# Patient Record
Sex: MALE | Race: BLACK OR AFRICAN AMERICAN | Employment: OTHER | URBAN - METROPOLITAN AREA
[De-identification: names, ages, dates, MRNs, and addresses within clinical notes are randomized per-mention and may not be internally consistent; named-entity substitution may affect disease eponyms.]

---

## 2017-01-09 RX ORDER — VALSARTAN 320 MG/1
320 TABLET ORAL DAILY
Qty: 90 TAB | Refills: 1 | Status: SHIPPED | OUTPATIENT
Start: 2017-01-09 | End: 2017-06-28 | Stop reason: SDUPTHER

## 2017-01-11 DIAGNOSIS — N52.9 ERECTILE DYSFUNCTION, UNSPECIFIED ERECTILE DYSFUNCTION TYPE: ICD-10-CM

## 2017-01-11 RX ORDER — LEVOTHYROXINE SODIUM 50 UG/1
TABLET ORAL
Qty: 90 TAB | Refills: 0 | Status: SHIPPED | OUTPATIENT
Start: 2017-01-11 | End: 2017-04-27 | Stop reason: SDUPTHER

## 2017-01-11 RX ORDER — TADALAFIL 20 MG/1
20 TABLET ORAL
Status: CANCELLED | OUTPATIENT
Start: 2017-01-11

## 2017-01-11 RX ORDER — TADALAFIL 20 MG/1
20 TABLET ORAL
Qty: 12 TAB | Refills: 5 | Status: SHIPPED | OUTPATIENT
Start: 2017-01-11

## 2017-01-11 NOTE — TELEPHONE ENCOUNTER
Requested Prescriptions     Pending Prescriptions Disp Refills    tadalafil (CIALIS) 20 mg tablet 12 Tab 5     Sig: Take 1 Tab by mouth every thirty-six (36) hours. Indications: Erectile Dysfunction    tadalafil (CIALIS) 20 mg tablet       Si Tab.

## 2017-02-21 ENCOUNTER — OFFICE VISIT (OUTPATIENT)
Dept: FAMILY MEDICINE CLINIC | Age: 67
End: 2017-02-21

## 2017-02-21 ENCOUNTER — HOSPITAL ENCOUNTER (OUTPATIENT)
Dept: LAB | Age: 67
Discharge: HOME | End: 2017-02-21
Payer: MEDICARE

## 2017-02-21 VITALS
SYSTOLIC BLOOD PRESSURE: 159 MMHG | TEMPERATURE: 95.6 F | HEART RATE: 86 BPM | HEIGHT: 77 IN | RESPIRATION RATE: 18 BRPM | DIASTOLIC BLOOD PRESSURE: 75 MMHG

## 2017-02-21 DIAGNOSIS — E03.9 HYPOTHYROIDISM, ADULT: ICD-10-CM

## 2017-02-21 DIAGNOSIS — E11.21 TYPE 2 DIABETES MELLITUS WITH DIABETIC NEPHROPATHY, WITH LONG-TERM CURRENT USE OF INSULIN (HCC): ICD-10-CM

## 2017-02-21 DIAGNOSIS — Z79.4 TYPE 2 DIABETES MELLITUS WITH DIABETIC NEPHROPATHY, WITH LONG-TERM CURRENT USE OF INSULIN (HCC): ICD-10-CM

## 2017-02-21 DIAGNOSIS — I10 ESSENTIAL HYPERTENSION: ICD-10-CM

## 2017-02-21 DIAGNOSIS — Z79.4 TYPE 2 DIABETES MELLITUS WITH DIABETIC NEPHROPATHY, WITH LONG-TERM CURRENT USE OF INSULIN (HCC): Primary | ICD-10-CM

## 2017-02-21 DIAGNOSIS — E78.5 DYSLIPIDEMIA: ICD-10-CM

## 2017-02-21 DIAGNOSIS — E11.21 TYPE 2 DIABETES MELLITUS WITH DIABETIC NEPHROPATHY, WITH LONG-TERM CURRENT USE OF INSULIN (HCC): Primary | ICD-10-CM

## 2017-02-21 LAB
ALBUMIN SERPL BCP-MCNC: 3.6 G/DL (ref 3.4–5)
ALBUMIN/GLOB SERPL: 1.2 {RATIO} (ref 0.8–1.7)
ALP SERPL-CCNC: 75 U/L (ref 45–117)
ALT SERPL-CCNC: 39 U/L (ref 16–61)
ANION GAP BLD CALC-SCNC: 8 MMOL/L (ref 3–18)
AST SERPL W P-5'-P-CCNC: 23 U/L (ref 15–37)
BILIRUB SERPL-MCNC: 0.3 MG/DL (ref 0.2–1)
BUN SERPL-MCNC: 23 MG/DL (ref 7–18)
BUN/CREAT SERPL: 15 (ref 12–20)
CALCIUM SERPL-MCNC: 8.8 MG/DL (ref 8.5–10.1)
CHLORIDE SERPL-SCNC: 102 MMOL/L (ref 100–108)
CO2 SERPL-SCNC: 31 MMOL/L (ref 21–32)
CREAT SERPL-MCNC: 1.51 MG/DL (ref 0.6–1.3)
EST. AVERAGE GLUCOSE BLD GHB EST-MCNC: 177 MG/DL
GLOBULIN SER CALC-MCNC: 3.1 G/DL (ref 2–4)
GLUCOSE SERPL-MCNC: 270 MG/DL (ref 74–99)
HBA1C MFR BLD: 7.8 % (ref 4.2–5.6)
POTASSIUM SERPL-SCNC: 4 MMOL/L (ref 3.5–5.5)
PROT SERPL-MCNC: 6.7 G/DL (ref 6.4–8.2)
SODIUM SERPL-SCNC: 141 MMOL/L (ref 136–145)

## 2017-02-21 PROCEDURE — 80053 COMPREHEN METABOLIC PANEL: CPT | Performed by: FAMILY MEDICINE

## 2017-02-21 PROCEDURE — 83036 HEMOGLOBIN GLYCOSYLATED A1C: CPT | Performed by: FAMILY MEDICINE

## 2017-02-21 PROCEDURE — 82043 UR ALBUMIN QUANTITATIVE: CPT | Performed by: FAMILY MEDICINE

## 2017-02-21 NOTE — PROGRESS NOTES
HISTORY OF PRESENT ILLNESS  Crispin Espinoza is a 74 y.o. male. HPI Comments: Patient is here for his three month follow up for DM/htn/hyperlipidemia. He is due for some blood work and has been taking his medications. He has been trying to adhere to his diet, he does not check his sugars at home. Diabetes   The history is provided by the patient. This is a chronic problem. The problem occurs constantly. The problem has not changed since onset. Pertinent negatives include no chest pain, no abdominal pain, no headaches and no shortness of breath. The symptoms are relieved by medications. Treatments tried: currently on trulicity and is doing well. The treatment provided mild relief. Hypertension    The history is provided by the patient. This is a chronic problem. The problem has not changed since onset. Pertinent negatives include no chest pain, no palpitations, no anxiety, no confusion, no malaise/fatigue, no blurred vision, no headaches, no tinnitus, no neck pain, no peripheral edema, no dizziness, no shortness of breath and no nausea. Risk factors include stress, hypertension, obesity, a sedentary lifestyle, male gender, dyslipidemia, diabetes mellitus and family history. Cholesterol Problem   The history is provided by the patient. This is a chronic problem. The problem occurs constantly. The problem has been gradually improving. Pertinent negatives include no chest pain, no abdominal pain, no headaches and no shortness of breath. The symptoms are aggravated by stress. The symptoms are relieved by medications. Treatments tried: currently on meds. Review of Systems   Constitutional: Negative for chills, fever and malaise/fatigue. HENT: Negative for congestion, ear discharge, sore throat and tinnitus. Eyes: Negative for blurred vision, double vision, pain and discharge. Respiratory: Negative for cough, sputum production, shortness of breath and wheezing.     Cardiovascular: Negative for chest pain, palpitations and leg swelling. Gastrointestinal: Negative for abdominal pain, diarrhea and nausea. Genitourinary: Negative for dysuria and frequency. Musculoskeletal: Negative for joint pain and neck pain. Neurological: Negative for dizziness, tingling, focal weakness, weakness and headaches. Psychiatric/Behavioral: Negative for confusion and depression. The patient is not nervous/anxious and does not have insomnia. Visit Vitals    /75 (BP 1 Location: Left arm, BP Patient Position: Sitting)    Pulse 86    Temp 95.6 °F (35.3 °C) (Oral)    Resp 18    Ht 6' 5\" (1.956 m)       Physical Exam   Constitutional: He is oriented to person, place, and time. He appears well-developed and well-nourished. No distress. HENT:   Head: Normocephalic and atraumatic. Right Ear: External ear normal.   Left Ear: External ear normal.   Mouth/Throat: Oropharynx is clear and moist.   Eyes: EOM are normal. Pupils are equal, round, and reactive to light. No scleral icterus. Neck: Normal range of motion. No thyromegaly present. Cardiovascular: Normal rate, regular rhythm and normal heart sounds. Pulmonary/Chest: Effort normal and breath sounds normal. No respiratory distress. He has no wheezes. Abdominal: Soft. Bowel sounds are normal. He exhibits no distension. There is no tenderness. Lymphadenopathy:     He has no cervical adenopathy. Neurological: He is alert and oriented to person, place, and time. Psychiatric: He has a normal mood and affect. ASSESSMENT and PLAN  Diabetes :  1)   Goal HBA1C < 7.  2)   Post prandial blood sugar less than or equal to 180.  3)   Exercise 30 min 3-5 times a week for goal BMI of 25.  4)   Please monitor blood sugars as directed and keep a log to bring in with you at each visit. 5)   Diabetic diet discussed and patient instructions to be handed out. 6)   Goal LDL< 100  7)   Goal BP< 120/80 mmhg.   8)   Annual eye exam and foot exam.  9)   Please examine you feet daily for any skin breakages or ulcers. 10) Referral made to see nutritionist for better dietary guidance. 11) Continue current medications as prescribed. 12) Explained the side effects of medication and patient verbalized understanding. 13) Please avoid smoking , alcohol and illicit drug use if applicable to you.  14) Please have blood work ordered today completed. 15) Please make sure you schedule your routine medical exam every 1-2 years. Hypertension :  1) Goal blood pressure less than equal to 140/90 mmHg, goal BP can vary depending on risk factors as discussed. 2) Lifestyle modifications discussed with patient, low sodium <2 gm, low salt , DASH diet  3) Exercise for at least 30 min 3-5 times a week for goal BMI of less than or equal  To 25.  4) Continue current medications as prescribed. 5) Please begin medication as discussed for better blood pressure control, explained side effects and patient verbalized understanding. 6) Goal LDL<100.  7) Please monitor your blood pressure and keep a log to bring in with you at each visit. 8) Discussed risk factors with patient such as CAD, FAST of stroke symptoms, pt verbalizes understanding. 9) Please avoid smoking , alcohol and any illicit drug use if applicable to you. Hyperlipidemia :  1) Goal LDL less than or equal to 100 mg/dl , total cholesterol less than or equal to 200 mg/dl. 2) Goal blood pressure less than or equal to 140/90 mmHg. 3) Discussed low - cholesterol and low fat diet, Good Fats vs Bad Fats. 4) Exercise 30 min 3-5 times a week for goal BMI of less than or equal to 25.  5) Continue current medication as prescribed for cholesterol control. 6) Explained the side effects of medication and patient verbalized understanding. 7) You may also begin fish oil 1000 mg 3-4 times a day for better cholesterol control. 8) You may also try red yeast rice for cholesterol control. 9) Please drink skim milk if you drink dairy products.    10) Please avoid smoking , alcohol , or any illicit drug use if applicable to you. 11) Regular cholesterol panel to be done every 6-12 months.  12) Discussed attributing risk factors , patient verbalized understanding.

## 2017-02-21 NOTE — MR AVS SNAPSHOT
Visit Information Date & Time Provider Department Dept. Phone Encounter #  
 2/21/2017 10:30 AM Fain Paget, MD [address] [phone] [id number] Follow-up Instructions Return in about 3 months (around 5/21/2017) for dm/htn/hyperlipidemia . Upcoming Health Maintenance Date Due COLONOSCOPY 11/19/2015 Pneumococcal 65+ High/Highest Risk (2 of 2 - PPSV23) 4/18/2017 HEMOGLOBIN A1C Q6M 5/21/2017 MICROALBUMIN Q1 11/21/2017 LIPID PANEL Q1 11/21/2017 MEDICARE YEARLY EXAM 11/22/2017 EYE EXAM RETINAL OR DILATED Q1 12/13/2017 FOOT EXAM Q1 1/17/2018 GLAUCOMA SCREENING Q2Y 12/13/2018 DTaP/Tdap/Td series (2 - Td) 2/21/2027 Allergies as of 2/21/2017  Review Complete On: 2/21/2017 By: Fain Paget, MD  
 No Known Allergies Current Immunizations  Reviewed on 3/3/2015 No immunizations on file. Not reviewed this visit You Were Diagnosed With   
  
 Codes Comments Type 2 diabetes mellitus with diabetic nephropathy, with long-term current use of insulin (HCC)    -  Primary ICD-10-CM: E11.21, Z79.4 ICD-9-CM: 250.40, 583.81, V58.67 Hypothyroidism, adult     ICD-10-CM: E03.9 ICD-9-CM: 101. 9 Dyslipidemia     ICD-10-CM: E78.5 ICD-9-CM: 272.4 Essential hypertension     ICD-10-CM: I10 
ICD-9-CM: 401.9 Vitals BP Pulse Temp Resp Height(growth percentile) Smoking Status 159/75 (BP 1 Location: Left arm, BP Patient Position: Sitting) 86 95.6 °F (35.3 °C) (Oral) 18 6' 5\" (1.956 m) Never Smoker Vitals History Preferred Pharmacy Pharmacy Name Phone [id number], [address] [facility] [phone] Your Updated Medication List  
  
   
This list is accurate as of: 2/21/17 10:59 AM.  Always use your most recent med list.  
  
  
  
  
 allopurinol 100 mg tablet Commonly known as:  Kaia Bee Take 2 Tabs by mouth daily.   
  
 * COLCHICINE PO  
0.6 mg.  
  
 * colchicine 0.6 mg tablet Take 1 Tab by mouth daily as needed. Comp Stocking,Knee,Long,X-Lrg Misc  
2 Each. diclofenac 1 % Gel Commonly known as:  VOLTAREN Apply 2 g to affected area four (4) times daily. diclofenac-miSOPROStol  mg-mcg per tablet Commonly known as:  ARTHROTEC 50 TAKE 1 TAB BY MOUTH TWO (2) TIMES A DAY. * dulaglutide 1.5 mg/0.5 mL sub-q pen Commonly known as:  TRULICITY  
0.5 ML BY SUBCUTANEOUS ROUTE EVERY SEVEN (7) DAYS. * dulaglutide 1.5 mg/0.5 mL sub-q pen Commonly known as:  TRULICITY INJECT 0.5 ML BY SUBCUTANEOUS ROUTE EVERY SEVEN (7) DAYS. Fenofibrate 150 mg Cap Take 145 mg by mouth daily. furosemide 80 mg tablet Commonly known as:  LASIX  
1 tab daily  
  
 gabapentin 300 mg capsule Commonly known as:  NEURONTIN Take 300 mg by mouth nightly. * levothyroxine 50 mcg tablet Commonly known as:  SYNTHROID  
TAKE 1 TAB BY MOUTH DAILY (BEFORE BREAKFAST) FOR 90 DAYS. INDICATIONS: HYPOTHYROIDISM * levothyroxine 50 mcg tablet Commonly known as:  SYNTHROID  
TAKE 1 TAB BY MOUTH DAILY (BEFORE BREAKFAST) FOR 90 DAYS. metoprolol tartrate 50 mg tablet Commonly known as:  LOPRESSOR Take 1 Tab by mouth every twelve (12) hours. potassium chloride 20 mEq tablet Commonly known as:  K-DUR, KLOR-CON Take 1 Tab by mouth daily. rosuvastatin 40 mg tablet Commonly known as:  CRESTOR  
TAKE 1 TAB BY MOUTH NIGHTLY FOR 90 DAYS. * tadalafil 20 mg tablet Commonly known as:  CIALIS  
20 mg.  
  
 * tadalafil 20 mg tablet Commonly known as:  CIALIS Take 1 Tab by mouth every thirty-six (36) hours. Indications: Erectile Dysfunction * testosterone 4 mg/24 hr Pt24 Commonly known as:  ANDRODERM  
1 Patch by TransDERmal route daily. Max Daily Amount: 1 Patch. * testosterone 2 mg/24 hour Pt24 Commonly known as:  ANDRODERM  
1 Patch by TransDERmal route daily. Max Daily Amount: 1 Patch. Indications: Male Hypogonadism Urea 40 % topical foam  
Apply  to affected area two (2) times a day. valsartan 320 mg tablet Commonly known as:  DIOVAN Take 1 Tab by mouth daily. * Notice: This list has 10 medication(s) that are the same as other medications prescribed for you. Read the directions carefully, and ask your doctor or other care provider to review them with you. Follow-up Instructions Return in about 3 months (around 5/21/2017) for dm/htn/hyperlipidemia . Introducing [facility]! Jesus Adames introduces [employer] patient portal. Now you can access parts of your medical record, email your doctor's office, and request medication refills online. 1. In your internet browser, go to [email or web address] 2. Click on the First Time User? Click Here link in the Sign In box. You will see the New Member Sign Up page. 3. Enter your [employer] Access Code exactly as it appears below. You will not need to use this code after youve completed the sign-up process. If you do not sign up before the expiration date, you must request a new code. · [employer] Access Code: [email or web address] Expires: 5/22/2017 10:11 AM 
 
4. Enter the last four digits of your Social Security Number (xxxx) and Date of Birth (mm/dd/yyyy) as indicated and click Submit. You will be taken to the next sign-up page. 5. Create a [employer] ID. This will be your [employer] login ID and cannot be changed, so think of one that is secure and easy to remember. 6. Create a [employer] password. You can change your password at any time. 7. Enter your Password Reset Question and Answer. This can be used at a later time if you forget your password. 8. Enter your e-mail address. You will receive e-mail notification when new information is available in [address] 9. Click Sign Up. You can now view and download portions of your medical record. 10. Click the Download Summary menu link to download a portable copy of your medical information. If you have questions, please visit the Frequently Asked Questions section of the [employer] website. Remember, [employer] is NOT to be used for urgent needs. For medical emergencies, dial 911. Now available from your iPhone and Android! Please provide this summary of care documentation to your next provider. Your primary care clinician is listed as Kelsey Romeo. If you have any questions after today's visit, please call [phone].

## 2017-02-21 NOTE — PROGRESS NOTES
Patient is here for dm and htn. 1. Have you been to the ER, urgent care clinic since your last visit? Hospitalized since your last visit?no    2. Have you seen or consulted any other health care providers outside of the [address] [place] [address] since your last visit? Include any pap smears or colon screening.  no

## 2017-02-22 LAB
CREAT UR-MCNC: 102.11 MG/DL (ref 30–125)
MICROALBUMIN UR-MCNC: 0.6 MG/DL (ref 0–3)
MICROALBUMIN/CREAT UR-RTO: 6 MG/G (ref 0–30)

## 2017-03-12 RX ORDER — DULAGLUTIDE 1.5 MG/.5ML
INJECTION, SOLUTION SUBCUTANEOUS
Qty: 4 SYRINGE | Refills: 0 | Status: SHIPPED | OUTPATIENT
Start: 2017-03-12 | End: 2018-02-05 | Stop reason: SDUPTHER

## 2017-03-20 RX ORDER — DULAGLUTIDE 1.5 MG/.5ML
INJECTION, SOLUTION SUBCUTANEOUS
Qty: 4 SYRINGE | Refills: 0 | Status: SHIPPED | OUTPATIENT
Start: 2017-03-20 | End: 2018-02-01 | Stop reason: SDUPTHER

## 2017-03-23 ENCOUNTER — TELEPHONE (OUTPATIENT)
Dept: FAMILY MEDICINE CLINIC | Age: 67
End: 2017-03-23

## 2017-03-23 NOTE — TELEPHONE ENCOUNTER
Pt states put in for refill for medication trulicity. States has to be 90 day supply in order for insurance to cover medication completley. Pt confirmed pharmacy on file.

## 2017-03-27 NOTE — TELEPHONE ENCOUNTER
[facility] pharmacy has medication Trulicity on hold. States [facility] pharmacy insurance will only cover a 90 day supply. [facility] has been without medication for 1 week now.

## 2017-03-31 RX ORDER — DICLOFENAC SODIUM AND MISOPROSTOL 50; 200 MG/1; UG/1
TABLET, DELAYED RELEASE ORAL
Qty: 90 TAB | Refills: 0 | Status: SHIPPED | OUTPATIENT
Start: 2017-03-31 | End: 2017-04-27 | Stop reason: SDUPTHER

## 2017-04-24 RX ORDER — DICLOFENAC SODIUM AND MISOPROSTOL 50; 200 MG/1; UG/1
TABLET, DELAYED RELEASE ORAL
Qty: 90 TAB | Refills: 0 | Status: SHIPPED | OUTPATIENT
Start: 2017-04-24 | End: 2018-10-03

## 2017-04-25 ENCOUNTER — TELEPHONE (OUTPATIENT)
Dept: FAMILY MEDICINE CLINIC | Age: 67
End: 2017-04-25

## 2017-04-27 RX ORDER — LEVOTHYROXINE SODIUM 50 UG/1
TABLET ORAL
Qty: 90 TAB | Refills: 0 | Status: SHIPPED | OUTPATIENT
Start: 2017-04-27

## 2017-04-27 RX ORDER — DICLOFENAC SODIUM AND MISOPROSTOL 50; 200 MG/1; UG/1
1 TABLET, DELAYED RELEASE ORAL 2 TIMES DAILY
Qty: 180 TAB | Refills: 0 | Status: SHIPPED | OUTPATIENT
Start: 2017-04-27 | End: 2017-08-07 | Stop reason: SDUPTHER

## 2017-04-30 RX ORDER — DICLOFENAC SODIUM AND MISOPROSTOL 50; 200 MG/1; UG/1
TABLET, DELAYED RELEASE ORAL
Qty: 90 TAB | Refills: 0 | Status: SHIPPED | OUTPATIENT
Start: 2017-04-30 | End: 2018-02-01 | Stop reason: SDUPTHER

## 2017-04-30 RX ORDER — LEVOTHYROXINE SODIUM 50 UG/1
TABLET ORAL
Qty: 90 TAB | Refills: 0 | Status: SHIPPED | OUTPATIENT
Start: 2017-04-30 | End: 2017-10-24 | Stop reason: SDUPTHER

## 2017-05-02 ENCOUNTER — OFFICE VISIT (OUTPATIENT)
Dept: FAMILY MEDICINE CLINIC | Age: 67
End: 2017-05-02

## 2017-05-02 VITALS
OXYGEN SATURATION: 100 % | BODY MASS INDEX: 37.19 KG/M2 | WEIGHT: 315 LBS | HEIGHT: 77 IN | TEMPERATURE: 97.4 F | SYSTOLIC BLOOD PRESSURE: 150 MMHG | RESPIRATION RATE: 16 BRPM | DIASTOLIC BLOOD PRESSURE: 62 MMHG | HEART RATE: 86 BPM

## 2017-05-02 DIAGNOSIS — Z13.39 SCREENING FOR ALCOHOLISM: ICD-10-CM

## 2017-05-02 DIAGNOSIS — Z00.00 ROUTINE GENERAL MEDICAL EXAMINATION AT A HEALTH CARE FACILITY: ICD-10-CM

## 2017-05-02 DIAGNOSIS — Z00.00 MEDICARE ANNUAL WELLNESS VISIT, SUBSEQUENT: Primary | ICD-10-CM

## 2017-05-02 NOTE — PROGRESS NOTES
Patient is here for subsequent wellness visit. 1. Have you been to the ER, urgent care clinic since your last visit? Hospitalized since your last visit?no    2. Have you seen or consulted any other health care providers outside of the [address] [place] [address] since your last visit? Include any pap smears or colon screening.  no

## 2017-05-02 NOTE — PROGRESS NOTES
HISTORY OF PRESENT ILLNESS  Jermaine Hines is a 74 y.o. male. HPI Comments: Patient is here for his annual wellness visit and he is up to date on almost all health screening except for colonoscopy which is not yet due. Patient does see an eye doctor and dentist.    Annual Exam   The history is provided by the patient. This is a chronic problem. The problem occurs constantly. The problem has been gradually improving. Pertinent negatives include no chest pain, no abdominal pain, no headaches and no shortness of breath. Nothing aggravates the symptoms. Nothing relieves the symptoms. He has tried nothing for the symptoms. Review of Systems   Constitutional: Negative for chills, fever and malaise/fatigue. Morbidly obese   HENT: Negative for nosebleeds and sore throat. Eyes: Negative for blurred vision, double vision, pain and discharge. Respiratory: Negative for cough, sputum production, shortness of breath and wheezing. Cardiovascular: Positive for leg swelling. Negative for chest pain and palpitations. Chronic lower leg swelling on both legs   Gastrointestinal: Negative for abdominal pain and vomiting. Musculoskeletal: Positive for joint pain. Neurological: Negative for tingling, focal weakness, weakness and headaches. Psychiatric/Behavioral: Negative for depression. The patient is not nervous/anxious. Visit Vitals    /62 (BP 1 Location: Left arm, BP Patient Position: Sitting)    Pulse 86    Temp 97.4 °F (36.3 °C) (Oral)    Resp 16    Ht 6' 5\" (1.956 m)    Wt (!) 458 lb (207.7 kg)    SpO2 100%    BMI 54.31 kg/m2       Physical Exam   Constitutional: He is oriented to person, place, and time. He appears well-developed and well-nourished. No distress. HENT:   Head: Normocephalic and atraumatic.    Right Ear: External ear normal.   Left Ear: External ear normal.   Mouth/Throat: Oropharynx is clear and moist.   Eyes: EOM are normal. Pupils are equal, round, and reactive to light. No scleral icterus. Neck: Normal range of motion. No thyromegaly present. Cardiovascular: Normal rate, regular rhythm and normal heart sounds. Pulmonary/Chest: Effort normal and breath sounds normal. No respiratory distress. He has no wheezes. Abdominal: Soft. Bowel sounds are normal. He exhibits no distension. There is no tenderness. Lymphadenopathy:     He has no cervical adenopathy. Neurological: He is alert and oriented to person, place, and time. Psychiatric: He has a normal mood and affect. This is a Subsequent Medicare Annual Wellness Visit providing Personalized Prevention Plan Services (PPPS) (Performed 12 months after initial AWV and PPPS )    I have reviewed the patient's medical history in detail and updated the computerized patient record. History     Past Medical History:   Diagnosis Date    Anemia of chronic disease     Arthritis     Chronic renal insufficiency     Diabetes ([facility]     Dyslipidemia     Gout     Gout     Hypertension     Lymphedema     Morbid obesity ([facility]       Past Surgical History:   Procedure Laterality Date    HX ORTHOPAEDIC  3/2015     Lt Foot ulcer     Current Outpatient Prescriptions   Medication Sig Dispense Refill    levothyroxine (SYNTHROID) 50 mcg tablet TAKE 1 TAB BY MOUTH DAILY (BEFORE BREAKFAST) FOR 90 DAYS. 90 Tab 0    diclofenac-miSOPROStol (ARTHROTEC 50)  mg-mcg per tablet TAKE 1 TABLET BY MOUTH TWICE A DAY 90 Tab 0    levothyroxine (SYNTHROID) 50 mcg tablet TAKE 1 TAB BY MOUTH DAILY (BEFORE BREAKFAST) FOR 90 DAYS. 90 Tab 0    diclofenac-miSOPROStol (ARTHROTEC 50)  mg-mcg per tablet Take 1 Tab by mouth two (2) times a day. 180 Tab 0    diclofenac-miSOPROStol (ARTHROTEC 50)  mg-mcg per tablet TAKE 1 TABLET BY MOUTH TWO (2) TIMES A DAY.  90 Tab 0    TRULICITY 1.5 HR/3.5 mL sub-q pen INJECT 0.5 ML BY SUBCUTANEOUS ROUTE EVERY SEVEN (7) DAYS. 4 Syringe 0    TRULICITY 1.5 YZ/3.8 mL sub-q pen INJECT 0.5 ML BY SUBCUTANEOUS ROUTE EVERY SEVEN (7) DAYS. 4 Syringe 0    tadalafil (CIALIS) 20 mg tablet Take 1 Tab by mouth every thirty-six (36) hours. Indications: Erectile Dysfunction 12 Tab 5    valsartan (DIOVAN) 320 mg tablet Take 1 Tab by mouth daily. 90 Tab 1    levothyroxine (SYNTHROID) 50 mcg tablet TAKE 1 TAB BY MOUTH DAILY (BEFORE BREAKFAST) FOR 90 DAYS. INDICATIONS: HYPOTHYROIDISM 90 Tab 0    potassium chloride (K-DUR, KLOR-CON) 20 mEq tablet Take 1 Tab by mouth daily. 90 Tab 3    diclofenac (VOLTAREN) 1 % gel Apply 2 g to affected area four (4) times daily. 2 g 0    rosuvastatin (CRESTOR) 40 mg tablet TAKE 1 TAB BY MOUTH NIGHTLY FOR 90 DAYS. 90 Tab 1    testosterone (ANDRODERM) 2 mg/24 hour pt24 1 Patch by TransDERmal route daily. Max Daily Amount: 1 Patch. Indications: Male Hypogonadism 90 Patch 1    testosterone (ANDRODERM) 4 mg/24 hr pt24 1 Patch by TransDERmal route daily. Max Daily Amount: 1 Patch. 60 Patch 0    dulaglutide (TRULICITY) 1.5 MZ/4.1 mL sub-q pen 0.5 ML BY SUBCUTANEOUS ROUTE EVERY SEVEN (7) DAYS. 7.5 Syringe 0    Comp Stocking,Knee,Long,X-Lrg misc 2 Each.  tadalafil (CIALIS, ADCIRCA) 20 mg tablet 20 mg.      COLCHICINE PO 0.6 mg.      colchicine 0.6 mg tablet Take 1 Tab by mouth daily as needed. 30 Tab 0    furosemide (LASIX) 80 mg tablet 1 tab daily 90 Tab 3    gabapentin (NEURONTIN) 300 mg capsule Take 300 mg by mouth nightly.  Urea 40 % topical foam Apply  to affected area two (2) times a day. 1 Can 0    allopurinol (ZYLOPRIM) 100 mg tablet Take 2 Tabs by mouth daily. 30 Tab 0    metoprolol (LOPRESSOR) 50 mg tablet Take 1 Tab by mouth every twelve (12) hours. 60 Tab 0    Fenofibrate 150 mg cap Take 145 mg by mouth daily.        No Known Allergies  Family History   Problem Relation Age of Onset   [facility] Cancer Mother     No Known Problems Father      Social History   Substance Use Topics    Smoking status: Never Smoker    Smokeless tobacco: Not on file    Alcohol use No     Patient Active Problem List   Diagnosis Code    Diabetic foot ulcer ([facility] E11.621, K15.467    Diastolic dysfunction E08.7    Dyslipidemia E78.5    Cellulitis and abscess of foot, except toes L03.119, L02.619    Lymphedema of both lower extremities I89.0    CKD (chronic kidney disease) stage 4, GFR 15-29 ml/min ([facility]) N18.4    Morbid obesity with BMI of 70 and over, adult ([facility] E66.01, Z68.45    Type 2 diabetes mellitus with diabetic nephropathy ([facility] E11.21    Erectile dysfunction N52.9    Hypothyroidism, adult E03.9    Hypogonadism in male E29.1    Borderline anemia D64.9    Idiopathic gout of multiple sites M10.09       Depression Risk Factor Screening:     PHQ 2 / 9, over the last two weeks 5/2/2017   Little interest or pleasure in doing things Not at all   Feeling down, depressed or hopeless Not at all   Total Score PHQ 2 0     Alcohol Risk Factor Screening:   Patient does not drink alcohol. Functional Ability and Level of Safety:   Denies difficulty walking or getting around. The patient does not use an assistive device. The patient has no difficulty sitting down or standing from a seated position. The patient denies any difficulty getting out of bed and denies any difficulty getting out of bath or shower. The patient has no trouble toileting and is experiencing no incontinence. The patient uses no incontinence products. Patient has no difficulty preparing balanced nutritious meals , groms and is able to dress and undress without any difficulty. The patient has no difficulty taking prescribed medications properly and has no difficulty with personal hygiene such as brushing teeth , bathing , or showering. Hearing Loss   Good hearing on whisper testing , 2 feel behind the patient. Activities of Daily Living   Denies any difficulty with any indoor household activities and denies experiencing any short term memory loss.       Fall Risk     Fall Risk Assessment, last [facility] 5/2/2017   Able to walk? Yes   Fall in past 12 months? No     Abuse Screen   Patient is not abused    Review of Systems   See above    Physical Examination   See above  Evaluation of Cognitive Function:  The patient has not lost interest in activities. Has no feelings of hopelessness. Sleep hygiene is good and has good daily energy. Appetite is also appropriate. Does not feel bad about herself, has no trouble concentrating. No suicidal ideation. Patient Care Team:  Idalia Vasquez MD as PCP - General (Internal Medicine)  Thelma Castillo MD (Inactive) as Physician (Ophthalmology)    Advice/Referrals/Counseling   Education and counseling provided:  End-of-Life planning (with patient's consent)  Pneumococcal Vaccine  Influenza Vaccine  Hepatitis B Vaccine  Prostate cancer screening tests (PSA, covered annually)  Colorectal cancer screening tests  Cardiovascular screening blood test  Bone mass measurement (DEXA)  Screening for glaucoma  Diabetes screening test      Assessment/Plan     Medicare annual wellness , subsequent:  1) Please make sure you have a routine physical exam every 1-2 years. 2) Annual check up with eye doctor and dentist.  3) Colorectal cancer screening with colonoscopy every ten years at age 48. Depending on certain risk factors screening may need to be done earlier. 4) Rectal exam and PSA screening at age of 50, screening may be done earlier depending on certain risk factors as discussed.    ) Bone density testing starting at the age of 72.   ) Routine blood work to be ordered as part of physical exam and has been discussed with patient.  ) Screening for STD's/HIV.  ) Exercise at least 30 min 3-5 times a week for goal BMI of less than or equal to 25.    Please make sure you wear a seat belt while driving daily , helmet safety discussed.  ) Please avoid smoking , alcohol and illicit drug use.  ) Daily requirement of calcium is 1200 mg per day and 1000 IU of vitamin D.  ) Please make sure all immunizations are up to date:       - Influenza vaccine every year        - Tdap every 10 years       - Pneumococcal vaccine starting at age of 72       - Shingles at age 63

## 2017-05-02 NOTE — MR AVS SNAPSHOT
Visit Information Date & Time Provider Department Dept. Phone Encounter #  
 5/2/2017 10:30 AM Artem De La Cruz MD [address] [facility] [address] [phone] [id number] Follow-up Instructions Return in about 1 month (around 6/2/2017) for dm/htn/hyperlipidemia/thyroid/testosterone and will need blood work done then. Follow-up and Disposition History Upcoming Health Maintenance Date Due COLONOSCOPY 11/19/2015 INFLUENZA AGE 9 TO ADULT 8/1/2017 HEMOGLOBIN A1C Q6M 8/21/2017 LIPID PANEL Q1 11/21/2017 MEDICARE YEARLY EXAM 11/22/2017 EYE EXAM RETINAL OR DILATED Q1 12/13/2017 FOOT EXAM Q1 1/17/2018 MICROALBUMIN Q1 2/21/2018 Pneumococcal 65+ Low/Medium Risk (2 of 2 - PPSV23) 5/2/2018 GLAUCOMA SCREENING Q2Y 12/13/2018 DTaP/Tdap/Td series (2 - Td) 2/21/2027 Allergies as of 5/2/2017  Review Complete On: 5/2/2017 By: Armando Moore LPN No Known Allergies Current Immunizations  Reviewed on 3/3/2015 No immunizations on file. Not reviewed this visit You Were Diagnosed With   
  
 Codes Comments Medicare annual wellness visit, subsequent    -  Primary ICD-10-CM: Z00.00 ICD-9-CM: V70.0 Routine general medical examination at a health care facility     ICD-10-CM: Z00.00 ICD-9-CM: V70.0 Screening for alcoholism     ICD-10-CM: Z13.89 ICD-9-CM: V79.1 Vitals BP Pulse Temp Resp Height(growth percentile) Weight(growth percentile) 150/62 (BP 1 Location: Left arm, BP Patient Position: Sitting) 86 97.4 °F (36.3 °C) (Oral) 16 6' 5\" (1.956 m) (!) 458 lb (207.7 kg) SpO2 BMI Smoking Status 100% 54.31 kg/m2 Never Smoker Vitals History BMI and BSA Data Body Mass Index Body Surface Area 54.31 kg/m 2 3.36 m 2 Preferred Pharmacy Pharmacy Name Phone [id number] Krzysztof Faulkner, [address] [facility] [phone] Your Updated Medication List  
  
   
 This list is accurate as of: 5/2/17 10:44 AM.  Always use your most recent med list.  
  
  
  
  
 allopurinol 100 mg tablet Commonly known as:  Ritta Popper Take 2 Tabs by mouth daily. * COLCHICINE PO  
0.6 mg.  
  
 * colchicine 0.6 mg tablet Take 1 Tab by mouth daily as needed. Comp Stocking,Knee,Long,X-Lrg Misc  
2 Each. diclofenac 1 % Gel Commonly known as:  VOLTAREN Apply 2 g to affected area four (4) times daily. * diclofenac-miSOPROStol  mg-mcg per tablet Commonly known as:  ARTHROTEC 50 TAKE 1 TABLET BY MOUTH TWO (2) TIMES A DAY. * diclofenac-miSOPROStol  mg-mcg per tablet Commonly known as:  ARTHROTEC 50 Take 1 Tab by mouth two (2) times a day. * diclofenac-miSOPROStol  mg-mcg per tablet Commonly known as:  ARTHROTEC 50 TAKE 1 TABLET BY MOUTH TWICE A DAY  
  
 * dulaglutide 1.5 mg/0.5 mL sub-q pen Commonly known as:  TRULICITY  
0.5 ML BY SUBCUTANEOUS ROUTE EVERY SEVEN (7) DAYS. * TRULICITY 1.5 AP/1.1 mL sub-q pen Generic drug:  dulaglutide INJECT 0.5 ML BY SUBCUTANEOUS ROUTE EVERY SEVEN (7) DAYS. * TRULICITY 1.5 GI/8.1 mL sub-q pen Generic drug:  dulaglutide INJECT 0.5 ML BY SUBCUTANEOUS ROUTE EVERY SEVEN (7) DAYS. Fenofibrate 150 mg Cap Take 145 mg by mouth daily. furosemide 80 mg tablet Commonly known as:  LASIX  
1 tab daily  
  
 gabapentin 300 mg capsule Commonly known as:  NEURONTIN Take 300 mg by mouth nightly. * levothyroxine 50 mcg tablet Commonly known as:  SYNTHROID  
TAKE 1 TAB BY MOUTH DAILY (BEFORE BREAKFAST) FOR 90 DAYS. INDICATIONS: HYPOTHYROIDISM * levothyroxine 50 mcg tablet Commonly known as:  SYNTHROID  
TAKE 1 TAB BY MOUTH DAILY (BEFORE BREAKFAST) FOR 90 DAYS. * levothyroxine 50 mcg tablet Commonly known as:  SYNTHROID  
TAKE 1 TAB BY MOUTH DAILY (BEFORE BREAKFAST) FOR 90 DAYS. metoprolol tartrate 50 mg tablet Commonly known as:  LOPRESSOR Take 1 Tab by mouth every twelve (12) hours. potassium chloride 20 mEq tablet Commonly known as:  K-DUR, KLOR-CON Take 1 Tab by mouth daily. rosuvastatin 40 mg tablet Commonly known as:  CRESTOR  
TAKE 1 TAB BY MOUTH NIGHTLY FOR 90 DAYS. * tadalafil 20 mg tablet Commonly known as:  CIALIS  
20 mg.  
  
 * tadalafil 20 mg tablet Commonly known as:  CIALIS Take 1 Tab by mouth every thirty-six (36) hours. Indications: Erectile Dysfunction * testosterone 4 mg/24 hr Pt24 Commonly known as:  ANDRODERM  
1 Patch by TransDERmal route daily. Max Daily Amount: 1 Patch. * testosterone 2 mg/24 hour Pt24 Commonly known as:  ANDRODERM  
1 Patch by TransDERmal route daily. Max Daily Amount: 1 Patch. Indications: Male Hypogonadism Urea 40 % topical foam  
Apply  to affected area two (2) times a day. valsartan 320 mg tablet Commonly known as:  DIOVAN Take 1 Tab by mouth daily. * Notice: This list has 15 medication(s) that are the same as other medications prescribed for you. Read the directions carefully, and ask your doctor or other care provider to review them with you. Follow-up Instructions Return in about 1 month (around 6/2/2017) for dm/htn/hyperlipidemia/thyroid/testosterone and will need blood work done then. Introducing [facility]! [employer] introduces [email or web address] patient portal. Now you can access parts of your medical record, email your doctor's office, and request medication refills online. 1. In your internet browser, go to [email or web address] 2. Click on the First Time User? Click Here link in the Sign In box. You will see the New Member Sign Up page. 3. Enter your [email or web address] Access Code exactly as it appears below. You will not need to use this code after youve completed the sign-up process. If you do not sign up before the expiration date, you must request a new code. · 1DayLater Access Code: [email or web address] Expires: 5/22/2017 11:11 AM 
 
4. Enter the last four digits of your Social Security Number (xxxx) and Date of Birth (mm/dd/yyyy) as indicated and click Submit. You will be taken to the next sign-up page. 5. Create a 1DayLater ID. This will be your 1DayLater login ID and cannot be changed, so think of one that is secure and easy to remember. 6. Create a 1DayLater password. You can change your password at any time. 7. Enter your Password Reset Question and Answer. This can be used at a later time if you forget your password. 8. Enter your e-mail address. You will receive e-mail notification when new information is available in [address] 9. Click Sign Up. You can now view and download portions of your medical record. 10. Click the Download Summary menu link to download a portable copy of your medical information. If you have questions, please visit the Frequently Asked Questions section of the 1DayLater website. Remember, 1DayLater is NOT to be used for urgent needs. For medical emergencies, dial 911. Now available from your iPhone and Android! Please provide this summary of care documentation to your next provider. Your primary care clinician is listed as Kelsey Urbina. If you have any questions after today's visit, please call [phone].

## 2017-06-01 RX ORDER — TESTOSTERONE 2 MG/D
PATCH TRANSDERMAL
Qty: 90 PATCH | Refills: 3 | Status: SHIPPED | OUTPATIENT
Start: 2017-06-01 | End: 2017-06-05 | Stop reason: SDUPTHER

## 2017-06-02 ENCOUNTER — TELEPHONE (OUTPATIENT)
Dept: FAMILY MEDICINE CLINIC | Age: 67
End: 2017-06-02

## 2017-06-05 ENCOUNTER — OFFICE VISIT (OUTPATIENT)
Dept: FAMILY MEDICINE CLINIC | Age: 67
End: 2017-06-05

## 2017-06-05 VITALS
HEART RATE: 87 BPM | HEIGHT: 77 IN | TEMPERATURE: 96.7 F | OXYGEN SATURATION: 96 % | DIASTOLIC BLOOD PRESSURE: 95 MMHG | WEIGHT: 315 LBS | RESPIRATION RATE: 17 BRPM | SYSTOLIC BLOOD PRESSURE: 153 MMHG | BODY MASS INDEX: 37.19 KG/M2

## 2017-06-05 DIAGNOSIS — Z76.0 MEDICATION REFILL: ICD-10-CM

## 2017-06-05 DIAGNOSIS — M10.09 IDIOPATHIC GOUT OF MULTIPLE SITES, UNSPECIFIED CHRONICITY: ICD-10-CM

## 2017-06-05 DIAGNOSIS — E11.21 TYPE 2 DIABETES MELLITUS WITH DIABETIC NEPHROPATHY, WITH LONG-TERM CURRENT USE OF INSULIN (HCC): Primary | ICD-10-CM

## 2017-06-05 DIAGNOSIS — E03.9 HYPOTHYROIDISM, ADULT: ICD-10-CM

## 2017-06-05 DIAGNOSIS — E78.5 DYSLIPIDEMIA: ICD-10-CM

## 2017-06-05 DIAGNOSIS — Z79.4 TYPE 2 DIABETES MELLITUS WITH DIABETIC NEPHROPATHY, WITH LONG-TERM CURRENT USE OF INSULIN (HCC): Primary | ICD-10-CM

## 2017-06-05 DIAGNOSIS — E29.1 HYPOGONADISM IN MALE: ICD-10-CM

## 2017-06-05 RX ORDER — COLCHICINE 0.6 MG/1
0.6 TABLET ORAL
Qty: 30 TAB | Refills: 0 | Status: SHIPPED | OUTPATIENT
Start: 2017-06-05 | End: 2017-06-28 | Stop reason: SDUPTHER

## 2017-06-05 NOTE — MR AVS SNAPSHOT
Visit Information Date & Time Provider Department Dept. Phone Encounter #  
 6/5/2017  8:45 AM Helio Harmon MD [address] [facility] [phone] [id number] Your Appointments 9/5/2017 10:15 AM  
ROUTINE CARE with Helio Harmon MD  
[address] [facility] [address]) [address] [facility] [address] [place] [address]  
[phone]  
  
   
 [address] [facility] [address] Upcoming Health Maintenance Date Due COLONOSCOPY 11/19/2015 INFLUENZA AGE 9 TO ADULT 8/1/2017 HEMOGLOBIN A1C Q6M 8/21/2017 LIPID PANEL Q1 11/21/2017 EYE EXAM RETINAL OR DILATED Q1 12/13/2017 FOOT EXAM Q1 1/17/2018 MICROALBUMIN Q1 2/21/2018 Pneumococcal 65+ Low/Medium Risk (2 of 2 - PPSV23) 5/2/2018 MEDICARE YEARLY EXAM 5/3/2018 GLAUCOMA SCREENING Q2Y 12/13/2018 DTaP/Tdap/Td series (2 - Td) 2/21/2027 Allergies as of 6/5/2017  Review Complete On: 5/2/2017 By: Jayce Noonan LPN No Known Allergies Current Immunizations  Reviewed on 3/3/2015 No immunizations on file. Not reviewed this visit You Were Diagnosed With   
  
 Codes Comments Type 2 diabetes mellitus with diabetic nephropathy, with long-term current use of insulin (HCC)    -  Primary ICD-10-CM: E11.21, Z79.4 ICD-9-CM: 250.40, 583.81, V58.67 Dyslipidemia     ICD-10-CM: E78.5 ICD-9-CM: 272.4 Hypogonadism in male     ICD-10-CM: E29.1 ICD-9-CM: 257.2 Idiopathic gout of multiple sites, unspecified chronicity     ICD-10-CM: M10.09 
ICD-9-CM: 274.00 Medication refill     ICD-10-CM: Z76.0 ICD-9-CM: V68.1 Hypothyroidism, adult     ICD-10-CM: E03.9 ICD-9-CM: 130. 9 Vitals BP Pulse Temp Resp Height(growth percentile) Weight(growth percentile) (!) 153/95 (BP 1 Location: Left arm, BP Patient Position: Sitting) 87 96.7 °F (35.9 °C) (Oral) 17 6' 5\" (1.956 m) (!) 463 lb 3.2 oz (210.1 kg) SpO2 BMI Smoking Status 96% 54.93 kg/m2 Never Smoker BMI and BSA Data Body Mass Index Body Surface Area 54.93 kg/m 2 3.38 m 2 Preferred Pharmacy Pharmacy Name Phone [id number] Andie Hastings, [address] [facility] [phone] Your Updated Medication List  
  
   
This list is accurate as of: 6/5/17  9:23 AM.  Always use your most recent med list.  
  
  
  
  
 allopurinol 100 mg tablet Commonly known as:  Usman Hurt Take 2 Tabs by mouth daily. * COLCHICINE PO  
0.6 mg.  
  
 * colchicine 0.6 mg tablet Take 1 Tab by mouth daily as needed. Comp Stocking,Knee,Long,X-Lrg Misc  
2 Each. diclofenac 1 % Gel Commonly known as:  VOLTAREN Apply 2 g to affected area four (4) times daily. * diclofenac-miSOPROStol  mg-mcg per tablet Commonly known as:  ARTHROTEC 50 TAKE 1 TABLET BY MOUTH TWO (2) TIMES A DAY. * diclofenac-miSOPROStol  mg-mcg per tablet Commonly known as:  ARTHROTEC 50 Take 1 Tab by mouth two (2) times a day. * diclofenac-miSOPROStol  mg-mcg per tablet Commonly known as:  ARTHROTEC 50 TAKE 1 TABLET BY MOUTH TWICE A DAY  
  
 * dulaglutide 1.5 mg/0.5 mL sub-q pen Commonly known as:  TRULICITY  
0.5 ML BY SUBCUTANEOUS ROUTE EVERY SEVEN (7) DAYS. * TRULICITY 1.5 DZ/8.4 mL sub-q pen Generic drug:  dulaglutide INJECT 0.5 ML BY SUBCUTANEOUS ROUTE EVERY SEVEN (7) DAYS. * TRULICITY 1.5 NO/3.6 mL sub-q pen Generic drug:  dulaglutide INJECT 0.5 ML BY SUBCUTANEOUS ROUTE EVERY SEVEN (7) DAYS. Fenofibrate 150 mg Cap Take 145 mg by mouth daily. furosemide 80 mg tablet Commonly known as:  LASIX  
1 tab daily  
  
 gabapentin 300 mg capsule Commonly known as:  NEURONTIN Take 300 mg by mouth nightly. * levothyroxine 50 mcg tablet Commonly known as:  SYNTHROID  
TAKE 1 TAB BY MOUTH DAILY (BEFORE BREAKFAST) FOR 90 DAYS.  INDICATIONS: HYPOTHYROIDISM * levothyroxine 50 mcg tablet Commonly known as:  SYNTHROID  
TAKE 1 TAB BY MOUTH DAILY (BEFORE BREAKFAST) FOR 90 DAYS. * levothyroxine 50 mcg tablet Commonly known as:  SYNTHROID  
TAKE 1 TAB BY MOUTH DAILY (BEFORE BREAKFAST) FOR 90 DAYS. metoprolol tartrate 50 mg tablet Commonly known as:  LOPRESSOR Take 1 Tab by mouth every twelve (12) hours. potassium chloride 20 mEq tablet Commonly known as:  K-DUR, KLOR-CON Take 1 Tab by mouth daily. rosuvastatin 40 mg tablet Commonly known as:  CRESTOR  
TAKE 1 TAB BY MOUTH NIGHTLY FOR 90 DAYS. * tadalafil 20 mg tablet Commonly known as:  CIALIS  
20 mg.  
  
 * tadalafil 20 mg tablet Commonly known as:  CIALIS Take 1 Tab by mouth every thirty-six (36) hours. Indications: Erectile Dysfunction * testosterone 4 mg/24 hr Pt24 Commonly known as:  ANDRODERM  
1 Patch by TransDERmal route daily. Max Daily Amount: 1 Patch. * testosterone 2 mg/24 hour Pt24 Commonly known as:  ANDRODERM  
APPLY 1 PATCH TRANSDERMALLY ONCE DAILY Urea 40 % topical foam  
Apply  to affected area two (2) times a day. valsartan 320 mg tablet Commonly known as:  DIOVAN Take 1 Tab by mouth daily. * Notice: This list has 15 medication(s) that are the same as other medications prescribed for you. Read the directions carefully, and ask your doctor or other care provider to review them with you. Prescriptions Printed Refills  
 testosterone (ANDRODERM) 2 mg/24 hour pt24 3 Sig: APPLY 1 PATCH TRANSDERMALLY ONCE DAILY Class: Print Prescriptions Sent to Pharmacy Refills  
 colchicine 0.6 mg tablet 0 Sig: Take 1 Tab by mouth daily as needed. Class: Normal  
 Pharmacy: [facility]acy #[id number] [place], [address] Ph #: [phone] Route: Oral  
  
Introducing [facility]!    
 [employer] introduces [employer] patient portal. Now you can access parts of your medical record, email your doctor's office, and request medication refills online. 1. In your internet browser, go to [email or web address] 2. Click on the First Time User? Click Here link in the Sign In box. You will see the New Member Sign Up page. 3. Enter your [employer] Access Code exactly as it appears below. You will not need to use this code after youve completed the sign-up process. If you do not sign up before the expiration date, you must request a new code. · [employer] Access Code: [email or web address] Expires: 9/3/2017  9:23 AM 
 
4. Enter the last four digits of your Social Security Number (xxxx) and Date of Birth (mm/dd/yyyy) as indicated and click Submit. You will be taken to the next sign-up page. 5. Create a [employer] ID. This will be your [employer] login ID and cannot be changed, so think of one that is secure and easy to remember. 6. Create a [employer] password. You can change your password at any time. 7. Enter your Password Reset Question and Answer. This can be used at a later time if you forget your password. 8. Enter your e-mail address. You will receive e-mail notification when new information is available in [address] 9. Click Sign Up. You can now view and download portions of your medical record. 10. Click the Download Summary menu link to download a portable copy of your medical information. If you have questions, please visit the Frequently Asked Questions section of the [employer] website. Remember, [employer] is NOT to be used for urgent needs. For medical emergencies, dial 911. Now available from your iPhone and Android! Please provide this summary of care documentation to your next provider. Your primary care clinician is listed as Kelsey Raymond. If you have any questions after today's visit, please call [phone].

## 2017-06-05 NOTE — PROGRESS NOTES
Cesilia Javier is a 74 y.o. male presents to office for DM, HTN, cholesterol and thyroid problem.       1. Have you been to the ER, urgent care clinic or hospitalized since your last visit? no          Health Maintenance items with a due date reviewed with patient:  Health Maintenance Due   Topic Date Due    COLONOSCOPY  11/19/2015

## 2017-06-05 NOTE — PROGRESS NOTES
HISTORY OF PRESENT ILLNESS  Vin Mistry is a 74 y.o. male. HPI Comments: Patient is here for his 3 month follow up for Dm/Htn and hyperlipidemia. He mentions he will make an appointment to see an eye doctor and he also is due to see some of his specialist. He has been taking his medications and does not check his blood sugars on a daily basis. He is due for some blood work today. He has no  Other complaints. Diabetes   The history is provided by the patient. This is a chronic problem. The problem occurs constantly. The problem has been gradually improving. Pertinent negatives include no chest pain, no abdominal pain, no headaches and no shortness of breath. The symptoms are aggravated by eating and stress. The symptoms are relieved by medications. Treatments tried: currently on meds. Hypertension    The history is provided by the patient. This is a chronic problem. The problem has not changed since onset. Pertinent negatives include no chest pain, no palpitations, no confusion, no malaise/fatigue, no blurred vision, no headaches, no neck pain, no peripheral edema, no dizziness, no shortness of breath, no nausea and no vomiting. Risk factors include obesity, hypertension, stress, male gender, diabetes mellitus and dyslipidemia. Cholesterol Problem   The history is provided by the patient. This is a chronic problem. The problem occurs constantly. The problem has been gradually worsening. Pertinent negatives include no chest pain, no abdominal pain, no headaches and no shortness of breath. The symptoms are aggravated by stress and eating. The symptoms are relieved by medications. Treatments tried: currently on meds. The treatment provided mild relief. Gout   The history is provided by the patient. This is a chronic problem. The problem occurs constantly. The problem has been gradually worsening. Pertinent negatives include no chest pain, no abdominal pain, no headaches and no shortness of breath.  The symptoms are aggravated by stress. The symptoms are relieved by medications. Treatments tried: currently on meds. The treatment provided significant relief. Other   The history is provided by the patient. This is a chronic problem. The problem occurs constantly. The problem has not changed since onset. Pertinent negatives include no chest pain, no abdominal pain, no headaches and no shortness of breath. The symptoms are aggravated by stress. Relieved by: testosterone patches. Treatments tried: currently on meds. Thyroid Problem   The history is provided by the patient. This is a chronic problem. The problem occurs constantly. The problem has been gradually improving. Pertinent negatives include no chest pain, no abdominal pain, no headaches and no shortness of breath. The symptoms are aggravated by stress. The symptoms are relieved by medications. Treatments tried: currently on meds. The treatment provided moderate relief. Review of Systems   Constitutional: Negative for chills, fever and malaise/fatigue. HENT: Negative for congestion, ear discharge and sore throat. Eyes: Negative for blurred vision, double vision, pain and discharge. Respiratory: Negative for shortness of breath. Cardiovascular: Negative for chest pain, palpitations and leg swelling. Gastrointestinal: Negative for abdominal pain, blood in stool, constipation, nausea and vomiting. Genitourinary: Negative for dysuria, frequency and hematuria. Musculoskeletal: Positive for gout. Negative for joint pain and neck pain. Neurological: Negative for dizziness, tingling, focal weakness and headaches. Psychiatric/Behavioral: Negative for confusion and depression.      Visit Vitals    BP (!) 153/95 (BP 1 Location: Left arm, BP Patient Position: Sitting)    Pulse 87    Temp 96.7 °F (35.9 °C) (Oral)    Resp 17    Ht 6' 5\" (1.956 m)    Wt (!) 463 lb 3.2 oz (210.1 kg)    SpO2 96%    BMI 54.93 kg/m2       Physical Exam Constitutional: He is oriented to person, place, and time. He appears well-developed and well-nourished. No distress. HENT:   Head: Normocephalic and atraumatic. Right Ear: External ear normal.   Left Ear: External ear normal.   Mouth/Throat: Oropharynx is clear and moist. No oropharyngeal exudate. Eyes: EOM are normal. Pupils are equal, round, and reactive to light. No scleral icterus. Neck: Normal range of motion. No thyromegaly present. Cardiovascular: Normal rate, regular rhythm and normal heart sounds. Pulmonary/Chest: Effort normal and breath sounds normal. No respiratory distress. He has no wheezes. Abdominal: Soft. Bowel sounds are normal. He exhibits no distension. There is no tenderness. Lymphadenopathy:     He has no cervical adenopathy. Neurological: He is alert and oriented to person, place, and time. Psychiatric: He has a normal mood and affect. ASSESSMENT and PLAN  Hypertension :  1) Goal blood pressure less than equal to 140/90 mmHg, goal BP can vary depending on risk factors as discussed. 2) Lifestyle modifications discussed with patient, low sodium <2 gm, low salt , DASH diet  3) Exercise for at least 30 min 3-5 times a week for goal BMI of less than or equal  To 25.  4) Continue current medications as prescribed. 5) Please begin medication as discussed for better blood pressure control, explained side effects and patient verbalized understanding. 6) Goal LDL<100.  7) Please monitor your blood pressure and keep a log to bring in with you at each visit. 8) Discussed risk factors with patient such as CAD, FAST of stroke symptoms, pt verbalizes understanding. 9) Please avoid smoking , alcohol and any illicit drug use if applicable to you. Hyperlipidemia:  1) Goal LDL less than or equal to 100 mg/dl , total cholesterol less than or equal to 200 mg/dl. 2) Goal blood pressure less than or equal to 140/90 mmHg.   3) Discussed low - cholesterol and low fat diet, Good Fats vs Bad Fats. 4) Exercise 30 min 3-5 times a week for goal BMI of less than or equal to 25.  5) Continue current medication as prescribed for cholesterol control. 6) Explained the side effects of medication and patient verbalized understanding. 7) You may also begin fish oil 1000 mg 3-4 times a day for better cholesterol control. 8) You may also try red yeast rice for cholesterol control. 9) Please drink skim milk if you drink dairy products. 10) Please avoid smoking , alcohol , or any illicit drug use if applicable to you. 11) Regular cholesterol panel to be done every 6-12 months.  12) Discussed attributing risk factors , patient verbalized understanding. Diabetes :  1)   Goal HBA1C < 7.  2)   Post prandial blood sugar less than or equal to 180.  3)   Exercise 30 min 3-5 times a week for goal BMI of 25.  4)   Please monitor blood sugars as directed and keep a log to bring in with you at each visit. 5)   Diabetic diet discussed and patient instructions to be handed out. 6)   Goal LDL< 100  7)   Goal BP< 120/80 mmhg. 8)   Annual eye exam and foot exam.  9)   Please examine you feet daily for any skin breakages or ulcers. 10) Referral made to see nutritionist for better dietary guidance. 11) Continue current medications as prescribed. 12) Explained the side effects of medication and patient verbalized understanding. 13) Please avoid smoking , alcohol and illicit drug use if applicable to you.  14) Please have blood work ordered today completed. 15) Please make sure you schedule your routine medical exam every 1-2 years. Hypothyroidism :  1) Reviewed recent lab results with patient , appropriate thyroid medication adjustment has been made. 2) Please have blood work ordered completed today and medication to be adjusted if required. 3) Continue current medication at present dose. 4) Continue to exercise 30 minutes 3-5 times a week to maintain BMI of 25 or less.     Hypogonadism :  - Continue current medication    Gout :  - Continue current medication

## 2017-06-06 LAB
ALBUMIN SERPL-MCNC: 3.8 G/DL (ref 3.6–4.8)
ALBUMIN/CREAT UR: <3.9 MG/G CREAT (ref 0–30)
ALBUMIN/GLOB SERPL: 1.6 {RATIO} (ref 1.2–2.2)
ALP SERPL-CCNC: 51 IU/L (ref 39–117)
ALT SERPL-CCNC: 40 IU/L (ref 0–44)
AST SERPL-CCNC: 31 IU/L (ref 0–40)
BASOPHILS # BLD AUTO: 0 X10E3/UL (ref 0–0.2)
BASOPHILS NFR BLD AUTO: 0 %
BILIRUB SERPL-MCNC: 0.2 MG/DL (ref 0–1.2)
BUN SERPL-MCNC: 19 MG/DL (ref 8–27)
BUN/CREAT SERPL: 15 (ref 10–24)
CALCIUM SERPL-MCNC: 9 MG/DL (ref 8.6–10.2)
CHLORIDE SERPL-SCNC: 103 MMOL/L (ref 96–106)
CHOLEST SERPL-MCNC: 94 MG/DL (ref 100–199)
CO2 SERPL-SCNC: 27 MMOL/L (ref 18–29)
CREAT SERPL-MCNC: 1.3 MG/DL (ref 0.76–1.27)
CREAT UR-MCNC: 76.6 MG/DL
EOSINOPHIL # BLD AUTO: 0.1 X10E3/UL (ref 0–0.4)
EOSINOPHIL NFR BLD AUTO: 2 %
ERYTHROCYTE [DISTWIDTH] IN BLOOD BY AUTOMATED COUNT: 14.8 % (ref 12.3–15.4)
EST. AVERAGE GLUCOSE BLD GHB EST-MCNC: 200 MG/DL
GLOBULIN SER CALC-MCNC: 2.4 G/DL (ref 1.5–4.5)
GLUCOSE SERPL-MCNC: 166 MG/DL (ref 65–99)
HBA1C MFR BLD: 8.6 % (ref 4.8–5.6)
HCT VFR BLD AUTO: 41 % (ref 37.5–51)
HDLC SERPL-MCNC: 44 MG/DL
HGB BLD-MCNC: 13.1 G/DL (ref 12.6–17.7)
IMM GRANULOCYTES # BLD: 0 X10E3/UL (ref 0–0.1)
IMM GRANULOCYTES NFR BLD: 0 %
LDLC SERPL CALC-MCNC: 35 MG/DL (ref 0–99)
LYMPHOCYTES # BLD AUTO: 2.9 X10E3/UL (ref 0.7–3.1)
LYMPHOCYTES NFR BLD AUTO: 46 %
MCH RBC QN AUTO: 30 PG (ref 26.6–33)
MCHC RBC AUTO-ENTMCNC: 32 G/DL (ref 31.5–35.7)
MCV RBC AUTO: 94 FL (ref 79–97)
MICROALBUMIN UR-MCNC: <3 UG/ML
MONOCYTES # BLD AUTO: 0.4 X10E3/UL (ref 0.1–0.9)
MONOCYTES NFR BLD AUTO: 6 %
NEUTROPHILS # BLD AUTO: 2.9 X10E3/UL (ref 1.4–7)
NEUTROPHILS NFR BLD AUTO: 46 %
PLATELET # BLD AUTO: 229 X10E3/UL (ref 150–379)
POTASSIUM SERPL-SCNC: 4.7 MMOL/L (ref 3.5–5.2)
PROT SERPL-MCNC: 6.2 G/DL (ref 6–8.5)
RBC # BLD AUTO: 4.36 X10E6/UL (ref 4.14–5.8)
SODIUM SERPL-SCNC: 146 MMOL/L (ref 134–144)
T4 FREE SERPL-MCNC: 1.15 NG/DL (ref 0.82–1.77)
TRIGL SERPL-MCNC: 73 MG/DL (ref 0–149)
TSH SERPL DL<=0.005 MIU/L-ACNC: 2.88 UIU/ML (ref 0.45–4.5)
VLDLC SERPL CALC-MCNC: 15 MG/DL (ref 5–40)
WBC # BLD AUTO: 6.3 X10E3/UL (ref 3.4–10.8)

## 2017-06-07 ENCOUNTER — TELEPHONE (OUTPATIENT)
Dept: FAMILY MEDICINE CLINIC | Age: 67
End: 2017-06-07

## 2017-06-09 ENCOUNTER — TELEPHONE (OUTPATIENT)
Dept: FAMILY MEDICINE CLINIC | Age: 67
End: 2017-06-09

## 2017-06-09 NOTE — TELEPHONE ENCOUNTER
Patient was informed of lab results and to monitor diet. Low carb low sugar. Patient was educated on taking medication as prescribed. Patient showed verbal understanding and had no further question or concern at this time.

## 2017-06-30 NOTE — TELEPHONE ENCOUNTER
Requested Prescriptions     Pending Prescriptions Disp Refills    colchicine 0.6 mg tablet [Pharmacy Med Name: COLCHICINE 0.6 MG TABLET] 30 Tab 0     Sig: TAKE 1 TAB BY MOUTH DAILY AS NEEDED.  valsartan (DIOVAN) 320 mg tablet [Pharmacy Med Name: VALSARTAN 320 MG TABLET] 90 Tab 1     Sig: TAKE 1 TABLET BY MOUTH EVERY DAY     Pt requesting a 90 supply on all medications.  Pt states completely out of both meds

## 2017-07-02 RX ORDER — COLCHICINE 0.6 MG/1
TABLET ORAL
Qty: 30 TAB | Refills: 0 | Status: SHIPPED | OUTPATIENT
Start: 2017-07-02 | End: 2017-07-31 | Stop reason: SDUPTHER

## 2017-07-02 RX ORDER — VALSARTAN 320 MG/1
TABLET ORAL
Qty: 90 TAB | Refills: 1 | Status: SHIPPED | OUTPATIENT
Start: 2017-07-02 | End: 2017-12-30 | Stop reason: SDUPTHER

## 2017-07-03 ENCOUNTER — TELEPHONE (OUTPATIENT)
Dept: FAMILY MEDICINE CLINIC | Age: 67
End: 2017-07-03

## 2017-07-03 NOTE — TELEPHONE ENCOUNTER
Pt requested a 90 day supply of colchine but was prescribed 30 days. The same thing happened last month. He has already picked up rx from pharmacy. I advised him to request a 90 day supply next month & do not take medication from pharmacy with out verifying it is a 90 day supply. Also pt sometimes takes 2 a day instead of 1. I Advised him that if he would like to increase his dose, he must schedule an appt. Pt declined.

## 2017-07-12 ENCOUNTER — TELEPHONE (OUTPATIENT)
Dept: FAMILY MEDICINE CLINIC | Age: 67
End: 2017-07-12

## 2017-07-12 NOTE — TELEPHONE ENCOUNTER
Mari[place] with [employer] following up on paperwork faxed on 07/03/17. States pt had an appt with [facility] for measuring for custom shoe. States appt was canceled due to paperwork not received. States please call to discuss status of paperwork.

## 2017-07-14 ENCOUNTER — DOCUMENTATION ONLY (OUTPATIENT)
Dept: FAMILY MEDICINE CLINIC | Age: 67
End: 2017-07-14

## 2017-07-17 ENCOUNTER — TELEPHONE (OUTPATIENT)
Dept: FAMILY MEDICINE CLINIC | Age: 67
End: 2017-07-17

## 2017-07-17 NOTE — TELEPHONE ENCOUNTER
Mimo with [employer] states received pt's order for custom shoes. States ICD 10 farhana given was E11.21. States due to pt's lymphodema he will require custom cast shoe. States this will require office notes. States please fax over office notes to [phone].

## 2017-07-24 ENCOUNTER — TELEPHONE (OUTPATIENT)
Dept: FAMILY MEDICINE CLINIC | Age: 67
End: 2017-07-24

## 2017-07-24 NOTE — TELEPHONE ENCOUNTER
Pt requesting to speak with [facility], [address]. Pt states was told by [facility] to contact PCP office regarding paperwork/Lymphedema.   States please call to discuss

## 2017-07-25 RX ORDER — FUROSEMIDE 80 MG/1
TABLET ORAL
Qty: 90 TAB | Refills: 3 | Status: SHIPPED | OUTPATIENT
Start: 2017-07-25 | End: 2018-07-11 | Stop reason: SDUPTHER

## 2017-07-25 NOTE — TELEPHONE ENCOUNTER
Pt requesting a 90 day supply    Requested Prescriptions     Pending Prescriptions Disp Refills    furosemide (LASIX) 80 mg tablet 90 Tab 3     Si tab daily

## 2017-07-26 NOTE — TELEPHONE ENCOUNTER
Called patient and informed him just need another dx code. radhae will fax over order, I informed patient it will be taken care of today, patient said thank you.

## 2017-08-02 RX ORDER — COLCHICINE 0.6 MG/1
TABLET ORAL
Qty: 30 TAB | Refills: 0 | Status: SHIPPED | OUTPATIENT
Start: 2017-08-02 | End: 2017-08-31 | Stop reason: SDUPTHER

## 2017-08-09 RX ORDER — DICLOFENAC SODIUM AND MISOPROSTOL 50; 200 MG/1; UG/1
TABLET, DELAYED RELEASE ORAL
Qty: 180 TAB | Refills: 0 | Status: SHIPPED | OUTPATIENT
Start: 2017-08-09 | End: 2017-11-03 | Stop reason: SDUPTHER

## 2017-08-31 NOTE — TELEPHONE ENCOUNTER
Please send 90 day supply. Requested Prescriptions     Pending Prescriptions Disp Refills    colchicine 0.6 mg tablet [Pharmacy Med Name: COLCHICINE 0.6 MG TABLET] 30 Tab 0     Sig: TAKE 1 TAB BY MOUTH DAILY AS NEEDED.     ------------------------------------------------------------------------    Megan Sanches at 06/30/17 8431   Status: Signed          Requested Prescriptions             Pending Prescriptions Disp Refills    colchicine 0.6 mg tablet [Pharmacy Med Name: COLCHICINE 0.6 MG TABLET] 30 Tab 0       Sig: TAKE 1 TAB BY MOUTH DAILY AS NEEDED.  valsartan (DIOVAN) 320 mg tablet [Pharmacy Med Name: VALSARTAN 320 MG TABLET] 90 Tab 1       Sig: TAKE 1 TABLET BY MOUTH EVERY DAY      Pt requesting a 90 day supply on all medications.  Pt states completely out of both meds

## 2017-09-01 RX ORDER — COLCHICINE 0.6 MG/1
TABLET ORAL
Qty: 30 TAB | Refills: 0 | Status: SHIPPED | OUTPATIENT
Start: 2017-09-01 | End: 2017-09-05 | Stop reason: SDUPTHER

## 2017-09-05 ENCOUNTER — OFFICE VISIT (OUTPATIENT)
Dept: FAMILY MEDICINE CLINIC | Age: 67
End: 2017-09-05

## 2017-09-05 RX ORDER — COLCHICINE 0.6 MG/1
TABLET ORAL
Qty: 90 TAB | Refills: 3 | Status: SHIPPED | OUTPATIENT
Start: 2017-09-05 | End: 2017-09-29 | Stop reason: SDUPTHER

## 2017-09-28 RX ORDER — ROSUVASTATIN CALCIUM 40 MG/1
TABLET, COATED ORAL
Qty: 90 TAB | Refills: 1 | Status: SHIPPED | OUTPATIENT
Start: 2017-09-28 | End: 2018-03-23 | Stop reason: SDUPTHER

## 2017-09-29 RX ORDER — COLCHICINE 0.6 MG/1
TABLET ORAL
Qty: 90 TAB | Refills: 3 | Status: SHIPPED | OUTPATIENT
Start: 2017-09-29 | End: 2018-09-13 | Stop reason: SDUPTHER

## 2017-09-29 NOTE — TELEPHONE ENCOUNTER
Requested Prescriptions     Pending Prescriptions Disp Refills    colchicine 0.6 mg tablet 90 Tab 3     Sig: TAKE 1 TAB BY MOUTH DAILY AS NEEDED.

## 2017-10-24 RX ORDER — LEVOTHYROXINE SODIUM 50 UG/1
TABLET ORAL
Qty: 90 TAB | Refills: 0 | Status: SHIPPED | OUTPATIENT
Start: 2017-10-24 | End: 2018-01-20 | Stop reason: SDUPTHER

## 2017-11-03 RX ORDER — DICLOFENAC SODIUM AND MISOPROSTOL 50; 200 MG/1; UG/1
TABLET, DELAYED RELEASE ORAL
Qty: 180 TAB | Refills: 0 | Status: SHIPPED | OUTPATIENT
Start: 2017-11-03 | End: 2018-01-30 | Stop reason: SDUPTHER

## 2017-11-23 DIAGNOSIS — I10 ESSENTIAL HYPERTENSION WITH GOAL BLOOD PRESSURE LESS THAN 130/80: ICD-10-CM

## 2017-11-27 RX ORDER — POTASSIUM CHLORIDE 1500 MG/1
TABLET, EXTENDED RELEASE ORAL
Qty: 90 TAB | Refills: 3 | Status: SHIPPED | OUTPATIENT
Start: 2017-11-27 | End: 2018-11-09 | Stop reason: SDUPTHER

## 2017-12-03 RX ORDER — TESTOSTERONE 2 MG/D
PATCH TRANSDERMAL
Qty: 90 PATCH | Refills: 0 | Status: SHIPPED | OUTPATIENT
Start: 2017-12-03 | End: 2018-02-05 | Stop reason: SDUPTHER

## 2017-12-11 ENCOUNTER — HOSPITAL ENCOUNTER (OUTPATIENT)
Dept: LAB | Age: 67
Discharge: HOME | End: 2017-12-11
Payer: MEDICARE

## 2017-12-11 ENCOUNTER — OFFICE VISIT (OUTPATIENT)
Dept: FAMILY MEDICINE CLINIC | Age: 67
End: 2017-12-11

## 2017-12-11 VITALS
BODY MASS INDEX: 37.19 KG/M2 | DIASTOLIC BLOOD PRESSURE: 67 MMHG | OXYGEN SATURATION: 97 % | WEIGHT: 315 LBS | RESPIRATION RATE: 18 BRPM | SYSTOLIC BLOOD PRESSURE: 144 MMHG | HEART RATE: 61 BPM | TEMPERATURE: 95.8 F | HEIGHT: 77 IN

## 2017-12-11 DIAGNOSIS — Z79.4 TYPE 2 DIABETES MELLITUS WITH DIABETIC NEPHROPATHY, WITH LONG-TERM CURRENT USE OF INSULIN (HCC): Primary | ICD-10-CM

## 2017-12-11 DIAGNOSIS — E11.21 TYPE 2 DIABETES MELLITUS WITH DIABETIC NEPHROPATHY, WITH LONG-TERM CURRENT USE OF INSULIN (HCC): ICD-10-CM

## 2017-12-11 DIAGNOSIS — E11.21 TYPE 2 DIABETES MELLITUS WITH DIABETIC NEPHROPATHY, WITH LONG-TERM CURRENT USE OF INSULIN (HCC): Primary | ICD-10-CM

## 2017-12-11 DIAGNOSIS — Z79.4 TYPE 2 DIABETES MELLITUS WITH DIABETIC NEPHROPATHY, WITH LONG-TERM CURRENT USE OF INSULIN (HCC): ICD-10-CM

## 2017-12-11 LAB
ALBUMIN SERPL-MCNC: 3.7 G/DL (ref 3.4–5)
ALBUMIN/GLOB SERPL: 1.3 {RATIO} (ref 0.8–1.7)
ALP SERPL-CCNC: 45 U/L (ref 45–117)
ALT SERPL-CCNC: 30 U/L (ref 16–61)
ANION GAP SERPL CALC-SCNC: 7 MMOL/L (ref 3–18)
AST SERPL-CCNC: 24 U/L (ref 15–37)
BILIRUB SERPL-MCNC: 0.3 MG/DL (ref 0.2–1)
BUN SERPL-MCNC: 21 MG/DL (ref 7–18)
BUN/CREAT SERPL: 14 (ref 12–20)
CALCIUM SERPL-MCNC: 9.4 MG/DL (ref 8.5–10.1)
CHLORIDE SERPL-SCNC: 105 MMOL/L (ref 100–108)
CO2 SERPL-SCNC: 32 MMOL/L (ref 21–32)
CREAT SERPL-MCNC: 1.49 MG/DL (ref 0.6–1.3)
EST. AVERAGE GLUCOSE BLD GHB EST-MCNC: 169 MG/DL
GLOBULIN SER CALC-MCNC: 2.9 G/DL (ref 2–4)
GLUCOSE SERPL-MCNC: 114 MG/DL (ref 74–99)
HBA1C MFR BLD: 7.5 % (ref 4.2–5.6)
POTASSIUM SERPL-SCNC: 4.4 MMOL/L (ref 3.5–5.5)
PROT SERPL-MCNC: 6.6 G/DL (ref 6.4–8.2)
SODIUM SERPL-SCNC: 144 MMOL/L (ref 136–145)

## 2017-12-11 PROCEDURE — 83036 HEMOGLOBIN GLYCOSYLATED A1C: CPT | Performed by: FAMILY MEDICINE

## 2017-12-11 PROCEDURE — 80053 COMPREHEN METABOLIC PANEL: CPT | Performed by: FAMILY MEDICINE

## 2017-12-11 NOTE — PROGRESS NOTES
Chief Complaint   Patient presents with    Diabetes     1. Have you been to the ER, urgent care clinic since your last visit? Hospitalized since your last visit? No    2. Have you seen or consulted any other health care providers outside of the [facility] since your last visit? Include any pap smears or colon screening.  No

## 2017-12-11 NOTE — PROGRESS NOTES
HISTORY OF PRESENT ILLNESS  Jose Zelaya is a 74 y.o. male. Diabetes   The history is provided by the patient. This is a chronic problem. The problem occurs constantly. The problem has been gradually improving. Pertinent negatives include no chest pain, no abdominal pain, no headaches and no shortness of breath. The symptoms are aggravated by stress and eating. The symptoms are relieved by medications. Treatments tried: currently on insulin  The treatment provided moderate relief. Review of Systems   Constitutional: Positive for malaise/fatigue. Negative for chills and fever. HENT: Negative for congestion, ear discharge, ear pain, hearing loss, sinus pain and sore throat. Eyes: Negative for blurred vision, pain and discharge. Respiratory: Negative for cough, shortness of breath and wheezing. Cardiovascular: Positive for leg swelling. Negative for chest pain, palpitations and claudication. Gastrointestinal: Negative for abdominal pain, constipation, nausea and vomiting. Genitourinary: Negative for dysuria, frequency and hematuria. Musculoskeletal: Positive for joint pain. Neurological: Negative for tingling, focal weakness, weakness and headaches. Psychiatric/Behavioral: Negative for depression. The patient is not nervous/anxious. Visit Vitals    /67    Pulse 61    Temp 95.8 °F (35.4 °C) (Oral)    Resp 18    Ht 6' 5\" (1.956 m)    Wt (!) 454 lb (205.9 kg)    SpO2 97%    BMI 53.84 kg/m2       Physical Exam   Constitutional: He is oriented to person, place, and time. He appears well-developed and well-nourished. No distress. HENT:   Head: Normocephalic and atraumatic. Right Ear: External ear normal.   Left Ear: External ear normal.   Mouth/Throat: No oropharyngeal exudate. Eyes: EOM are normal. Pupils are equal, round, and reactive to light. No scleral icterus. Neck: Normal range of motion. No thyromegaly present.    Cardiovascular: Normal rate, regular rhythm and normal heart sounds. Pulmonary/Chest: Effort normal. No respiratory distress. He has wheezes. Abdominal: Soft. Bowel sounds are normal. He exhibits no distension. There is no tenderness. Lymphadenopathy:     He has no cervical adenopathy. Neurological: He is alert and oriented to person, place, and time. Psychiatric: He has a normal mood and affect. ASSESSMENT and PLAN  Diabetes :  1)   Goal HBA1C < 7.  2)   Post prandial blood sugar less than or equal to 180.  3)   Exercise 30 min 3-5 times a week for goal BMI of 25.  4)   Please monitor blood sugars as directed and keep a log to bring in with you at each visit. 5)   Diabetic diet discussed and patient instructions to be handed out. 6)   Goal LDL< 100  7)   Goal BP< 120/80 mmhg. 8)   Annual eye exam and foot exam.  9)   Please examine you feet daily for any skin breakages or ulcers. 10) Referral made to see nutritionist for better dietary guidance. 11) Continue current medications as prescribed. 12) Explained the side effects of medication and patient verbalized understanding. 13) Please avoid smoking , alcohol and illicit drug use if applicable to you.  14) Please have blood work ordered today completed. 15) Please make sure you schedule your routine medical exam every 1-2 years.

## 2018-01-01 RX ORDER — VALSARTAN 320 MG/1
TABLET ORAL
Qty: 90 TAB | Refills: 1 | Status: SHIPPED | OUTPATIENT
Start: 2018-01-01 | End: 2018-06-27 | Stop reason: SDUPTHER

## 2018-01-21 RX ORDER — LEVOTHYROXINE SODIUM 50 UG/1
TABLET ORAL
Qty: 90 TAB | Refills: 0 | Status: SHIPPED | OUTPATIENT
Start: 2018-01-21 | End: 2018-02-01 | Stop reason: SDUPTHER

## 2018-01-30 RX ORDER — DICLOFENAC SODIUM AND MISOPROSTOL 50; 200 MG/1; UG/1
TABLET, DELAYED RELEASE ORAL
Qty: 180 TAB | Refills: 0 | Status: SHIPPED | OUTPATIENT
Start: 2018-01-30 | End: 2018-02-01 | Stop reason: SDUPTHER

## 2018-02-01 ENCOUNTER — HOSPITAL ENCOUNTER (OUTPATIENT)
Dept: WOUND CARE | Age: 68
Discharge: HOME | End: 2018-02-01
Payer: MEDICARE

## 2018-02-01 VITALS
OXYGEN SATURATION: 97 % | RESPIRATION RATE: 18 BRPM | DIASTOLIC BLOOD PRESSURE: 80 MMHG | SYSTOLIC BLOOD PRESSURE: 172 MMHG | HEART RATE: 69 BPM | TEMPERATURE: 96.9 F

## 2018-02-01 PROBLEM — L97.312: Status: ACTIVE | Noted: 2018-02-01

## 2018-02-01 PROBLEM — L97.212 NON-PRESSURE CHRONIC ULCER OF RIGHT CALF WITH FAT LAYER EXPOSED (HCC): Status: ACTIVE | Noted: 2018-02-01

## 2018-02-01 PROCEDURE — 77030011256 HC DRSG MEPILEX <16IN NO BORD MOLN -A: Performed by: PODIATRIST

## 2018-02-01 PROCEDURE — 77030011255 HC DSG AQUACEL AG BMS -A: Performed by: PODIATRIST

## 2018-02-01 PROCEDURE — 11042 DBRDMT SUBQ TIS 1ST 20SQCM/<: CPT

## 2018-02-01 NOTE — PROGRESS NOTES
Progress and Debridement Note    Patient: Elizabeth Yee MRN: [id number]  SSN: [id number]    YOB: 1950  Age: 74 y.o. Sex: male      Assessment:     Active Problems:    Lymphedema of both lower extremities (7/14/2015)      Type 2 diabetes mellitus with diabetic nephropathy ([facility] [address] (7/15/2015)      Skin ulcer of ankle with fat layer exposed, right ([address] (2/1/2018)      Non-pressure chronic ulcer of right calf with fat layer exposed ([address] (2/1/2018)        Plan:      Selective excisional debridement with Aquacel AG , mepilex and tubigrip Q 48 hrs. Advised patient to use lymphedema pumps daily BID instead on 3x a week. Gave patient instructions on proper dressing placement. RTC in 2 weeks        Subjective:     73 y/o longtime patient was seen in the office in Dec 2017 with a traumatic ulcer to the RLE/ankle. Patient states he did receive the dressing supplies from [facility] and has been changing the dressings himself. He has not been putting the Aquacel AG over the ulcer. He thought that was what he was to use to clean the wound. He admits to using the lymphedema pumps but only 3 x week.     Objective:     Patient Vitals for the past 24 hrs:   Temp Pulse Resp BP SpO2   02/01/18 0936 96.9 °F (36.1 °C) 69 18 172/80 97 %       Physical Exam:     General Exam  alert, cooperative, no distress, appears stated age    REVIEW OF SYSTEMS:  General: denies chronic fatigue, weight loss, fever, anemia, bruising, depression, nervousness, panic attacks  HEENT: denies ringing in ears, ear infections, dizzy spells, poor vision, glaucoma, sinus trouble, hoarseness, eye infections  GI: denies diarrhea, gas, bloating, heartburn, regurgitation, difficulty swallowing, painful swallowing, nausea, vomiting, constipation, abdominal pain, decreased appetite, blood in stools, black stools, jaundice, dark urine  Lungs: denies pneumonia, asthma, cough, SOB, hemoptysis  Heart: denies chest pain, irregular heart beat, ankle swelling, HTN  Skin: denies rashes, hives, allergic reaction  Urinary: denies UTI, kidney stones, decreased urine force and flow, urination at night, blood in urine, painful urination  Bones and Joints: denies arthritis, rheumatism, back pain, gout, osteoporosis  Neurologic: denies stroke, seizures, headaches, numbness, tingling       VASCULAR EXAM:. Pedal pulses  2/4 DP and PT are non palpable right and left foot. Skin temperature is warm to cool right and left foot. Digital capillary fill time is 4 seconds right and left foot. Patient has extensive lymphedema of the right and left lower extremity and feet.      NEUROLOGICAL EXAM:. Sensation is diminished to the right and left foot. Protective sensation diminished with 5.07g monofilament wire Bilateral pt can not feel wire at distal tip of all toes. Vibratory sensation diminished due to LOPS. Patient with Diabetic peripheral Neuropathy.      MUSCULOSKELETAL EXAM:. There is equinus of the right and left limb.      DERMATOLOGICAL EXAM:. Full thickness ulceration to the lateral aspect of the right ankle and anterior RLE. There is evidence of granulation and slough noted lateral ankle lesion. There is no probing to bone noted.         Wound Foot Left;Dorsal (Active)   DRESSING STATUS Removed 4/9/2015  3:26 PM   DRESSING TYPE Other (Comment) 4/9/2015  3:26 PM   Incision site well approximated? No 4/9/2015  3:26 PM    Read Only Wound Dimensions (length x width x depth) 5.5x0.5x0.8 4/9/2015  3:26 PM   Condition of Base Pink;Granulation 4/9/2015  3:26 PM   Condition of Edges Open 4/9/2015  3:26 PM   Tissue Type Red;Pink 4/9/2015  3:26 PM   Tissue Type Percent Pink 50 4/9/2015  3:26 PM   Tissue Type Percent Red 50 4/9/2015  3:26 PM   Drainage Amount  Small  4/9/2015  3:26 PM   Drainage Color Serosanguinous 4/9/2015  3:26 PM   Wound Odor None 4/9/2015  3:26 PM   Periwound Skin Condition Edematous; Other (comment) 4/9/2015  3:26 PM   Cleansing and Cleansing Agents  Dermal wound cleanser 4/9/2015  3:26 PM   Dressing Type Applied Other (Comment) 4/9/2015  3:26 PM   Procedure Tolerated Well 4/9/2015  3:26 PM   Number of days:1067       Wound Leg Lower Right;Lateral (Active)   DRESSING STATUS Removed 2/1/2018  9:48 AM   DRESSING TYPE Foam 2/1/2018  9:48 AM   Wound Length (cm) 2 cm 2/1/2018  9:48 AM   Wound Width (cm) 2.3 cm 2/1/2018  9:48 AM   Wound Depth (cm) 0.6 2/1/2018  9:48 AM   Wound Surface area (cm^3) 4.6 cm^2 2/1/2018  9:48 AM   Condition of Base Pink;Slough 2/1/2018  9:48 AM   Condition of Edges Open 2/1/2018  9:48 AM   Tissue Type Pink;Yellow 2/1/2018  9:48 AM   Tissue Type Percent Pink 50 2/1/2018  9:48 AM   Tissue Type Percent Yellow 50 2/1/2018  9:48 AM   Drainage Amount  Moderate 2/1/2018  9:48 AM   Drainage Color Serous 2/1/2018  9:48 AM   Wound Odor None 2/1/2018  9:48 AM   Periwound Skin Condition Edematous 2/1/2018  9:48 AM   Cleansing and Cleansing Agents  Dermal wound cleanser 2/1/2018  9:48 AM   Number of days:0       Wound Leg Lower Right; Anterior (Active)   DRESSING TYPE Open to air 2/1/2018  9:48 AM   Wound Length (cm) 1 cm 2/1/2018  9:48 AM   Wound Width (cm) 1 cm 2/1/2018  9:48 AM   Wound Depth (cm) 0.1 2/1/2018  9:48 AM   Wound Surface area (cm^3) 1 cm^2 2/1/2018  9:48 AM   Condition of Base Pink;Slough 2/1/2018  9:48 AM   Condition of Edges Open 2/1/2018  9:48 AM   Tissue Type Pink;Yellow 2/1/2018  9:48 AM   Tissue Type Percent Pink 50 2/1/2018  9:48 AM   Tissue Type Percent Yellow 50 2/1/2018  9:48 AM   Drainage Amount  Moderate 2/1/2018  9:48 AM   Drainage Color Serous 2/1/2018  9:48 AM   Wound Odor None 2/1/2018  9:48 AM   Number of days:0              Lab/Data Review:  No results found for this or any previous visit (from the past 24 hour(s)). none      PROCEDURE    Selective sharp instrument excisional debridement of slough and devitilized tissue    After the benefits/risks/SE were discussed, the patient agreed to proceed.      Time out was done:   * Patient was identified by name and date of birth   * Agreement on procedure being performed was verified   * Procedure site verified and marked as necessary   * Patient was positioned for comfort   * Consent was signed and verified. Site: lateral ankle    Instruments used:    [x]  Dermal curette  [] Blade        [] #15  [] #10  [] Forceps  [] Tissue nippers  [] sterile scissors  [] Other     Anesthesia:    []  EMLA 2.5% cream: applied to wound beds for approximately 15minutes. []   Lidocaine 2% Topical Gel      []  Lidocaine injectable 1% with epinephrine 1:100,000    []  Lidocaine injectable 1% without epinephrine    []  Other:     [x]  None         [] patient is insensate due to neuropathy         [] patient declines        [] allergy to anesthetic        [x] tissue for debridement is either superficial, loosely adherent and/or necrotic and denervated     After satisfactory anesthesia achieved, the wound/s was/were sharply debrided necrotic, devitalized and granulation tissue down to the sub Q layer, revealing a clean and viable wound bed. Post debridement measurement was _2.1x_2.4_x_0.5_cm . Bleeding: <5mL Resolved with light focal pressure. Wounds were cleaned and irrigated with saline.      Wound care applied:   []   Hydrogell   []  Hypergel   [x]   Hydrofiber/Aquacel  AG    []   Cadexomer Iodine (Iodosorb)   []  Silver Alginate    []   Medihoney:    []   Collagenase:Santyl   []  Calcium Alginate   []   Collagen:    []   Foam   []  Non-Adherent Contact Layer   []   Xeroform    []   Adaptec:   []   Hydrocolloid   []   Transparent Film    []  Antibiotic ointment/cream     []   Other (see below)     Other:     []   Dry Gauze and Roll Gauze    []   Foam and Roll Gauze    []   Dry Gauze    []   Bordered gauze:     []   Secure with Tape    [x]   Bordered foam       [x]   Other  Pumps BID    [x]   Compression Wrap:          []   [place]    []Multi-Layer    [x]Tubular Bandages       Stacie-Ulcer Care    []   Cream     []   Lotion     []   Ointment     []   Barrier     []   Other:       The patient tolerated the procedure well with no complications. The patient left the exam room in satisfactory and stable condition.      Signed By: Joaquina Nieto DPM     February 1, 2018 10:31 AM

## 2018-02-01 NOTE — WOUND CARE
Patient here for evaluation of right lower leg ulcers. He stated he bumped his right lower leg and that is how the ulcer started. Plain foam dressing was covering wound upon arrival, with tape placed circumferentially to hold in place. He was seen in Dr. LYLE NOLEN [facility]"" private office and was referred here. Supplies of Aquacel AG and plain foam ordered through [facility] by Dr. Nikunj Velazquez office. He will continue to apply Aquacel Ag and foam to both ulcers on leg. He was instructed in avoiding use of tape circumferentially. Tubigrip provided. Use of his lymphedema pumps was discussed. ...patient states he uses them three times a week but doesn't feel like they are doing anything. Instructed him to increase usage of pumps at least daily, and twice daily if feasible. He will return to the clinic in 2 weeks for follow up appointment. Tubigrip G used to hold dressings in place. 02/01/18 0948   Wound Leg Lower Right;Lateral   Date First Assessed/Time First Assessed: 02/01/18 0947   POA: Yes  Location: Leg Lower  Orientation: Right;Lateral   DRESSING STATUS Removed   DRESSING TYPE Foam  (tape)   Wound Length (cm) 2 cm   Wound Width (cm) 2.3 cm   Wound Depth (cm) 0.6   Wound Surface area (cm^3) 4.6 cm^2   Condition of Base Pink;Slough   Condition of Edges Open   Tissue Type Pink;Yellow   Tissue Type Percent Pink 50   Tissue Type Percent Yellow 50   Drainage Amount  Moderate   Drainage Color Serous   Wound Odor None   Periwound Skin Condition Edematous   Cleansing and Cleansing Agents  Dermal wound cleanser   Dressing Type Applied Other (Comment)  (Aquacel Ag, Mepilex foam (plain), tubigrip (G). )   Wound Leg Lower Right; Anterior   Date First Assessed/Time First Assessed: 02/01/18 0948   POA: Yes  Location: Leg Lower  Orientation: Right; Anterior   DRESSING TYPE Open to air   Wound Length (cm) 1 cm   Wound Width (cm) 1 cm   Wound Depth (cm) 0.1   Wound Surface area (cm^3) 1 cm^2   Condition of Base Pink;Slough Condition of Edges Open   Tissue Type Pink;Yellow   Tissue Type Percent Pink 50   Tissue Type Percent Yellow 50   Drainage Amount  Moderate   Drainage Color Serous   Wound Odor None   Periwound Skin Condition Edematous   Cleansing and Cleansing Agents  Dermal wound cleanser   Dressing Type Applied Other (Comment)  (Aquacel Ag, Mepilex border foam, tubigrip)

## 2018-02-05 DIAGNOSIS — E29.1 HYPOGONADISM IN MALE: Primary | ICD-10-CM

## 2018-02-05 RX ORDER — DULAGLUTIDE 1.5 MG/.5ML
INJECTION, SOLUTION SUBCUTANEOUS
Qty: 6 SYRINGE | Refills: 3 | Status: SHIPPED | OUTPATIENT
Start: 2018-02-05 | End: 2019-01-29 | Stop reason: SDUPTHER

## 2018-02-05 NOTE — TELEPHONE ENCOUNTER
Requested Prescriptions     Pending Prescriptions Disp Refills    testosterone (ANDRODERM) 2 mg/24 hour pt24 90 Patch 0

## 2018-02-06 ENCOUNTER — TELEPHONE (OUTPATIENT)
Dept: FAMILY MEDICINE CLINIC | Age: 68
End: 2018-02-06

## 2018-02-15 ENCOUNTER — HOSPITAL ENCOUNTER (OUTPATIENT)
Dept: WOUND CARE | Age: 68
Discharge: HOME | End: 2018-02-15
Payer: MEDICARE

## 2018-02-15 VITALS
HEART RATE: 66 BPM | DIASTOLIC BLOOD PRESSURE: 70 MMHG | OXYGEN SATURATION: 99 % | TEMPERATURE: 97.2 F | SYSTOLIC BLOOD PRESSURE: 136 MMHG | RESPIRATION RATE: 18 BRPM

## 2018-02-15 PROCEDURE — 77030011256 HC DRSG MEPILEX <16IN NO BORD MOLN -A: Performed by: PODIATRIST

## 2018-02-15 PROCEDURE — 97597 DBRDMT OPN WND 1ST 20 CM/<: CPT

## 2018-02-15 PROCEDURE — 77030020057 HC DRSG MTRX CLLGN STGN -B: Performed by: PODIATRIST

## 2018-02-15 PROCEDURE — 11042 DBRDMT SUBQ TIS 1ST 20SQCM/<: CPT

## 2018-02-15 NOTE — WOUND CARE
Patient ambulated into [facility] clinic, no distress noted. Cleansed and assessed bilateral lower extremities. Dr. Nicole Farfan preformed sharp instrument debridement of right lower leg wound. Patient tolerated well. Dressed lower leg wounds as below. Instructed patient on proper wound cleansing and dressing application. Also encouraged patient to use lymphedema pumps daily. Patient verbalized understanding. Will fax medical supply orders to [facility]. Follow up appointment in 4 weeks. Left clinic, no distress noted. 02/15/18 0935   Wound Leg Lower Right;Lateral   Date First Assessed/Time First Assessed: 02/01/18 0947   POA: Yes  Location: Leg Lower  Orientation: Right;Lateral   DRESSING STATUS Removed   DRESSING TYPE Other (Comment)  (Tubigrip, Coversite, Aqaucel AG)   Non-Pressure Injury Partial thickness (epider/derm)   Wound Length (cm) 1.5 cm   Wound Width (cm) 2.1 cm   Wound Depth (cm) 0.4   Wound Surface area (cm^2) 3.15 cm^2   Condition of Base Pink;Slough   Condition of Edges Open   Tissue Type Pink;Yellow   Tissue Type Percent Pink 40   Tissue Type Percent Yellow 60   Drainage Amount  Small    Drainage Color Serosanguinous   Wound Odor None   Periwound Skin Condition Edematous; Other (comment)  (Hyperkeratotic skin)   Cleansing and Cleansing Agents  Dermal wound cleanser   Dressing Type Applied Other (Comment)  (Terri AG, Plain Foam, Hypafix, Tubigrip)   Procedure Tolerated Well   Wound Leg Lower Right; Anterior   Date First Assessed/Time First Assessed: 02/01/18 0948   POA: Yes  Location: Leg Lower  Orientation: Right; Anterior   DRESSING STATUS Removed   DRESSING TYPE Other (Comment)  (Tubigrip, Coversite, Aquacel AG)   Non-Pressure Injury Partial thickness (epider/derm)   Wound Length (cm) 1.1 cm   Wound Width (cm) 1.1 cm   Wound Depth (cm) 0.1   Wound Surface area (cm^2) 1.21 cm^2   Condition of Base Pink;Slough   Condition of Edges Open   Tissue Type Pink   Tissue Type Percent Pink 100   Drainage Amount  Small    Drainage Color Serosanguinous   Wound Odor None   Periwound Skin Condition Edematous; Other (comment)  (Hyperkeratotic skin)   Cleansing and Cleansing Agents  Dermal wound cleanser   Dressing Type Applied Other (Comment)  (Terri AG, Plain Foam, Hypafix, Tubigrip)   Procedure Tolerated Well

## 2018-02-15 NOTE — PROGRESS NOTES
Progress and Debridement Note    Patient: Sabiha Abel MRN: [id number]  SSN: [id number]    YOB: 1950  Age: 79 y.o. Sex: male      Assessment:     Active Problems:    Lymphedema of both lower extremities (7/14/2015)      Type 2 diabetes mellitus with diabetic nephropathy ([facility] [address] (7/15/2015)      Skin ulcer of ankle with fat layer exposed, right ([address] (2/1/2018)      Non-pressure chronic ulcer of right calf with fat layer exposed ([address] (2/1/2018)        Plan:      Selective excisional debridement with Aquacel AG , mepilex and tubigrip Q 48 hrs. Advised patient to use lymphedema pumps daily BID instead on 3x a week. Subjective:     80 y/o diabetic male presents for chronic ulcer RLE. He states he has been using the lymphedema pumps as instructed and changing the dressings    Objective:     No data found. Physical Exam:     General Exam  alert, cooperative, no distress, appears stated age    REVIEW OF SYSTEMS:  General: denies chronic fatigue, weight loss, fever, anemia, bruising, depression, nervousness, panic attacks  HEENT: denies ringing in ears, ear infections, dizzy spells, poor vision, glaucoma, sinus trouble, hoarseness, eye infections  GI: denies diarrhea, gas, bloating, heartburn, regurgitation, difficulty swallowing, painful swallowing, nausea, vomiting, constipation, abdominal pain, decreased appetite, blood in stools, black stools, jaundice, dark urine  Lungs: denies pneumonia, asthma, cough, SOB, hemoptysis  Heart: denies chest pain, irregular heart beat, ankle swelling, HTN  Skin: denies rashes, hives, allergic reaction  Urinary: denies UTI, kidney stones, decreased urine force and flow, urination at night, blood in urine, painful urination  Bones and Joints: denies arthritis, rheumatism, back pain, gout, osteoporosis  Neurologic: denies stroke, seizures, headaches, numbness, tingling    VASCULAR EXAM:. Pedal pulses  2/4 DP and PT are non palpable right and left foot.  Skin temperature is warm to cool right and left foot. Digital capillary fill time is 4 seconds right and left foot. Patient has extensive lymphedema of the right and left lower extremity and feet.       NEUROLOGICAL EXAM:. Sensation is diminished to the right and left foot. Protective sensation diminished with 5.07g monofilament wire Bilateral pt can not feel wire at distal tip of all toes. Vibratory sensation diminished due to LOPS. Patient with Diabetic peripheral Neuropathy.       MUSCULOSKELETAL EXAM:. There is equinus of the right and left limb.       DERMATOLOGICAL EXAM:. Full thickness ulceration to the lateral aspect of the right ankle and anterior RLE. There is evidence of granulation and slough noted lateral ankle lesion. There is no probing to bone noted.         Wound Leg Lower Right;Lateral (Active)   DRESSING STATUS Removed 2/15/2018  9:35 AM   DRESSING TYPE Other (Comment) 2/15/2018  9:35 AM   Non-Pressure Injury Partial thickness (epider/derm) 2/15/2018  9:35 AM   Wound Length (cm) 1.5 cm 2/15/2018  9:35 AM   Wound Width (cm) 2.1 cm 2/15/2018  9:35 AM   Wound Depth (cm) 0.4 2/15/2018  9:35 AM   Wound Surface area (cm^2) 3.15 cm^2 2/15/2018  9:35 AM   Condition of Base [place];Slough 2/15/2018  9:35 AM   Condition of Edges Open 2/15/2018  9:35 AM   Tissue Type Pink;Yellow 2/15/2018  9:35 AM   Tissue Type Percent Pink 40 2/15/2018  9:35 AM   Tissue Type Percent Yellow 60 2/15/2018  9:35 AM   Drainage Amount  Small  2/15/2018  9:35 AM   Drainage Color Serosanguinous 2/15/2018  9:35 AM   Wound Odor None 2/15/2018  9:35 AM   Periwound Skin Condition Edematous; Other (comment) 2/15/2018  9:35 AM   Cleansing and Cleansing Agents  Dermal wound cleanser 2/15/2018  9:35 AM   Dressing Type Applied Other (Comment) 2/1/2018  9:48 AM   Number of days:14       Wound Leg Lower Right; Anterior (Active)   DRESSING STATUS Removed 2/15/2018  9:35 AM   DRESSING TYPE Other (Comment) 2/15/2018  9:35 AM   Non-Pressure Injury Partial thickness (epider/derm) 2/15/2018  9:35 AM   Wound Length (cm) 1.1 cm 2/15/2018  9:35 AM   Wound Width (cm) 1.1 cm 2/15/2018  9:35 AM   Wound Depth (cm) 0.1 2/15/2018  9:35 AM   Wound Surface area (cm^2) 1.21 cm^2 2/15/2018  9:35 AM   Condition of Base [place];Slough 2/15/2018  9:35 AM   Condition of Edges Open 2/15/2018  9:35 AM   Tissue Type Pink 2/15/2018  9:35 AM   Tissue Type Percent Pink 100 2/15/2018  9:35 AM   Tissue Type Percent Yellow 50 2/1/2018  9:48 AM   Drainage Amount  Small  2/15/2018  9:35 AM   Drainage Color Serosanguinous 2/15/2018  9:35 AM   Wound Odor None 2/15/2018  9:35 AM   Periwound Skin Condition Edematous; Other (comment) 2/15/2018  9:35 AM   Cleansing and Cleansing Agents  Dermal wound cleanser 2/15/2018  9:35 AM   Dressing Type Applied Other (Comment) 2/1/2018  9:48 AM   Number of days:14       [REMOVED] Wound Foot Left;Dorsal (Removed)   Removed 02/15/18    DRESSING STATUS Removed 4/9/2015  3:26 PM   DRESSING TYPE Other (Comment) 4/9/2015  3:26 PM   Incision site well approximated? No 4/9/2015  3:26 PM    Read Only Wound Dimensions (length x width x depth) 5.5x0.5x0.8 4/9/2015  3:26 PM   Condition of Base Pink;Granulation 4/9/2015  3:26 PM   Condition of Edges Open 4/9/2015  3:26 PM   Tissue Type Red;Pink 4/9/2015  3:26 PM   Tissue Type Percent Pink 50 4/9/2015  3:26 PM   Tissue Type Percent Red 50 4/9/2015  3:26 PM   Drainage Amount  Small  4/9/2015  3:26 PM   Drainage Color Serosanguinous 4/9/2015  3:26 PM   Wound Odor None 4/9/2015  3:26 PM   Periwound Skin Condition Edematous; Other (comment) 4/9/2015  3:26 PM   Cleansing and Cleansing Agents  Dermal wound cleanser 4/9/2015  3:26 PM   Dressing Type Applied Other (Comment) 4/9/2015  3:26 PM   Procedure Tolerated Well 4/9/2015  3:26 PM   Number of days:1081          Lab/Data Review:  No results found for this or any previous visit (from the past 24 hour(s)).       none      PROCEDURE    Selective sharp instrument excisional debridement of slough and devitilized tissue    After the benefits/risks/SE were discussed, the patient agreed to proceed. Time out was done:   * Patient was identified by name and date of birth   * Agreement on procedure being performed was verified   * Procedure site verified and marked as necessary   * Patient was positioned for comfort   * Consent was signed and verified. Site: Lateral RLE/ankle and dorsal RLE    Instruments used:    [x]  Dermal curette  [] Blade        [] #15  [] #10  [] Forceps  [] Tissue nippers  [] sterile scissors  [] Other     Anesthesia:    []  EMLA 2.5% cream: applied to wound beds for approximately 15minutes. []   Lidocaine 2% Topical Gel      []  Lidocaine injectable 1% with epinephrine 1:100,000    []  Lidocaine injectable 1% without epinephrine    []  Other:     [x]  None         [x] patient is insensate due to neuropathy         [] patient declines        [] allergy to anesthetic        [] tissue for debridement is either superficial, loosely adherent and/or necrotic and denervated     After satisfactory anesthesia achieved, the wound/s was/were sharply debrided necrotic, devitalized and granulation tissue down to the sub Q layer, revealing a clean and viable wound bed. Post debridement measurement was 1.6_x_2.1_x_0.6_cm lateral .   Post debridement measurement was 1.2_x_1.1_x_0.1_cm dorsal    Bleeding: <5mL Resolved with light focal pressure. Wounds were cleaned and irrigated with saline.      Wound care applied:   []   Hydrogell   []  Hypergel   []   Hydrofiber/Aquacel      []   Cadexomer Iodine (Iodosorb)   []  Silver Alginate    []   Medihoney:    []   Collagenase:Santyl   []  Calcium Alginate   [x]   Collagen:shant    [x]   Foam   []  Non-Adherent Contact Layer   []   Xeroform    []   Adaptec:   []   Hydrocolloid   []   Transparent Film    []  Antibiotic ointment/cream     []   Other (see below)     Other:mepilex     []   Dry Gauze and Roll Gauze    []   Foam and Roll Gauze    []   Dry Gauze    []   Bordered gauze:     []   Secure with Tape    []   Bordered foam       []   Other      [x]   Compression Wrap:          []   Unna Boot    []Multi-Layer    [x]Tubular Bandages       Stacie-Ulcer Care    []   Cream     []   Lotion     []   Ointment     []   Barrier     []   Other:       The patient tolerated the procedure well with no complications. The patient left the exam room in satisfactory and stable condition.      Signed By: Woody Green DPM     February 15, 2018 10:25 AM

## 2018-03-02 ENCOUNTER — TELEPHONE (OUTPATIENT)
Dept: FAMILY MEDICINE CLINIC | Age: 68
End: 2018-03-02

## 2018-03-02 NOTE — TELEPHONE ENCOUNTER
Pt requesting a paper rx for Diabetic shoes. [facility]. States please call when ready for p/u.

## 2018-03-26 RX ORDER — ROSUVASTATIN CALCIUM 40 MG/1
TABLET, COATED ORAL
Qty: 90 TAB | Refills: 1 | Status: SHIPPED | OUTPATIENT
Start: 2018-03-26 | End: 2018-09-16 | Stop reason: SDUPTHER

## 2018-04-04 DIAGNOSIS — E29.1 HYPOGONADISM IN MALE: ICD-10-CM

## 2018-04-04 NOTE — TELEPHONE ENCOUNTER
Requested Prescriptions     Pending Prescriptions Disp Refills    testosterone (ANDRODERM) 2 mg/24 hour pt24 90 Patch 0     Sig: APPLY 1 PATCH TRANSDERMALLY ONCE DAILY

## 2018-04-18 ENCOUNTER — TELEPHONE (OUTPATIENT)
Dept: FAMILY MEDICINE CLINIC | Age: 68
End: 2018-04-18

## 2018-04-18 NOTE — TELEPHONE ENCOUNTER
[facility] but needs a \"condition\" in order to process the order for diabetic shoes.  Please call her at [phone]

## 2018-04-19 ENCOUNTER — HOSPITAL ENCOUNTER (OUTPATIENT)
Dept: WOUND CARE | Age: 68
Discharge: HOME | End: 2018-04-19
Payer: MEDICARE

## 2018-04-19 VITALS
SYSTOLIC BLOOD PRESSURE: 170 MMHG | RESPIRATION RATE: 18 BRPM | OXYGEN SATURATION: 98 % | TEMPERATURE: 96.9 F | HEART RATE: 66 BPM | DIASTOLIC BLOOD PRESSURE: 77 MMHG

## 2018-04-19 PROCEDURE — 77030020057 HC DRSG MTRX CLLGN STGN -B: Performed by: PODIATRIST

## 2018-04-19 PROCEDURE — 77030011256 HC DRSG MEPILEX <16IN NO BORD MOLN -A: Performed by: PODIATRIST

## 2018-04-19 PROCEDURE — 97602 WOUND(S) CARE NON-SELECTIVE: CPT

## 2018-04-19 RX ORDER — LEVOTHYROXINE SODIUM 50 UG/1
TABLET ORAL
Qty: 90 TAB | Refills: 0 | Status: SHIPPED | OUTPATIENT
Start: 2018-04-19 | End: 2018-07-26 | Stop reason: SDUPTHER

## 2018-04-19 NOTE — WOUND CARE
Patient here for follow up appointment for right lower leg wounds. Wounds healing nicely with current topical treatment. He states he is using lymphedema pumps daily. This nurse encouraged him to increase usage to help with lymphedema, which would help wounds. He verbalized understanding. Orders for collagen and border foam sent to [facility]. He will return to clinic in 4 weeks. 04/19/18 1414   Wound Leg Lower Right;Lateral   Date First Assessed/Time First Assessed: 02/01/18 0947   POA: Yes  Location: Leg Lower  Orientation: Right;Lateral   DRESSING STATUS Removed   DRESSING TYPE Other (Comment)  (Bandaid, paper tape)   Non-Pressure Injury Partial thickness (epider/derm)   Wound Length (cm) 1.4 cm   Wound Width (cm) 1.9 cm   Wound Depth (cm) 0.3   Wound Surface area (cm^2) 2.66 cm^2   Condition of Base Pink   Condition of Edges Open   Tissue Type Pink   Tissue Type Percent Pink 100   Drainage Amount  Moderate   Drainage Color Serous   Wound Odor None   Periwound Skin Condition Edematous   Cleansing and Cleansing Agents  Dermal wound cleanser   Dressing Type Applied Collagens/cell matrix  ([facility], Mepilex Border Foam)   Wound Leg Lower Right; Anterior   Date First Assessed/Time First Assessed: 02/01/18 0948   POA: Yes  Location: Leg Lower  Orientation: Right; Anterior   DRESSING TYPE Open to air   Wound Length (cm) 1 cm   Wound Width (cm) 1.1 cm   Wound Depth (cm) 0.1   Wound Surface area (cm^2) 1.1 cm^2   Condition of Base Pink   Condition of Edges Open   Tissue Type Pink   Tissue Type Percent Pink 100   Drainage Amount  Moderate   Drainage Color Serous   Wound Odor None   Periwound Skin Condition Edematous   Cleansing and Cleansing Agents  Dermal wound cleanser   Dressing Type Applied Collagens/cell matrix  ([facility], Mepilex Border Foam)

## 2018-04-19 NOTE — PROGRESS NOTES
Patient had a nurse visit with the dressing applied with shant, plain foam and mepilex. Patient to reschedule appointment with me.

## 2018-04-23 NOTE — TELEPHONE ENCOUNTER
Spoke with [address], she stated she will attach condition to patient's chart which is.     Type 2 dm with neuropathy

## 2018-05-10 RX ORDER — DICLOFENAC SODIUM AND MISOPROSTOL 50; 200 MG/1; UG/1
TABLET, DELAYED RELEASE ORAL
Qty: 180 TAB | Refills: 0 | Status: SHIPPED | OUTPATIENT
Start: 2018-05-10 | End: 2018-08-13 | Stop reason: SDUPTHER

## 2018-05-17 ENCOUNTER — HOSPITAL ENCOUNTER (OUTPATIENT)
Dept: WOUND CARE | Age: 68
Discharge: HOME | End: 2018-05-17
Payer: MEDICARE

## 2018-05-17 VITALS
DIASTOLIC BLOOD PRESSURE: 87 MMHG | SYSTOLIC BLOOD PRESSURE: 156 MMHG | OXYGEN SATURATION: 98 % | RESPIRATION RATE: 18 BRPM | TEMPERATURE: 97.3 F

## 2018-05-17 PROCEDURE — 15271 SKIN SUB GRAFT TRNK/ARM/LEG: CPT

## 2018-05-17 PROCEDURE — 74011000250 HC RX REV CODE- 250

## 2018-05-17 PROCEDURE — 77030011256 HC DRSG MEPILEX <16IN NO BORD MOLN -A: Performed by: PODIATRIST

## 2018-05-17 RX ORDER — LIDOCAINE AND PRILOCAINE 25; 25 MG/G; MG/G
CREAM TOPICAL
Status: COMPLETED
Start: 2018-05-17 | End: 2018-05-17

## 2018-05-17 RX ADMIN — LIDOCAINE AND PRILOCAINE 5 G: 25; 25 CREAM TOPICAL at 10:30

## 2018-05-17 NOTE — WOUND CARE
Patient here for follow up with Dr. Ekaterina Michel for right lower leg ulcers. Theraskin (#1) applied to both ulcers today and dressed as noted below. He will return to clinic in 1 week for nurse visit/evaluation and in 2 weeks for follow up with Dr. Ekaterina Michel. Nutrition and blood glucose was discussed as it relates to wound care. Need reinforcement. Brochure given to patient regarding diabetic classes at [facility].        05/17/18 1014 05/17/18 1105   Wound Leg Lower Right; Anterior   Date First Assessed/Time First Assessed: 02/01/18 0948   POA: Yes  Location: Leg Lower  Orientation: Right; Anterior   DRESSING TYPE Open to air --    Wound Length (cm) 0.4 cm --    Wound Width (cm) 0.5 cm --    Wound Depth (cm) 0.1 --    Wound Surface area (cm^2) 0.2 cm^2 --    Condition of Base Other (comment)  (scab) --    Condition of Edges Closed --    Drainage Amount  None --    Wound Odor None --    Periwound Skin Condition Edematous  (hyperkeratotic) --    Cleansing and Cleansing Agents  Dermal wound cleanser --    Dressing Type Applied --  Other (Comment)  (Theraskin,SS,Mepitel,NS moist gauze,Mepilex Border)   Procedure Tolerated --  Well   Wound Leg Lower Right;Lateral   Date First Assessed/Time First Assessed: 02/01/18 0947   POA: Yes  Location: Leg Lower  Orientation: Right;Lateral   DRESSING STATUS Removed --    DRESSING TYPE Other (Comment)  (Border Foam, collagen) --    Wound Length (cm) 1.4 cm --    Wound Width (cm) 1.8 cm --    Wound Depth (cm) 0.5 --    Wound Surface area (cm^2) 2.52 cm^2 --    Condition of Base Pink --    Condition of Edges Open --    Tissue Type Pink --    Tissue Type Percent Pink 100 --    Drainage Amount  Small  --    Drainage Color Serous --    Wound Odor None --    Periwound Skin Condition Edematous  (hyperkeratotic) --    Cleansing and Cleansing Agents  Dermal wound cleanser --    Dressing Type Applied --  Other (Comment)  (Theraskin,SS,Mepitel,NS moist gauze,Mepilex Border)   Procedure Tolerated -- Well

## 2018-05-17 NOTE — PROGRESS NOTES
Theraskin and Debridement Procedure Note    Pre-Operative Diagnosis: Non-Healing Wound  Post-Operative Diagnosis: Same    Site: lateral and anterior RLE     Procedure:   1. Selective sharp instrument debridement of slough and devitilized tissue  2. Application of Skin Biograft - Theraskin    Indications: 1. Removal of devitalized and necrotic tissue to promote healing and evaluate the extent of healing. 2. Stage 1 of a planned staged series of          10                applications of Theraskin in wound not responding to conventional therapy     After the benefits/risks/SE were discussed, the patient agreed to proceed. Time out was done:   * Patient was identified by name and date of birth   * Agreement on procedure being performed was verified   * Procedure site verified and marked as necessary   * Patient was positioned for comfort   * Consent was signed and verified. Instruments used:    [x]  Dermal curette  Blade        [] #15  [] #10  [] Forceps  [] Tissue nippers  [] sterile scissors  [] Other     Anesthesia used:    [x]  EMLA 2.5% cream: applied to wound beds for approximately 15minutes. []   Lidocaine 2% Topical Gel      []  Lidocaine injectable 1% with epinephrine 1:100,000; Amount used: mL    []  Lidocaine injectable 1% without epinephrine; Amount used: mL    []  Other:     []  None         [] patient is insensate due to neuropathy         [] patient declines        [] allergy to anesthetic        [] tissue for debridement is either superficial, loosely adherent and/or necrotic and denervated     Description of Procedure: After usual preparation, sharp debridement of slough and devitalized tissue was performed utilizing the instruments as above. Tissue was removed from the sub Q layer. The wound was debrided to remove non-viable tissue and to clearly reveal the wound margins. The wound was debrided to an acute state with some bleeding from the capillary bed.      Pre-debridement measurement: see accompanying progress note  Post debridement measurement: 1.5_x_2.0_x0.4cm distal lateralRLE . Post debridement measurement: 0.5_x_0.5_x0.1cm anterior lateral RLE     Bleeding: <5mL Resolved with light focal pressure. Wounds were cleaned and irrigated with saline. After usual preparation, Theraskin applied to the wound and affixed to the skin with steri strips and covered with non-adherent contact layer. The patient tolerated the procedure without complication. Theraskin 13 sq cm sheet  Theraskin used: 13 sq cm  Theraskin discarded: 0 sq cm    Wound care applied:   []   Hydrogell   []  Hypergel   []   Hydrofiber/Aquacel      []   Cadexomer Iodine (Iodosorb)   []  Silver Alginate    []   Medihoney:    []   Collagenase:Santyl   []  Calcium Alginate   []   Collagen:    []   Foam   []  Non-Adherent Contact Layer   []   Xeroform    []   Adaptec:   []   Hydrocolloid   []   Transparent Film    []  Antibiotic ointment/cream    []  hyderofera blue   []   Other (see below)    [] Mesalt       Other:mepiltel one     [x]   Saline moistened Gauze and Roll Gauze    []   Foam and Roll Gauze    []   Dry Gauze    []    Bordered gauze:     []   Secure with Tape    []   Other      [x]   Compression Wrap:          []   Unna Boot    []Multi-Layer    [x]Tubular Bandages       Stacie-Ulcer Care    []   Cream     []   Lotion     []   Ointment     []   Barrier     []   Other:       The patient tolerated the procedure well with no complications. The patient left the exam room in satisfactory and stable condition.      Mahamed Gray DPM

## 2018-05-23 ENCOUNTER — HOSPITAL ENCOUNTER (OUTPATIENT)
Dept: WOUND CARE | Age: 68
Discharge: HOME | End: 2018-05-23
Payer: MEDICARE

## 2018-05-23 PROCEDURE — 77030011256 HC DRSG MEPILEX <16IN NO BORD MOLN -A

## 2018-05-23 PROCEDURE — 77030020057 HC DRSG MTRX CLLGN STGN -B

## 2018-05-23 PROCEDURE — 97602 WOUND(S) CARE NON-SELECTIVE: CPT

## 2018-05-23 NOTE — WOUND CARE
Patient was seen today for dressing change and graft check. He had Theraskin placed on his right lower leg wounds 5/17/18 by Dr. Rafaela Abdi. His wounds are open to air today. The patient reports \"my dressing fell off a few days ago. I toss and turn in the bed at night, and they were on the sheet when I woke up. \" Upon inspection of his wounds, there is no longer any graft in place, as both wound beds are clearly visible. The wounds appear to have improved a little. A dressing of Terri and bordered foam was applied today. He will continue with these dressings on M,W,F at home. Follow up with Dr. Rafaela Abdi in one week.

## 2018-05-31 ENCOUNTER — HOSPITAL ENCOUNTER (OUTPATIENT)
Dept: WOUND CARE | Age: 68
Discharge: HOME | End: 2018-05-31
Payer: MEDICARE

## 2018-05-31 VITALS
SYSTOLIC BLOOD PRESSURE: 134 MMHG | DIASTOLIC BLOOD PRESSURE: 82 MMHG | TEMPERATURE: 97 F | HEART RATE: 72 BPM | RESPIRATION RATE: 18 BRPM | OXYGEN SATURATION: 97 %

## 2018-05-31 PROCEDURE — 15271 SKIN SUB GRAFT TRNK/ARM/LEG: CPT | Performed by: PODIATRIST

## 2018-05-31 NOTE — PROGRESS NOTES
Theraskin and Debridement Procedure Note    Pre-Operative Diagnosis: Non-Healing Wound  Post-Operative Diagnosis: Same    Site: right ankle and lower leg     Procedure:   1. Selective sharp instrument debridement of slough and devitilized tissue  2. Application of Skin Biograft - Theraskin    Indications: 1. Removal of devitalized and necrotic tissue to promote healing and evaluate the extent of healing. 2. Stage 2 of a planned staged series of          10                applications of Theraskin in wound not responding to conventional therapy     After the benefits/risks/SE were discussed, the patient agreed to proceed. Time out was done:   * Patient was identified by name and date of birth   * Agreement on procedure being performed was verified   * Procedure site verified and marked as necessary   * Patient was positioned for comfort   * Consent was signed and verified. Instruments used:    []  Dermal curette  Blade        [] #15  [] #10  [] Forceps  [] Tissue nippers  [] sterile scissors  [] Other     Anesthesia used:    []  EMLA 2.5% cream: applied to wound beds for approximately 15minutes. []   Lidocaine 2% Topical Gel      []  Lidocaine injectable 1% with epinephrine 1:100,000; Amount used: mL    []  Lidocaine injectable 1% without epinephrine; Amount used: mL    []  Other:     []  None         [] patient is insensate due to neuropathy         [] patient declines        [] allergy to anesthetic        [] tissue for debridement is either superficial, loosely adherent and/or necrotic and denervated     Description of Procedure: After usual preparation, sharp debridement of slough and devitalized tissue was performed utilizing the instruments as above. Tissue was removed from the sub Q layer. The wound was debrided to remove non-viable tissue and to clearly reveal the wound margins. The wound was debrided to an acute state with some bleeding from the capillary bed.      Pre-debridement measurement: see accompanying progress note  Post debridement measurement:  ___x_____x___cm, right ankle . Post debridement measurement:  ___x_____x___cm right lower leg    Bleeding: <5mL Resolved with light focal pressure. Wounds were cleaned and irrigated with saline. After usual preparation, Theraskin applied to the wound and affixed to the skin with steri strips and covered with non-adherent contact layer. The patient tolerated the procedure without complication. Theraskin 13 sq cm sheet  Theraskin used: 13 sq cm  Theraskin discarded: 0 sq cm    Wound care applied:   []   Hydrogell   []  Hypergel   []   Hydrofiber/Aquacel      []   Cadexomer Iodine (Iodosorb)   []  Silver Alginate    []   Medihoney:    []   Collagenase:Santyl   []  Calcium Alginate   []   Collagen:    []   Foam   []  Non-Adherent Contact Layer   []   Xeroform    []   Adaptec:   []   Hydrocolloid   []   Transparent Film    []  Antibiotic ointment/cream    []  hyderofera blue   []   Other (see below)    [] Mesalt       Other:Cutimed sorbact     [x]   Saline moistened Gauze and Roll Gauze    []   Foam and Roll Gauze    []   Dry Gauze    []    Bordered gauze:     []   Secure with Tape    [x]   Other  Rolled coban    []   Compression Wrap:          []   Unna Boot    []Multi-Layer    []Tubular Bandages       Stacie-Ulcer Care    []   Cream     []   Lotion     []   Ointment     []   Barrier     []   Other:       The patient tolerated the procedure well with no complications. The patient left the exam room in satisfactory and stable condition.      Luciano Mckeon DPM

## 2018-05-31 NOTE — WOUND CARE
Patient was seen today for follow up by Dr. Filemon Nation. He had a Theraskin graft placed earlier this month, but the patient reported that the graft \"fell off\" while he was sleeping. A second Theraskin was placed today by Dr. Filemon Nation. Today, instead of using steri strips to secure the graft, she used staples. A dressing of roll gauze and Coban was also applied for extra security. Patient was instructed to keep the graft dry, and to cover it with a trash bag and tape when showering. Patient verbalized understanding. He will follow up in one week with Dr. Filemon Nation. 05/31/18 1021   Wound Leg Lower Right;Lateral   Date First Assessed/Time First Assessed: 02/01/18 0947   POA: Yes  Location: Leg Lower  Orientation: Right;Lateral   DRESSING TYPE Open to air   Wound Length (cm) 1.3 cm   Wound Width (cm) 1.9 cm   Wound Depth (cm) 0.6   Wound Surface area (cm^2) 2.47 cm^2   Tissue Type Pink;Yellow   Tissue Type Percent Pink 50   Tissue Type Percent Yellow 50   Drainage Amount  Scant   Drainage Color Serous   Wound Odor None   Periwound Skin Condition Edematous; Other (comment)   Cleansing and Cleansing Agents  Dermal wound cleanser   Dressing Type Applied Other (Comment)  (Theraskin, Cutimed Sorbact, saline gauze, Kerlix, Coban)   Procedure Tolerated Well   Wound Leg Lower Right; Anterior   Date First Assessed/Time First Assessed: 02/01/18 0948   POA: Yes  Location: Leg Lower  Orientation: Right; Anterior   DRESSING TYPE Open to air   Wound Length (cm) 1 cm   Wound Width (cm) 1.1 cm   Wound Depth (cm) 0.1   Wound Surface area (cm^2) 1.1 cm^2   Condition of Base Pink   Tissue Type Pink;Yellow   Tissue Type Percent Pink 50   Tissue Type Percent Yellow 50   Drainage Amount  Scant   Wound Odor None   Periwound Skin Condition Edematous; Other (comment)  (Hyperkeratotic changes related to lymphedema)   Cleansing and Cleansing Agents  Dermal wound cleanser   Dressing Type Applied Other (Comment)  (Theraskin, Cutimed, saline gauze, Coban)   Procedure Tolerated Well

## 2018-06-07 ENCOUNTER — HOSPITAL ENCOUNTER (OUTPATIENT)
Dept: WOUND CARE | Age: 68
Discharge: HOME | End: 2018-06-07
Payer: MEDICARE

## 2018-06-07 VITALS
DIASTOLIC BLOOD PRESSURE: 84 MMHG | SYSTOLIC BLOOD PRESSURE: 151 MMHG | RESPIRATION RATE: 18 BRPM | HEART RATE: 82 BPM | TEMPERATURE: 97.1 F | OXYGEN SATURATION: 98 %

## 2018-06-07 PROCEDURE — 77030008462 HC STPLR SKN PROX J&J -A: Performed by: PODIATRIST

## 2018-06-07 PROCEDURE — 74011000250 HC RX REV CODE- 250

## 2018-06-07 PROCEDURE — 15271 SKIN SUB GRAFT TRNK/ARM/LEG: CPT

## 2018-06-07 RX ORDER — LIDOCAINE AND PRILOCAINE 25; 25 MG/G; MG/G
CREAM TOPICAL
Status: COMPLETED
Start: 2018-06-07 | End: 2018-06-07

## 2018-06-07 RX ADMIN — LIDOCAINE AND PRILOCAINE: 25; 25 CREAM TOPICAL at 10:28

## 2018-06-07 NOTE — WOUND CARE
Patient here for follow up with Dr. Rogelio Olmstead s/p placement of Theraskin. Wounds healing with current treatment. Debridement of both ulcers performed by Dr. Rogelio Olmstead with  Theraskin graft placed on right anterior lower leg and right lateral lower leg ulcers. Theraskin ID [id number] prepared per  instructions with 10 ml NS Lot #[id number] exp. 2020-08-31. Theraskin secured with lg by Dr. Fransisca Andrade non adherent layer placed over graft, covered with gauze, Kerlix, and Coban to secure all in place. He will return in 1 week for nurse visit/evaluation and in 2 weeks for follow up with Dr. Rogelio Olmstead. 06/07/18 1009 06/07/18 1100   Wound Leg Lower Right;Lateral   Date First Assessed/Time First Assessed: 02/01/18 0947   POA: Yes  Location: Leg Lower  Orientation: Right;Lateral   DRESSING STATUS Removed --    DRESSING TYPE Other (Comment)  (Coban, Kerlix,Cutimed,gauze. Theraskin/staples in place) --    Non-Pressure Injury Full thickness (subcut/muscle) --    Wound Length (cm) 1.5 cm --    Wound Width (cm) 1.7 cm --    Wound Depth (cm) 0.3 --    Wound Surface area (cm^2) 2.55 cm^2 --    Condition of Base [place];Slough  (remnants ot Theraskin in place) --    Tissue Type Pink; Other (comment) --    Tissue Type Percent Pink 90 --    Tissue Type Percent Other (comment) 10 --    Drainage Amount  Small  --    Drainage Color Serosanguinous --    Wound Odor None --    Periwound Skin Condition Edematous --    Cleansing and Cleansing Agents  Normal saline --    Dressing Type Applied --  Other (Comment)  (Theraskin,SS,Cutimed,gauze,hypafix,Kerlix,Coban)   Number of Skin Staples Removed 7 --    Procedure Tolerated --  Well   Wound Leg Lower Right; Anterior   Date First Assessed/Time First Assessed: 02/01/18 0948   POA: Yes  Location: Leg Lower  Orientation: Right; Anterior   DRESSING STATUS Removed --    DRESSING TYPE Other (Comment)  (Coban,Kerlix,tape, cutimed,Theraskin/staples in place) -- Non-Pressure Injury Full thickness (subcut/muscle) --    Wound Length (cm) 0.9 cm --    Wound Width (cm) 0.9 cm --    Wound Depth (cm) 0.1 --    Wound Surface area (cm^2) 0.81 cm^2 --    Condition of Base Pink;Slough --    Condition of Edges Open --    Tissue Type Pink;Yellow --    Tissue Type Percent Pink 80 --    Tissue Type Percent Yellow 20 --    Drainage Amount  Small  --    Drainage Color Serosanguinous --    Wound Odor None --    Periwound Skin Condition Edematous --    Cleansing and Cleansing Agents  Normal saline --    Dressing Type Applied --  Other (Comment)  (ERIC Ruvalcaba,Cutimed,gauze,hypafix,Kerlix,Coban)   Number of Skin Staples Removed 5 --    Procedure Tolerated --  Well

## 2018-06-07 NOTE — PROGRESS NOTES
Theraskin and Debridement Procedure Note    Pre-Operative Diagnosis: Non-Healing Wound  Post-Operative Diagnosis: Same    Site: Right lower leg lateral and anterior ulcers     Procedure:   1. Selective sharp instrument debridement of slough and devitilized tissue  2. Application of Skin Biograft - Theraskin    Indications: 1. Removal of devitalized and necrotic tissue to promote healing and evaluate the extent of healing. 2. Stage 3 of a planned staged series of              10            applications of Theraskin in wound not responding to conventional therapy     After the benefits/risks/SE were discussed, the patient agreed to proceed. Time out was done:   * Patient was identified by name and date of birth   * Agreement on procedure being performed was verified   * Procedure site verified and marked as necessary   * Patient was positioned for comfort   * Consent was signed and verified. Instruments used:    []  Dermal curette  Blade        [] #15  [] #10  [] Forceps  [] Tissue nippers  [] sterile scissors  [] Other     Anesthesia used:    []  EMLA 2.5% cream: applied to wound beds for approximately 15minutes. []   Lidocaine 2% Topical Gel      []  Lidocaine injectable 1% with epinephrine 1:100,000; Amount used: mL    []  Lidocaine injectable 1% without epinephrine; Amount used: mL    []  Other:     []  None         [] patient is insensate due to neuropathy         [] patient declines        [] allergy to anesthetic        [] tissue for debridement is either superficial, loosely adherent and/or necrotic and denervated     Description of Procedure: After usual preparation, sharp debridement of slough and devitalized tissue was performed utilizing the instruments as above. Tissue was removed from the sub Q layer. The wound was debrided to remove non-viable tissue and to clearly reveal the wound margins. The wound was debrided to an acute state with some bleeding from the capillary bed. Pre-debridement measurement: see accompanying progress note  Post debridement measurement:  1.6_x_1.7_x0.2_cm RLE lateral .     Post debridement measurement:  1.0_x0.9_x_0.1_cm RLE anterior . Bleeding: <5mL Resolved with light focal pressure. Wounds were cleaned and irrigated with saline. After usual preparation, Theraskin applied to the wound and affixed to the skin with steri strips and covered with non-adherent contact layer. The patient tolerated the procedure without complication. Theraskin 13 sq cm sheet  Theraskin used: 13 sq cm  Theraskin discarded: 0 sq cm    Wound care applied:   []   Hydrogell   []  Hypergel   []   Hydrofiber/Aquacel      []   Cadexomer Iodine (Iodosorb)   []  Silver Alginate    []   Medihoney:    []   Collagenase:Santyl   []  Calcium Alginate   []   Collagen:    []   Foam   []  Non-Adherent Contact Layer   []   Xeroform    []   Adaptec:   []   Hydrocolloid   []   Transparent Film    []  Antibiotic ointment/cream    []  hyderofera blue   []   Other (see below)    [] Mesalt       Other:cutimed     [x]   Saline Gauze and Roll Gauze    []   Foam and Roll Gauze    []   Dry Gauze    []    Bordered gauze:     []   Secure with Tape    []   Other      [x]   Compression Wrap:          []   Unna Boot    []Multi-Layer    [x]Tubular Bandages       Stacie-Ulcer Care    []   Cream     []   Lotion     []   Ointment     []   Barrier     []   Other:       The patient tolerated the procedure well with no complications. The patient left the exam room in satisfactory and stable condition.      Margret Sewell DPM

## 2018-06-11 ENCOUNTER — OFFICE VISIT (OUTPATIENT)
Dept: FAMILY MEDICINE CLINIC | Age: 68
End: 2018-06-11

## 2018-06-11 VITALS
TEMPERATURE: 95.7 F | RESPIRATION RATE: 18 BRPM | DIASTOLIC BLOOD PRESSURE: 88 MMHG | OXYGEN SATURATION: 98 % | HEIGHT: 77 IN | HEART RATE: 74 BPM | SYSTOLIC BLOOD PRESSURE: 160 MMHG

## 2018-06-11 DIAGNOSIS — E03.9 HYPOTHYROIDISM, ADULT: Primary | ICD-10-CM

## 2018-06-11 DIAGNOSIS — E11.21 TYPE 2 DIABETES MELLITUS WITH DIABETIC NEPHROPATHY, WITH LONG-TERM CURRENT USE OF INSULIN (HCC): ICD-10-CM

## 2018-06-11 DIAGNOSIS — Z79.4 TYPE 2 DIABETES MELLITUS WITH DIABETIC NEPHROPATHY, WITH LONG-TERM CURRENT USE OF INSULIN (HCC): ICD-10-CM

## 2018-06-11 DIAGNOSIS — I10 ESSENTIAL HYPERTENSION: ICD-10-CM

## 2018-06-11 DIAGNOSIS — I89.0 LYMPHEDEMA OF BOTH LOWER EXTREMITIES: ICD-10-CM

## 2018-06-11 DIAGNOSIS — E29.1 HYPOGONADISM IN MALE: ICD-10-CM

## 2018-06-11 DIAGNOSIS — E66.01 MORBID OBESITY WITH BMI OF 70 AND OVER, ADULT (HCC): ICD-10-CM

## 2018-06-11 DIAGNOSIS — E78.5 DYSLIPIDEMIA: ICD-10-CM

## 2018-06-11 NOTE — PROGRESS NOTES
Patient is here for f/u for combo clinic. 1. Have you been to the ER, urgent care clinic since your last visit? Hospitalized since your last visit?no    2. Have you seen or consulted any other health care providers outside of the [address] [place] [address] since your last visit? Include any pap smears or colon screening.  no

## 2018-06-11 NOTE — PROGRESS NOTES
HISTORY OF PRESENT ILLNESS  Blake Torres is a 79 y.o. male. HPI Comments: Patient Is here for his chronic medical issues. He just recently had some blood work done with his kidney specialist and I have reviewed this with him. He mentions he has been taking his medications. I will not be ordering blood work at present and he would like some refills. His cholesterol has improved and his diabetes level is 6.6. He has been taking his medications. He will also start taking iron medication and he has started to exercise and work out. Patient also mentions as his lymphedema of both the lower legs is getting worse to the point where he is not able to put his clothes on or ambulate properly he would like to go to a place where he was given information about through a friend and I have made this referral and entered information. Cholesterol Problem   The history is provided by the patient. This is a chronic problem. The problem occurs constantly. The problem has been gradually improving. Pertinent negatives include no chest pain, no abdominal pain, no headaches and no shortness of breath. The symptoms are aggravated by eating and stress. The symptoms are relieved by medications. Treatments tried: currently on meds. Diabetes   The history is provided by the patient. This is a chronic problem. The problem occurs constantly. The problem has been gradually improving. Pertinent negatives include no chest pain, no abdominal pain, no headaches and no shortness of breath. The symptoms are aggravated by stress and eating. The symptoms are relieved by medications. Treatments tried: currently on meds. Other   The history is provided by the patient. This is a chronic problem. The problem occurs constantly. The problem has been gradually worsening. Pertinent negatives include no chest pain, no abdominal pain, no headaches and no shortness of breath.  Associated symptoms comments: Chronic lower leg lymphedema gradually worsening . The symptoms are aggravated by standing, exertion and walking. Nothing relieves the symptoms. Treatments tried: dieretics in past. The treatment provided mild relief. Review of Systems   Constitutional: Positive for malaise/fatigue. Negative for fever. HENT: Negative for congestion, ear pain, sinus pain and sore throat. Eyes: Negative for blurred vision, double vision, pain and discharge. Respiratory: Negative for cough, sputum production, shortness of breath and wheezing. Cardiovascular: Positive for leg swelling. Negative for chest pain, palpitations and claudication. Gastrointestinal: Negative for abdominal pain, blood in stool, constipation, nausea and vomiting. Genitourinary: Negative for dysuria, hematuria and urgency. Musculoskeletal: Positive for back pain and joint pain. Neurological: Negative for dizziness, tingling, focal weakness, weakness and headaches. Endo/Heme/Allergies: Negative for environmental allergies. Psychiatric/Behavioral: Negative for depression. The patient is not nervous/anxious and does not have insomnia. Visit Vitals    /88    Pulse 74    Temp 95.7 °F (35.4 °C) (Oral)    Resp 18    Ht 6' 5\" (1.956 m)    SpO2 98%       Physical Exam   Constitutional: He appears well-developed and well-nourished. No distress. HENT:   Head: Normocephalic and atraumatic. Left Ear: External ear normal.   Mouth/Throat: Oropharynx is clear and moist. No oropharyngeal exudate. Eyes: EOM are normal. Pupils are equal, round, and reactive to light. No scleral icterus. Neck: Normal range of motion. No thyromegaly present. Cardiovascular: Normal rate, regular rhythm and normal heart sounds. Pulmonary/Chest: Effort normal and breath sounds normal. No respiratory distress. He has no wheezes. Abdominal: Soft. Bowel sounds are normal. He exhibits no distension. There is no tenderness. Musculoskeletal: He exhibits edema and tenderness.         Left lower leg: He exhibits swelling and edema. Legs:  Lymphadenopathy:     He has no cervical adenopathy. Psychiatric: He has a normal mood and affect. ASSESSMENT and PLAN  Diabetes :  1)   Goal HBA1C < 7.  2)   Post prandial blood sugar less than or equal to 180.  3)   Exercise 30 min 3-5 times a week for goal BMI of 25.  4)   Please monitor blood sugars as directed and keep a log to bring in with you at each visit. 5)   Diabetic diet discussed and patient instructions to be handed out. 6)   Goal LDL< 100  7)   Goal BP< 120/80 mmhg. 8)   Annual eye exam and foot exam.  9)   Please examine you feet daily for any skin breakages or ulcers. 10) Referral made to see nutritionist for better dietary guidance. 11) Continue current medications as prescribed. 12) Explained the side effects of medication and patient verbalized understanding. 13) Please avoid smoking , alcohol and illicit drug use if applicable to you.  14) Please have blood work ordered today completed. 15) Please make sure you schedule your routine medical exam every 1-2 years. Hypertension :  1) Goal blood pressure less than equal to 140/90 mmHg, goal BP can vary depending on risk factors as discussed. 2) Lifestyle modifications discussed with patient, low sodium <2 gm, low salt , DASH diet  3) Exercise for at least 30 min 3-5 times a week for goal BMI of less than or equal  To 25.  4) Continue current medications as prescribed. 5) Please begin medication as discussed for better blood pressure control, explained side effects and patient verbalized understanding. 6) Goal LDL<100.  7) Please monitor your blood pressure and keep a log to bring in with you at each visit. 8) Discussed risk factors with patient such as CAD, FAST of stroke symptoms, pt verbalizes understanding. 9) Please avoid smoking , alcohol and any illicit drug use if applicable to you.   10) Discussed lifestyle modifications ,dietary control and BP monitoring at home     Hyperlipidemia :  1) Goal LDL less than or equal to 100 mg/dl , total cholesterol less than or equal to 200 mg/dl. 2) Goal blood pressure less than or equal to 140/90 mmHg. 3) Discussed low - cholesterol and low fat diet, Good Fats vs Bad Fats. 4) Exercise 30 min 3-5 times a week for goal BMI of less than or equal to 25.  5) Continue current medication as prescribed for cholesterol control. 6) Explained the side effects of medication and patient verbalized understanding. 7) You may also begin fish oil 1000 mg 3-4 times a day for better cholesterol control. 8) You may also try red yeast rice for cholesterol control. 9) Please drink skim milk if you drink dairy products. 10) Please avoid smoking , alcohol , or any illicit drug use if applicable to you. 11) Regular cholesterol panel to be done every 6-12 months.  12) Discussed attributing risk factors , patient verbalized understanding.     Hypogonadism :  - Currently on testosterone patch   - refill given    Bilateral lower leg lymphedema :    - Referral to physical therapy

## 2018-06-14 ENCOUNTER — HOSPITAL ENCOUNTER (OUTPATIENT)
Dept: WOUND CARE | Age: 68
Discharge: HOME | End: 2018-06-14
Payer: MEDICARE

## 2018-06-14 PROCEDURE — 97602 WOUND(S) CARE NON-SELECTIVE: CPT

## 2018-06-14 NOTE — WOUND CARE
Wound/Ostomy Nurse Progress Note      Patient: Hardik Leigh                                                                                      FZN:[id number]    MRN: [id number]          Situation: Patient here today for nurse visit; Graft check and dressing change. Background: Theraskin grafts placed on his right lower leg ulcers last week by Dr. Gregorio Anderson. Assessment:  His dressing is C/D/I. He denies any problems or complaints. The dressing was removed and the grafts are in place and viable on both anterior and lateral ulcers. The wounds were gently cleansed with NS and a fresh dressing was applied. Plan: RTC in one week for appointment with Dr. Gregorio Anderson.

## 2018-06-21 ENCOUNTER — HOSPITAL ENCOUNTER (OUTPATIENT)
Dept: WOUND CARE | Age: 68
Discharge: HOME | End: 2018-06-21
Payer: MEDICARE

## 2018-06-21 VITALS
OXYGEN SATURATION: 97 % | TEMPERATURE: 97 F | HEART RATE: 66 BPM | DIASTOLIC BLOOD PRESSURE: 79 MMHG | RESPIRATION RATE: 18 BRPM | SYSTOLIC BLOOD PRESSURE: 137 MMHG

## 2018-06-21 PROCEDURE — 87186 SC STD MICRODIL/AGAR DIL: CPT | Performed by: PODIATRIST

## 2018-06-21 PROCEDURE — 74011000250 HC RX REV CODE- 250

## 2018-06-21 PROCEDURE — 87077 CULTURE AEROBIC IDENTIFY: CPT | Performed by: PODIATRIST

## 2018-06-21 PROCEDURE — 77030019570 HC BNDG COMPR KT S&N -B: Performed by: PODIATRIST

## 2018-06-21 PROCEDURE — 87070 CULTURE OTHR SPECIMN AEROBIC: CPT | Performed by: PODIATRIST

## 2018-06-21 RX ORDER — LIDOCAINE AND PRILOCAINE 25; 25 MG/G; MG/G
CREAM TOPICAL
Status: COMPLETED
Start: 2018-06-21 | End: 2018-06-21

## 2018-06-21 RX ORDER — DOXYCYCLINE 100 MG/1
100 CAPSULE ORAL 2 TIMES DAILY
Qty: 14 CAP | Refills: 0 | Status: SHIPPED | OUTPATIENT
Start: 2018-06-21 | End: 2018-08-14

## 2018-06-21 RX ADMIN — LIDOCAINE AND PRILOCAINE: 25; 25 CREAM TOPICAL at 10:43

## 2018-06-21 NOTE — WOUND CARE
Patient arrived to [facility] clinic, no distress noted. Removed dressing on right lower leg. Cleansed and assessed wounds. Wound Culture obtained of right lateral lower leg wound. Dr. Zoe Severe preformed sharp instrument debridement and [facility] application. Patient tolerated well. Dressed wounds per Dr. Zoe Severe. Graft Type: Theraskin 1 x 2    Graft ID#: [id number]    Track[employer] ID#: [id number]    Retrieved from: [address] [place] [address]    Retrieved and Reconstituted by: David Santiago RN    Reconstitution Method: NS    Amount of graft used (sq cm): 13    Amount discarded (sq cm): 0    Plan: Return to [facility] in 1 week for nurse visit, and 2 weeks for a follow up visit with Dr. Zoe Severe. Dr. Zoe Severe sent a prescription for Doxy 100 mg PO 2x a day. Instructed patient on medicine and encouraged him to take the entire course. Left clinic, no distress noted. 06/21/18 1040   Wound Leg Lower Right;Lateral   Date First Assessed/Time First Assessed: 02/01/18 0947   POA: Yes  Location: Leg Lower  Orientation: Right;Lateral   DRESSING STATUS Removed   DRESSING TYPE Other (Comment)  (Coban, roll gauze, gauze 4 x 4, cutimed)   Non-Pressure Injury Full thickness (subcut/muscle)   Wound Length (cm) 2.1 cm   Wound Width (cm) 2.5 cm   Wound Depth (cm) 0.4   Wound Surface area (cm^2) 5.25 cm^2   Condition of Base Slough   Condition of Edges Open   Tissue Type Yellow   Tissue Type Percent Yellow 100   Drainage Amount  Small    Drainage Color Serosanguinous   Wound Odor Mild   Periwound Skin Condition Edematous; Erythema, blanchable; Other (comment)  (raised wound edges)   Cleansing and Cleansing Agents  Normal saline   Dressing Type Applied Other (Comment)  (Theraskin, Staples, Wound gel, Cutimed, gauze wrap, coban)   Number of Skin Staples Removed 15   Procedure Tolerated Well   Wound Leg Lower Right; Anterior   Date First Assessed/Time First Assessed: 02/01/18 0948   POA: Yes  Location: Leg Lower  Orientation: Right; Anterior   DRESSING STATUS Removed   DRESSING TYPE Other (Comment)  (coban, roll gauze, gauze 4x4, cutimed)   Non-Pressure Injury Full thickness (subcut/muscle)   Wound Length (cm) 1.6 cm   Wound Width (cm) 1.3 cm   Wound Depth (cm) 0.1   Wound Surface area (cm^2) 2.08 cm^2   Condition of Base Pink   Condition of Edges Open   Tissue Type Pink   Tissue Type Percent Pink 100   Drainage Amount  Small    Drainage Color Serous   Wound Odor None   Periwound Skin Condition Edematous   Cleansing and Cleansing Agents  Normal saline   Dressing Type Applied Other (Comment)  (Theraskin, Staples, Wound gel, Cutimed, roll gauze, Coban)   Number of Skin Staples Removed 15   Procedure Tolerated Well

## 2018-06-21 NOTE — PROGRESS NOTES
Theraskin and Debridement Procedure Note    Pre-Operative Diagnosis: Non-Healing Wound  Post-Operative Diagnosis: Same    Site: RLE anterior and lateral     Procedure:   1. Selective sharp instrument debridement of slough and devitilized tissue  2. Application of Skin Biograft - Theraskin    Indications: 1. Removal of devitalized and necrotic tissue to promote healing and evaluate the extent of healing. 2. Stage 4 of a planned staged series of                   10       applications of Theraskin in wound not responding to conventional therapy     After the benefits/risks/SE were discussed, the patient agreed to proceed. Time out was done:   * Patient was identified by name and date of birth   * Agreement on procedure being performed was verified   * Procedure site verified and marked as necessary   * Patient was positioned for comfort   * Consent was signed and verified. Instruments used:    [x]  Dermal curette  Blade        [] #15  [] #10  [] Forceps  [] Tissue nippers  [] sterile scissors  [] Other     Anesthesia used:    [x]  EMLA 2.5% cream: applied to wound beds for approximately 15minutes. []   Lidocaine 2% Topical Gel      []  Lidocaine injectable 1% with epinephrine 1:100,000; Amount used: mL    []  Lidocaine injectable 1% without epinephrine; Amount used: mL    []  Other:     []  None         [] patient is insensate due to neuropathy         [] patient declines        [] allergy to anesthetic        [] tissue for debridement is either superficial, loosely adherent and/or necrotic and denervated     Description of Procedure: After usual preparation, sharp debridement of slough and devitalized tissue was performed utilizing the instruments as above. Tissue was removed from the sub Q layer. The wound was debrided to remove non-viable tissue and to clearly reveal the wound margins. The wound was debrided to an acute state with some bleeding from the capillary bed.      Pre-debridement measurement: see accompanying progress note  Post debridement measurement: 1.7_x1.4x0.1cm anterior RLE     Post debridement measurement:  2.5_x_3.0_x_0.2_cm lateral RLE     Bleeding: <5mL Resolved with light focal pressure. Wounds were cleaned and irrigated with saline. After usual preparation, Theraskin applied to the wound and affixed to the skin with steri strips and covered with non-adherent contact layer. The patient tolerated the procedure without complication. Theraskin 13 sq cm sheet  Theraskin used: 13 sq cm  Theraskin discarded: 0 sq cm    Wound care applied:   [x]   Hydrogell   []  Hypergel   []   Hydrofiber/Aquacel      []   Cadexomer Iodine (Iodosorb)   []  Silver Alginate    []   Medihoney:    []   Collagenase:Santyl   []  Calcium Alginate   []   Collagen:    []   Foam   []  Non-Adherent Contact Layer   []   Xeroform    []   Adaptec:   []   Hydrocolloid   []   Transparent Film    []  Antibiotic ointment/cream    []  hyderofera blue   []   Other (see below)    [] Mesalt       Other:cutimed sorbact     [x]   Dry Gauze and Roll Gauze    []   Foam and Roll Gauze    []   Dry Gauze    []    Bordered gauze:     []   Secure with Tape    []   Other      [x]   Compression Wrap:          []   Unna Boot    [x]Multi-Layer    []Tubular Bandages       Stacie-Ulcer Care    []   Cream     []   Lotion     []   Ointment     []   Barrier     []   Other:       The patient tolerated the procedure well with no complications. The patient left the exam room in satisfactory and stable condition.      Brigido Cortes DPM

## 2018-06-24 LAB
BACTERIA SPEC CULT: ABNORMAL
GRAM STN SPEC: ABNORMAL
SERVICE CMNT-IMP: ABNORMAL

## 2018-06-27 RX ORDER — VALSARTAN 320 MG/1
TABLET ORAL
Qty: 90 TAB | Refills: 1 | Status: SHIPPED | OUTPATIENT
Start: 2018-06-27 | End: 2018-09-28 | Stop reason: SDUPTHER

## 2018-06-28 ENCOUNTER — HOSPITAL ENCOUNTER (OUTPATIENT)
Dept: WOUND CARE | Age: 68
Discharge: HOME | End: 2018-06-28
Payer: MEDICARE

## 2018-06-28 PROCEDURE — 97602 WOUND(S) CARE NON-SELECTIVE: CPT

## 2018-06-28 NOTE — WOUND CARE
Patient Name: Clive Wagner    : 1950    MRN: [id number]      Situation: Nurse visit for dressing change and graft check. Background: Right lower leg ulcers, Lymphedema bilateral lower legs. Assessment: Patient denies any complaints since last visit. His dressing is still in place. The dressing was removed and the wounds were assessed. The right lateral leg wound still have a graft in place and secure. The graft appears to be taking well. There is no evidence of the graft on the right anterior ulcer, but the staples are still intact and the wound looks like it is healing well. Both areas were cleansed with NS. The staples were removed from the right anterior ulcer. Plan: A new dressing of Cutimed, roll gauze, and Coban was applied today. This dressing will remain in place until his follow up appointment with Dr. Esvin Lay in one week.

## 2018-07-05 ENCOUNTER — HOSPITAL ENCOUNTER (OUTPATIENT)
Dept: WOUND CARE | Age: 68
Discharge: HOME | End: 2018-07-05
Payer: MEDICARE

## 2018-07-05 VITALS
TEMPERATURE: 97.1 F | HEART RATE: 62 BPM | DIASTOLIC BLOOD PRESSURE: 80 MMHG | SYSTOLIC BLOOD PRESSURE: 146 MMHG | OXYGEN SATURATION: 97 % | RESPIRATION RATE: 16 BRPM

## 2018-07-05 PROCEDURE — 77030008462 HC STPLR SKN PROX J&J -A: Performed by: PODIATRIST

## 2018-07-05 PROCEDURE — 15271 SKIN SUB GRAFT TRNK/ARM/LEG: CPT

## 2018-07-05 NOTE — PROGRESS NOTES
Theraskin and Debridement Procedure Note    Pre-Operative Diagnosis: Non-Healing Wound  Post-Operative Diagnosis: Same    Site: RLE anterior and lateral     Procedure:   1. Selective sharp instrument debridement of slough and devitilized tissue  2. Application of Skin Biograft - Theraskin    Indications: 1. Removal of devitalized and necrotic tissue to promote healing and evaluate the extent of healing. 2. Stage 5 of a planned staged series of     10                     applications of Theraskin in wound not responding to conventional therapy     After the benefits/risks/SE were discussed, the patient agreed to proceed. Time out was done:   * Patient was identified by name and date of birth   * Agreement on procedure being performed was verified   * Procedure site verified and marked as necessary   * Patient was positioned for comfort   * Consent was signed and verified. Instruments used:    []  Dermal curette  Blade        [] #15  [] #10  [] Forceps  [] Tissue nippers  [] sterile scissors  [x] Other     Anesthesia used:    []  EMLA 2.5% cream: applied to wound beds for approximately 15minutes. []   Lidocaine 2% Topical Gel      []  Lidocaine injectable 1% with epinephrine 1:100,000; Amount used: mL    []  Lidocaine injectable 1% without epinephrine; Amount used: mL    []  Other:     [x]  None         [] patient is insensate due to neuropathy         [] patient declines        [] allergy to anesthetic        [x] tissue for debridement is either superficial, loosely adherent and/or necrotic and denervated     Description of Procedure: After usual preparation, sharp debridement of slough and devitalized tissue was performed utilizing the instruments as above. Tissue was removed from the sub Q layer. The wound was debrided to remove non-viable tissue and to clearly reveal the wound margins. The wound was debrided to an acute state with some bleeding from the capillary bed.      Pre-debridement measurement: see accompanying progress note  Post debridement measurement: 2.0_x2.6_x0.1__cm lateral .   Post debridement measurement:  0.9_x_0.9_x_0.1__cm . Bleeding: <5mL Resolved with light focal pressure. Wounds were cleaned and irrigated with saline. After usual preparation, Theraskin applied to the wound and affixed to the skin with steri strips and covered with non-adherent contact layer. The patient tolerated the procedure without complication. Theraskin 13 sq cm sheet  Theraskin used: 13 sq cm  Theraskin discarded: 0 sq cm    Wound care applied:   []   Hydrogell   []  Hypergel   []   Hydrofiber/Aquacel      []   Cadexomer Iodine (Iodosorb)   []  Silver Alginate    []   Medihoney:    []   Collagenase:Santyl   []  Calcium Alginate   []   Collagen:    []   Foam   []  Non-Adherent Contact Layer   []   Xeroform    []   Adaptec:   []   Hydrocolloid   []   Transparent Film    []  Antibiotic ointment/cream    []  hyderofera blue   []   Other (see below)    [] Mesalt       Other:cutimed sorbact     [x]   Saline Gauze and Roll Gauze    []   Foam and Roll Gauze    []   Dry Gauze    []    Bordered gauze:     []   Secure with Tape    []   Other      [x]   Compression Wrap:          []   Unna Boot    []Multi-Layer    [x]Tubular Bandages       Stacie-Ulcer Care    []   Cream     []   Lotion     []   Ointment     []   Barrier     []   Other:       The patient tolerated the procedure well with no complications. The patient left the exam room in satisfactory and stable condition.      Praveena President, XIOMARA

## 2018-07-05 NOTE — WOUND CARE
Theraskin applied to Anterior and lateral leg wound, secured with staples. Cutimed and saline gauze placed and secured with kerlix and coban. Patient to follow up in one week with wound clinic for dressing change.        Jordon   [id number]  Size 1x2  EXP Date 2/17/2022

## 2018-07-11 RX ORDER — FUROSEMIDE 80 MG/1
TABLET ORAL
Qty: 90 TAB | Refills: 3 | Status: SHIPPED | OUTPATIENT
Start: 2018-07-11 | End: 2019-06-29 | Stop reason: SDUPTHER

## 2018-07-12 ENCOUNTER — HOSPITAL ENCOUNTER (OUTPATIENT)
Dept: WOUND CARE | Age: 68
Discharge: HOME | End: 2018-07-12
Payer: MEDICARE

## 2018-07-12 PROCEDURE — 77030019604 HC DRSG WND CA ALG S&N -A

## 2018-07-12 PROCEDURE — 97602 WOUND(S) CARE NON-SELECTIVE: CPT

## 2018-07-12 NOTE — WOUND CARE
[facility] to  [facility], no distress noted. Removed dressing. Theraskin and staples intact, cleansed right leg wounds, applied dressings per Dr. Verlin Koyanagi. Patient tolerated well. Provided patient with information on Lymphedema clinics. Left clinic, no distress noted. Follow up appointment 7/19/18. 
 
 
 
 
 
 07/12/18 1140 Wound Leg Lower Right;Lateral  
Date First Assessed/Time First Assessed: 02/01/18 0947   POA: Yes  Location: Leg Lower  Orientation: Right;Lateral  
DRESSING STATUS Removed DRESSING TYPE Other (Comment) (Coban, Kerlix, Cutimed Swab) Non-Pressure Injury Full thickness (subcut/muscle) Wound Length (cm) (Theraskin intact) Tissue Type Other (comment) [facility] INTACT) Drainage Amount  Moderate Drainage Color Tan Wound Odor Mild Periwound Skin Condition Edematous Cleansing and Cleansing Agents  Dermal wound cleanser Dressing Type Applied Other (Comment) (THERASKIN, wound gel, Cutimed swab, Algisite, Kerlix, Coban) Procedure Tolerated Well Wound Leg Lower Right; Anterior Date First Assessed/Time First Assessed: 02/01/18 0948   POA: Yes  Location: Leg Lower  Orientation: Right; Anterior DRESSING STATUS Removed DRESSING TYPE Other (Comment) (Coban, Kerlix, Cutimed) Non-Pressure Injury Partial thickness (epider/derm) Wound Length (cm) (THERASKIN) Tissue Type Other (comment) [facility]) Drainage Amount  Small Drainage Color Serous Wound Odor None Periwound Skin Condition Edematous Cleansing and Cleansing Agents  Dermal wound cleanser Dressing Type Applied Other (Comment) (THERASKIN, wound gel, Cutimed, Algisite, Kerlix, Coban) Procedure Tolerated Well

## 2018-07-13 DIAGNOSIS — E29.1 HYPOGONADISM IN MALE: ICD-10-CM

## 2018-07-13 NOTE — TELEPHONE ENCOUNTER
Requested Prescriptions     Pending Prescriptions Disp Refills    testosterone (ANDRODERM) 4 mg/24 hr pt24 60 Patch 0     Si Patch by TransDERmal route daily. Max Daily Amount: 1 Patch.

## 2018-07-16 ENCOUNTER — TELEPHONE (OUTPATIENT)
Dept: FAMILY MEDICINE CLINIC | Age: 68
End: 2018-07-16

## 2018-07-19 ENCOUNTER — HOSPITAL ENCOUNTER (OUTPATIENT)
Dept: WOUND CARE | Age: 68
Discharge: HOME | End: 2018-07-19
Payer: MEDICARE

## 2018-07-19 VITALS
OXYGEN SATURATION: 97 % | TEMPERATURE: 97.4 F | DIASTOLIC BLOOD PRESSURE: 76 MMHG | RESPIRATION RATE: 16 BRPM | SYSTOLIC BLOOD PRESSURE: 138 MMHG | HEART RATE: 68 BPM

## 2018-07-19 PROCEDURE — 15271 SKIN SUB GRAFT TRNK/ARM/LEG: CPT | Performed by: PODIATRIST

## 2018-07-19 PROCEDURE — G0277 HBOT, FULL BODY CHAMBER, 30M: HCPCS

## 2018-07-19 NOTE — WOUND CARE
Patient arrived to [facility] clinic, no distress noted. Removed old dressing, cleansed wounds, and removed lg per Dr. Bobbi Parkinson. Dr. Bobbi Parkinson preformed sharp instrument debridement of right lower leg lateral wound and THERASKIN application. Staples used to secure graft. Patient tolerated well. Dressed right lower extremity per Dr. Bobbi Parkinson. Follow up appointment in 1 week and with Dr. Bobbi Parkinson 8/2/18. Left clinic, no distress noted. Graft Type:Theraskin 1in x 2 in Graft ID#: [id number] Carlitos Merl ID#: [id number] Retrieved from: [address] Retrieved and Reconstituted by: Benitez Israel RN Reconstitution Method: Normal Saline Amount of graft used (sq cm): 10 cm Amount discarded (sq cm): 3 cm 
 
 
 
 
 
 
 
 
 
 
 07/19/18 0936 Wound Leg Lower Right;Lateral  
Date First Assessed/Time First Assessed: 02/01/18 0947   POA: Yes  Location: Leg Lower  Orientation: Right;Lateral  
DRESSING STATUS Removed DRESSING TYPE Other (Comment) (Coban, Kerlix, Algisite, Cutimed) Non-Pressure Injury Full thickness (subcut/muscle) Wound Length (cm) 2.4 cm Wound Width (cm) 2.5 cm Wound Depth (cm) 0.2 Wound Surface area (cm^2) 6 cm^2 Condition of Base Pink;Slough Condition of Edges Open Tissue Type Pink;Yellow Tissue Type Percent Pink 60 Tissue Type Percent Yellow 40 Drainage Amount  Moderate Drainage Color Serous Wound Odor None Periwound Skin Condition Edematous Cleansing and Cleansing Agents  Dermal wound cleanser Dressing Type Applied Other (Comment) (Theraskin, Staples, Cutimed, [place], [place], [place]) Number of Skin Staples Removed 5 Procedure Tolerated Well Wound Leg Lower Right; Anterior Date First Assessed/Time First Assessed: 02/01/18 0948   POA: Yes  Location: Leg Lower  Orientation: Right; Anterior DRESSING STATUS Removed DRESSING TYPE Other (Comment) (Coban, Kerlix, Algisite, Cutimed ) Wound Length (cm) 0 cm Wound Width (cm) 0 cm Wound Depth (cm) 0 Wound Surface area (cm^2) 0 cm^2 Condition of Edges Closed Drainage Amount  None Wound Odor None Periwound Skin Condition Intact Cleansing and Cleansing Agents  Dermal wound cleanser Dressing Type Applied Other (Comment) 
(Kerlix, Coban) Procedure Tolerated Well

## 2018-07-19 NOTE — PROGRESS NOTES
Theraskin and Debridement Procedure Note    Pre-Operative Diagnosis: Non-Healing Wound  Post-Operative Diagnosis: Same    Site: RLE/ankle     Procedure:   1. Selective sharp instrument debridement of slough and devitilized tissue  2. Application of Skin Biograft - Theraskin    Indications: 1. Removal of devitalized and necrotic tissue to promote healing and evaluate the extent of healing. 2. Stage 6 of a planned staged series of        10                  applications of Theraskin in wound not responding to conventional therapy     After the benefits/risks/SE were discussed, the patient agreed to proceed. Time out was done:   * Patient was identified by name and date of birth   * Agreement on procedure being performed was verified   * Procedure site verified and marked as necessary   * Patient was positioned for comfort   * Consent was signed and verified. Instruments used:    []  Dermal curette  Blade        [x] #15  [] #10  [] Forceps  [] Tissue nippers  [] sterile scissors  [] Other     Anesthesia used:    []  EMLA 2.5% cream: applied to wound beds for approximately 15minutes. []   Lidocaine 2% Topical Gel      []  Lidocaine injectable 1% with epinephrine 1:100,000; Amount used: mL    []  Lidocaine injectable 1% without epinephrine; Amount used: mL    []  Other:     [x]  None         [] patient is insensate due to neuropathy         [] patient declines        [] allergy to anesthetic        [x] tissue for debridement is either superficial, loosely adherent and/or necrotic and denervated     Description of Procedure: After usual preparation, sharp debridement of slough and devitalized tissue was performed utilizing the instruments as above. Tissue was removed from the sub Q layer. The wound was debrided to remove non-viable tissue and to clearly reveal the wound margins. The wound was debrided to an acute state with some bleeding from the capillary bed.      Pre-debridement measurement: see accompanying progress note  Post debridement measurement:  2.2__x_2.5_x0.1_cm . Bleeding: <5mL Resolved with light focal pressure. Wounds were cleaned and irrigated with saline. After usual preparation, Theraskin applied to the wound and affixed to the skin with steri strips and covered with non-adherent contact layer. The patient tolerated the procedure without complication. Theraskin 13 sq cm sheet  Theraskin used: 10 sq cm  Theraskin discarded: 3 sq cm    Wound care applied:   []   Hydrogell   []  Hypergel   []   Hydrofiber/Aquacel      []   Cadexomer Iodine (Iodosorb)   []  Silver Alginate    []   Medihoney:    []   Collagenase:Santyl   [x]  Calcium Alginate   []   Collagen:    []   Foam   []  Non-Adherent Contact Layer   []   Xeroform    []   Adaptec:   []   Hydrocolloid   []   Transparent Film    []  Antibiotic ointment/cream    []  hyderofera blue   []   Other (see below)    [] Mesalt       Other:cutimed sorbact     [x]   Dry Gauze and Roll Gauze    []   Foam and Roll Gauze    []   Dry Gauze    []    Bordered gauze:     []   Secure with Tape    []   Other      [x]   Compression Wrap:          []   Unna Boot    []Multi-Layer    [x]Tubular Bandages       Stacie-Ulcer Care    []   Cream     []   Lotion     []   Ointment     []   Barrier     []   Other:       The patient tolerated the procedure well with no complications. The patient left the exam room in satisfactory and stable condition.      Hoda Saravia DPM

## 2018-07-26 RX ORDER — LEVOTHYROXINE SODIUM 50 UG/1
TABLET ORAL
Qty: 90 TAB | Refills: 0 | Status: SHIPPED | OUTPATIENT
Start: 2018-07-26 | End: 2018-08-14

## 2018-08-02 ENCOUNTER — HOSPITAL ENCOUNTER (OUTPATIENT)
Dept: WOUND CARE | Age: 68
Discharge: HOME | End: 2018-08-02
Payer: MEDICARE

## 2018-08-02 VITALS
RESPIRATION RATE: 18 BRPM | OXYGEN SATURATION: 95 % | HEART RATE: 70 BPM | DIASTOLIC BLOOD PRESSURE: 83 MMHG | TEMPERATURE: 97 F | SYSTOLIC BLOOD PRESSURE: 139 MMHG

## 2018-08-02 PROCEDURE — A6021 COLLAGEN DRESSING <=16 SQ IN: HCPCS | Performed by: PODIATRIST

## 2018-08-02 PROCEDURE — 97602 WOUND(S) CARE NON-SELECTIVE: CPT

## 2018-08-02 PROCEDURE — 77030013575 HC DRSG HYDROFERA HOLL -B: Performed by: PODIATRIST

## 2018-08-02 NOTE — WOUND CARE
Patient arrived to [address], no distress noted. Dressing removed, Theraskin and staples intact. Removed 6 lg per Dr. Al Conteh. Cleansed wound, and assessed. Noted improvement to wound and patient denies pain. Dr. Al Conteh recommended a new dressing of Endoform and Hydrofera Blue, dressed wound. Instructed patient on new dressings and wound care/ dressing changes. Provided written instructions, and patient verbalized understanding. No DME ordered, patient has supplies from [address]. Will FU 8/14/18 with Dr. Al Conteh. Possible THERASKIN at that visit. 08/02/18 0945 Wound Leg Lower Right;Lateral  
Date First Assessed/Time First Assessed: 02/01/18 0947   POA: Yes  Location: Leg Lower  Orientation: Right;Lateral  
DRESSING STATUS Removed DRESSING TYPE Other (Comment) (Coban, Kerlix, Polymem, Cutimed) Non-Pressure Injury Full thickness (subcut/muscle) Wound Length (cm) 1.9 cm Wound Width (cm) 2.3 cm Wound Depth (cm) 0.3 Wound Surface area (cm^2) 4.37 cm^2 Condition of Base Pink;Slough Condition of Edges Open Tissue Type Pink;Yellow Tissue Type Percent Pink 40 Tissue Type Percent Yellow 60 Drainage Amount  Large Drainage Color Serosanguinous Wound Odor None Periwound Skin Condition Edematous; Erythema, blanchable Cleansing and Cleansing Agents  Normal saline Dressing Type Applied Other (Comment) 
(Endoform, Hydrofera Blue, Gauze, Coban) Procedure Tolerated Well Wound Leg Lower Right; Anterior Date First Assessed/Time First Assessed: 02/01/18 0948   POA: Yes  Location: Leg Lower  Orientation: Right; Anterior Wound Length (cm) 0 cm Wound Width (cm) 0 cm Wound Depth (cm) 0 Wound Surface area (cm^2) 0 cm^2 Condition of Edges Closed Periwound Skin Condition Intact

## 2018-08-09 ENCOUNTER — HOSPITAL ENCOUNTER (OUTPATIENT)
Dept: WOUND CARE | Age: 68
Discharge: HOME | End: 2018-08-09
Payer: MEDICARE

## 2018-08-09 PROCEDURE — A6021 COLLAGEN DRESSING <=16 SQ IN: HCPCS

## 2018-08-09 PROCEDURE — 97602 WOUND(S) CARE NON-SELECTIVE: CPT

## 2018-08-09 PROCEDURE — 77030013575 HC DRSG HYDROFERA HOLL -B

## 2018-08-09 NOTE — WOUND CARE
Patient arrived to [facility] clinic, no distress noted. Removed dressing, cleansed, assessed, and dressed right lower leg wound per Dr. Ana Gomez. Wound noted to be improving. Patient tolerated dressing change well. Patient reported that daughter and granddaughter assist with the dressing change at home, no problems or concerns verbalized.  Will FU 8/14/18.                   08/09/18 0920   Wound Leg Lower Right;Lateral   Date First Assessed/Time First Assessed: 02/01/18 0947   POA: Yes  Location: Leg Lower  Orientation: Right;Lateral   DRESSING STATUS Removed   DRESSING TYPE Other (Comment)  (Coban, Tape, Kerlix, Hydrofera blue)   Non-Pressure Injury Full thickness (subcut/muscle)   Wound Length (cm) 1.8 cm   Wound Width (cm) 1.9 cm   Wound Depth (cm) 0.2   Wound Surface area (cm^2) 3.42 cm^2   Change in Wound Size % 25.65   Condition of Base Pink;Slough   Condition of Edges Open   Tissue Type Pink;Yellow   Tissue Type Percent Pink 50   Tissue Type Percent Yellow 50   Drainage Amount  Large   Drainage Color Serosanguinous   Wound Odor None   Periwound Skin Condition Erythema, blanchable;Edematous   Cleansing and Cleansing Agents  Normal saline   Dressing Type Applied Other (Comment)  (Hydrofera blue, Endoform, Kerlix, Coban)   Procedure Tolerated Well

## 2018-08-13 RX ORDER — DICLOFENAC SODIUM AND MISOPROSTOL 50; 200 MG/1; UG/1
TABLET, DELAYED RELEASE ORAL
Qty: 180 TAB | Refills: 0 | Status: SHIPPED | OUTPATIENT
Start: 2018-08-13 | End: 2018-10-03

## 2018-08-14 ENCOUNTER — HOSPITAL ENCOUNTER (OUTPATIENT)
Dept: WOUND CARE | Age: 68
Discharge: HOME | End: 2018-08-14
Payer: MEDICARE

## 2018-08-14 VITALS
SYSTOLIC BLOOD PRESSURE: 141 MMHG | HEART RATE: 68 BPM | TEMPERATURE: 97.4 F | DIASTOLIC BLOOD PRESSURE: 78 MMHG | RESPIRATION RATE: 16 BRPM | OXYGEN SATURATION: 97 %

## 2018-08-14 PROCEDURE — 97602 WOUND(S) CARE NON-SELECTIVE: CPT

## 2018-08-14 PROCEDURE — 77030013575 HC DRSG HYDROFERA HOLL -B: Performed by: PODIATRIST

## 2018-08-14 PROCEDURE — A6021 COLLAGEN DRESSING <=16 SQ IN: HCPCS | Performed by: PODIATRIST

## 2018-08-14 RX ORDER — DOXYCYCLINE 100 MG/1
100 CAPSULE ORAL 2 TIMES DAILY
Qty: 14 CAP | Refills: 0 | Status: SHIPPED | OUTPATIENT
Start: 2018-08-14 | End: 2018-09-06

## 2018-08-14 NOTE — PROGRESS NOTES
Podiatry Surgery Progress Note      Patient: Faby Medley MRN: [id number]  SSN: [id number]    YOB: 1950  Age: 79 y.o. Sex: male      Assessment:     Patient Active Problem List   Diagnosis Code    Diabetic foot ulcer ([facility] E11.621, Q69.035    Diastolic dysfunction V18.5    Dyslipidemia E78.5    Cellulitis and abscess of foot, except toes L03.119, L02.619    Lymphedema of both lower extremities I89.0    CKD (chronic kidney disease) stage 4, GFR 15-29 ml/min ([facility]) N18.4    Morbid obesity with BMI of 70 and over, adult ([facility] E66.01, Z68.45    Type 2 diabetes mellitus with diabetic nephropathy ([facility] E11.21    Erectile dysfunction N52.9    Hypothyroidism, adult E03.9    Hypogonadism in male E29.1    Borderline anemia D64.9    Idiopathic gout of multiple sites M10.09    Skin ulcer of ankle with fat layer exposed, right ([facility]) L97.312    Non-pressure chronic ulcer of right calf with fat layer exposed ([facility] T13.917          Plan: Will continue [address] with Endoform and Hydrofera Blue Q 48 hrs. He is to use compression and his lymphedema pumps as instructed. Will place in eRx Doxycycline x 7 days, patient has a h/o MRSA. RTC in 3 weeks for possible Theraskin zuri. Total time spent with patient: 30 [address] discussed with: Patient and Nursing Staff    Discussed:  Care Plan and home health    Disposition:  Stable      Mr. Nellie Murphy is a 79 y.o. male who is a diabetic was admitted Chronic VLU RLE.  The dressing has been changed as instructed RLE       Subjective:   Past Medical History  Past Medical History:   Diagnosis Date    Anemia of chronic disease     Arthritis     Chronic renal insufficiency     Diabetes ([facility]     Dyslipidemia     Gout     Gout     Hypertension     Lymphedema     Morbid obesity ([facility]      Social History     Social History    Marital status:      Spouse name: N/A    Number of children: N/A    Years of education: N/A     Occupational History    Not on file. Social History Main Topics    Smoking status: Never Smoker    Smokeless tobacco: Never Used    Alcohol use No    Drug use: No    Sexual activity: Not on file     Other Topics Concern    Not on file     Social History Narrative       Current Medications  Current Outpatient Prescriptions   Medication Sig Dispense Refill    diclofenac-miSOPROStol (ARTHROTEC 50)  mg-mcg per tablet TAKE 1 TABLET BY MOUTH TWICE A  Tab 0    testosterone (ANDRODERM) 4 mg/24 hr pt24 1 Patch by TransDERmal route daily. Max Daily Amount: 1 Patch. 60 Patch 0    furosemide (LASIX) 80 mg tablet TAKE 1 TABLET BY MOUTH ONCE DAILY 90 Tab 3    valsartan (DIOVAN) 320 mg tablet TAKE 1 TABLET BY MOUTH EVERY DAY 90 Tab 1    testosterone (ANDRODERM) 2 mg/24 hour pt24 APPLY 1 PATCH TRANSDERMALLY ONCE DAILY 90 Patch 0    rosuvastatin (CRESTOR) 40 mg tablet TAKE 1 TABLET BY MOUTH NIGHTLY FOR 90 DAYS. 90 Tab 1    TRULICITY 1.5 SY/0.3 mL sub-q pen INJECT 0.5ML SUBCUTANEOUSLY EVERY 7 DAYS 6 Syringe 3    KLOR-CON M20 20 mEq tablet TAKE 1 TABLET BY MOUTH ONCE DAILY 90 Tab 3    colchicine 0.6 mg tablet TAKE 1 TAB BY MOUTH DAILY AS NEEDED. 90 Tab 3    vit b comp & c-fa-copper-zinc (FOLBEE PLUS CZ) 5-1.5-25 mg tab Take 1 Tab by mouth daily. 90 Tab 3    levothyroxine (SYNTHROID) 50 mcg tablet TAKE 1 TAB BY MOUTH DAILY (BEFORE BREAKFAST) FOR 90 DAYS. 90 Tab 0    diclofenac-miSOPROStol (ARTHROTEC 50)  mg-mcg per tablet TAKE 1 TABLET BY MOUTH TWO (2) TIMES A DAY. 90 Tab 0    tadalafil (CIALIS) 20 mg tablet Take 1 Tab by mouth every thirty-six (36) hours. Indications: Erectile Dysfunction 12 Tab 5    diclofenac (VOLTAREN) 1 % gel Apply 2 g to affected area four (4) times daily. 2 g 0    dulaglutide (TRULICITY) 1.5 ZF/9.8 mL sub-q pen 0.5 ML BY SUBCUTANEOUS ROUTE EVERY SEVEN (7) DAYS. 7.5 Syringe 0    Comp Stocking,Knee,Long,X-Lrg misc 2 Each.       COLCHICINE PO 0.6 mg.      gabapentin (NEURONTIN) 300 mg capsule Take 300 mg by mouth nightly.  Urea 40 % topical foam Apply  to affected area two (2) times a day. 1 Can 0    allopurinol (ZYLOPRIM) 100 mg tablet Take 2 Tabs by mouth daily. 30 Tab 0    metoprolol (LOPRESSOR) 50 mg tablet Take 1 Tab by mouth every twelve (12) hours. 60 Tab 0    Fenofibrate 150 mg cap Take 145 mg by mouth daily. Patient Allergies  No Known Allergies       Objective:   General Exam  alert, cooperative, no distress, appears stated age    Vitals  There were no vitals taken for this visit. REVIEW OF SYSTEMS:  No changes    Foot Exam    VASCULAR EXAM:. Pedal pulses  2/4 DP and PT are non palpable right and left foot. Skin temperature is warm to cool right and left foot.  Digital capillary fill time is 4 seconds right and left foot. Patient has extensive lymphedema of the right and left lower extremity and feet.       NEUROLOGICAL EXAM:. Sensation is diminished to the right and left foot. Protective sensation diminished with 5.07g monofilament wire Bilateral pt can not feel wire at distal tip of all toes. Vibratory sensation diminished due to LOPS. Patient with Diabetic peripheral Neuropathy.       MUSCULOSKELETAL EXAM:. There is equinus of the right and left limb.       DERMATOLOGICAL EXAM:. Full thickness ulceration to the lateral aspect of the right ankle and anterior RLE. [place] is evidence of increase granulation noted lateral ankle lesion with improved measurements.  There is no probing to bone noted.      Ulcer  Ulcer Location: R lower leg anterior, R ankle lateral, Ulcer measurements: as stated below and Ulcer base: Granular/Healthy  Azul Scale: Stage 2    Wound Leg Lower Right;Lateral (Active)   DRESSING STATUS Removed 8/14/2018  2:15 PM   DRESSING TYPE Other (Comment) 8/14/2018  2:15 PM   Non-Pressure Injury Full thickness (subcut/muscle) 8/14/2018  2:15 PM   Wound Length (cm) 1.4 cm 8/14/2018  2:15 PM   Wound Width (cm) 1.6 cm 8/14/2018  2:15 PM   Wound Depth (cm) 0.2 8/14/2018  2:15 PM   Wound Surface area (cm^2) 2.24 cm^2 8/14/2018  2:15 PM   Change in Wound Size % 51.3 8/14/2018  2:15 PM   Condition of Base Pink;Slough 8/9/2018  9:20 AM   Condition of Edges Open 8/14/2018  2:15 PM   Tissue Type Pink;Yellow 8/9/2018  9:20 AM   Tissue Type Percent Pink 50 8/9/2018  9:20 AM   Tissue Type Percent Yellow 50 8/9/2018  9:20 AM   Tissue Type Percent Other (comment) 10 6/7/2018 10:09 AM   Drainage Amount  Moderate 8/14/2018  2:15 PM   Drainage Color Serosanguinous 8/14/2018  2:15 PM   Wound Odor None 8/14/2018  2:15 PM   Periwound Skin Condition Edematous 8/14/2018  2:15 PM   Cleansing and Cleansing Agents  Dermal wound cleanser 8/14/2018  2:15 PM   Dressing Type Applied Other (Comment) 8/9/2018  9:20 AM   Number of Skin Staples Removed 5 7/19/2018  9:36 AM   Procedure Tolerated Well 8/9/2018  9:20 AM   Number of days:194       [REMOVED] Wound Foot Left;Dorsal (Removed)   Removed 02/15/18    DRESSING STATUS Removed 4/9/2015  3:26 PM   DRESSING TYPE Other (Comment) 4/9/2015  3:26 PM   Incision site well approximated? No 4/9/2015  3:26 PM    Read Only Wound Dimensions (length x width x depth) 5.5x0.5x0.8 4/9/2015  3:26 PM   Condition of Base Pink;Granulation 4/9/2015  3:26 PM   Condition of Edges Open 4/9/2015  3:26 PM   Tissue Type Red;Pink 4/9/2015  3:26 PM   Tissue Type Percent Pink 50 4/9/2015  3:26 PM   Tissue Type Percent Red 50 4/9/2015  3:26 PM   Drainage Amount  Small  4/9/2015  3:26 PM   Drainage Color Serosanguinous 4/9/2015  3:26 PM   Wound Odor None 4/9/2015  3:26 PM   Periwound Skin Condition Edematous; Other (comment) 4/9/2015  3:26 PM   Cleansing and Cleansing Agents  Dermal wound cleanser 4/9/2015  3:26 PM   Dressing Type Applied Other (Comment) 4/9/2015  3:26 PM   Procedure Tolerated Well 4/9/2015  3:26 PM   Number of days:1081       [REMOVED] Wound Leg Lower Right; Anterior (Removed)   Removed 08/09/18    DRESSING STATUS Removed 7/19/2018  9:36 AM   DRESSING TYPE Other (Comment) 7/19/2018  9:36 AM   Non-Pressure Injury Partial thickness (epider/derm) 7/12/2018 11:40 AM   Wound Length (cm) 0 cm 8/2/2018  9:45 AM   Wound Width (cm) 0 cm 8/2/2018  9:45 AM   Wound Depth (cm) 0 8/2/2018  9:45 AM   Wound Surface area (cm^2) 0 cm^2 8/2/2018  9:45 AM   Condition of Base [place] 7/5/2018 10:50 AM   Condition of Edges Closed 8/2/2018  9:45 AM   Tissue Type Other (comment) 7/12/2018 11:40 AM   Tissue Type Percent Pink 100 7/5/2018 10:50 AM   Tissue Type Percent Red 100 5/23/2018 11:26 AM   Tissue Type Percent Yellow 20 6/7/2018 10:09 AM   Drainage Amount  None 7/19/2018  9:36 AM   Drainage Color Serous 7/12/2018 11:40 AM   Wound Odor None 7/19/2018  9:36 AM   Periwound Skin Condition Intact 8/2/2018  9:45 AM   Cleansing and Cleansing Agents  Dermal wound cleanser 7/19/2018  9:36 AM   Dressing Type Applied Other (Comment) 7/19/2018  9:36 AM   Number of Skin Staples Removed 6 6/28/2018  9:42 AM   Procedure Tolerated Well 7/19/2018  9:36 AM   Number of days:189        Labs  No results found for this or any previous visit (from the past 24 hour(s)). X-Ray:  deferred    Procedures:   S/p Theraskin application on 4/51, 1/44, 6/07, 6/21, 7/05, 7/19 JANA Preston, XIOMARA  August 14, 2018

## 2018-08-14 NOTE — WOUND CARE
08/14/18 1415   Wound Leg Lower Right;Lateral   Date First Assessed/Time First Assessed: 02/01/18 0947   POA: Yes  Location: Leg Lower  Orientation: Right;Lateral   DRESSING STATUS Removed   DRESSING TYPE Other (Comment)  (Coban,Kerlix,Hydrofera Blue)   Non-Pressure Injury Full thickness (subcut/muscle)   Wound Length (cm) 1.4 cm   Wound Width (cm) 1.6 cm   Wound Depth (cm) 0.2   Wound Surface area (cm^2) 2.24 cm^2   Change in Wound Size % 51.3   Condition of Edges Open   Tissue Type Pink   Tissue Type Percent Pink 100   Drainage Amount  Moderate   Drainage Color Serosanguinous   Wound Odor None   Periwound Skin Condition Edematous   Cleansing and Cleansing Agents  Dermal wound cleanser   Dressing Type Applied Other (Comment)  (Endoform, Hydrofera Blue, Kerlix, Coband)   Procedure Tolerated Well

## 2018-08-15 ENCOUNTER — OFFICE VISIT (OUTPATIENT)
Dept: FAMILY MEDICINE CLINIC | Age: 68
End: 2018-08-15

## 2018-08-15 VITALS
DIASTOLIC BLOOD PRESSURE: 82 MMHG | SYSTOLIC BLOOD PRESSURE: 153 MMHG | HEART RATE: 71 BPM | TEMPERATURE: 98.3 F | BODY MASS INDEX: 37.19 KG/M2 | OXYGEN SATURATION: 98 % | WEIGHT: 315 LBS | HEIGHT: 77 IN | RESPIRATION RATE: 18 BRPM

## 2018-08-15 DIAGNOSIS — E29.1 HYPOGONADISM IN MALE: ICD-10-CM

## 2018-08-15 DIAGNOSIS — Z00.00 MEDICARE ANNUAL WELLNESS VISIT, SUBSEQUENT: Primary | ICD-10-CM

## 2018-08-15 DIAGNOSIS — E11.9 ENCOUNTER FOR DIABETIC FOOT EXAM (HCC): ICD-10-CM

## 2018-08-15 DIAGNOSIS — Z71.89 ACP (ADVANCE CARE PLANNING): ICD-10-CM

## 2018-08-15 PROBLEM — E11.40 TYPE 2 DIABETES MELLITUS WITH DIABETIC NEUROPATHY (HCC): Status: ACTIVE | Noted: 2018-08-15

## 2018-08-15 NOTE — PROGRESS NOTES
HISTORY OF PRESENT ILLNESS  Faby Medley is a 79 y.o. male. HPI Comments: Patient is here for his annual wellness. He is up to date with health maintenance and will be seeing his eye doctor some time in October or November but he is not sure. Patient will go back   Patient is up to date with all his other measures and he has had his vaccines. He has had his colonoscopy in 2014. Patient is currently following up to see about his referral to lymphedema clinic. The place where he wanted to go the provider no longer works there. Patient mentions he is not sure at present if he wants to rejoin the lymphedema clinic he was treated at the past.      Annual Exam   The history is provided by the patient. This is a new problem. The problem occurs constantly. The problem has not changed since onset. Pertinent negatives include no chest pain, no abdominal pain, no headaches and no shortness of breath. Review of Systems   Constitutional: Negative for chills, fever and malaise/fatigue. HENT: Negative for hearing loss, nosebleeds and sinus pain. Eyes: Negative for blurred vision, double vision, pain and discharge. Respiratory: Negative for cough, shortness of breath and wheezing. Cardiovascular: Positive for leg swelling. Negative for chest pain. Gastrointestinal: Negative for abdominal pain, diarrhea, nausea and vomiting. Genitourinary: Negative for dysuria, hematuria and urgency. Musculoskeletal: Positive for joint pain. Neurological: Negative for dizziness, weakness and headaches. Endo/Heme/Allergies: Negative for environmental allergies. Psychiatric/Behavioral: Negative for depression, substance abuse and suicidal ideas. The patient is not nervous/anxious and does not have insomnia. Physical Exam   Constitutional: He is oriented to person, place, and time. He appears well-developed and well-nourished. No distress. HENT:   Head: Normocephalic and atraumatic.    Right Ear: External ear normal.   Left Ear: External ear normal.   Mouth/Throat: Oropharynx is clear and moist. No oropharyngeal exudate. Eyes: EOM are normal. Pupils are equal, round, and reactive to light. No scleral icterus. Neck: Normal range of motion. No thyromegaly present. Cardiovascular: Normal rate, regular rhythm and normal heart sounds. Pulmonary/Chest: Effort normal and breath sounds normal. No respiratory distress. He has no wheezes. Abdominal: Soft. Bowel sounds are normal. He exhibits no distension. There is no tenderness. Musculoskeletal: He exhibits edema. Lymphadenopathy:     He has no cervical adenopathy. Neurological: He is alert and oriented to person, place, and time. Psychiatric: He has a normal mood and affect. Diabetic foot exam:     Left Foot:   Visual Exam: lymphedema / large feet and dry skin   Pulse DP: 1+ (weak)   Filament test: reduced sensation    Vibratory sensation: Vibratory sensation: diminished       Right Foot:   Visual Exam: Lymphedema / large feet and dry skin   Pulse DP: 1+ (weak)   Filament test: reduced sensation    Vibratory sensation: Vibratory sensation: diminished              This is the Subsequent Medicare Annual Wellness Exam, performed 12 months or more after the Initial AWV or the last Subsequent AWV    I have reviewed the patient's medical history in detail and updated the computerized patient record.      History     Past Medical History:   Diagnosis Date    Anemia of chronic disease     Arthritis     Chronic renal insufficiency     Diabetes ([address]     Dyslipidemia     Gout     Gout     Hypertension     Lymphedema     Morbid obesity ([facility] [address]       Past Surgical History:   Procedure Laterality Date    HX ORTHOPAEDIC  3/2015     Lt Foot ulcer     Current Outpatient Prescriptions   Medication Sig Dispense Refill    testosterone (ANDRODERM) 2 mg/24 hour pt24 APPLY 1 PATCH TRANSDERMALLY ONCE DAILY 90 Patch 0    doxycycline (VIBRAMYCIN) 100 mg capsule Take 1 Cap by mouth two (2) times a day. Indications: Skin and Skin Structure Infection 14 Cap 0    diclofenac-miSOPROStol (ARTHROTEC 50)  mg-mcg per tablet TAKE 1 TABLET BY MOUTH TWICE A  Tab 0    testosterone (ANDRODERM) 4 mg/24 hr pt24 1 Patch by TransDERmal route daily. Max Daily Amount: 1 Patch. 60 Patch 0    furosemide (LASIX) 80 mg tablet TAKE 1 TABLET BY MOUTH ONCE DAILY 90 Tab 3    valsartan (DIOVAN) 320 mg tablet TAKE 1 TABLET BY MOUTH EVERY DAY 90 Tab 1    rosuvastatin (CRESTOR) 40 mg tablet TAKE 1 TABLET BY MOUTH NIGHTLY FOR 90 DAYS. 90 Tab 1    TRULICITY 1.5 RH/1.5 mL sub-q pen INJECT 0.5ML SUBCUTANEOUSLY EVERY 7 DAYS 6 Syringe 3    KLOR-CON M20 20 mEq tablet TAKE 1 TABLET BY MOUTH ONCE DAILY 90 Tab 3    colchicine 0.6 mg tablet TAKE 1 TAB BY MOUTH DAILY AS NEEDED. 90 Tab 3    vit b comp & c-fa-copper-zinc (FOLBEE PLUS CZ) 5-1.5-25 mg tab Take 1 Tab by mouth daily. 90 Tab 3    levothyroxine (SYNTHROID) 50 mcg tablet TAKE 1 TAB BY MOUTH DAILY (BEFORE BREAKFAST) FOR 90 DAYS. 90 Tab 0    diclofenac-miSOPROStol (ARTHROTEC 50)  mg-mcg per tablet TAKE 1 TABLET BY MOUTH TWO (2) TIMES A DAY. 90 Tab 0    tadalafil (CIALIS) 20 mg tablet Take 1 Tab by mouth every thirty-six (36) hours. Indications: Erectile Dysfunction 12 Tab 5    diclofenac (VOLTAREN) 1 % gel Apply 2 g to affected area four (4) times daily. 2 g 0    dulaglutide (TRULICITY) 1.5 SQ/9.0 mL sub-q pen 0.5 ML BY SUBCUTANEOUS ROUTE EVERY SEVEN (7) DAYS. 7.5 Syringe 0    Comp Stocking,Knee,Long,X-Lrg misc 2 Each.  COLCHICINE PO 0.6 mg.      gabapentin (NEURONTIN) 300 mg capsule Take 300 mg by mouth nightly.  Urea 40 % topical foam Apply  to affected area two (2) times a day. 1 Can 0    allopurinol (ZYLOPRIM) 100 mg tablet Take 2 Tabs by mouth daily. 30 Tab 0    metoprolol (LOPRESSOR) 50 mg tablet Take 1 Tab by mouth every twelve (12) hours. 60 Tab 0    Fenofibrate 150 mg cap Take 145 mg by mouth daily. No Known Allergies  Family History   Problem Relation Age of Onset   [facility] Mother     No Known Problems Father      Social History   Substance Use Topics    Smoking status: Never Smoker    Smokeless tobacco: Never Used    Alcohol use No     Patient Active Problem List   Diagnosis Code    Diabetic foot ulcer ([facility]) E11.621, H61.418    Diastolic dysfunction M95.8    Dyslipidemia E78.5    Cellulitis and abscess of foot, except toes L03.119, L02.619    Lymphedema of both lower extremities I89.0    CKD (chronic kidney disease) stage 4, GFR 15-29 ml/min ([facility]) N18.4    Morbid obesity with BMI of 70 and over, adult ([facility] [address] E66.01, Z68.45    Type 2 diabetes mellitus with diabetic nephropathy ([facility] [address] E11.21    Erectile dysfunction N52.9    Hypothyroidism, adult E03.9    Hypogonadism in male E29.1    Borderline anemia D64.9    Idiopathic gout of multiple sites M10.09    Skin ulcer of ankle with fat layer exposed, right ([facility]) L97.312    Non-pressure chronic ulcer of right calf with fat layer exposed ([facility] [address] L97.212    Type 2 diabetes mellitus with diabetic neuropathy ([facility]) E11.40       Depression Risk Factor Screening:     PHQ over the last two weeks 8/15/2018   Little interest or pleasure in doing things Not at all   Feeling down, depressed, irritable, or hopeless Not at all   Total Score PHQ 2 0     Alcohol Risk Factor Screening:   Patient does not drink alcohol    Functional Ability and Level of Safety:   Hearing Loss  Good hearing on whisper testing , 2 feel behind the patient. Activities of Daily Living  The home contains: no safety equipment. Patient does total self care    Fall Risk  Fall Risk Assessment, last 12 mths 8/15/2018   Able to walk? Yes   Fall in past 12 months?  No       Abuse Screen  Patient is not abused    Cognitive Screening   Evaluation of Cognitive Function:  Has your family/caregiver stated any concerns about your memory: no  Normal    Patient Care Team   Patient Care Team:  Mary Whitmore MD as PCP - General (Internal Medicine)  Jake Linares MD (Inactive) as Physician (Ophthalmology)    Assessment/Plan   Education and counseling provided:  Are appropriate based on today's review and evaluation  End-of-Life planning (with patient's consent)  Pneumococcal Vaccine  Influenza Vaccine  Hepatitis B Vaccine  Screening Mammography  Screening Pap and pelvic (covered once every 2 years)  Colorectal cancer screening tests  Cardiovascular screening blood test  Bone mass measurement (DEXA)  Screening for glaucoma  Diabetes screening test  Medical nutrition therapy for individuals with diabetes or renal disease    Diagnoses and all orders for this visit:    1. Medicare annual wellness visit, subsequent    2. Encounter for diabetic foot exam ([facility] [address]  -      DIABETES FOOT EXAM    3. ACP (advance care planning)    4. Hypogonadism in male  -     testosterone (ANDRODERM) 2 mg/24 hour pt24; APPLY 1 PATCH TRANSDERMALLY ONCE DAILY        Health Maintenance Due   Topic Date Due    EYE EXAM RETINAL OR DILATED Q1  12/13/2017    Pneumococcal 65+ Low/Medium Risk (2 of 2 - PPSV23) 05/02/2018    Influenza Age 5 to Adult  08/01/2018     1) Please make sure you have a routine physical exam every 1-2 years. 2) Annual check up with eye doctor and dentist.  3) Colorectal cancer screening with colonoscopy every ten years at age 48. Depending on certain risk factors screening may need to be done earlier. 4) Rectal exam and PSA screening at age of 48, screening may be done earlier depending on certain risk factors as discussed.    ) Bone density testing starting at the age of 72.   ) Routine blood work to be ordered as part of physical exam and has been discussed with patient.  ) Screening for STD's/HIV.  ) Exercise at least 30 min 3-5 times a week for goal BMI of less than or equal to 25.    Please make sure you wear a seat belt while driving daily , helmet safety discussed.  ) Please avoid smoking , alcohol and illicit drug use.  ) Daily requirement of calcium is 1200 mg per day and 1000 IU of vitamin D.  ) Please make sure all immunizations are up to date:       - Influenza vaccine every year        - Tdap every 10 years       - Pneumococcal vaccine starting at age of 72       - Shingles at age 61      Medications requested have been refilled.

## 2018-08-15 NOTE — PATIENT INSTRUCTIONS
Medicare Wellness Visit, Male    The best way to live healthy is to have a lifestyle where you eat a well-balanced diet, exercise regularly, limit alcohol use, and quit all forms of tobacco/nicotine, if applicable. Regular preventive services are another way to keep healthy. Preventive services (vaccines, screening tests, monitoring & exams) can help personalize your care plan, which helps you manage your own care. Screening tests can find health problems at the earliest stages, when they are easiest to treat. [facility] follows the current, evidence-based guidelines published by the [facility] ([facility]) when recommending preventive services for our patients. Because we follow these guidelines, sometimes recommendations change over time as research supports it. (For example, a prostate screening blood test is no longer routinely recommended for men with no symptoms.)    Of course, you and your provider may decide to screen more often for some diseases, based on your risk and co-morbidities (chronic disease you are already diagnosed with). Preventive services for you include:    - Medicare offers their members a free annual wellness visit, which is time for you and your primary care provider to discuss and plan for your preventive service needs. Take advantage of this benefit every year!    -All people over age 72 should receive the recommended pneumonia vaccines. Current USPSTF guidelines recommend a series of two vaccines for the best pneumonia protection.     -All adults should have a yearly flu vaccine and a tetanus vaccine every 10 years.  All adults age 61 years should receive a shingles vaccine once in their lifetime.      -All adults age 38-68 years who are overweight should have a diabetes screening test once every three years.     -Other screening tests & preventive services for persons with diabetes include: an eye exam to screen for diabetic retinopathy, a kidney function test, a foot exam, and stricter control over your cholesterol.     -Cardiovascular screening for adults with routine risk involves an electrocardiogram (ECG) at intervals determined by the provider.     -Colorectal cancer screenings should be done for adults age 54-65 years with normal risk. There are a number of acceptable methods of screening for this type of cancer. Each test has its own benefits and drawbacks. Discuss with your provider what is most appropriate for you during your annual wellness visit. The different tests include: colonoscopy (considered the best screening method), a fecal occult blood test, a fecal DNA test, and sigmoidoscopy.    -All adults born between [facility] should be screened once for Hepatitis C.    -An Abdominal Aortic Aneurysm (AAA) Screening is recommended for men age 73-68 who has ever smoked in their lifetime.      Here is a list of your current Health Maintenance items (your personalized list of preventive services) with a due date:  Health Maintenance Due   Topic Date Due    Eye Exam  12/13/2017    Pneumococcal Vaccine (2 of 2 - PPSV23) 05/02/2018    Flu Vaccine  08/01/2018

## 2018-08-15 NOTE — ACP (ADVANCE CARE PLANNING)
Advance Care Planning (ACP) Provider Note - Comprehensive     Date of ACP Conversation: 08/15/18  Persons included in Conversation:  patient  Length of ACP Conversation in minutes:  Five minutes              General ACP for ALL Patients with Decision Making Capacity:   Importance of advance care planning, including choosing a healthcare agent to communicate patient's healthcare decisions if patient lost the ability to make decisions, such as after a sudden illness or accident    Review of Existing Advance Directive:  Patient has this in place and will bring in paperwork back    For Serious or Chronic Illness:  Understanding of medical condition      Interventions Provided:  Recommended completion of Advance Directive form after review of ACP materials and conversation with prospective healthcare agent   Recommended communicating the plan and making copies for the healthcare agent, personal physician, and others as appropriate (e.g., health system)  Recommended review of completed ACP document annually or upon change in health status

## 2018-08-26 RX ORDER — FOLIC ACID/VIT BCOMP,C/CU/ZINC 5-1.5-25MG
TABLET ORAL
Qty: 90 TAB | Refills: 3 | Status: SHIPPED | OUTPATIENT
Start: 2018-08-26 | End: 2018-11-26 | Stop reason: SDUPTHER

## 2018-09-06 ENCOUNTER — HOSPITAL ENCOUNTER (OUTPATIENT)
Dept: WOUND CARE | Age: 68
Discharge: HOME | End: 2018-09-06
Payer: MEDICARE

## 2018-09-06 VITALS
SYSTOLIC BLOOD PRESSURE: 146 MMHG | DIASTOLIC BLOOD PRESSURE: 77 MMHG | TEMPERATURE: 96.9 F | OXYGEN SATURATION: 96 % | HEART RATE: 80 BPM

## 2018-09-06 PROCEDURE — 77030011256 HC DRSG MEPILEX <16IN NO BORD MOLN -A: Performed by: PODIATRIST

## 2018-09-06 PROCEDURE — 97602 WOUND(S) CARE NON-SELECTIVE: CPT

## 2018-09-06 PROCEDURE — 87077 CULTURE AEROBIC IDENTIFY: CPT | Performed by: PODIATRIST

## 2018-09-06 PROCEDURE — 87186 SC STD MICRODIL/AGAR DIL: CPT | Performed by: PODIATRIST

## 2018-09-06 PROCEDURE — 87070 CULTURE OTHR SPECIMN AEROBIC: CPT | Performed by: PODIATRIST

## 2018-09-06 NOTE — PROGRESS NOTES
Podiatry Surgery Progress Note Patient: Edgar Herron MRN: [id number]  SSN: [id number] YOB: 1950  Age: 79 y.o. Sex: male Assessment:  
 
Patient Active Problem List  
Diagnosis Code  Diabetic foot ulcer ([facility] E11.621, L97.509  Diastolic dysfunction W33.4  Dyslipidemia E78.5  Cellulitis and abscess of foot, except toes L03.119, L02.619  Lymphedema of both lower extremities I89.0  CKD (chronic kidney disease) stage 4, GFR 15-29 ml/min ([facility]) N18.4  Morbid obesity with BMI of 70 and over, adult ([facility] E66.01, Z68.45  Type 2 diabetes mellitus with diabetic nephropathy ([facility]) E11.21  
 Erectile dysfunction N52.9  Hypothyroidism, adult E03.9  Hypogonadism in male E29.1  Borderline anemia D64.9  
 Idiopathic gout of multiple sites M10.09  
 Skin ulcer of ankle with fat layer exposed, right ([facility] P55.044  Non-pressure chronic ulcer of right calf with fat layer exposed ([facility] D08.589  Type 2 diabetes mellitus with diabetic neuropathy ([facility]) E11.40 Plan:  
Continue LWC with endoform and hydrofera blue Q 48hrs,  Must continue compression stockings and lymphedema pumps(BID) Elevate when sitting and lying. Explained to patient the swelling in his legs decrease healing. Total time spent with patient: 30 Minutes Care Plan discussed with: Patient and Nursing Staff Discussed:  Care Plan and home health Disposition:  Stable Mr. Maryjo Schirmer is a 79 y.o. male who was admitted for chronic VLE with a diabetic component RLE . Patient states he completed the antibx given at the last visit. He states he has not been using the pumps as instructed. Subjective:  
Past Medical History Past Medical History:  
Diagnosis Date  Anemia of chronic disease  Arthritis  Chronic renal insufficiency  Diabetes ([facility]  Dyslipidemia  Gout  Gout  Hypertension  Lymphedema  Morbid obesity ([facility] Social History Social History  Marital status:  Spouse name: N/A  
 Number of children: N/A  
 Years of education: N/A Occupational History  Not on file. Social History Main Topics  Smoking status: Never Smoker  Smokeless tobacco: Never Used  Alcohol use No  
 Drug use: No  
 Sexual activity: Not on file Other Topics Concern  Not on file Social History Narrative Current Medications Current Outpatient Prescriptions Medication Sig Dispense Refill  FOLBEE PLUS 5-1.5-25 mg tab TAKE 1 TABLET BY MOUTH EVERY DAY 90 Tab 3  
 diclofenac-miSOPROStol (ARTHROTEC 50)  mg-mcg per tablet TAKE 1 TABLET BY MOUTH TWICE A  Tab 0  
 testosterone (ANDRODERM) 4 mg/24 hr pt24 1 Patch by TransDERmal route daily. Max Daily Amount: 1 Patch. 60 Patch 0  
 furosemide (LASIX) 80 mg tablet TAKE 1 TABLET BY MOUTH ONCE DAILY 90 Tab 3  
 valsartan (DIOVAN) 320 mg tablet TAKE 1 TABLET BY MOUTH EVERY DAY 90 Tab 1  
 rosuvastatin (CRESTOR) 40 mg tablet TAKE 1 TABLET BY MOUTH NIGHTLY FOR 90 DAYS. 90 Tab 1  TRULICITY 1.5 FL/6.9 mL sub-q pen INJECT 0.5ML SUBCUTANEOUSLY EVERY 7 DAYS 6 Syringe 3  
 KLOR-CON M20 20 mEq tablet TAKE 1 TABLET BY MOUTH ONCE DAILY 90 Tab 3  
 colchicine 0.6 mg tablet TAKE 1 TAB BY MOUTH DAILY AS NEEDED. 90 Tab 3  
 levothyroxine (SYNTHROID) 50 mcg tablet TAKE 1 TAB BY MOUTH DAILY (BEFORE BREAKFAST) FOR 90 DAYS. 90 Tab 0  
 diclofenac-miSOPROStol (ARTHROTEC 50)  mg-mcg per tablet TAKE 1 TABLET BY MOUTH TWO (2) TIMES A DAY. 90 Tab 0  
 tadalafil (CIALIS) 20 mg tablet Take 1 Tab by mouth every thirty-six (36) hours. Indications: Erectile Dysfunction 12 Tab 5  
 diclofenac (VOLTAREN) 1 % gel Apply 2 g to affected area four (4) times daily. 2 g 0  
 dulaglutide (TRULICITY) 1.5 ZS/5.1 mL sub-q pen 0.5 ML BY SUBCUTANEOUS ROUTE EVERY SEVEN (7) DAYS. 7.5 Syringe 0  
 Comp Stocking,Knee,Long,X-Lrg misc 2 Each.     
 COLCHICINE PO 0.6 mg.    
  gabapentin (NEURONTIN) 300 mg capsule Take 300 mg by mouth nightly.  Urea 40 % topical foam Apply  to affected area two (2) times a day. 1 Can 0  
 allopurinol (ZYLOPRIM) 100 mg tablet Take 2 Tabs by mouth daily. 30 Tab 0  
 metoprolol (LOPRESSOR) 50 mg tablet Take 1 Tab by mouth every twelve (12) hours. 60 Tab 0  Fenofibrate 150 mg cap Take 145 mg by mouth daily. Patient Allergies No Known Allergies Objective:  
General Exam 
alert, cooperative, no distress, appears stated age Vitals Visit Vitals  /77  Pulse 80  Temp 96.9 °F (36.1 °C)  SpO2 96% REVIEW OF SYSTEMS: 
General: denies chronic fatigue, weight loss, fever, anemia, bruising, depression, nervousness, panic attacks HEENT: denies ringing in ears, ear infections, dizzy spells, poor vision, glaucoma, sinus trouble, hoarseness, eye infections GI: denies diarrhea, gas, bloating, heartburn, regurgitation, difficulty swallowing, painful swallowing, nausea, vomiting, constipation, abdominal pain, decreased appetite, blood in stools, black stools, jaundice, dark urine Lungs: denies pneumonia, asthma, cough, SOB, hemoptysis Heart: denies chest pain, irregular heart beat, leg swelling, HTN Skin: denies rashes, hives, allergic reaction Urinary: denies UTI, kidney stones, decreased urine force and flow, urination at night, blood in urine, painful urination Bones and Joints: denies arthritis, rheumatism, back pain, gout, osteoporosis Neurologic: denies stroke, seizures, headaches, numbness, tingling Foot Exam 
VASCULAR EXAM:. Pedal pulses  2/4 DP and PT are non palpable right and left foot. Skin temperature is warm to cool right and left foot.  Digital capillary fill time is 4 seconds right and left foot. Patient has extensive lymphedema of the right and left lower extremity and feet.  
   
NEUROLOGICAL EXAM:. Sensation is diminished to the right and left foot. Protective sensation diminished with 5.07g monofilament wire Bilateral pt can not feel wire at distal tip of all toes. Vibratory sensation diminished due to LOPS. Patient with Diabetic peripheral Neuropathy.  
   
MUSCULOSKELETAL EXAM:. There is equinus of the right and left limb.  
   
DERMATOLOGICAL EXAM:. Full thickness ulceration to the lateral aspect of the right ankle and anterior RLE. Lizbeth  is evidence of increase granulation noted lateral ankle lesion with slightly improved measurements. There is no probing to bone noted.  
   
Ulcer Ulcer Location: R lower leg anterior, R ankle lateral, Ulcer measurements: as stated below and Ulcer base: Granular/Healthy Azul Scale: Stage 2 Wound Leg Lower Right;Lateral (Active) DRESSING STATUS Removed 9/6/2018  9:11 AM  
DRESSING TYPE Other (Comment) 9/6/2018  9:11 AM  
Non-Pressure Injury Full thickness (subcut/muscle) 8/14/2018  2:15 PM  
Wound Length (cm) 1.3 cm 9/6/2018  9:11 AM  
Wound Width (cm) 1.4 cm 9/6/2018  9:11 AM  
Wound Depth (cm) 0.2 9/6/2018  9:11 AM  
Wound Surface area (cm^2) 1.82 cm^2 9/6/2018  9:11 AM  
Change in Wound Size % 51.3 8/14/2018  2:15 PM  
Condition of Base Kosse 9/6/2018  9:11 AM  
Condition of Edges Open 9/6/2018  9:11 AM  
Tissue Type Pink 9/6/2018  9:11 AM  
Tissue Type Percent Pink 100 9/6/2018  9:11 AM  
Tissue Type Percent Yellow 50 8/9/2018  9:20 AM  
Tissue Type Percent Other (comment) 10 6/7/2018 10:09 AM  
Drainage Amount  Small  9/6/2018  9:11 AM  
Drainage Color Serous 9/6/2018  9:11 AM  
Wound Odor None 9/6/2018  9:11 AM  
Periwound Skin Condition Edematous 9/6/2018  9:11 AM  
Cleansing and Cleansing Agents  Dermal wound cleanser 9/6/2018  9:11 AM  
Dressing Type Applied Other (Comment) 8/14/2018  2:15 PM  
Number of Skin Staples Removed 5 7/19/2018  9:36 AM  
Procedure Tolerated Well 8/14/2018  2:15 PM  
Number of days:217  
   
[REMOVED] Wound Foot Left;Dorsal (Removed) Removed 02/15/18 DRESSING STATUS Removed 4/9/2015  3:26 PM  
DRESSING TYPE Other (Comment) 4/9/2015  3:26 PM  
Incision site well approximated? No 4/9/2015  3:26 PM  
 Read Only Wound Dimensions (length x width x depth) 5.5x0.5x0.8 4/9/2015  3:26 PM  
Condition of Base Pink;Granulation 4/9/2015  3:26 PM  
Condition of Edges Open 4/9/2015  3:26 PM  
Tissue Type Red;Pink 4/9/2015  3:26 PM  
Tissue Type Percent Pink 50 4/9/2015  3:26 PM  
Tissue Type Percent Red 50 4/9/2015  3:26 PM  
Drainage Amount  Small  4/9/2015  3:26 PM  
Drainage Color Serosanguinous 4/9/2015  3:26 PM  
Wound Odor None 4/9/2015  3:26 PM  
Periwound Skin Condition Edematous; Other (comment) 4/9/2015  3:26 PM  
Cleansing and Cleansing Agents  Dermal wound cleanser 4/9/2015  3:26 PM  
Dressing Type Applied Other (Comment) 4/9/2015  3:26 PM  
Procedure Tolerated Well 4/9/2015  3:26 PM  
Number of days:1081 [REMOVED] Wound Leg Lower Right; Anterior (Removed) Removed 08/09/18 DRESSING STATUS Removed 7/19/2018  9:36 AM  
DRESSING TYPE Other (Comment) 7/19/2018  9:36 AM  
Non-Pressure Injury Partial thickness (epider/derm) 7/12/2018 11:40 AM  
Wound Length (cm) 0 cm 8/2/2018  9:45 AM  
Wound Width (cm) 0 cm 8/2/2018  9:45 AM  
Wound Depth (cm) 0 8/2/2018  9:45 AM  
Wound Surface area (cm^2) 0 cm^2 8/2/2018  9:45 AM  
Condition of Base Blue Ash 7/5/2018 10:50 AM  
Condition of Edges Closed 8/2/2018  9:45 AM  
Tissue Type Other (comment) 7/12/2018 11:40 AM  
Tissue Type Percent Pink 100 7/5/2018 10:50 AM  
Tissue Type Percent Red 100 5/23/2018 11:26 AM  
Tissue Type Percent Yellow 20 6/7/2018 10:09 AM  
Drainage Amount  None 7/19/2018  9:36 AM  
Drainage Color Serous 7/12/2018 11:40 AM  
Wound Odor None 7/19/2018  9:36 AM  
Periwound Skin Condition Intact 8/2/2018  9:45 AM  
Cleansing and Cleansing Agents  Dermal wound cleanser 7/19/2018  9:36 AM  
Dressing Type Applied Other (Comment) 7/19/2018  9:36 AM  
Number of Skin Staples Removed 6 6/28/2018  9:42 AM  
 Procedure Tolerated Well 7/19/2018  9:36 AM  
Number of days:189 Labs No results found for this or any previous visit (from the past 24 hour(s)). X-Ray:  none Procedures:  S/p Theraskin application on 0/70, 8/98, 6/07, 6/21, 7/05, 7/19 JEAN CARLOS. Ariel Louis DPM 
September 6, 2018

## 2018-09-06 NOTE — WOUND CARE
Patient Name: Jordyn Hinkle : 1950 MRN: [id number] Situation: Follow up wound care Background: Chronic right lower leg ulcer with lymphedema. Assessment: Patient's wound has not progressed much since his last visit. However, the base looks better with more pink. He recently finished a course of antibiotics prescribed by Dr. Emeli Webb. Another culture was taken today. A dressing of Endoform, HF Blue, and Coban was applied. Plan: Continue with dressings as above. Follow up in one month with Dr. Emeli Webb. Patient will be contacted with culture results if antibiotic needed.

## 2018-09-09 LAB
BACTERIA SPEC CULT: ABNORMAL
BACTERIA SPEC CULT: ABNORMAL
GRAM STN SPEC: ABNORMAL
SERVICE CMNT-IMP: ABNORMAL

## 2018-09-10 RX ORDER — DOXYCYCLINE 100 MG/1
100 CAPSULE ORAL 2 TIMES DAILY
Qty: 14 CAP | Refills: 0 | Status: SHIPPED | OUTPATIENT
Start: 2018-09-10 | End: 2018-10-18

## 2018-09-16 RX ORDER — COLCHICINE 0.6 MG/1
TABLET ORAL
Qty: 90 TAB | Refills: 3 | Status: SHIPPED | OUTPATIENT
Start: 2018-09-16 | End: 2018-12-10 | Stop reason: SDUPTHER

## 2018-09-16 RX ORDER — ROSUVASTATIN CALCIUM 40 MG/1
TABLET, COATED ORAL
Qty: 90 TAB | Refills: 1 | Status: SHIPPED | OUTPATIENT
Start: 2018-09-16 | End: 2018-12-10 | Stop reason: SDUPTHER

## 2018-09-28 RX ORDER — VALSARTAN 320 MG/1
320 TABLET ORAL DAILY
Qty: 90 TAB | Refills: 3 | Status: SHIPPED | OUTPATIENT
Start: 2018-09-28 | End: 2019-10-03 | Stop reason: SDUPTHER

## 2018-10-04 ENCOUNTER — HOSPITAL ENCOUNTER (OUTPATIENT)
Dept: WOUND CARE | Age: 68
Discharge: HOME | End: 2018-10-04
Payer: MEDICARE

## 2018-10-04 PROCEDURE — 15271 SKIN SUB GRAFT TRNK/ARM/LEG: CPT | Performed by: PODIATRIST

## 2018-10-04 NOTE — WOUND CARE
Patient Name: Ana Hurd : 1950 MRN: [id number] Theraskin graft placed today. Patient will follow up in one week for graft check and dressing change Graft Type: Theraskin Graft ID#: [id number] Orvan Been ID#: [id number] Retrieved from: [address] [place] [address] Retrieved and Reconstituted by: Olu Suggs RN Reconstitution Method: Normal Saline Amount of graft used (sq cm): 19.5 sq cm Amount discarded (sq cm): 19.5 sq cm

## 2018-10-04 NOTE — PROGRESS NOTES
Theraskin and Debridement Procedure Note Pre-Operative Diagnosis: Non-Healing Wound Post-Operative Diagnosis: Same Site: lateral right ankle Procedure: 1. Selective sharp instrument debridement of slough and devitilized tissue 2. Application of Skin Biograft - Theraskin Indications: 1. Removal of devitalized and necrotic tissue to promote healing and evaluate the extent of healing. 2. Stage 7 of a planned staged series of       10                   applications of Theraskin in wound not responding to conventional therapy After the benefits/risks/SE were discussed, the patient agreed to proceed. Time out was done: * Patient was identified by name and date of birth * Agreement on procedure being performed was verified * Procedure site verified and marked as necessary * Patient was positioned for comfort * Consent was signed and verified. Instruments used:  
 [x]  Dermal curette  Blade 
      [] #15  [] #10  [] Forceps  [] Tissue nippers  [] sterile scissors  [] Other Anesthesia used:  
 []  EMLA 2.5% cream: applied to wound beds for approximately 15minutes. []   Lidocaine 2% Topical Gel    
 []  Lidocaine injectable 1% with epinephrine 1:100,000; Amount used: mL  
 []  Lidocaine injectable 1% without epinephrine; Amount used: mL  
 []  Other:   
 []  None  
      [] patient is insensate due to neuropathy  
      [] patient declines 
      [] allergy to anesthetic 
      [] tissue for debridement is either superficial, loosely adherent and/or necrotic and denervated Description of Procedure: After usual preparation, sharp debridement of slough and devitalized tissue was performed utilizing the instruments as above. Tissue was removed from the sub Q layer. The wound was debrided to remove non-viable tissue and to clearly reveal the wound margins. The wound was debrided to an acute state with some bleeding from the capillary bed. Pre-debridement measurement: see accompanying progress note Post debridement measurement:  1.3_x1.4_x0.2_cm . Bleeding: <5mL Resolved with light focal pressure. Wounds were cleaned and irrigated with saline. After usual preparation, Theraskin applied to the wound and affixed to the skin with steri strips and covered with non-adherent contact layer. The patient tolerated the procedure without complication. Theraskin 39 sq cm sheet Theraskin used: 19.5 sq cm Theraskin discarded: 19.5 sq cm Wound care applied: 
 []   Hydrogell   []  Hypergel   []   Hydrofiber/Aquacel    
 []   Cadexomer Iodine (Iodosorb)   []  Silver Alginate    []   Medihoney:  
 []   Collagenase:Santyl   []  Calcium Alginate   []   Collagen:  
 []   Foam   []  Non-Adherent Contact Layer   []   Xeroform  
 []   Adaptec:   []   Hydrocolloid   []   Transparent Film  
 []  Antibiotic ointment/cream    []  hyderofera blue   []   Other (see below) [] [place]alt Other: cutimed sorbact with saline moistened gauze  
 
 []   Dry Gauze and Roll Gauze  
 []   Foam and Roll Gauze  
 []   Dry Gauze  
 []    Bordered gauze:   
 []   Secure with Tape  
 [x]   Other  mepilex [x]   Compression Wrap: 
        []   Ellaree Bibber    []Multi-Layer    [x]Tubular Bandages Stacie-Ulcer Care 
  []   Cream  
  []   Lotion []   Ointment  
  []   Barrier  
  []   Other:  
 
 
The patient tolerated the procedure well with no complications. The patient left the exam room in satisfactory and stable condition.   
 
Giuseppe Andrade DPM

## 2018-10-11 ENCOUNTER — HOSPITAL ENCOUNTER (OUTPATIENT)
Dept: WOUND CARE | Age: 68
Discharge: HOME | End: 2018-10-11
Payer: MEDICARE

## 2018-10-11 PROCEDURE — 97602 WOUND(S) CARE NON-SELECTIVE: CPT

## 2018-10-11 PROCEDURE — 77030011256 HC DRSG MEPILEX <16IN NO BORD MOLN -A

## 2018-10-11 NOTE — WOUND CARE
Patient is here for graft check and dressing change. He has no complaints today. His dressing is C/D/I. The dressing was removed. The graft is in place and still viable. The graft was irrigated with NS and a new dressing was applied. He will follow up in one week with Dr. Spike Wooten.

## 2018-10-15 DIAGNOSIS — E29.1 HYPOGONADISM IN MALE: ICD-10-CM

## 2018-10-18 ENCOUNTER — HOSPITAL ENCOUNTER (OUTPATIENT)
Dept: WOUND CARE | Age: 68
Discharge: HOME | End: 2018-10-18
Payer: MEDICARE

## 2018-10-18 VITALS
DIASTOLIC BLOOD PRESSURE: 85 MMHG | TEMPERATURE: 96.9 F | HEART RATE: 66 BPM | SYSTOLIC BLOOD PRESSURE: 112 MMHG | OXYGEN SATURATION: 98 %

## 2018-10-18 PROCEDURE — 97602 WOUND(S) CARE NON-SELECTIVE: CPT

## 2018-10-18 PROCEDURE — 77030020057 HC DRSG MTRX CLLGN STGN -B: Performed by: PODIATRIST

## 2018-10-18 NOTE — WOUND CARE
Patient here for follow up appointment with Dr. Mumtaz Beltran s/p Theraskin placement 10/4/18. Patient presents today with graft and staples in place. Theraskin is adherent to wound base. Staples and excess, dry graft trimmed off. Yessi Mcdaniel will remain in place with application of collagen moistend with NS. He will return in 1 week for nurse visit and in 2 weeks for follow up with Dr. Mumtaz Beltran. 10/18/18 5221 Wound Leg Lower Right;Lateral  
Date First Assessed/Time First Assessed: 02/01/18 0947   POA: Yes  Location: Leg Lower  Orientation: Right;Lateral  
DRESSING STATUS Removed DRESSING TYPE Other (Comment) (Coban,Foam, Cutimed Sorbact, Theraskin/staples intact) Wound Length (cm) 1 cm Wound Width (cm) 1.7 cm Wound Depth (cm) 0.1 Wound Surface area (cm^2) 1.7 cm^2 Change in Wound Size % 63.04 Tissue Type Other (comment) (Theraskin graft in place) Tissue Type Percent Other (comment) 100 Drainage Amount  Small Drainage Color Sanguinous Wound Odor None Periwound Skin Condition Edematous Cleansing and Cleansing Agents  Normal saline Dressing Type Applied Other (Comment) (Terri over Therskin, NS, plain foam, hypafix. Coban) Number of Skin Staples Removed 5

## 2018-10-18 NOTE — PROGRESS NOTES
Podiatry Surgery Progress Note Patient: Valerio Jaramillo MRN: [id number]  SSN: [id number] YOB: 1950  Age: 79 y.o. Sex: male Assessment:  
 
Patient Active Problem List  
Diagnosis Code  Diabetic foot ulcer ([facility] [address] E11.621, L97.509  Diastolic dysfunction K06.7  Dyslipidemia E78.5  Cellulitis and abscess of foot, except toes L03.119, L02.619  Lymphedema of both lower extremities I89.0  CKD (chronic kidney disease) stage 4, GFR 15-29 ml/min ([facility]) N18.4  Morbid obesity with BMI of 70 and over, adult ([facility] [address] E66.01, Z68.45  Type 2 diabetes mellitus with diabetic nephropathy ([facility]) E11.21  
 Erectile dysfunction N52.9  Hypothyroidism, adult E03.9  Hypogonadism in male E29.1  Borderline anemia D64.9  
 Idiopathic gout of multiple sites M10.09  
 Skin ulcer of ankle with fat layer exposed, right ([facility] [address] Q97.904  Non-pressure chronic ulcer of right calf with fat layer exposed ([address] G78.274  Type 2 diabetes mellitus with diabetic neuropathy ([facility]) E11.40 Plan: With place Terri with mepilex of tubigrip Q 48 hrs. He may benefit from Hendricks application at next visit Total time spent with patient: 30 Minutes Care Plan discussed with: Patient and Nursing Staff Discussed:  Care Plan and home health Disposition:  Stable Mr. Miguel Angel Barrera is a 79 y.o. male who was admitted for chronic ulcer right dorsal ankle. He states he has b. Subjective:  
Past Medical History Past Medical History:  
Diagnosis Date  Anemia of chronic disease  Arthritis  Chronic renal insufficiency  Diabetes ([address]  Dyslipidemia  Edema  Gout  Gout  Hypertension  Lymphedema  Morbid obesity ([facility] [address] Social History Socioeconomic History  Marital status:  Spouse name: Not on file  Number of children: Not on file  Years of education: Not on file  Highest education level: Not on file Social Needs  Financial resource strain: Not on file  Food insecurity - worry: Not on file  Food insecurity - inability: Not on file  Transportation needs - medical: Not on file  Transportation needs - non-medical: Not on file Occupational History  Not on file Tobacco Use  Smoking status: Never Smoker  Smokeless tobacco: Never Used Substance and Sexual Activity  Alcohol use: No  
 Drug use: No  
 Sexual activity: Not on file Other Topics Concern  Not on file Social History Narrative  Not on file Current Medications Current Outpatient Medications Medication Sig Dispense Refill  testosterone (ANDRODERM) 4 mg/24 hr pt24 1 Patch by TransDERmal route daily. Max Daily Amount: 1 Patch. 60 Patch 0  
 ibuprofen (MOTRIN) 600 mg tablet Take 1 Tab by mouth every six (6) hours as needed for Pain. 20 Tab 0  
 valsartan (DIOVAN) 320 mg tablet Take 1 Tab by mouth daily. 90 Tab 3  
 colchicine 0.6 mg tablet TAKE 1 TAB BY MOUTH DAILY AS NEEDED. 90 Tab 3  
 rosuvastatin (CRESTOR) 40 mg tablet TAKE 1 TABLET BY MOUTH NIGHTLY FOR 90 DAYS. 90 Tab 1  
 FOLBEE PLUS 5-1.5-25 mg tab TAKE 1 TABLET BY MOUTH EVERY DAY 90 Tab 3  furosemide (LASIX) 80 mg tablet TAKE 1 TABLET BY MOUTH ONCE DAILY 90 Tab 3  
 TRULICITY 1.5 ZN/2.6 mL sub-q pen INJECT 0.5ML SUBCUTANEOUSLY EVERY 7 DAYS 6 Syringe 3  
 KLOR-CON M20 20 mEq tablet TAKE 1 TABLET BY MOUTH ONCE DAILY 90 Tab 3  
 levothyroxine (SYNTHROID) 50 mcg tablet TAKE 1 TAB BY MOUTH DAILY (BEFORE BREAKFAST) FOR 90 DAYS. 90 Tab 0  
 tadalafil (CIALIS) 20 mg tablet Take 1 Tab by mouth every thirty-six (36) hours. Indications: Erectile Dysfunction 12 Tab 5  
 diclofenac (VOLTAREN) 1 % gel Apply 2 g to affected area four (4) times daily. 2 g 0  
 dulaglutide (TRULICITY) 1.5 MF/6.0 mL sub-q pen 0.5 ML BY SUBCUTANEOUS ROUTE EVERY SEVEN (7) DAYS. 7.5 Syringe 0  
 Comp Stocking,Knee,Long,X-Lrg misc 2 Each.     
 COLCHICINE PO 0.6 mg.    
  gabapentin (NEURONTIN) 300 mg capsule Take 300 mg by mouth nightly.  Urea 40 % topical foam Apply  to affected area two (2) times a day. 1 Can 0  
 allopurinol (ZYLOPRIM) 100 mg tablet Take 2 Tabs by mouth daily. 30 Tab 0  
 metoprolol (LOPRESSOR) 50 mg tablet Take 1 Tab by mouth every twelve (12) hours. 60 Tab 0  Fenofibrate 150 mg cap Take 145 mg by mouth daily. Patient Allergies No Known Allergies Objective:  
General Exam 
alert, cooperative, no distress, appears stated age Vitals Visit Vitals /85 (BP Patient Position: Sitting) Pulse 66 Temp 96.9 °F (36.1 °C) SpO2 98% REVIEW OF SYSTEMS: 
General: denies chronic fatigue, weight loss, fever, anemia, bruising, depression, nervousness, panic attacks HEENT: denies ringing in ears, ear infections, dizzy spells, poor vision, glaucoma, sinus trouble, hoarseness, eye infections GI: denies diarrhea, gas, bloating, heartburn, regurgitation, difficulty swallowing, painful swallowing, nausea, vomiting, constipation, abdominal pain, decreased appetite, blood in stools, black stools, jaundice, dark urine Lungs: denies pneumonia, asthma, cough, SOB, hemoptysis Heart: denies chest pain, irregular heart beat, ankle swelling, HTN Skin: denies rashes, hives, allergic reaction Urinary: denies UTI, kidney stones, decreased urine force and flow, urination at night, blood in urine, painful urination Bones and Joints: denies arthritis, rheumatism, back pain, gout, osteoporosis Neurologic: denies stroke, seizures, headaches, numbness, tingling Foot Exam 
VASCULAR EXAM:. Pedal pulses  2/4 DP and PT are non palpable right and left foot. Skin temperature is warm to cool right and left foot.  Digital capillary fill time is 4 seconds right and left foot. Patient has extensive lymphedema of the right and left lower extremity and feet.  
   
NEUROLOGICAL EXAM:. Sensation is diminished to the right and left foot. Protective sensation diminished with 5.07g monofilament wire Bilateral pt can not feel wire at distal tip of all toes. Vibratory sensation diminished due to LOPS. Patient with Diabetic peripheral Neuropathy.  
   
MUSCULOSKELETAL EXAM:. There is equinus of the right and left limb.  
   
DERMATOLOGICAL EXAM:. Full thickness ulceration to the lateral aspect of the right ankle and anterior RLE. Singh Lingle is evidence of graft intact and in place lateral dorsal ankle, there appears to be improvement in measurements. There is no probing to bone noted.  
   
Ulcer Ulcer Location: R lower leg anterior, R ankle lateral, Ulcer measurements: as stated below and Ulcer base: Granular/Healthy Azul Scale: Stage 2 Wound Leg Lower Right;Lateral (Active) DRESSING STATUS Removed 10/18/2018  9:31 AM  
DRESSING TYPE Other (Comment) 10/18/2018  9:31 AM  
Non-Pressure Injury Full thickness (subcut/muscle) 10/4/2018  9:53 AM  
Wound Length (cm) 1 cm 10/18/2018  9:31 AM  
Wound Width (cm) 1.7 cm 10/18/2018  9:31 AM  
Wound Depth (cm) 0.1 10/18/2018  9:31 AM  
Wound Surface area (cm^2) 1.7 cm^2 10/18/2018  9:31 AM  
Change in Wound Size % 63.04 10/18/2018  9:31 AM  
Condition of Base [place];Slough 10/4/2018  9:53 AM  
Condition of Edges Open 9/6/2018  9:11 AM  
Tissue Type Other (comment) 10/18/2018  9:31 AM  
Tissue Type Percent Pink 90 10/4/2018  9:53 AM  
Tissue Type Percent Yellow 10 10/4/2018  9:53 AM  
Tissue Type Percent Other (comment) 100 10/18/2018  9:31 AM  
Drainage Amount  Small  10/18/2018  9:31 AM  
Drainage Color Sanguinous 10/18/2018  9:31 AM  
Wound Odor None 10/18/2018  9:31 AM  
Periwound Skin Condition Edematous 10/18/2018  9:31 AM  
Cleansing and Cleansing Agents  Normal saline 10/18/2018  9:31 AM  
Dressing Type Applied Other (Comment) 10/18/2018  9:31 AM  
Number of Skin Staples Removed 5 10/18/2018  9:31 AM  
Procedure Tolerated Well 10/11/2018  9:42 AM  
Number of days: 259 [REMOVED] Wound Foot Left;Dorsal (Removed) DRESSING STATUS Removed 4/9/2015  3:26 PM  
DRESSING TYPE Other (Comment) 4/9/2015  3:26 PM  
Incision site well approximated? No 4/9/2015  3:26 PM  
Condition of Base Pink;Granulation 4/9/2015  3:26 PM  
Condition of Edges Open 4/9/2015  3:26 PM  
Tissue Type Red;Pink 4/9/2015  3:26 PM  
Tissue Type Percent Pink 50 4/9/2015  3:26 PM  
Tissue Type Percent Red 50 4/9/2015  3:26 PM  
Drainage Amount  Small  4/9/2015  3:26 PM  
Drainage Color Serosanguinous 4/9/2015  3:26 PM  
Wound Odor None 4/9/2015  3:26 PM  
Periwound Skin Condition Edematous; Other (comment) 4/9/2015  3:26 PM  
Cleansing and Cleansing Agents  Dermal wound cleanser 4/9/2015  3:26 PM  
Dressing Type Applied Other (Comment) 4/9/2015  3:26 PM  
Procedure Tolerated Well 4/9/2015  3:26 PM  
Number of days: 8051 [REMOVED] Wound Leg Lower Right; Anterior (Removed) DRESSING STATUS Removed 7/19/2018  9:36 AM  
DRESSING TYPE Other (Comment) 7/19/2018  9:36 AM  
Non-Pressure Injury Partial thickness (epider/derm) 7/12/2018 11:40 AM  
Wound Length (cm) 0 cm 8/2/2018  9:45 AM  
Wound Width (cm) 0 cm 8/2/2018  9:45 AM  
Wound Depth (cm) 0 8/2/2018  9:45 AM  
Wound Surface area (cm^2) 0 cm^2 8/2/2018  9:45 AM  
Condition of Base [place] 7/5/2018 10:50 AM  
Condition of Edges Closed 8/2/2018  9:45 AM  
Tissue Type Other (comment) 7/12/2018 11:40 AM  
Tissue Type Percent Pink 100 7/5/2018 10:50 AM  
Tissue Type Percent Red 100 5/23/2018 11:26 AM  
Tissue Type Percent Yellow 20 6/7/2018 10:09 AM  
Drainage Amount  None 7/19/2018  9:36 AM  
Drainage Color Serous 7/12/2018 11:40 AM  
Wound Odor None 7/19/2018  9:36 AM  
Periwound Skin Condition Intact 8/2/2018  9:45 AM  
Cleansing and Cleansing Agents  Dermal wound cleanser 7/19/2018  9:36 AM  
Dressing Type Applied Other (Comment) 7/19/2018  9:36 AM  
Number of Skin Staples Removed 6 6/28/2018  9:42 AM  
Procedure Tolerated Well 7/19/2018  9:36 AM  
 Number of days: 189 Labs No results found for this or any previous visit (from the past 24 hour(s)). X-Ray:  none Procedures:   S/p Theraskin application on 9/68, 6/10, 6/07, 6/21, 7/05, 7/19, 10/04/18 JEAN CARLOS. 
  
 
 
 
 
        
Mian Johnson DPM 
October 18, 2018

## 2018-10-19 ENCOUNTER — HOSPITAL ENCOUNTER (OUTPATIENT)
Dept: LAB | Age: 68
Discharge: HOME | End: 2018-10-19
Payer: MEDICARE

## 2018-10-19 DIAGNOSIS — E11.21 TYPE 2 DIABETES MELLITUS WITH DIABETIC NEPHROPATHY, WITH LONG-TERM CURRENT USE OF INSULIN (HCC): ICD-10-CM

## 2018-10-19 DIAGNOSIS — Z79.4 TYPE 2 DIABETES MELLITUS WITH DIABETIC NEPHROPATHY, WITH LONG-TERM CURRENT USE OF INSULIN (HCC): ICD-10-CM

## 2018-10-19 LAB
EST. AVERAGE GLUCOSE BLD GHB EST-MCNC: 140 MG/DL
HBA1C MFR BLD: 6.5 % (ref 4.2–5.6)

## 2018-10-19 PROCEDURE — 83036 HEMOGLOBIN GLYCOSYLATED A1C: CPT | Performed by: FAMILY MEDICINE

## 2018-10-23 RX ORDER — LEVOTHYROXINE SODIUM 50 UG/1
TABLET ORAL
Qty: 90 TAB | Refills: 0 | Status: SHIPPED | OUTPATIENT
Start: 2018-10-23 | End: 2019-01-20 | Stop reason: SDUPTHER

## 2018-10-25 ENCOUNTER — HOSPITAL ENCOUNTER (OUTPATIENT)
Dept: WOUND CARE | Age: 68
Discharge: HOME | End: 2018-10-25
Payer: MEDICARE

## 2018-10-25 PROCEDURE — 77030028895 HC GEL MEDIH TU INLC -A

## 2018-10-25 PROCEDURE — 97602 WOUND(S) CARE NON-SELECTIVE: CPT

## 2018-10-25 PROCEDURE — 77030019604 HC DRSG WND CA ALG S&N -A

## 2018-10-25 PROCEDURE — 77030011256 HC DRSG MEPILEX <16IN NO BORD MOLN -A

## 2018-10-25 NOTE — WOUND CARE
Patient here for nurse visit with wound assessment. He arrived with a dressing of Coban and foam in place. The Coban had rolled up and the foam dressing was falling off. The dressing was removed and his ulcer was assessed. The ulcer is reddish pink and raised. It is essentially unchanged. There is no evidence of any remaining Theraskin graft. The wound was cleansed. A new dressing of Medihoney and alginate with a Coban wrap was applied for now. He will follow up next week with Dr. Corby Gong.

## 2018-10-26 ENCOUNTER — TELEPHONE (OUTPATIENT)
Dept: FAMILY MEDICINE CLINIC | Age: 68
End: 2018-10-26

## 2018-11-01 ENCOUNTER — HOSPITAL ENCOUNTER (OUTPATIENT)
Dept: WOUND CARE | Age: 68
Discharge: HOME | End: 2018-11-01

## 2018-11-01 VITALS
RESPIRATION RATE: 16 BRPM | TEMPERATURE: 97.5 F | HEART RATE: 91 BPM | OXYGEN SATURATION: 91 % | DIASTOLIC BLOOD PRESSURE: 82 MMHG | SYSTOLIC BLOOD PRESSURE: 138 MMHG

## 2018-11-01 NOTE — PROGRESS NOTES
Podiatry Surgery Progress Note      Patient: Halima Schilling MRN: [id number]  SSN: [id number]    YOB: 1950  Age: 79 y.o. Sex: male      Assessment:     Patient Active Problem List   Diagnosis Code    Diabetic foot ulcer ([facility] [address] E11.621, T18.199    Diastolic dysfunction X46.6    Dyslipidemia E78.5    Cellulitis and abscess of foot, except toes L03.119, L02.619    Lymphedema of both lower extremities I89.0    CKD (chronic kidney disease) stage 4, GFR 15-29 ml/min ([facility]) N18.4    Morbid obesity with BMI of 70 and over, adult ([facility] [address] E66.01, Z68.45    Type 2 diabetes mellitus with diabetic nephropathy ([facility] [address] E11.21    Erectile dysfunction N52.9    Hypothyroidism, adult E03.9    Hypogonadism in male E29.1    Borderline anemia D64.9    Idiopathic gout of multiple sites M10.09    Skin ulcer of ankle with fat layer exposed, right ([facility] [address] L97.312    Non-pressure chronic ulcer of right calf with fat layer exposed ([facility] [address] B58.662    Type 2 diabetes mellitus with diabetic neuropathy ([facility]) E11.40          Plan:   Manually removed hypergranulation with saline 4x4 right ankle. Will hold Theraskin and start [address] with medihoney foam and mepilex with tubigrip. He MUST use Lymphedema pumps as instructed. RTC 2-4 weeks    Total time spent with patient: 30 [address] discussed with: Patient and Nursing Staff    Discussed:  Care Plan and home health    Disposition:  Stable      Mr. Sage Rose is a 79 y.o. male who was admitted for chronic ulcer right ankle.         Subjective:   Past Medical History  Past Medical History:   Diagnosis Date    Anemia of chronic disease     Arthritis     Chronic renal insufficiency     Diabetes ([facility] [address]     Dyslipidemia     Edema     Gout     Gout     Hypertension     Lymphedema     Morbid obesity ([facility] [address]      Social History     Socioeconomic History    Marital status:      Spouse name: Not on file    Number of children: Not on file    Years of education: Not on file    Highest education level: Not on file   Social Needs    Financial resource strain: Not on file    Food insecurity - worry: Not on file    Food insecurity - inability: Not on file    Transportation needs - medical: Not on file   [employer] needs - non-medical: Not on file   Occupational History    Not on file   Tobacco Use    Smoking status: Never Smoker    Smokeless tobacco: Never Used   Substance and Sexual Activity    Alcohol use: No    Drug use: No    Sexual activity: Not on file   Other Topics Concern    Not on file   Social History Narrative    Not on file       Current Medications  Current Outpatient Medications   Medication Sig Dispense Refill    levothyroxine (SYNTHROID) 50 mcg tablet TAKE 1 TABLET BY MOUTH EVERY DAY BEFORE BREAKFAST 90 Tab 0    testosterone (ANDRODERM) 4 mg/24 hr pt24 1 Patch by TransDERmal route daily. Max Daily Amount: 1 Patch. 60 Patch 0    ibuprofen (MOTRIN) 600 mg tablet Take 1 Tab by mouth every six (6) hours as needed for Pain. 20 Tab 0    valsartan (DIOVAN) 320 mg tablet Take 1 Tab by mouth daily. 90 Tab 3    colchicine 0.6 mg tablet TAKE 1 TAB BY MOUTH DAILY AS NEEDED. 90 Tab 3    rosuvastatin (CRESTOR) 40 mg tablet TAKE 1 TABLET BY MOUTH NIGHTLY FOR 90 DAYS. 90 Tab 1    FOLBEE PLUS 5-1.5-25 mg tab TAKE 1 TABLET BY MOUTH EVERY DAY 90 Tab 3    furosemide (LASIX) 80 mg tablet TAKE 1 TABLET BY MOUTH ONCE DAILY 90 Tab 3    TRULICITY 1.5 LR/0.3 mL sub-q pen INJECT 0.5ML SUBCUTANEOUSLY EVERY 7 DAYS 6 Syringe 3    KLOR-CON M20 20 mEq tablet TAKE 1 TABLET BY MOUTH ONCE DAILY 90 Tab 3    levothyroxine (SYNTHROID) 50 mcg tablet TAKE 1 TAB BY MOUTH DAILY (BEFORE BREAKFAST) FOR 90 DAYS. 90 Tab 0    tadalafil (CIALIS) 20 mg tablet Take 1 Tab by mouth every thirty-six (36) hours. Indications: Erectile Dysfunction 12 Tab 5    diclofenac (VOLTAREN) 1 % gel Apply 2 g to affected area four (4) times daily.  2 g 0    dulaglutide (TRULICITY) 1.5 mg/0.5 mL sub-q pen 0.5 ML BY SUBCUTANEOUS ROUTE EVERY SEVEN (7) DAYS. 7.5 Syringe 0    Comp Stocking,Knee,Long,X-Lrg misc 2 Each.  COLCHICINE PO 0.6 mg.      gabapentin (NEURONTIN) 300 mg capsule Take 300 mg by mouth nightly.  Urea 40 % topical foam Apply  to affected area two (2) times a day. 1 Can 0    allopurinol (ZYLOPRIM) 100 mg tablet Take 2 Tabs by mouth daily. 30 Tab 0    metoprolol (LOPRESSOR) 50 mg tablet Take 1 Tab by mouth every twelve (12) hours. 60 Tab 0    Fenofibrate 150 mg cap Take 145 mg by mouth daily. Patient Allergies  No Known Allergies       Objective:   General Exam  alert, cooperative, no distress, appears stated age    [de-identified]  Visit Vitals  /82 (BP 1 Location: Left arm, BP Patient Position: Sitting)   Pulse 91   Temp 97.5 °F (36.4 °C)   Resp 16   SpO2 91%       REVIEW OF SYSTEMS:  General: denies chronic fatigue, weight loss, fever, anemia, bruising, depression, nervousness, panic attacks  HEENT: denies ringing in ears, ear infections, dizzy spells, poor vision, glaucoma, sinus trouble, hoarseness, eye infections  GI: denies diarrhea, gas, bloating, heartburn, regurgitation, difficulty swallowing, painful swallowing, nausea, vomiting, constipation, abdominal pain, decreased appetite, blood in stools, black stools, jaundice, dark urine  Lungs: denies pneumonia, asthma, cough, SOB, hemoptysis  Heart: denies chest pain, irregular heart beat, ankle swelling, HTN  Skin: denies rashes, hives, allergic reaction  Urinary: denies UTI, kidney stones, decreased urine force and flow, urination at night, blood in urine, painful urination  Bones and Joints: denies arthritis, rheumatism, back pain, gout, osteoporosis  Neurologic: denies stroke, seizures, headaches, numbness, tingling    Foot Exam  VASCULAR EXAM:. Pedal pulses  2/4 DP and PT are non palpable right and left foot.  Skin temperature is warm to cool right and left foot.  Digital capillary fill time is 4 seconds right and left foot. Patient has extensive lymphedema of the right and left lower extremity and feet.       NEUROLOGICAL EXAM:. Sensation is diminished to the right and left foot. Protective sensation diminished with 5.07g monofilament wire Bilateral pt can not feel wire at distal tip of all toes. Vibratory sensation diminished due to LOPS. Patient with Diabetic peripheral Neuropathy.       MUSCULOSKELETAL EXAM:. There is equinus of the right and left limb.       DERMATOLOGICAL EXAM:. Full thickness ulceration to the lateral aspect of the right ankle and anterior RLE. Sheri Blair is evidence of graft intact and in place lateral dorsal ankle, there appears to be improvement in measurements.  There is no probing to bone noted.       Ulcer  Ulcer Location: R lower leg anterior, R ankle lateral, Ulcer measurements: as stated below and Ulcer base: Granular/Healthy  Azul Scale: Stage 2           Wound Leg Lower Right;Lateral (Active)   DRESSING STATUS Removed 10/25/2018 12:08 PM   DRESSING TYPE Open to air 11/1/2018 10:02 AM   Non-Pressure Injury Full thickness (subcut/muscle) 10/4/2018  9:53 AM   Wound Length (cm) 1.6 cm 11/1/2018 10:02 AM   Wound Width (cm) 1.7 cm 11/1/2018 10:02 AM   Wound Depth (cm) 0.1 11/1/2018 10:02 AM   Wound Surface area (cm^2) 2.72 cm^2 11/1/2018 10:02 AM   Change in Wound Size % 40.87 11/1/2018 10:02 AM   Condition of Base Hypergranulation;Pink;Slough 11/1/2018 10:02 AM   Condition of Edges Open 11/1/2018 10:02 AM   Tissue Type Red 10/25/2018 12:08 PM   Tissue Type Percent Pink 5 11/1/2018 10:02 AM   Tissue Type Percent Yellow 95 11/1/2018 10:02 AM   Tissue Type Percent Other (comment) 100 10/25/2018 12:08 PM   Drainage Amount  Small  10/25/2018 12:08 PM   Drainage Color Serosanguinous 10/25/2018 12:08 PM   Wound Odor None 11/1/2018 10:02 AM   Periwound Skin Condition Edematous 11/1/2018 10:02 AM   Cleansing and Cleansing Agents  Dermal wound cleanser 11/1/2018 10:02 AM   Dressing Type Applied Other (Comment) 10/25/2018 12:08 PM   Number of Skin Staples Removed 5 10/18/2018  9:31 AM   Procedure Tolerated Well 10/11/2018  9:42 AM   Number of days: 273       [REMOVED] Wound Foot Left;Dorsal (Removed)   DRESSING STATUS Removed 4/9/2015  3:26 PM   DRESSING TYPE Other (Comment) 4/9/2015  3:26 PM   Incision site well approximated? No 4/9/2015  3:26 PM   Condition of Base Pink;Granulation 4/9/2015  3:26 PM   Condition of Edges Open 4/9/2015  3:26 PM   Tissue Type Red;Pink 4/9/2015  3:26 PM   Tissue Type Percent Pink 50 4/9/2015  3:26 PM   Tissue Type Percent Red 50 4/9/2015  3:26 PM   Drainage Amount  Small  4/9/2015  3:26 PM   Drainage Color Serosanguinous 4/9/2015  3:26 PM   Wound Odor None 4/9/2015  3:26 PM   Periwound Skin Condition Edematous; Other (comment) 4/9/2015  3:26 PM   Cleansing and Cleansing Agents  Dermal wound cleanser 4/9/2015  3:26 PM   Dressing Type Applied Other (Comment) 4/9/2015  3:26 PM   Procedure Tolerated Well 4/9/2015  3:26 PM   Number of days: 8177       [REMOVED] Wound Leg Lower Right; Anterior (Removed)   DRESSING STATUS Removed 7/19/2018  9:36 AM   DRESSING TYPE Other (Comment) 7/19/2018  9:36 AM   Non-Pressure Injury Partial thickness (epider/derm) 7/12/2018 11:40 AM   Wound Length (cm) 0 cm 8/2/2018  9:45 AM   Wound Width (cm) 0 cm 8/2/2018  9:45 AM   Wound Depth (cm) 0 8/2/2018  9:45 AM   Wound Surface area (cm^2) 0 cm^2 8/2/2018  9:45 AM   Condition of Base [place] 7/5/2018 10:50 AM   Condition of Edges Closed 8/2/2018  9:45 AM   Tissue Type Other (comment) 7/12/2018 11:40 AM   Tissue Type Percent Pink 100 7/5/2018 10:50 AM   Tissue Type Percent Red 100 5/23/2018 11:26 AM   Tissue Type Percent Yellow 20 6/7/2018 10:09 AM   Drainage Amount  None 7/19/2018  9:36 AM   Drainage Color Serous 7/12/2018 11:40 AM   Wound Odor None 7/19/2018  9:36 AM   Periwound Skin Condition Intact 8/2/2018  9:45 AM   Cleansing and Cleansing Agents  Dermal wound cleanser 7/19/2018  9:36 AM   Dressing Type Applied Other (Comment) 7/19/2018  9:36 AM   Number of Skin Staples Removed 6 6/28/2018  9:42 AM   Procedure Tolerated Well 7/19/2018  9:36 AM   Number of days: 189        Labs  No results found for this or any previous visit (from the past 24 hour(s)). X-Ray:  none    Procedures:   S/p Theraskin application on 2/83, 9/92, 6/07, 6/21, 7/05, 7/19, 10/04/18 JANA Ngo DPM  November 1, 2018

## 2018-11-09 DIAGNOSIS — I10 ESSENTIAL HYPERTENSION WITH GOAL BLOOD PRESSURE LESS THAN 130/80: ICD-10-CM

## 2018-11-09 RX ORDER — POTASSIUM CHLORIDE 1500 MG/1
TABLET, EXTENDED RELEASE ORAL
Qty: 90 TAB | Refills: 3 | Status: SHIPPED | OUTPATIENT
Start: 2018-11-09 | End: 2019-09-19 | Stop reason: SDUPTHER

## 2018-11-12 ENCOUNTER — OFFICE VISIT (OUTPATIENT)
Dept: FAMILY MEDICINE CLINIC | Age: 68
End: 2018-11-12

## 2018-11-12 VITALS
RESPIRATION RATE: 18 BRPM | DIASTOLIC BLOOD PRESSURE: 68 MMHG | TEMPERATURE: 96.9 F | OXYGEN SATURATION: 93 % | BODY MASS INDEX: 37.19 KG/M2 | HEIGHT: 77 IN | HEART RATE: 82 BPM | SYSTOLIC BLOOD PRESSURE: 140 MMHG | WEIGHT: 315 LBS

## 2018-11-12 DIAGNOSIS — N18.4 CKD (CHRONIC KIDNEY DISEASE) STAGE 4, GFR 15-29 ML/MIN (HCC): ICD-10-CM

## 2018-11-12 DIAGNOSIS — Z79.4 TYPE 2 DIABETES MELLITUS WITH DIABETIC NEUROPATHY, WITH LONG-TERM CURRENT USE OF INSULIN (HCC): Primary | ICD-10-CM

## 2018-11-12 DIAGNOSIS — E66.01 MORBIDLY OBESE (HCC): ICD-10-CM

## 2018-11-12 DIAGNOSIS — E29.1 HYPOGONADISM IN MALE: ICD-10-CM

## 2018-11-12 DIAGNOSIS — E11.40 TYPE 2 DIABETES MELLITUS WITH DIABETIC NEUROPATHY, WITH LONG-TERM CURRENT USE OF INSULIN (HCC): Primary | ICD-10-CM

## 2018-11-12 NOTE — PROGRESS NOTES
HISTORY OF PRESENT ILLNESS Veverly Tonie is a 79 y.o. male. Patient is here for his diabetes follow up and he has been taking his medications. His most recent hemoglobin a1c was 6.5. Diabetes The history is provided by the patient. This is a chronic problem. The problem occurs constantly. The problem has been gradually improving. Pertinent negatives include no chest pain, no abdominal pain, no headaches and no shortness of breath. The symptoms are aggravated by stress. The symptoms are relieved by medications. Treatments tried: currently on meds. The treatment provided moderate relief. Review of Systems Constitutional: Negative for chills, fever and malaise/fatigue. HENT: Negative for congestion, hearing loss, nosebleeds, sinus pain and sore throat. Eyes: Negative for blurred vision, double vision, pain and discharge. Respiratory: Negative for cough, shortness of breath and wheezing. Cardiovascular: Negative for chest pain and leg swelling. Gastrointestinal: Negative for abdominal pain, blood in stool, nausea and vomiting. Genitourinary: Negative for dysuria, frequency and hematuria. Musculoskeletal: Negative for back pain, joint pain and myalgias. Neurological: Negative for focal weakness, weakness and headaches. Endo/Heme/Allergies: Negative for environmental allergies. Psychiatric/Behavioral: Negative for depression and hallucinations. The patient is not nervous/anxious and does not have insomnia. Visit Vitals /68 Pulse 82 Temp 96.9 °F (36.1 °C) (Oral) Resp 18 Ht 6' 5\" (1.956 m) Wt (!) 455 lb 3.2 oz (206.5 kg) SpO2 93% BMI 53.98 kg/m² Physical Exam  
Constitutional: He is oriented to person, place, and time. He appears well-developed and well-nourished. No distress. HENT:  
Head: Normocephalic and atraumatic. Right Ear: External ear normal.  
Left Ear: External ear normal.  
Mouth/Throat: No oropharyngeal exudate. Eyes: EOM are normal. Pupils are equal, round, and reactive to light. No scleral icterus. Neck: Normal range of motion. No thyromegaly present. Cardiovascular: Normal rate and regular rhythm. Pulmonary/Chest: Effort normal and breath sounds normal. No respiratory distress. He has no wheezes. Abdominal: Soft. Bowel sounds are normal. He exhibits no distension. There is no tenderness. Lymphadenopathy:  
  He has no cervical adenopathy. Neurological: He is alert and oriented to person, place, and time. Psychiatric: He has a normal mood and affect. ASSESSMENT and PLAN Diabetes : 
1)   Goal HBA1C < 7. 
2)   Post prandial blood sugar less than or equal to 180. 
3)   Exercise 30 min 3-5 times a week for goal BMI of 25. 
4)   Please monitor blood sugars as directed and keep a log to bring in with you at each visit. 5)   Diabetic diet discussed and patient instructions to be handed out. 6)   Goal LDL< 100 
7)   Goal BP< 120/80 mmhg. 8)   Annual eye exam and foot exam. 9)   Please examine you feet daily for any skin breakages or ulcers. 10) Referral made to see nutritionist for better dietary guidance. 11) Continue current medications as prescribed. 12) Explained the side effects of medication and patient verbalized understanding. 13) Please avoid smoking , alcohol and illicit drug use if applicable to you. 
14) Please have blood work ordered today completed. 15) Please make sure you schedule your routine medical exam every 1-2 years. Medication refill: 
 Medications requested have been refilled. Morbidly obese: - Lifestyle modification is necessary to ensure weight loss and this includes diet , exercise and behavioral modification. 
- Diet is geared towards balanced low-calorie diets/portion-controlled diets, low fat diet , low carbohydrate diet and mediterranean diet. Start with 1500 kcal diet. -Exercise 30 minutes 3-5 times a week - Behavioral modification helps in controlling eating behavior - Consider surgery only if well-informed and motivated, BMI ? 40 kg/m2,have acceptable risk for surgery, failed previous non-surgical weight loss, adults with a BMI ? 35 kg/m2 comorbidities such as severe diabetes, sleep apnea, or joint disease may also be candidates.  
 
CKD : 
- Please continue follow up with specialist

## 2018-11-12 NOTE — PROGRESS NOTES
Chief Complaint Patient presents with  Diabetes 1. Have you been to the ER, urgent care clinic since your last visit? Hospitalized since your last visit? [facility]-knee pain 2. Have you seen or consulted any other health care providers outside of the [address] [place] [address] since your last visit? Include any pap smears or colon screening.  No

## 2018-11-26 NOTE — TELEPHONE ENCOUNTER
Requested Prescriptions     Pending Prescriptions Disp Refills    vit b comp & c-fa-copper-zinc (FOLBEE PLUS) 5-1.5-25 mg tab 90 Tab 3

## 2018-12-04 RX ORDER — DICLOFENAC SODIUM AND MISOPROSTOL 50; 200 MG/1; UG/1
TABLET, DELAYED RELEASE ORAL
Qty: 180 TAB | Refills: 0 | Status: SHIPPED | OUTPATIENT
Start: 2018-12-04 | End: 2019-02-26 | Stop reason: SDUPTHER

## 2018-12-06 ENCOUNTER — HOSPITAL ENCOUNTER (OUTPATIENT)
Dept: WOUND CARE | Age: 68
Discharge: HOME | End: 2018-12-06
Payer: MEDICARE

## 2018-12-06 VITALS
RESPIRATION RATE: 15 BRPM | SYSTOLIC BLOOD PRESSURE: 148 MMHG | OXYGEN SATURATION: 98 % | HEART RATE: 65 BPM | DIASTOLIC BLOOD PRESSURE: 72 MMHG | TEMPERATURE: 97 F

## 2018-12-06 PROCEDURE — 77030011256 HC DRSG MEPILEX <16IN NO BORD MOLN -A: Performed by: PODIATRIST

## 2018-12-06 PROCEDURE — 77030028895 HC GEL MEDIH TU INLC -A: Performed by: PODIATRIST

## 2018-12-06 PROCEDURE — 97602 WOUND(S) CARE NON-SELECTIVE: CPT

## 2018-12-06 PROCEDURE — 77030019604 HC DRSG WND CA ALG S&N -A: Performed by: PODIATRIST

## 2018-12-06 NOTE — PROGRESS NOTES
Podiatry Surgery Progress Note Patient: Rodney Vasquez MRN: [id number]  SSN: [id number] YOB: 1950  Age: 79 y.o. Sex: male Assessment:  
 
Patient Active Problem List  
Diagnosis Code  Diabetic foot ulcer ([facility] E11.621, L97.509  Diastolic dysfunction L41.8  Dyslipidemia E78.5  Cellulitis and abscess of foot, except toes L03.119, L02.619  Lymphedema of both lower extremities I89.0  CKD (chronic kidney disease) stage 4, GFR 15-29 ml/min ([facility]) N18.4  Morbid obesity with BMI of 70 and over, adult ([facility] E66.01, Z68.45  Type 2 diabetes mellitus with diabetic nephropathy ([facility]) E11.21  
 Erectile dysfunction N52.9  Hypothyroidism, adult E03.9  Hypogonadism in male E29.1  Borderline anemia D64.9  
 Idiopathic gout of multiple sites M10.09  
 Skin ulcer of ankle with fat layer exposed, right ([facility] B43.987  Non-pressure chronic ulcer of right calf with fat layer exposed ([address] S65.004  Type 2 diabetes mellitus with diabetic neuropathy ([facility]) E11.40 Plan: Will continue [address] with Medihoney and alginate with mepilex and compression. He is to continue use lymhedema pumps BID. Total time spent with patient: 30 Minutes Care Plan discussed with: Patient and Nursing Staff Discussed:  Care Plan Disposition:  Stable Mr. Amira Chahal is a 79 y.o. male who was admitted for chronic diabetic ulcer Right ankle. He has been changing the dressing with Christiano Krill as instructed. He has also been using the compression and has been using the lymphedema pumps frequently Subjective:  
Past Medical History Past Medical History:  
Diagnosis Date  Anemia of chronic disease  Arthritis  Chronic renal insufficiency  Diabetes ([address]  Dyslipidemia  Edema  Gout  Gout  Hypertension  Lymphedema  Morbid obesity ([address] Social History Socioeconomic History  Marital status:  Spouse name: Not on file  Number of children: Not on file  Years of education: Not on file  Highest education level: Not on file Social Needs  Financial resource strain: Not on file  Food insecurity - worry: Not on file  Food insecurity - inability: Not on file  Transportation needs - medical: Not on file  Transportation needs - non-medical: Not on file Occupational History  Not on file Tobacco Use  Smoking status: Never Smoker  Smokeless tobacco: Never Used Substance and Sexual Activity  Alcohol use: No  
 Drug use: No  
 Sexual activity: Not on file Other Topics Concern  Not on file Social History Narrative  Not on file Current Medications Current Outpatient Medications Medication Sig Dispense Refill  diclofenac-miSOPROStol (ARTHROTEC 50)  mg-mcg per tablet TAKE 1 TABLET BY MOUTH TWICE A  Tab 0  
 vit b comp & c-fa-copper-zinc (FOLBEE PLUS) 5-1.5-25 mg tab Take 1 Tab by mouth daily. 90 Tab 3  
 testosterone (ANDRODERM) 4 mg/24 hr pt24 1 Patch by TransDERmal route daily. Max Daily Amount: 1 Patch. 60 Patch 0  
 KLOR-CON M20 20 mEq tablet TAKE 1 TABLET BY MOUTH ONCE DAILY 90 Tab 3  
 levothyroxine (SYNTHROID) 50 mcg tablet TAKE 1 TABLET BY MOUTH EVERY DAY BEFORE BREAKFAST 90 Tab 0  ibuprofen (MOTRIN) 600 mg tablet Take 1 Tab by mouth every six (6) hours as needed for Pain. 20 Tab 0  
 valsartan (DIOVAN) 320 mg tablet Take 1 Tab by mouth daily. 90 Tab 3  
 colchicine 0.6 mg tablet TAKE 1 TAB BY MOUTH DAILY AS NEEDED. 90 Tab 3  
 rosuvastatin (CRESTOR) 40 mg tablet TAKE 1 TABLET BY MOUTH NIGHTLY FOR 90 DAYS. 90 Tab 1  
 furosemide (LASIX) 80 mg tablet TAKE 1 TABLET BY MOUTH ONCE DAILY 90 Tab 3  
 TRULICITY 1.5 HF/6.4 mL sub-q pen INJECT 0.5ML SUBCUTANEOUSLY EVERY 7 DAYS 6 Syringe 3  
 levothyroxine (SYNTHROID) 50 mcg tablet TAKE 1 TAB BY MOUTH DAILY (BEFORE BREAKFAST) FOR 90 DAYS.  90 Tab 0  
  tadalafil (CIALIS) 20 mg tablet Take 1 Tab by mouth every thirty-six (36) hours. Indications: Erectile Dysfunction 12 Tab 5  
 diclofenac (VOLTAREN) 1 % gel Apply 2 g to affected area four (4) times daily. 2 g 0  
 dulaglutide (TRULICITY) 1.5 AW/8.6 mL sub-q pen 0.5 ML BY SUBCUTANEOUS ROUTE EVERY SEVEN (7) DAYS. 7.5 Syringe 0  
 Comp Stocking,Knee,Long,X-Lrg misc 2 Each.  COLCHICINE PO 0.6 mg.    
 gabapentin (NEURONTIN) 300 mg capsule Take 300 mg by mouth nightly.  Urea 40 % topical foam Apply  to affected area two (2) times a day. 1 Can 0  
 allopurinol (ZYLOPRIM) 100 mg tablet Take 2 Tabs by mouth daily. 30 Tab 0  
 metoprolol (LOPRESSOR) 50 mg tablet Take 1 Tab by mouth every twelve (12) hours. 60 Tab 0  Fenofibrate 150 mg cap Take 145 mg by mouth daily. Patient Allergies No Known Allergies Objective:  
General Exam 
alert, cooperative, no distress, appears stated age Vitals Visit Vitals /72 (BP 1 Location: Left arm, BP Patient Position: Sitting) Pulse 65 Temp 97 °F (36.1 °C) Resp 15 SpO2 98% REVIEW OF SYSTEMS: 
General: denies chronic fatigue, weight loss, fever, anemia, bruising, depression, nervousness, panic attacks HEENT: denies ringing in ears, ear infections, dizzy spells, poor vision, glaucoma, sinus trouble, hoarseness, eye infections GI: denies diarrhea, gas, bloating, heartburn, regurgitation, difficulty swallowing, painful swallowing, nausea, vomiting, constipation, abdominal pain, decreased appetite, blood in stools, black stools, jaundice, dark urine Lungs: denies pneumonia, asthma, cough, SOB, hemoptysis Heart: denies chest pain, irregular heart beat, ankle swelling, HTN Skin: denies rashes, hives, allergic reaction Urinary: denies UTI, kidney stones, decreased urine force and flow, urination at night, blood in urine, painful urination Bones and Joints: denies arthritis, rheumatism, back pain, gout, osteoporosis Neurologic: denies stroke, seizures, headaches, numbness, tingling Foot Exam 
VASCULAR EXAM:. Pedal pulses  2/4 DP and PT are non palpable right and left foot. Skin temperature is warm to cool right and left foot.  Digital capillary fill time is 4 seconds right and left foot. Patient has extensive lymphedema of the right and left lower extremity and feet.  
   
NEUROLOGICAL EXAM:. Sensation is diminished to the right and left foot. Protective sensation diminished with 5.07g monofilament wire Bilateral pt can not feel wire at distal tip of all toes. Vibratory sensation diminished due to LOPS. Patient with Diabetic peripheral Neuropathy.  
   
MUSCULOSKELETAL EXAM:. There is equinus of the right and left limb.  
   
DERMATOLOGICAL EXAM:. Full thickness ulceration to the lateral aspect of the right ankle and anterior RLE. Sheri Blair is evidence of graft intact and in place lateral dorsal ankle, there appears to be improvement in measurements. There is no probing to bone noted.  
   
Ulcer Ulcer Location: R lower leg anterior, R ankle lateral, Ulcer measurements: see below, Ulcer base: Granular/Healthy and Ulcer exudate: Scant/small amount Serous exudate Azul Scale: Stage 2 Wound Leg Lower Right;Lateral (Active) DRESSING STATUS Removed 12/6/2018  9:49 AM  
DRESSING TYPE Other (Comment) 12/6/2018  9:49 AM  
Non-Pressure Injury Full thickness (subcut/muscle) 12/6/2018  9:49 AM  
Wound Length (cm) 0.4 cm 12/6/2018  9:49 AM  
Wound Width (cm) 0.5 cm 12/6/2018  9:49 AM  
Wound Depth (cm) 0.1 12/6/2018  9:49 AM  
Wound Surface area (cm^2) 0.2 cm^2 12/6/2018  9:49 AM  
Change in Wound Size % 95.65 12/6/2018  9:49 AM  
Condition of Base [place] 12/6/2018  9:49 AM  
Condition of Edges Open 12/6/2018  9:49 AM  
Tissue Type Red 10/25/2018 12:08 PM  
Tissue Type Percent Pink 100 12/6/2018  9:49 AM  
Tissue Type Percent Yellow 95 11/1/2018 10:02 AM  
Tissue Type Percent Other (comment) 100 10/25/2018 12:08 PM  
 Drainage Amount  Small  12/6/2018  9:49 AM  
Drainage Color Serous 12/6/2018  9:49 AM  
Wound Odor None 12/6/2018  9:49 AM  
Periwound Skin Condition Edematous; Macerated 12/6/2018  9:49 AM  
Cleansing and Cleansing Agents  Dermal wound cleanser 12/6/2018  9:49 AM  
Dressing Type Applied Other (Comment) 12/6/2018  9:49 AM  
Number of Skin Staples Removed 5 10/18/2018  9:31 AM  
Procedure Tolerated Well 10/11/2018  9:42 AM  
Number of days: 308 [REMOVED] Wound Foot Left;Dorsal (Removed) DRESSING STATUS Removed 4/9/2015  3:26 PM  
DRESSING TYPE Other (Comment) 4/9/2015  3:26 PM  
Incision site well approximated? No 4/9/2015  3:26 PM  
Condition of Base Pink;Granulation 4/9/2015  3:26 PM  
Condition of Edges Open 4/9/2015  3:26 PM  
Tissue Type Red;Pink 4/9/2015  3:26 PM  
Tissue Type Percent Pink 50 4/9/2015  3:26 PM  
Tissue Type Percent Red 50 4/9/2015  3:26 PM  
Drainage Amount  Small  4/9/2015  3:26 PM  
Drainage Color Serosanguinous 4/9/2015  3:26 PM  
Wound Odor None 4/9/2015  3:26 PM  
Periwound Skin Condition Edematous; Other (comment) 4/9/2015  3:26 PM  
Cleansing and Cleansing Agents  Dermal wound cleanser 4/9/2015  3:26 PM  
Dressing Type Applied Other (Comment) 4/9/2015  3:26 PM  
Procedure Tolerated Well 4/9/2015  3:26 PM  
Number of days: 1248 [REMOVED] Wound Leg Lower Right; Anterior (Removed) DRESSING STATUS Removed 7/19/2018  9:36 AM  
DRESSING TYPE Other (Comment) 7/19/2018  9:36 AM  
Non-Pressure Injury Partial thickness (epider/derm) 7/12/2018 11:40 AM  
Wound Length (cm) 0 cm 8/2/2018  9:45 AM  
Wound Width (cm) 0 cm 8/2/2018  9:45 AM  
Wound Depth (cm) 0 8/2/2018  9:45 AM  
Wound Surface area (cm^2) 0 cm^2 8/2/2018  9:45 AM  
Condition of Base [place] 7/5/2018 10:50 AM  
Condition of Edges Closed 8/2/2018  9:45 AM  
Tissue Type Other (comment) 7/12/2018 11:40 AM  
Tissue Type Percent Pink 100 7/5/2018 10:50 AM  
Tissue Type Percent Red 100 5/23/2018 11:26 AM  
 Tissue Type Percent Yellow 20 6/7/2018 10:09 AM  
Drainage Amount  None 7/19/2018  9:36 AM  
Drainage Color Serous 7/12/2018 11:40 AM  
Wound Odor None 7/19/2018  9:36 AM  
Periwound Skin Condition Intact 8/2/2018  9:45 AM  
Cleansing and Cleansing Agents  Dermal wound cleanser 7/19/2018  9:36 AM  
Dressing Type Applied Other (Comment) 7/19/2018  9:36 AM  
Number of Skin Staples Removed 6 6/28/2018  9:42 AM  
Procedure Tolerated Well 7/19/2018  9:36 AM  
Number of days: 189 Labs No results found for this or any previous visit (from the past 24 hour(s)). X-Ray:  deferred Procedures:  S/p Theraskin application on 3/13, 2/13, 6/07, 6/21, 7/05, 7/19, 10/04/18 MATTHEWE. Kelin Phillips DPM 
December 6, 2018

## 2018-12-06 NOTE — WOUND CARE
Wound/Ostomy Nurse Progress Note Patient: Lizandro Reina OCD:[birth year] MRN: [id number] Situation: Wound care follow up for right lower leg ulcer. Background: Slow healing venous ulcer. Assessment: His wound has improved and it is now measuring smaller. He has been using Medihoney and a foam dressing. No new problems today. A new dressing of Medihoney and foam was applied. His leg was wrapped with Coban to keep the dressing in place. Recommendation: Continue dressings 3x/week, Follow up in four weeks with Dr. Shaka Leggett.

## 2018-12-10 NOTE — TELEPHONE ENCOUNTER
Per fax from [facility] requesting a 90 day supply of medications  Requested Prescriptions     Pending Prescriptions Disp Refills    colchicine 0.6 mg tablet 90 Tab 3    rosuvastatin (CRESTOR) 40 mg tablet 90 Tab 1

## 2018-12-14 RX ORDER — ROSUVASTATIN CALCIUM 40 MG/1
TABLET, COATED ORAL
Qty: 90 TAB | Refills: 1 | Status: SHIPPED | OUTPATIENT
Start: 2018-12-14 | End: 2019-07-21 | Stop reason: SDUPTHER

## 2018-12-14 RX ORDER — COLCHICINE 0.6 MG/1
TABLET ORAL
Qty: 90 TAB | Refills: 3 | Status: SHIPPED | OUTPATIENT
Start: 2018-12-14

## 2018-12-17 DIAGNOSIS — E29.1 HYPOGONADISM IN MALE: ICD-10-CM

## 2019-01-03 ENCOUNTER — HOSPITAL ENCOUNTER (OUTPATIENT)
Dept: WOUND CARE | Age: 69
Discharge: HOME | End: 2019-01-03
Payer: MEDICARE

## 2019-01-03 ENCOUNTER — TELEPHONE (OUTPATIENT)
Dept: FAMILY MEDICINE CLINIC | Age: 69
End: 2019-01-03

## 2019-01-03 PROCEDURE — 77030011256 HC DRSG MEPILEX <16IN NO BORD MOLN -A: Performed by: PODIATRIST

## 2019-01-03 PROCEDURE — 97602 WOUND(S) CARE NON-SELECTIVE: CPT

## 2019-01-03 NOTE — WOUND CARE
Patient's wound continuing to heal well. It is measuring smaller today. He will continue with collagen 3x/week as before. He will follow up in one month with the hope that the wound will be closed at that point.

## 2019-01-03 NOTE — PROGRESS NOTES
Podiatry Surgery Progress Note Patient: Vivien Belle MRN: [id number]  SSN: [id number] YOB: 1950  Age: 79 y.o. Sex: male Assessment:  
 
Patient Active Problem List  
Diagnosis Code  Diabetic foot ulcer ([facility] E11.621, L97.509  Diastolic dysfunction R44.4  Dyslipidemia E78.5  Cellulitis and abscess of foot, except toes L03.119, L02.619  Lymphedema of both lower extremities I89.0  CKD (chronic kidney disease) stage 4, GFR 15-29 ml/min ([facility]) N18.4  Morbid obesity with BMI of 70 and over, adult ([facility] E66.01, Z68.45  Type 2 diabetes mellitus with diabetic nephropathy ([facility]) E11.21  
 Erectile dysfunction N52.9  Hypothyroidism, adult E03.9  Hypogonadism in male E29.1  Borderline anemia D64.9  
 Idiopathic gout of multiple sites M10.09  
 Skin ulcer of ankle with fat layer exposed, right ([facility] O12.820  Non-pressure chronic ulcer of right calf with fat layer exposed ([facility] X87.028  Type 2 diabetes mellitus with diabetic neuropathy ([facility]) E11.40 Plan:  
Monitor, use compression stockings daily and lymphedema pumps as instructed. Keep diabetic foot appointment at the office for diabetic foot care Total time spent with patient: 30 Minutes Care Plan discussed with: Patient and Nursing Staff Discussed:  Care Plan and D/C Planning Disposition:  Stable Mr. Arlet Perez is a 79 y.o. male who was admitted for chronic ulcer care right ankle. Subjective:  
Past Medical History Past Medical History:  
Diagnosis Date  Anemia of chronic disease  Arthritis  Chronic renal insufficiency  Diabetes ([facility]  Dyslipidemia  Edema  Gout  Gout  Hypertension  Lymphedema  Morbid obesity ([facility] Social History Socioeconomic History  Marital status:  Spouse name: Not on file  Number of children: Not on file  Years of education: Not on file  Highest education level: Not on file Social Needs  Financial resource strain: Not on file  Food insecurity - worry: Not on file  Food insecurity - inability: Not on file  Transportation needs - medical: Not on file  Transportation needs - non-medical: Not on file Occupational History  Not on file Tobacco Use  Smoking status: Never Smoker  Smokeless tobacco: Never Used Substance and Sexual Activity  Alcohol use: No  
 Drug use: No  
 Sexual activity: Not on file Other Topics Concern  Not on file Social History Narrative  Not on file Current Medications Current Outpatient Medications Medication Sig Dispense Refill  testosterone (ANDRODERM) 4 mg/24 hr pt24 1 Patch by TransDERmal route daily. Max Daily Amount: 1 Patch. 60 Patch 0  
 colchicine 0.6 mg tablet TAKE 1 TAB BY MOUTH DAILY AS NEEDED. 90 Tab 3  
 rosuvastatin (CRESTOR) 40 mg tablet TAKE 1 TABLET BY MOUTH NIGHTLY FOR 90 DAYS. 90 Tab 1  
 diclofenac-miSOPROStol (ARTHROTEC 50)  mg-mcg per tablet TAKE 1 TABLET BY MOUTH TWICE A  Tab 0  
 vit b comp & c-fa-copper-zinc (FOLBEE PLUS) 5-1.5-25 mg tab Take 1 Tab by mouth daily. 90 Tab 3  
 KLOR-CON M20 20 mEq tablet TAKE 1 TABLET BY MOUTH ONCE DAILY 90 Tab 3  
 levothyroxine (SYNTHROID) 50 mcg tablet TAKE 1 TABLET BY MOUTH EVERY DAY BEFORE BREAKFAST 90 Tab 0  ibuprofen (MOTRIN) 600 mg tablet Take 1 Tab by mouth every six (6) hours as needed for Pain. 20 Tab 0  
 valsartan (DIOVAN) 320 mg tablet Take 1 Tab by mouth daily. 90 Tab 3  furosemide (LASIX) 80 mg tablet TAKE 1 TABLET BY MOUTH ONCE DAILY 90 Tab 3  
 TRULICITY 1.5 AM/8.8 mL sub-q pen INJECT 0.5ML SUBCUTANEOUSLY EVERY 7 DAYS 6 Syringe 3  
 levothyroxine (SYNTHROID) 50 mcg tablet TAKE 1 TAB BY MOUTH DAILY (BEFORE BREAKFAST) FOR 90 DAYS. 90 Tab 0  
 tadalafil (CIALIS) 20 mg tablet Take 1 Tab by mouth every thirty-six (36) hours. Indications: Erectile Dysfunction 12 Tab 5  diclofenac (VOLTAREN) 1 % gel Apply 2 g to affected area four (4) times daily. 2 g 0  
 dulaglutide (TRULICITY) 1.5 WA/3.4 mL sub-q pen 0.5 ML BY SUBCUTANEOUS ROUTE EVERY SEVEN (7) DAYS. 7.5 Syringe 0  
 Comp Stocking,Knee,Long,X-Lrg misc 2 Each.  COLCHICINE PO 0.6 mg.    
 gabapentin (NEURONTIN) 300 mg capsule Take 300 mg by mouth nightly.  Urea 40 % topical foam Apply  to affected area two (2) times a day. 1 Can 0  
 allopurinol (ZYLOPRIM) 100 mg tablet Take 2 Tabs by mouth daily. 30 Tab 0  
 metoprolol (LOPRESSOR) 50 mg tablet Take 1 Tab by mouth every twelve (12) hours. 60 Tab 0  Fenofibrate 150 mg cap Take 145 mg by mouth daily. Patient Allergies No Known Allergies Objective:  
General Exam 
alert, cooperative, no distress, appears stated age Vitals There were no vitals taken for this visit. REVIEW OF SYSTEMS: 
General: denies chronic fatigue, weight loss, fever, anemia, bruising, depression, nervousness, panic attacks HEENT: denies ringing in ears, ear infections, dizzy spells, poor vision, glaucoma, sinus trouble, hoarseness, eye infections GI: denies diarrhea, gas, bloating, heartburn, regurgitation, difficulty swallowing, painful swallowing, nausea, vomiting, constipation, abdominal pain, decreased appetite, blood in stools, black stools, jaundice, dark urine Lungs: denies pneumonia, asthma, cough, SOB, hemoptysis Heart: denies chest pain, irregular heart beat, ankle swelling, HTN Skin: denies rashes, hives, allergic reaction Urinary: denies UTI, kidney stones, decreased urine force and flow, urination at night, blood in urine, painful urination Bones and Joints: denies arthritis, rheumatism, back pain, gout, osteoporosis Neurologic: denies stroke, seizures, headaches, numbness, tingling Foot Exam 
VASCULAR EXAM:. Pedal pulses  2/4 DP and PT are non palpable right and left foot.  Skin temperature is warm to cool right and left foot.  Digital capillary fill time is 4 seconds right and left foot. Patient has extensive lymphedema of the right and left lower extremity and feet.  
   
NEUROLOGICAL EXAM:. Sensation is diminished to the right and left foot. Protective sensation diminished with 5.07g monofilament wire Bilateral pt can not feel wire at distal tip of all toes. Babinski is age appropriated, bilateral. Patient with Diabetic peripheral Neuropathy.  
   
MUSCULOSKELETAL EXAM:. There is equinus of the right and left limb.  
   
DERMATOLOGICAL EXAM:. Full thickness ulceration to the lateral aspect of the right ankle and anterior RLE. Mariama Hatchet is evidence of  complete epithialization there appears to be improvement in measurements. There is no probing to bone noted.  
 
MYCOTIC NAIL Dystrophic nail 1,2,3,4,5 bilateral. Elongated thickened nails 1,2,3,4,5 bilateral Hypertrophic nails 1,2,3,4,5 Ulcer Ulcer Location: R ankle lateral, and Ulcer base: Epithialization Azul Scale: Stage 2 Wound Leg Lower Right;Lateral (Active) Number of days: 336 Labs No results found for this or any previous visit (from the past 24 hour(s)). X-Ray:  deferred Procedures:  S/p Theraskin application on 2/70, 7/05, 6/07, 6/21, 7/05, 7/19, 10/04/18 RLE. Rosa Elena Arango DPM 
January 3, 2019

## 2019-01-21 RX ORDER — LEVOTHYROXINE SODIUM 50 UG/1
TABLET ORAL
Qty: 90 TAB | Refills: 0 | Status: SHIPPED | OUTPATIENT
Start: 2019-01-21 | End: 2019-01-24 | Stop reason: SDUPTHER

## 2019-01-22 DIAGNOSIS — E29.1 HYPOGONADISM IN MALE: ICD-10-CM

## 2019-01-24 ENCOUNTER — TELEPHONE (OUTPATIENT)
Dept: FAMILY MEDICINE CLINIC | Age: 69
End: 2019-01-24

## 2019-01-25 RX ORDER — LEVOTHYROXINE SODIUM 50 UG/1
TABLET ORAL
Qty: 90 TAB | Refills: 0 | Status: SHIPPED | OUTPATIENT
Start: 2019-01-25 | End: 2019-07-05 | Stop reason: SDUPTHER

## 2019-01-29 RX ORDER — DULAGLUTIDE 1.5 MG/.5ML
INJECTION, SOLUTION SUBCUTANEOUS
Qty: 6 SYRINGE | Refills: 3 | Status: SHIPPED | OUTPATIENT
Start: 2019-01-29 | End: 2019-07-24

## 2019-01-30 ENCOUNTER — TELEPHONE (OUTPATIENT)
Dept: FAMILY MEDICINE CLINIC | Age: 69
End: 2019-01-30

## 2019-01-30 NOTE — TELEPHONE ENCOUNTER
Received fax transmission from [address] [place] [address] Transcription. Sent to be signed, sent, and scanned.

## 2019-02-07 ENCOUNTER — HOSPITAL ENCOUNTER (OUTPATIENT)
Dept: WOUND CARE | Age: 69
Discharge: HOME | End: 2019-02-07

## 2019-02-07 VITALS
HEART RATE: 83 BPM | SYSTOLIC BLOOD PRESSURE: 144 MMHG | OXYGEN SATURATION: 97 % | RESPIRATION RATE: 15 BRPM | TEMPERATURE: 97.4 F | DIASTOLIC BLOOD PRESSURE: 82 MMHG

## 2019-02-07 PROBLEM — L97.212 NON-PRESSURE CHRONIC ULCER OF RIGHT CALF WITH FAT LAYER EXPOSED (HCC): Status: RESOLVED | Noted: 2018-02-01 | Resolved: 2019-02-07

## 2019-02-07 NOTE — WOUND CARE
Patient seen in wound clinic by Dr Rafaela Abdi for follow up. Right Leg wound closed. No other open wounds. Patient to moisturizer BLE and feet daily as well as use lymphedema pumps. No follow up scheduled. He is to continue with his diabetic foot care appointments per normal and call wound clinic should new wound arise.

## 2019-02-25 DIAGNOSIS — E29.1 HYPOGONADISM IN MALE: ICD-10-CM

## 2019-02-25 NOTE — TELEPHONE ENCOUNTER
Requested Prescriptions     Pending Prescriptions Disp Refills    testosterone (ANDRODERM) 4 mg/24 hr pt24 60 Patch 0     Si Patch by TransDERmal route daily.  Max Daily Amount: 1 Patch.     +Please call pt when Rx done

## 2019-02-26 RX ORDER — DICLOFENAC SODIUM AND MISOPROSTOL 50; 200 MG/1; UG/1
TABLET, DELAYED RELEASE ORAL
Qty: 180 TAB | Refills: 0 | Status: SHIPPED | OUTPATIENT
Start: 2019-02-26 | End: 2019-05-20 | Stop reason: SDUPTHER

## 2019-03-06 ENCOUNTER — TELEPHONE (OUTPATIENT)
Dept: FAMILY MEDICINE CLINIC | Age: 69
End: 2019-03-06

## 2019-03-14 ENCOUNTER — TELEPHONE (OUTPATIENT)
Dept: FAMILY MEDICINE CLINIC | Age: 69
End: 2019-03-14

## 2019-03-14 RX ORDER — METOPROLOL TARTRATE 50 MG/1
50 TABLET ORAL 2 TIMES DAILY
Qty: 90 TAB | Refills: 0 | Status: SHIPPED | OUTPATIENT
Start: 2019-03-14 | End: 2019-04-05 | Stop reason: SDUPTHER

## 2019-03-14 NOTE — TELEPHONE ENCOUNTER
I have sent over metoprolol 50 mg twice a day and he should come back for a nurse visit on one month for blood pressure

## 2019-03-14 NOTE — TELEPHONE ENCOUNTER
Attempted to call pt to advise must p/u medication he dropped off to office. Per nurse [facility] office not able to dispose of meds. States will let Dr Juliette De La Torre know to send over a new medication.

## 2019-03-14 NOTE — TELEPHONE ENCOUNTER
Pt stopped by office and provided medication Valsartan and letter from pharmacy stating med has been recalled. Pt states please send in another rx for another med to pharmacy on file.  States please call with any questions or concerns

## 2019-03-15 NOTE — TELEPHONE ENCOUNTER
I spoke with patient to inform Dr. Candi Pedraza sent over medication. He picked it up yesterday but he stated he may not be taking it due to the side effects of the medication. He also stated he review the lab work from his kidney specialist and he said his labs improved while he was not taking his valsartan. Patient stated he wont take anything and will come in one month check BP.

## 2019-03-28 DIAGNOSIS — E29.1 HYPOGONADISM IN MALE: ICD-10-CM

## 2019-04-05 RX ORDER — METOPROLOL TARTRATE 50 MG/1
TABLET ORAL
Qty: 90 TAB | Refills: 0 | Status: SHIPPED | OUTPATIENT
Start: 2019-04-05 | End: 2019-07-24

## 2019-05-20 RX ORDER — DICLOFENAC SODIUM AND MISOPROSTOL 50; 200 MG/1; UG/1
TABLET, DELAYED RELEASE ORAL
Qty: 180 TAB | Refills: 0 | Status: SHIPPED | OUTPATIENT
Start: 2019-05-20 | End: 2019-07-24

## 2019-05-24 ENCOUNTER — OFFICE VISIT (OUTPATIENT)
Dept: FAMILY MEDICINE CLINIC | Age: 69
End: 2019-05-24

## 2019-05-24 ENCOUNTER — HOSPITAL ENCOUNTER (OUTPATIENT)
Dept: LAB | Age: 69
Discharge: HOME | End: 2019-05-24
Payer: MEDICARE

## 2019-05-24 VITALS
HEIGHT: 77 IN | DIASTOLIC BLOOD PRESSURE: 72 MMHG | SYSTOLIC BLOOD PRESSURE: 155 MMHG | WEIGHT: 315 LBS | TEMPERATURE: 96.5 F | RESPIRATION RATE: 18 BRPM | BODY MASS INDEX: 37.19 KG/M2 | HEART RATE: 76 BPM | OXYGEN SATURATION: 94 %

## 2019-05-24 DIAGNOSIS — E78.5 DYSLIPIDEMIA: ICD-10-CM

## 2019-05-24 DIAGNOSIS — E66.01 MORBIDLY OBESE (HCC): ICD-10-CM

## 2019-05-24 DIAGNOSIS — E11.40 TYPE 2 DIABETES MELLITUS WITH DIABETIC NEUROPATHY, WITH LONG-TERM CURRENT USE OF INSULIN (HCC): ICD-10-CM

## 2019-05-24 DIAGNOSIS — N18.4 CKD (CHRONIC KIDNEY DISEASE) STAGE 4, GFR 15-29 ML/MIN (HCC): ICD-10-CM

## 2019-05-24 DIAGNOSIS — I10 ESSENTIAL HYPERTENSION: ICD-10-CM

## 2019-05-24 DIAGNOSIS — E11.40 TYPE 2 DIABETES MELLITUS WITH DIABETIC NEUROPATHY, WITH LONG-TERM CURRENT USE OF INSULIN (HCC): Primary | ICD-10-CM

## 2019-05-24 DIAGNOSIS — Z79.4 TYPE 2 DIABETES MELLITUS WITH DIABETIC NEUROPATHY, WITH LONG-TERM CURRENT USE OF INSULIN (HCC): Primary | ICD-10-CM

## 2019-05-24 DIAGNOSIS — Z79.4 TYPE 2 DIABETES MELLITUS WITH DIABETIC NEUROPATHY, WITH LONG-TERM CURRENT USE OF INSULIN (HCC): ICD-10-CM

## 2019-05-24 LAB
ALBUMIN SERPL-MCNC: 3.5 G/DL (ref 3.4–5)
ALBUMIN/GLOB SERPL: 1.2 {RATIO} (ref 0.8–1.7)
ALP SERPL-CCNC: 54 U/L (ref 45–117)
ALT SERPL-CCNC: 66 U/L (ref 16–61)
ANION GAP SERPL CALC-SCNC: 6 MMOL/L (ref 3–18)
AST SERPL-CCNC: 34 U/L (ref 15–37)
BASOPHILS # BLD: 0 K/UL (ref 0–0.1)
BASOPHILS NFR BLD: 0 % (ref 0–2)
BILIRUB SERPL-MCNC: 0.3 MG/DL (ref 0.2–1)
BUN SERPL-MCNC: 20 MG/DL (ref 7–18)
BUN/CREAT SERPL: 12 (ref 12–20)
CALCIUM SERPL-MCNC: 8.7 MG/DL (ref 8.5–10.1)
CHLORIDE SERPL-SCNC: 106 MMOL/L (ref 100–108)
CHOLEST SERPL-MCNC: 122 MG/DL
CO2 SERPL-SCNC: 31 MMOL/L (ref 21–32)
CREAT SERPL-MCNC: 1.66 MG/DL (ref 0.6–1.3)
DIFFERENTIAL METHOD BLD: ABNORMAL
EOSINOPHIL # BLD: 0.1 K/UL (ref 0–0.4)
EOSINOPHIL NFR BLD: 2 % (ref 0–5)
ERYTHROCYTE [DISTWIDTH] IN BLOOD BY AUTOMATED COUNT: 13.9 % (ref 11.6–14.5)
EST. AVERAGE GLUCOSE BLD GHB EST-MCNC: 166 MG/DL
GLOBULIN SER CALC-MCNC: 2.9 G/DL (ref 2–4)
GLUCOSE SERPL-MCNC: 175 MG/DL (ref 74–99)
HBA1C MFR BLD: 7.4 % (ref 4.2–5.6)
HCT VFR BLD AUTO: 42.5 % (ref 36–48)
HDLC SERPL-MCNC: 51 MG/DL (ref 40–60)
HDLC SERPL: 2.4 {RATIO} (ref 0–5)
HGB BLD-MCNC: 13.3 G/DL (ref 13–16)
LDLC SERPL CALC-MCNC: 46.6 MG/DL (ref 0–100)
LIPID PROFILE,FLP: NORMAL
LYMPHOCYTES # BLD: 2.1 K/UL (ref 0.9–3.6)
LYMPHOCYTES NFR BLD: 36 % (ref 21–52)
MCH RBC QN AUTO: 32 PG (ref 24–34)
MCHC RBC AUTO-ENTMCNC: 31.3 G/DL (ref 31–37)
MCV RBC AUTO: 102.4 FL (ref 74–97)
MONOCYTES # BLD: 0.4 K/UL (ref 0.05–1.2)
MONOCYTES NFR BLD: 8 % (ref 3–10)
NEUTS SEG # BLD: 3.2 K/UL (ref 1.8–8)
NEUTS SEG NFR BLD: 54 % (ref 40–73)
PLATELET # BLD AUTO: 180 K/UL (ref 135–420)
PMV BLD AUTO: 10.4 FL (ref 9.2–11.8)
POTASSIUM SERPL-SCNC: 4.2 MMOL/L (ref 3.5–5.5)
PROT SERPL-MCNC: 6.4 G/DL (ref 6.4–8.2)
RBC # BLD AUTO: 4.15 M/UL (ref 4.7–5.5)
SODIUM SERPL-SCNC: 143 MMOL/L (ref 136–145)
TRIGL SERPL-MCNC: 122 MG/DL (ref ?–150)
VLDLC SERPL CALC-MCNC: 24.4 MG/DL
WBC # BLD AUTO: 5.9 K/UL (ref 4.6–13.2)

## 2019-05-24 PROCEDURE — 83036 HEMOGLOBIN GLYCOSYLATED A1C: CPT

## 2019-05-24 PROCEDURE — 80053 COMPREHEN METABOLIC PANEL: CPT

## 2019-05-24 PROCEDURE — 82043 UR ALBUMIN QUANTITATIVE: CPT

## 2019-05-24 PROCEDURE — 80061 LIPID PANEL: CPT

## 2019-05-24 PROCEDURE — 85025 COMPLETE CBC W/AUTO DIFF WBC: CPT

## 2019-05-24 NOTE — PROGRESS NOTES
Patient presents for lab draw ordered by:    Ordering Provider:  Severo Tubbs Department/Practice:  [facility]  Phone:  [phone]  Date Ordered:  5/2019    The following labs were drawn and sent to [facility]  by Talia Garg LPN:    CBC, Lipid Profile, CMP, HgA1C and urine microalbumin    The following tubes were sent:     1 SST, 1Lav, 0Blue top, 1urine    Left AC cleansed using aseptic technique. Specimen obtained using a 21 gauge butterfly  with 1 attempt. Patient tolerated process well and there was no bleeding noted at site.

## 2019-05-24 NOTE — PROGRESS NOTES
HISTORY OF PRESENT ILLNESS  Faby Medley is a 81 y.o. male. Patient is her for his follow up on diabetes / ckd . He is due to have some blood work and mentions he is feeling well. He recently saw a eye specialist and was told to come back in one year. Diabetes   The history is provided by the patient. This is a chronic problem. The problem occurs constantly. The problem has been gradually improving. Pertinent negatives include no chest pain, no abdominal pain, no headaches and no shortness of breath. The symptoms are aggravated by stress and eating. The symptoms are relieved by medications. Treatments tried: medications. The treatment provided moderate relief. Chronic Kidney Disease   The history is provided by the patient. This is a chronic problem. The problem occurs constantly. The problem has been gradually improving. Pertinent negatives include no chest pain, no abdominal pain, no headaches and no shortness of breath. The symptoms are aggravated by stress. Review of Systems   Constitutional: Negative for chills, fever and malaise/fatigue. HENT: Negative for congestion, hearing loss, nosebleeds, sinus pain and sore throat. Eyes: Negative for blurred vision, double vision, pain and discharge. Respiratory: Negative for cough, sputum production, shortness of breath and wheezing. Cardiovascular: Negative for chest pain, palpitations and leg swelling. Gastrointestinal: Negative for abdominal pain, blood in stool, constipation, diarrhea, nausea and vomiting. Genitourinary: Negative for dysuria, frequency and hematuria. Musculoskeletal: Negative for joint pain and myalgias. Neurological: Negative for dizziness, tingling, focal weakness, weakness and headaches. Endo/Heme/Allergies: Negative for environmental allergies. Psychiatric/Behavioral: Negative for depression. The patient is not nervous/anxious and does not have insomnia.       Visit Vitals  /72   Pulse 76   Temp 96.5 °F (35.8 °C) (Oral)   Resp 18   Ht 6' 5\" (1.956 m)   Wt (!) 463 lb (210 kg)   SpO2 94%   BMI 54.90 kg/m²       Physical Exam   Constitutional: He is oriented to person, place, and time. He appears well-developed and well-nourished. No distress. HENT:   Head: Normocephalic and atraumatic. Right Ear: External ear normal.   Left Ear: External ear normal.   Mouth/Throat: Oropharynx is clear and moist. No oropharyngeal exudate. Eyes: Pupils are equal, round, and reactive to light. EOM are normal. No scleral icterus. Neck: Normal range of motion. No thyromegaly present. Cardiovascular: Normal rate, regular rhythm and normal heart sounds. Pulmonary/Chest: Breath sounds normal. No respiratory distress. Abdominal: Soft. Bowel sounds are normal. He exhibits no distension. There is no tenderness. Musculoskeletal: He exhibits edema and tenderness. Right lower leg: He exhibits swelling. Left lower leg: He exhibits swelling. Legs:  Lymphadenopathy:     He has no cervical adenopathy. Neurological: He is alert and oriented to person, place, and time. Psychiatric: He has a normal mood and affect. ASSESSMENT and PLAN  diabets :  1)   Goal HBA1C < 7.  2)   Post prandial blood sugar less than or equal to 180.  3)   Exercise 30 min 3-5 times a week for goal BMI of 25.  4)   Please monitor blood sugars as directed and keep a log to bring in with you at each visit. 5)   Diabetic diet discussed and patient instructions to be handed out. 6)   Goal LDL< 100  7)   Goal BP< 120/80 mmhg. 8)   Annual eye exam and foot exam.  9)   Please examine you feet daily for any skin breakages or ulcers. 10) Referral made to see nutritionist for better dietary guidance. 11) Continue current medications as prescribed. 12) Explained the side effects of medication and patient verbalized understanding.   13) Please avoid smoking , alcohol and illicit drug use if applicable to you.  14) Please have blood work ordered today completed. 15) Please make sure you schedule your routine medical exam every 1-2 years.     CKD stage 3 :  - blood work today   - refills

## 2019-05-24 NOTE — PROGRESS NOTES
Chief Complaint   Patient presents with    Diabetes     follow up    Chronic Kidney Disease     follow up     . Natali Gonsalez 1. Have you been to the ER, urgent care clinic since your last visit? Hospitalized since your last visit? No    2. Have you seen or consulted any other health care providers outside of the [address] [place] [address] since your last visit? Include any pap smears or colon screening.  No

## 2019-05-25 LAB
CREAT UR-MCNC: 180 MG/DL (ref 30–125)
MICROALBUMIN UR-MCNC: 1.48 MG/DL (ref 0–3)
MICROALBUMIN/CREAT UR-RTO: 8 MG/G (ref 0–30)

## 2019-05-28 ENCOUNTER — TELEPHONE (OUTPATIENT)
Dept: FAMILY MEDICINE CLINIC | Age: 69
End: 2019-05-28

## 2019-06-03 DIAGNOSIS — E29.1 HYPOGONADISM IN MALE: ICD-10-CM

## 2019-06-30 RX ORDER — FUROSEMIDE 80 MG/1
TABLET ORAL
Qty: 90 TAB | Refills: 3 | Status: SHIPPED | OUTPATIENT
Start: 2019-06-30

## 2019-07-02 ENCOUNTER — DOCUMENTATION ONLY (OUTPATIENT)
Dept: FAMILY MEDICINE CLINIC | Age: 69
End: 2019-07-02

## 2019-07-02 NOTE — PROGRESS NOTES
Patient never picked up prescription for ANDRODERM 4mg printed on 2/26/19.      Prescription shredded on 7/2/19.

## 2019-07-05 RX ORDER — LEVOTHYROXINE SODIUM 50 UG/1
TABLET ORAL
Qty: 90 TAB | Refills: 0 | Status: SHIPPED | OUTPATIENT
Start: 2019-07-05 | End: 2019-07-24

## 2019-07-22 ENCOUNTER — OFFICE VISIT (OUTPATIENT)
Dept: FAMILY MEDICINE CLINIC | Age: 69
End: 2019-07-22

## 2019-07-22 VITALS
HEIGHT: 77 IN | RESPIRATION RATE: 16 BRPM | WEIGHT: 315 LBS | TEMPERATURE: 97.8 F | BODY MASS INDEX: 37.19 KG/M2 | DIASTOLIC BLOOD PRESSURE: 76 MMHG | OXYGEN SATURATION: 97 % | HEART RATE: 84 BPM | SYSTOLIC BLOOD PRESSURE: 131 MMHG

## 2019-07-22 DIAGNOSIS — M79.661 BILATERAL CALF PAIN: ICD-10-CM

## 2019-07-22 DIAGNOSIS — M79.89 SWELLING OF LOWER LEG: ICD-10-CM

## 2019-07-22 DIAGNOSIS — M79.604 BILATERAL LEG PAIN: ICD-10-CM

## 2019-07-22 DIAGNOSIS — Z91.199 NON-COMPLIANCE: ICD-10-CM

## 2019-07-22 DIAGNOSIS — M79.605 BILATERAL LEG PAIN: ICD-10-CM

## 2019-07-22 DIAGNOSIS — M79.662 BILATERAL CALF PAIN: ICD-10-CM

## 2019-07-22 DIAGNOSIS — I89.0 LYMPHEDEMA OF BOTH LOWER EXTREMITIES: Primary | ICD-10-CM

## 2019-07-22 RX ORDER — ROSUVASTATIN CALCIUM 40 MG/1
TABLET, COATED ORAL
Qty: 90 TAB | Refills: 1 | Status: SHIPPED | OUTPATIENT
Start: 2019-07-22

## 2019-07-22 NOTE — PROGRESS NOTES
Patient is here for leg pain that has been going on for about 1 week. .1. Have you been to the ER, urgent care clinic since your last visit? Hospitalized since your last visit?no    2. Have you seen or consulted any other health care providers outside of the [address] [place] [address] since your last visit? Include any pap smears or colon screening.  no

## 2019-07-25 NOTE — PROGRESS NOTES
HISTORY OF PRESENT ILLNESS  Sarah Espinal is a 81 y.o. male. Patient is here as he has been having bilateral leg pain which has gotten worse in the last week. I have explained to him that his lymphedema has gotten much more worse in the last few months. He mentions he does not think seeing any more specialists will help or even the lymphedema clinic , he is hesitant on going to emergency room and I have advised him that I will not be able to do anything stat for him here at the clinic. He mentioned to me he will go to emergency room on Wednesday. He wants x-rays of legs and I have explained this may not  Be necessary at this time but as he is not ambulatory  Lower leg blood clots need to be ruled out. Patient mentions rather than having outpatient doppler scheduled he would like to go to the emergency room. Patient has been non- compliant in regards to this situation previously as well. Leg Pain    The history is provided by the patient. This is a chronic problem. The problem occurs constantly. The problem has been gradually worsening. The pain is present in the right lower leg and left lower leg. The quality of the pain is described as aching, pounding and sharp. The pain is at a severity of 7/10. The pain is moderate. Associated symptoms include limited range of motion and stiffness. The symptoms are aggravated by activity and movement. The treatment provided no relief. There has been no history of extremity trauma. Review of Systems   Constitutional: Positive for malaise/fatigue. Negative for chills and fever. HENT: Negative for congestion, ear discharge, ear pain, hearing loss, sinus pain and sore throat. Eyes: Negative for blurred vision, double vision and pain. Respiratory: Negative for cough, sputum production and shortness of breath. Cardiovascular: Positive for leg swelling. Negative for chest pain and palpitations.    Gastrointestinal: Negative for abdominal pain, constipation, diarrhea, nausea and vomiting. Genitourinary: Negative for dysuria and hematuria. Musculoskeletal: Positive for stiffness. Negative for joint pain and myalgias. Neurological: Negative for dizziness, focal weakness, weakness and headaches. Endo/Heme/Allergies: Negative for environmental allergies. Psychiatric/Behavioral: Negative for depression. The patient is not nervous/anxious. Physical Exam   Constitutional: He is oriented to person, place, and time. He appears well-developed and well-nourished. No distress. HENT:   Head: Normocephalic and atraumatic. Right Ear: External ear normal.   Left Ear: External ear normal.   Mouth/Throat: Oropharynx is clear and moist. No oropharyngeal exudate. Eyes: Pupils are equal, round, and reactive to light. EOM are normal. No scleral icterus. Neck: Normal range of motion. No thyromegaly present. Cardiovascular: Normal rate, regular rhythm and normal heart sounds. Pulmonary/Chest: Effort normal and breath sounds normal. No respiratory distress. He has no wheezes. Abdominal: Soft. Bowel sounds are normal. He exhibits no distension. There is no tenderness. Musculoskeletal: He exhibits edema and tenderness. Left lower leg: He exhibits tenderness, swelling and edema. Legs:  Lymphadenopathy:     He has no cervical adenopathy. Neurological: He is alert and oriented to person, place, and time. Psychiatric: He has a normal mood and affect.        ASSESSMENT and PLAN  Ongoing lymphedema which is getting worse:  - information has been given to vascular clinic   - Please make an apt  - discussed the need to go to emergency room to rule out DVT  - patient does not seem to understand the importance

## 2019-08-08 RX ORDER — DICLOFENAC SODIUM AND MISOPROSTOL 50; 200 MG/1; UG/1
TABLET, DELAYED RELEASE ORAL
Qty: 180 TAB | Refills: 0 | Status: SHIPPED | OUTPATIENT
Start: 2019-08-08 | End: 2019-10-30 | Stop reason: SDUPTHER

## 2019-08-15 ENCOUNTER — HOSPITAL ENCOUNTER (EMERGENCY)
Age: 69
Discharge: HOME | DRG: 623 | End: 2019-08-15
Attending: EMERGENCY MEDICINE | Admitting: EMERGENCY MEDICINE
Payer: MEDICARE

## 2019-08-15 ENCOUNTER — HOSPITAL ENCOUNTER (OUTPATIENT)
Dept: WOUND CARE | Age: 69
Discharge: HOME | End: 2019-08-15
Payer: MEDICARE

## 2019-08-15 VITALS
DIASTOLIC BLOOD PRESSURE: 68 MMHG | RESPIRATION RATE: 18 BRPM | TEMPERATURE: 97.4 F | OXYGEN SATURATION: 93 % | HEART RATE: 84 BPM | SYSTOLIC BLOOD PRESSURE: 113 MMHG

## 2019-08-15 VITALS
BODY MASS INDEX: 37.19 KG/M2 | TEMPERATURE: 97.3 F | WEIGHT: 315 LBS | SYSTOLIC BLOOD PRESSURE: 115 MMHG | DIASTOLIC BLOOD PRESSURE: 98 MMHG | RESPIRATION RATE: 18 BRPM | HEIGHT: 77 IN | OXYGEN SATURATION: 100 % | HEART RATE: 88 BPM

## 2019-08-15 DIAGNOSIS — I89.0 LYMPHEDEMA OF BOTH LOWER EXTREMITIES: Primary | ICD-10-CM

## 2019-08-15 DIAGNOSIS — Z51.89 ENCOUNTER FOR WOUND CARE: ICD-10-CM

## 2019-08-15 DIAGNOSIS — S81.831A PUNCTURE WOUND OF MULTIPLE SITES OF RIGHT LOWER EXTREMITY, INITIAL ENCOUNTER: ICD-10-CM

## 2019-08-15 PROBLEM — L03.115 CELLULITIS OF LEG WITHOUT FOOT, RIGHT: Status: ACTIVE | Noted: 2019-08-15

## 2019-08-15 PROBLEM — L97.213 NON-PRESSURE CHRONIC ULCER OF RIGHT CALF WITH NECROSIS OF MUSCLE (HCC): Status: ACTIVE | Noted: 2019-08-15

## 2019-08-15 PROBLEM — L97.313 NON-PRESSURE CHRONIC ULCER OF RIGHT ANKLE WITH NECROSIS OF MUSCLE (HCC): Status: ACTIVE | Noted: 2019-08-15

## 2019-08-15 LAB
ALBUMIN SERPL-MCNC: 2.8 G/DL (ref 3.4–5)
ALBUMIN/GLOB SERPL: 0.7 {RATIO} (ref 0.8–1.7)
ALP SERPL-CCNC: 60 U/L (ref 45–117)
ALT SERPL-CCNC: 56 U/L (ref 16–61)
ANION GAP SERPL CALC-SCNC: 4 MMOL/L (ref 3–18)
AST SERPL-CCNC: 26 U/L (ref 10–38)
BASOPHILS # BLD: 0 K/UL (ref 0–0.1)
BASOPHILS NFR BLD: 0 % (ref 0–2)
BILIRUB SERPL-MCNC: 0.1 MG/DL (ref 0.2–1)
BUN SERPL-MCNC: 27 MG/DL (ref 7–18)
BUN/CREAT SERPL: 14 (ref 12–20)
CALCIUM SERPL-MCNC: 9.1 MG/DL (ref 8.5–10.1)
CHLORIDE SERPL-SCNC: 106 MMOL/L (ref 100–111)
CO2 SERPL-SCNC: 34 MMOL/L (ref 21–32)
CREAT SERPL-MCNC: 1.91 MG/DL (ref 0.6–1.3)
DIFFERENTIAL METHOD BLD: ABNORMAL
EOSINOPHIL # BLD: 0.2 K/UL (ref 0–0.4)
EOSINOPHIL NFR BLD: 2 % (ref 0–5)
ERYTHROCYTE [DISTWIDTH] IN BLOOD BY AUTOMATED COUNT: 13.6 % (ref 11.6–14.5)
GLOBULIN SER CALC-MCNC: 4.2 G/DL (ref 2–4)
GLUCOSE SERPL-MCNC: 125 MG/DL (ref 74–99)
HCT VFR BLD AUTO: 27.2 % (ref 36–48)
HGB BLD-MCNC: 8.4 G/DL (ref 13–16)
LYMPHOCYTES # BLD: 1.7 K/UL (ref 0.9–3.6)
LYMPHOCYTES NFR BLD: 26 % (ref 21–52)
MCH RBC QN AUTO: 31.3 PG (ref 24–34)
MCHC RBC AUTO-ENTMCNC: 30.9 G/DL (ref 31–37)
MCV RBC AUTO: 101.5 FL (ref 74–97)
MONOCYTES # BLD: 0.4 K/UL (ref 0.05–1.2)
MONOCYTES NFR BLD: 6 % (ref 3–10)
NEUTS SEG # BLD: 4.3 K/UL (ref 1.8–8)
NEUTS SEG NFR BLD: 66 % (ref 40–73)
PLATELET # BLD AUTO: 158 K/UL (ref 135–420)
POTASSIUM SERPL-SCNC: 5.2 MMOL/L (ref 3.5–5.5)
PROT SERPL-MCNC: 7 G/DL (ref 6.4–8.2)
RBC # BLD AUTO: 2.68 M/UL (ref 4.7–5.5)
SODIUM SERPL-SCNC: 144 MMOL/L (ref 136–145)
WBC # BLD AUTO: 6.6 K/UL (ref 4.6–13.2)

## 2019-08-15 PROCEDURE — 85025 COMPLETE CBC W/AUTO DIFF WBC: CPT

## 2019-08-15 PROCEDURE — 11045 DBRDMT SUBQ TISS EACH ADDL: CPT

## 2019-08-15 PROCEDURE — 87077 CULTURE AEROBIC IDENTIFY: CPT

## 2019-08-15 PROCEDURE — 87186 SC STD MICRODIL/AGAR DIL: CPT

## 2019-08-15 PROCEDURE — 99283 EMERGENCY DEPT VISIT LOW MDM: CPT

## 2019-08-15 PROCEDURE — 80053 COMPREHEN METABOLIC PANEL: CPT

## 2019-08-15 PROCEDURE — 87070 CULTURE OTHR SPECIMN AEROBIC: CPT

## 2019-08-15 PROCEDURE — 77030011256 HC DRSG MEPILEX <16IN NO BORD MOLN -A: Performed by: PODIATRIST

## 2019-08-15 PROCEDURE — 74011250637 HC RX REV CODE- 250/637: Performed by: PHYSICIAN ASSISTANT

## 2019-08-15 RX ORDER — DOXYCYCLINE 100 MG/1
100 CAPSULE ORAL 2 TIMES DAILY
Qty: 20 CAP | Refills: 0 | Status: SHIPPED | OUTPATIENT
Start: 2019-08-15 | End: 2019-08-30

## 2019-08-15 RX ORDER — DOXYCYCLINE 100 MG/1
100 CAPSULE ORAL
Status: COMPLETED | OUTPATIENT
Start: 2019-08-15 | End: 2019-08-15

## 2019-08-15 RX ADMIN — DOXYCYCLINE 100 MG: 100 CAPSULE ORAL at 18:44

## 2019-08-15 NOTE — ED PROVIDER NOTES
EMERGENCY DEPARTMENT HISTORY AND PHYSICAL EXAM    Date: 8/15/2019  Patient Name: Jacquie Bennett    History of Presenting Illness     Chief Complaint   Patient presents with    Skin Problem     right lower leg         History Provided By: Patient    Chief Complaint: wound to leg  Duration: 1 Months  Timing:  Gradual and Worsening  Location: right lower extremity  Quality: n/a  Severity: N/A  Modifying Factors: none  Associated Symptoms: denies any other associated signs or symptoms      HPI: Jacquie Bennett is a 81 y.o. male with a PMH of Morbid obesity, diabetes, hypertension, dyslipidemia, chronic renal insufficiency, anemia, arthritis, lymphedema, gout who presents to the ER for further evaluation of lymphedema and wounds to his right lower extremity. Patient was seen in the wound care clinic today by Dr. Nikunj Beltran, podiatry who advised patient should be further evaluated in the emergency department. She reports the patient has a tunneling wound to his right lower extremity approximately 8 cm deep. She reported purulent drainage and foul odor. Wound was packed upstairs prior to ER arrival.  Patient denied any recent fevers. He has not been on any antibiotics for this. He denied all other symptoms and complaints. PCP: Tomas Petty MD    Current Facility-Administered Medications   Medication Dose Route Frequency Provider Last Rate Last Dose    doxycycline (MONODOX) capsule 100 mg  100 mg Oral NOW Sivakumar Leo PA-C         Current Outpatient Medications   Medication Sig Dispense Refill    doxycycline (MONODOX) 100 mg capsule Take 1 Cap by mouth two (2) times a day for 10 days. 20 Cap 0    methocarbamol (ROBAXIN-750) 750 mg tablet Take 1 Tab by mouth four (4) times daily.  As needed for pain or muscle spasm 20 Tab 0    diclofenac-miSOPROStol (ARTHROTEC 50)  mg-mcg per tablet TAKE 1 TABLET BY MOUTH TWICE A  Tab 0    rosuvastatin (CRESTOR) 40 mg tablet TAKE 1 TABLET BY MOUTH NIGHTLY FOR 90 DAYS. 90 Tab 1    furosemide (LASIX) 80 mg tablet TAKE 1 TABLET BY MOUTH EVERY DAY 90 Tab 3    testosterone (ANDRODERM) 4 mg/24 hr pt24 1 Patch by TransDERmal route daily. Max Daily Amount: 1 Patch. 60 Patch 0    colchicine 0.6 mg tablet TAKE 1 TAB BY MOUTH DAILY AS NEEDED. 90 Tab 3    vit b comp & c-fa-copper-zinc (FOLBEE PLUS) 5-1.5-25 mg tab Take 1 Tab by mouth daily. 90 Tab 3    KLOR-CON M20 20 mEq tablet TAKE 1 TABLET BY MOUTH ONCE DAILY 90 Tab 3    ibuprofen (MOTRIN) 600 mg tablet Take 1 Tab by mouth every six (6) hours as needed for Pain. 20 Tab 0    valsartan (DIOVAN) 320 mg tablet Take 1 Tab by mouth daily. 90 Tab 3    levothyroxine (SYNTHROID) 50 mcg tablet TAKE 1 TAB BY MOUTH DAILY (BEFORE BREAKFAST) FOR 90 DAYS. 90 Tab 0    tadalafil (CIALIS) 20 mg tablet Take 1 Tab by mouth every thirty-six (36) hours. Indications: Erectile Dysfunction 12 Tab 5    dulaglutide (TRULICITY) 1.5 KA/1.6 mL sub-q pen 0.5 ML BY SUBCUTANEOUS ROUTE EVERY SEVEN (7) DAYS. 7.5 Syringe 0    gabapentin (NEURONTIN) 300 mg capsule Take 300 mg by mouth nightly.  metoprolol (LOPRESSOR) 50 mg tablet Take 1 Tab by mouth every twelve (12) hours. 60 Tab 0    Fenofibrate 150 mg cap Take 145 mg by mouth daily. Past History     Past Medical History:  Past Medical History:   Diagnosis Date    Anemia of chronic disease     Arthritis     Chronic renal insufficiency     Diabetes ([facility] [address]     Dyslipidemia     Edema     Gout     Gout     Hypertension     Lymphedema     Morbid obesity ([facility] [address]        Past Surgical History:  Past Surgical History:   Procedure Laterality Date    HX ORTHOPAEDIC  3/2015     Lt Foot ulcer       Family History:  Family History   Problem Relation Age of Onset    Cancer Mother     No Known Problems Father        Social History:  Social History     Tobacco Use    Smoking status: Never Smoker    Smokeless tobacco: Never Used   Substance Use Topics    Alcohol use: No    Drug use:  No Allergies:  No Known Allergies      Review of Systems   Review of Systems   Constitutional: Negative for chills, fatigue and fever. HENT: Negative. Negative for sore throat. Eyes: Negative. Respiratory: Negative for cough and shortness of breath. Cardiovascular: Negative for chest pain and palpitations. Gastrointestinal: Negative for abdominal pain, nausea and vomiting. Genitourinary: Negative for dysuria. Musculoskeletal: Negative. Skin: Positive for wound. Right leg swelling, wound drainage     Neurological: Negative for dizziness, weakness, light-headedness and headaches. Psychiatric/Behavioral: Negative. All other systems reviewed and are negative. Physical Exam     Vitals:    08/15/19 1550   BP: 126/84   Pulse: 88   Resp: 18   Temp: 97.3 °F (36.3 °C)   SpO2: 97%   Weight: (!) 205 kg (452 lb)   Height: 6' 5\" (1.956 m)     Physical Exam   Constitutional: He is oriented to person, place, and time. He appears well-developed and well-nourished. No distress. HENT:   Head: Normocephalic and atraumatic. Mouth/Throat: Oropharynx is clear and moist.   Eyes: Conjunctivae are normal. No scleral icterus. Neck: Neck supple. No JVD present. No tracheal deviation present. Cardiovascular: Normal rate, regular rhythm and normal heart sounds. No murmur heard. Pulmonary/Chest: Effort normal and breath sounds normal. No respiratory distress. He has no wheezes. He has no rales. Abdominal: Soft. There is no tenderness. Musculoskeletal: Normal range of motion. Right lower leg: He exhibits swelling. Legs:  Neurological: He is alert and oriented to person, place, and time. He has normal strength. Gait normal. GCS eye subscore is 4. GCS verbal subscore is 5. GCS motor subscore is 6. Skin: Skin is warm and dry. He is not diaphoretic. Psychiatric: He has a normal mood and affect. Nursing note and vitals reviewed.         Diagnostic Study Results     Labs - Recent Results (from the past 12 hour(s))   CBC WITH AUTOMATED DIFF    Collection Time: 08/15/19  5:05 PM   Result Value Ref Range    WBC 6.6 4.6 - 13.2 K/uL    RBC 2.68 (L) 4.70 - 5.50 M/uL    HGB 8.4 (L) 13.0 - 16.0 g/dL    HCT 27.2 (L) 36.0 - 48.0 %    .5 (H) 74.0 - 97.0 FL    MCH 31.3 24.0 - 34.0 PG    MCHC 30.9 (L) 31.0 - 37.0 g/dL    RDW 13.6 11.6 - 14.5 %    PLATELET 094 325 - 850 K/uL    NEUTROPHILS 66 40 - 73 %    LYMPHOCYTES 26 21 - 52 %    MONOCYTES 6 3 - 10 %    EOSINOPHILS 2 0 - 5 %    BASOPHILS 0 0 - 2 %    ABS. NEUTROPHILS 4.3 1.8 - 8.0 K/UL    ABS. LYMPHOCYTES 1.7 0.9 - 3.6 K/UL    ABS. MONOCYTES 0.4 0.05 - 1.2 K/UL    ABS. EOSINOPHILS 0.2 0.0 - 0.4 K/UL    ABS. BASOPHILS 0.0 0.0 - 0.1 K/UL    DF AUTOMATED     METABOLIC PANEL, COMPREHENSIVE    Collection Time: 08/15/19  5:05 PM   Result Value Ref Range    Sodium 144 136 - 145 mmol/L    Potassium 5.2 3.5 - 5.5 mmol/L    Chloride 106 100 - 111 mmol/L    CO2 34 (H) 21 - 32 mmol/L    Anion gap 4 3.0 - 18 mmol/L    Glucose 125 (H) 74 - 99 mg/dL    BUN 27 (H) 7.0 - 18 MG/DL    Creatinine 1.91 (H) 0.6 - 1.3 MG/DL    BUN/Creatinine ratio 14 12 - 20      GFR est AA 43 (L) >60 ml/min/1.73m2    GFR est non-AA 35 (L) >60 ml/min/1.73m2    Calcium 9.1 8.5 - 10.1 MG/DL    Bilirubin, total 0.1 (L) 0.2 - 1.0 MG/DL    ALT (SGPT) 56 16 - 61 U/L    AST (SGOT) 26 10 - 38 U/L    Alk. phosphatase 60 45 - 117 U/L    Protein, total 7.0 6.4 - 8.2 g/dL    Albumin 2.8 (L) 3.4 - 5.0 g/dL    Globulin 4.2 (H) 2.0 - 4.0 g/dL    A-G Ratio 0.7 (L) 0.8 - 1.7         Radiologic Studies -   No orders to display     CT Results  (Last 48 hours)    None        CXR Results  (Last 48 hours)    None            Medical Decision Making   I am the first provider for this patient. I reviewed the vital signs, available nursing notes, past medical history, past surgical history, family history and social history. Vital Signs-Reviewed the patient's vital signs.     Records Reviewed: Nursing Notes and Old Medical Records     1123 PM  46-year-old male with history of chronic lymphedema presents to the ER from Dr. Mandy Green office, podiatry for evaluation of tunneling of lymphedema and wounds to right lower leg. Patient afebrile with stable vitals. No signs of sepsis on exam.  Discussed patient with Dr. Mandy Green, present in ER as well as Kaitlin Beckwith PA-C w/ vascular who were both here to further evaluate patient. Due to patient's size he is unable to have any further CTA imaging of his extremity to further assess this. We will plan on labs at this time to ensure no systemic infection and anticipate discharge on antibiotics with plastic surgery follow-up. Fahran Riec PA-C     6:21 PM  Labs reviewed and noted to be stable. No elevated white count. Discussed results with patient. Given first dose of doxycycline here and will send home with prescription and plastic surgery follow-up for wound consult and possible need for surgical washout. Discussed strict return precautions. Patient stable for discharge. All questions answered and patient in agreement with plan of care. Will plan for discharge. Farhan Rice PA-C    Disposition:  discharged    DISCHARGE NOTE:       Care plan outlined and precautions discussed. Patient has no new complaints, changes, or physical findings. Results of labs were reviewed with the patient. All medications were reviewed with the patient; will d/c home with matias. All of pt's questions and concerns were addressed. Patient was instructed and agrees to follow up with pcp and plastics, as well as to return to the ED upon further deterioration. Patient is ready to go home.     Follow-up Information     Follow up With Specialties Details Why [facility] EMERGENCY DEPT Emergency Medicine  If symptoms worsen [address] [place] [address] 51    Jose Guillaume MD Plastic Surgery Call in 1 day plastic surgery follow up for lymphedema and suspected need for surgical wash out of right leg wound [id number] [address]            Current Discharge Medication List      START taking these medications    Details   doxycycline (MONODOX) 100 mg capsule Take 1 Cap by mouth two (2) times a day for 10 days. Qty: 20 Cap, Refills: 0         CONTINUE these medications which have NOT CHANGED    Details   methocarbamol (ROBAXIN-750) 750 mg tablet Take 1 Tab by mouth four (4) times daily. As needed for pain or muscle spasm  Qty: 20 Tab, Refills: 0      diclofenac-miSOPROStol (ARTHROTEC 50)  mg-mcg per tablet TAKE 1 TABLET BY MOUTH TWICE A DAY  Qty: 180 Tab, Refills: 0      rosuvastatin (CRESTOR) 40 mg tablet TAKE 1 TABLET BY MOUTH NIGHTLY FOR 90 DAYS. Qty: 90 Tab, Refills: 1      furosemide (LASIX) 80 mg tablet TAKE 1 TABLET BY MOUTH EVERY DAY  Qty: 90 Tab, Refills: 3      testosterone (ANDRODERM) 4 mg/24 hr pt24 1 Patch by TransDERmal route daily. Max Daily Amount: 1 Patch. Qty: 60 Patch, Refills: 0    Associated Diagnoses: Hypogonadism in male      colchicine 0.6 mg tablet TAKE 1 TAB BY MOUTH DAILY AS NEEDED. Qty: 90 Tab, Refills: 3      vit b comp & c-fa-copper-zinc (FOLBEE PLUS) 5-1.5-25 mg tab Take 1 Tab by mouth daily. Qty: 90 Tab, Refills: 3      KLOR-CON M20 20 mEq tablet TAKE 1 TABLET BY MOUTH ONCE DAILY  Qty: 90 Tab, Refills: 3    Associated Diagnoses: Essential hypertension with goal blood pressure less than 130/80      ibuprofen (MOTRIN) 600 mg tablet Take 1 Tab by mouth every six (6) hours as needed for Pain. Qty: 20 Tab, Refills: 0      valsartan (DIOVAN) 320 mg tablet Take 1 Tab by mouth daily. Qty: 90 Tab, Refills: 3      levothyroxine (SYNTHROID) 50 mcg tablet TAKE 1 TAB BY MOUTH DAILY (BEFORE BREAKFAST) FOR 90 DAYS. Qty: 90 Tab, Refills: 0      tadalafil (CIALIS) 20 mg tablet Take 1 Tab by mouth every thirty-six (36) hours.  Indications: Erectile Dysfunction  Qty: 12 Tab, Refills: 5 Associated Diagnoses: Erectile dysfunction, unspecified erectile dysfunction type      dulaglutide (TRULICITY) 1.5 NZ/1.2 mL sub-q pen 0.5 ML BY SUBCUTANEOUS ROUTE EVERY SEVEN (7) DAYS. Qty: 7.5 Syringe, Refills: 0    Associated Diagnoses: Type 2 diabetes mellitus with diabetic nephropathy (HCC)      gabapentin (NEURONTIN) 300 mg capsule Take 300 mg by mouth nightly. metoprolol (LOPRESSOR) 50 mg tablet Take 1 Tab by mouth every twelve (12) hours. Qty: 60 Tab, Refills: 0      Fenofibrate 150 mg cap Take 145 mg by mouth daily. Provider Notes (Medical Decision Making):     Procedures:  Procedures        Diagnosis     Clinical Impression:   1. Lymphedema of both lower extremities    2.  Encounter for wound care

## 2019-08-15 NOTE — ED NOTES
Attempted to discharge patient. Patient daughter demanded to speak to physician statng she did not think he received proper care for being here for 4 hours.  Joana IVAN made aware

## 2019-08-15 NOTE — PROGRESS NOTES
Podiatry Surgery Progress Note      Patient: Cesar Stoddard MRN: [id number]  SSN: [id number]    YOB: 1950  Age: 81 y.o. Sex: male      Assessment:     Patient Active Problem List   Diagnosis Code    Diabetic foot ulcer ([facility] E11.621, B00.341    Diastolic dysfunction Y17.48    Dyslipidemia E78.5    Cellulitis and abscess of foot, except toes L03.119, L02.619    Lymphedema of both lower extremities I89.0    CKD (chronic kidney disease) stage 4, GFR 15-29 ml/min ([facility]) N18.4    Morbid obesity with BMI of 70 and over, adult ([facility] E66.01, Z68.45    Type 2 diabetes mellitus with diabetic nephropathy ([facility] E11.21    Erectile dysfunction N52.9    Hypothyroidism, adult E03.9    Hypogonadism in male E29.1    Borderline anemia D64.9    Idiopathic gout of multiple sites M10.09    Skin ulcer of ankle with fat layer exposed, right ([facility] L97.312    Non-pressure chronic ulcer of right calf with fat layer exposed ([facility] S27.926    Type 2 diabetes mellitus with diabetic neuropathy ([facility]) E11.40    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 ([facility]) L97.213    Puncture wound of multiple sites of right leg S81.831A    Cellulitis of leg without foot, right L03.115          Plan: Will send patient to ER for IV antibx and may benefit from consult to General surgery or vascular RLE for possible washout and maybe a wound vac application. C&S gram stain taken today     Total time spent with patient: [address] discussed with: Patient and Nursing Staff    Discussed:  Care Plan    Disposition:  Stable      Mr. Casandra Sosa is a 81 y.o. male who was seen at the wound clinic c/o of sore on the RLE/calf after injury to the leg that occurred 07/10/19. He states he was concerned that the his leg was painful He states he was seen at the [facility] ER to r/o DVT. He was given a muscle relaxant. He states he has been using the lymphedema pumps.        Subjective:   Past Medical History  Past Medical History:   Diagnosis Date    Anemia of chronic disease     Arthritis     Chronic renal insufficiency     Diabetes (HCC)     Dyslipidemia     Edema     Gout     Gout     Hypertension     Lymphedema     Morbid obesity ([facility]      Social History     Socioeconomic History    Marital status:      Spouse name: Not on file    Number of children: Not on file    Years of education: Not on file    Highest education level: Not on file   Occupational History    Not on file   Social Needs    Financial resource strain: Not on file    Food insecurity:     Worry: Not on file     Inability: Not on file    Transportation needs:     Medical: Not on file     Non-medical: Not on file   Tobacco Use    Smoking status: Never Smoker    Smokeless tobacco: Never Used   Substance and Sexual Activity    Alcohol use: No    Drug use: No    Sexual activity: Not on file   Lifestyle    Physical activity:     Days per week: Not on file     Minutes per session: Not on file    Stress: Not on file   Relationships    Social connections:     Talks on phone: Not on file     Gets together: Not on file     Attends [employer] service: Not on file     Active member of club or organization: Not on file     Attends meetings of clubs or organizations: Not on file     Relationship status: Not on file    Intimate partner violence:     Fear of current or ex partner: Not on file     Emotionally abused: Not on file     Physically abused: Not on file     Forced sexual activity: Not on file   Other Topics Concern    Not on file   Social History Narrative    Not on file       Current Medications  Current Outpatient Medications   Medication Sig Dispense Refill    methocarbamol (ROBAXIN-750) 750 mg tablet Take 1 Tab by mouth four (4) times daily.  As needed for pain or muscle spasm 20 Tab 0    diclofenac-miSOPROStol (ARTHROTEC 50)  mg-mcg per tablet TAKE 1 TABLET BY MOUTH TWICE A  Tab 0    rosuvastatin (CRESTOR) 40 mg tablet TAKE 1 TABLET BY MOUTH NIGHTLY FOR 90 DAYS. 90 Tab 1    furosemide (LASIX) 80 mg tablet TAKE 1 TABLET BY MOUTH EVERY DAY 90 Tab 3    testosterone (ANDRODERM) 4 mg/24 hr pt24 1 Patch by TransDERmal route daily. Max Daily Amount: 1 Patch. 60 Patch 0    colchicine 0.6 mg tablet TAKE 1 TAB BY MOUTH DAILY AS NEEDED. 90 Tab 3    vit b comp & c-fa-copper-zinc (FOLBEE PLUS) 5-1.5-25 mg tab Take 1 Tab by mouth daily. 90 Tab 3    KLOR-CON M20 20 mEq tablet TAKE 1 TABLET BY MOUTH ONCE DAILY 90 Tab 3    ibuprofen (MOTRIN) 600 mg tablet Take 1 Tab by mouth every six (6) hours as needed for Pain. 20 Tab 0    valsartan (DIOVAN) 320 mg tablet Take 1 Tab by mouth daily. 90 Tab 3    levothyroxine (SYNTHROID) 50 mcg tablet TAKE 1 TAB BY MOUTH DAILY (BEFORE BREAKFAST) FOR 90 DAYS. 90 Tab 0    tadalafil (CIALIS) 20 mg tablet Take 1 Tab by mouth every thirty-six (36) hours. Indications: Erectile Dysfunction 12 Tab 5    dulaglutide (TRULICITY) 1.5 SO/9.8 mL sub-q pen 0.5 ML BY SUBCUTANEOUS ROUTE EVERY SEVEN (7) DAYS. 7.5 Syringe 0    gabapentin (NEURONTIN) 300 mg capsule Take 300 mg by mouth nightly.  metoprolol (LOPRESSOR) 50 mg tablet Take 1 Tab by mouth every twelve (12) hours. 60 Tab 0    Fenofibrate 150 mg cap Take 145 mg by mouth daily. Patient Allergies  No Known Allergies       Objective:   General Exam  alert, cooperative, no distress, appears stated age    Vitals  There were no vitals taken for this visit.     REVIEW OF SYSTEMS:  General: denies chronic fatigue, weight loss, fever, anemia, bruising, depression, nervousness, panic attacks  HEENT: denies ringing in ears, ear infections, dizzy spells, poor vision, glaucoma, sinus trouble, hoarseness, eye infections  GI: denies diarrhea, gas, bloating, heartburn, regurgitation, difficulty swallowing, painful swallowing, nausea, vomiting, constipation, abdominal pain, decreased appetite, blood in stools, black stools, jaundice, dark urine  Lungs: denies pneumonia, asthma, cough, SOB, hemoptysis  Heart: denies chest pain, irregular heart beat, ankle swelling, HTN  Skin: denies rashes, hives, allergic reaction  Urinary: denies UTI, kidney stones, decreased urine force and flow, urination at night, blood in urine, painful urination  Bones and Joints: denies arthritis, rheumatism, back pain, gout, osteoporosis  Neurologic: denies stroke, seizures, headaches, numbness, tingling      Foot Exam  VASCULAR EXAM:. Pedal pulses  2/4 DP and PT are non palpable right and left foot. Skin temperature is warm to cool right and left foot.  Digital capillary fill time is 4 seconds right and left foot. Patient has extensive lymphedema of the right and left lower extremity and feet.       NEUROLOGICAL EXAM:. Sensation is diminished to the right and left foot. Protective sensation diminished with 5.07g monofilament wire Bilateral pt can not feel wire at distal tip of all toes. Babinski is age appropriated, bilateral. Patient with Diabetic peripheral Neuropathy.       MUSCULOSKELETAL EXAM:. There is equinus of the right and left limb.       DERMATOLOGICAL EXAM:. Full thickness ulceration to the lateral aspect of the right calf and posterior RLE. There is warmth noted noted upon palpation of the lateral aspect of the right calf. Upon examination there tunneling noted with brown exudate noted with odor lateral calf RLE        MYCOTIC NAIL Dystrophic nail 1,2,3,4,5 bilateral. Elongated thickened nails 1,2,3,4,5 bilateral Hypertrophic nails 1,2,3,4,5    Ulcer  Ulcer Location: R lower leg lateral, R lower leg posterior, Ulcer measurements:  3.5 x 2.9 x 8.0 lateral and 0.6 x 2.0 x 8.0 cm posterior Ulcer base: Necrotic eschar and Ulcer exudate: Moderate amount Brown exudate  Azul Scale: Stage 2    Wound Leg Lower Right;Lateral (Active)   Number of days: 560        Labs  No results found for this or any previous visit (from the past 24 hour(s)).     X-Ray: Taken 08/09/19 at [facility]  Indication: Injury, edema, pain           3 views right ankle show extensive soft tissue large plantar calcaneal spur. No  fracture or dislocation. Vascular calcifications seen in soft tissues.     IMPRESSION  IMPRESSION: Calcaneal spur. Peripheral vascular disease with extensive soft  tissue swelling.  No acute fracture.       Imaging     XR ANKLE RT MIN 3 V (Order: [id number]) - 8/9/2019        Procedures:   none               Pino Hernández DPM  August 15, 2019

## 2019-08-15 NOTE — ED NOTES
Pt comes from the wound care clinic with wounds to right lower leg. Pt states there are 2 areas that are possibly infected that needs treatment.   Pt denies any pain

## 2019-08-15 NOTE — DISCHARGE INSTRUCTIONS
Patient Education        Lymphedema: Care Instructions  Your Care Instructions    Lymphedema is fluid that builds up in the arms or legs. It is often caused by surgery to remove lymph nodes during cancer treatment, especially breast cancer surgery, which can cause fluid to build up in the arm. It can happen after radiation treatment to an area that involves lymph nodes. It also can be caused by a fractured bone or surgery to fix a fracture. And some medicines also can cause lymphedema. Some people get it for unknown reasons. Normally, lymph nodes trap bacteria and other substances as fluid flows through them. Then, the white cells in the body's defense, or immune, system can destroy the substances. But if there are few or no lymph nodes--or if the lymph system in an arm or leg has been damaged--fluid can build up in the affected arm or leg. You can take simple steps at home to help treat or prevent fluid buildup. Treatment may include raising the arm or leg to let gravity drain the fluid. You also can wear compression stockings or sleeves. Follow-up care is a key part of your treatment and safety. Be sure to make and go to all appointments, and call your doctor if you are having problems. It's also a good idea to know your test results and keep a list of the medicines you take. How can you care for yourself at home? · Wear a compression stocking or sleeve as your doctor suggests. It can help keep fluid from pooling in an arm or leg. Wear it during air travel. · Prop up the swollen arm or leg on a pillow anytime you sit or lie down. Try to keep it above the level of your heart. This will help reduce swelling. · Avoid crossing your legs if your legs are swollen. · Get some exercise on most days of the week. Increase the intensity of exercise slowly. Water aerobics can help reduce swelling by helping fluid move around.  Wear your compression stocking or sleeve during exercise, but not during water exercise. · See a physical therapist. He or she can teach you how to do self-massage to help fluid move around. You also can learn what activities would be best for you. · Keep your feet clean and wear clean socks or stockings every day. Check your feet often for signs of infection, such as redness or heat. Do not walk barefoot. · If you have had lymph nodes removed from under your arm:  ? Do not have blood drawn from the arm on the side of the lymph node surgery. ? Do not allow a blood pressure cuff to be placed on that arm. If you are in the hospital, make sure your nurse and other hospital staff know of your condition. ? Wear gloves when gardening or doing other activities that may lead to cuts on your fingers or hands. · If you have had lymph nodes removed from your groin:  ? Bathe your feet daily in lukewarm, not hot, water. Use a mild soap that has a moisturizer, or use a moisturizer separately. ? Check your feet for blisters or cuts. ? Wear comfortable and supportive shoes that fit properly. ? Wear the correct size of panty hose and stockings. Avoid garters or knee-high or thigh-high stockings. · Ask your doctor how to treat any cuts, scratches, insect bites, or other injuries that may occur. · Use sunscreen and insect repellent when outdoors to protect your skin from sunburn and insect bites. · Wear medical alert jewelry that says you have lymphedema. You can buy these at most drugstores and on the Internet. When should you call for help? Call your doctor now or seek immediate medical care if:    · You have signs of infection, such as:  ? Increased pain, swelling, warmth, or redness. ? Red streaks leading from the area. ? Pus draining from the area. ? A fever.    Watch closely for changes in your health, and be sure to contact your doctor if:    · You have new or worse symptoms from lymphedema.     · You do not get better as expected. Where can you learn more?   Go to http://buck-arun.info/. Enter V398 in the search box to learn more about \"Lymphedema: Care Instructions. \"  Current as of: December 19, 2018  Content Version: 12.1  © 5298-0441 [employer]. Care instructions adapted under license by [employer] (which disclaims liability or warranty for this information). If you have questions about a medical condition or this instruction, always ask your healthcare professional. [place] [address] any warranty or liability for your use of this information.

## 2019-08-15 NOTE — WOUND CARE
Wound/Ostomy Nurse Progress Note          Patient: Sarah Espinal                                                                                      KXV:[birth year]    MRN: [id number]          Situation: Patient here today to see Dr. Bel Redman for right lower leg ulcer. Background: Chronic BLE, lymphedema, and, chronic ulcers. Today he is c/o a wound on his right leg x 2 weeks. He has been seen twice in the ED at [facility], but states he was not given antibiotic or other treatment for the wound. Assessment: The wound in the right lateral leg is deep, cavernous, and malodorous. There is necrotic tissue within the wound. It measures 3.5 x 2.9 x 8.0 cm. He states he is feeling \"weak\". His vital signs are stable. His leg is warm to touch. Patient stood up at the bedside to expose the wound on his posterior leg. This wound is covered in yellow slough, and measures 0.6 x 2 x 8.0 cm. The drainage is brown, cloudy, and foul-smelling. The right lateral leg wound was packed with gauze soaked in Betadine. The right posterior leg was packed with Mesalt. Recommendation: The patient was directed to the ED for further work-up and possible admission for IV abx and surgical consult for debridement.

## 2019-08-15 NOTE — PROGRESS NOTES
Patient with h/o severe BLE lymphedema sent from wound care for RLE wounds x2 due to injury last month with concern for infection  May need washout and vac placement, no concern for arterial or venous issue at this time per Dr. Brambila Gist:  Would suggest General Surgery or Plastic surgery evaluation for wound washout and vac placement  Elevate BLE above heart while supine  Will need compression stockings/lymphedema pumps - outpatient services   If any concern for arterial disease, Arterial Duplex may be difficult with severe lymphedema - consider CTA Abd with RLE runoff for evaluation

## 2019-08-15 NOTE — ED NOTES
Assume care of patient at this time. Patient states he was sent from wound clinic for possible infection in right leg. Leg was just wrapped and packed by wound care, per Ian IVAN do not unwrap at this time. Patient states he has 2 \"puncture spots\" one on anterior of right calf and one posteriorly. Patient  Has lymphedema in bilat lower extremities. Swelling non pitting, skin raised and bumpy and rough. Patient denies pain and has been ambulatory in care area. Breathing even and unlabored denying SOB or chest pain.

## 2019-08-16 ENCOUNTER — HOSPITAL ENCOUNTER (INPATIENT)
Age: 69
LOS: 14 days | Discharge: HOME HEALTH | DRG: 623 | End: 2019-08-30
Attending: EMERGENCY MEDICINE | Admitting: HOSPITALIST
Payer: MEDICARE

## 2019-08-16 ENCOUNTER — APPOINTMENT (OUTPATIENT)
Dept: VASCULAR SURGERY | Age: 69
DRG: 623 | End: 2019-08-16
Attending: EMERGENCY MEDICINE
Payer: MEDICARE

## 2019-08-16 DIAGNOSIS — I89.0 LYMPHEDEMA: ICD-10-CM

## 2019-08-16 DIAGNOSIS — L97.919 ULCER OF RIGHT LOWER EXTREMITY, UNSPECIFIED ULCER STAGE (HCC): Primary | ICD-10-CM

## 2019-08-16 DIAGNOSIS — L03.115 CELLULITIS OF RIGHT LOWER EXTREMITY: ICD-10-CM

## 2019-08-16 PROBLEM — I50.32 CHRONIC DIASTOLIC HF (HEART FAILURE) (HCC): Status: ACTIVE | Noted: 2019-08-16

## 2019-08-16 PROBLEM — L03.90 CELLULITIS: Status: ACTIVE | Noted: 2019-08-16

## 2019-08-16 LAB
ALBUMIN SERPL-MCNC: 2.8 G/DL (ref 3.4–5)
ALBUMIN/GLOB SERPL: 0.8 {RATIO} (ref 0.8–1.7)
ALP SERPL-CCNC: 57 U/L (ref 45–117)
ALT SERPL-CCNC: 50 U/L (ref 16–61)
ANION GAP SERPL CALC-SCNC: ABNORMAL MMOL/L (ref 3–18)
AST SERPL-CCNC: 20 U/L (ref 10–38)
BASOPHILS # BLD: 0 K/UL (ref 0–0.1)
BASOPHILS NFR BLD: 0 % (ref 0–2)
BILIRUB SERPL-MCNC: 0.3 MG/DL (ref 0.2–1)
BUN SERPL-MCNC: 27 MG/DL (ref 7–18)
BUN/CREAT SERPL: 16 (ref 12–20)
CALCIUM SERPL-MCNC: 8.7 MG/DL (ref 8.5–10.1)
CHLORIDE SERPL-SCNC: 107 MMOL/L (ref 100–111)
CO2 SERPL-SCNC: 35 MMOL/L (ref 21–32)
CREAT SERPL-MCNC: 1.68 MG/DL (ref 0.6–1.3)
DIFFERENTIAL METHOD BLD: ABNORMAL
EOSINOPHIL # BLD: 0.2 K/UL (ref 0–0.4)
EOSINOPHIL NFR BLD: 2 % (ref 0–5)
ERYTHROCYTE [DISTWIDTH] IN BLOOD BY AUTOMATED COUNT: 13.6 % (ref 11.6–14.5)
GLOBULIN SER CALC-MCNC: 3.6 G/DL (ref 2–4)
GLUCOSE BLD STRIP.AUTO-MCNC: 134 MG/DL (ref 70–110)
GLUCOSE SERPL-MCNC: 105 MG/DL (ref 74–99)
HCT VFR BLD AUTO: 34.9 % (ref 36–48)
HGB BLD-MCNC: 10.8 G/DL (ref 13–16)
INR PPP: 1 (ref 0.8–1.2)
LACTATE BLD-SCNC: 0.65 MMOL/L (ref 0.4–2)
LYMPHOCYTES # BLD: 2.3 K/UL (ref 0.9–3.6)
LYMPHOCYTES NFR BLD: 26 % (ref 21–52)
MCH RBC QN AUTO: 30.9 PG (ref 24–34)
MCHC RBC AUTO-ENTMCNC: 30.9 G/DL (ref 31–37)
MCV RBC AUTO: 99.7 FL (ref 74–97)
MONOCYTES # BLD: 0.5 K/UL (ref 0.05–1.2)
MONOCYTES NFR BLD: 6 % (ref 3–10)
NEUTS SEG # BLD: 5.8 K/UL (ref 1.8–8)
NEUTS SEG NFR BLD: 66 % (ref 40–73)
PLATELET # BLD AUTO: 394 K/UL (ref 135–420)
PMV BLD AUTO: 9.1 FL (ref 9.2–11.8)
POTASSIUM SERPL-SCNC: 4.6 MMOL/L (ref 3.5–5.5)
PROT SERPL-MCNC: 6.4 G/DL (ref 6.4–8.2)
PROTHROMBIN TIME: 13 SEC (ref 11.5–15.2)
RBC # BLD AUTO: 3.5 M/UL (ref 4.7–5.5)
SODIUM SERPL-SCNC: 141 MMOL/L (ref 136–145)
WBC # BLD AUTO: 8.8 K/UL (ref 4.6–13.2)

## 2019-08-16 PROCEDURE — 82962 GLUCOSE BLOOD TEST: CPT

## 2019-08-16 PROCEDURE — 74011250637 HC RX REV CODE- 250/637: Performed by: PHYSICIAN ASSISTANT

## 2019-08-16 PROCEDURE — 74011250636 HC RX REV CODE- 250/636: Performed by: EMERGENCY MEDICINE

## 2019-08-16 PROCEDURE — 94761 N-INVAS EAR/PLS OXIMETRY MLT: CPT

## 2019-08-16 PROCEDURE — 74011000258 HC RX REV CODE- 258: Performed by: PHYSICIAN ASSISTANT

## 2019-08-16 PROCEDURE — 65270000029 HC RM PRIVATE

## 2019-08-16 PROCEDURE — 0JBQ0ZZ EXCISION OF RIGHT FOOT SUBCUTANEOUS TISSUE AND FASCIA, OPEN APPROACH: ICD-10-PCS | Performed by: PLASTIC SURGERY

## 2019-08-16 PROCEDURE — 96365 THER/PROPH/DIAG IV INF INIT: CPT

## 2019-08-16 PROCEDURE — 87040 BLOOD CULTURE FOR BACTERIA: CPT

## 2019-08-16 PROCEDURE — 0JBN0ZZ EXCISION OF RIGHT LOWER LEG SUBCUTANEOUS TISSUE AND FASCIA, OPEN APPROACH: ICD-10-PCS | Performed by: PLASTIC SURGERY

## 2019-08-16 PROCEDURE — 93926 LOWER EXTREMITY STUDY: CPT

## 2019-08-16 PROCEDURE — 83036 HEMOGLOBIN GLYCOSYLATED A1C: CPT

## 2019-08-16 PROCEDURE — 74011250636 HC RX REV CODE- 250/636: Performed by: PHYSICIAN ASSISTANT

## 2019-08-16 PROCEDURE — 80053 COMPREHEN METABOLIC PANEL: CPT

## 2019-08-16 PROCEDURE — 74011000258 HC RX REV CODE- 258: Performed by: EMERGENCY MEDICINE

## 2019-08-16 PROCEDURE — 85610 PROTHROMBIN TIME: CPT

## 2019-08-16 PROCEDURE — 83605 ASSAY OF LACTIC ACID: CPT

## 2019-08-16 PROCEDURE — 99284 EMERGENCY DEPT VISIT MOD MDM: CPT

## 2019-08-16 PROCEDURE — 85025 COMPLETE CBC W/AUTO DIFF WBC: CPT

## 2019-08-16 RX ORDER — INSULIN LISPRO 100 [IU]/ML
INJECTION, SOLUTION INTRAVENOUS; SUBCUTANEOUS
Status: DISCONTINUED | OUTPATIENT
Start: 2019-08-16 | End: 2019-08-19

## 2019-08-16 RX ORDER — ACETAMINOPHEN 325 MG/1
650 TABLET ORAL
Status: DISCONTINUED | OUTPATIENT
Start: 2019-08-16 | End: 2019-08-30 | Stop reason: HOSPADM

## 2019-08-16 RX ORDER — FUROSEMIDE 40 MG/1
80 TABLET ORAL DAILY
Status: DISCONTINUED | OUTPATIENT
Start: 2019-08-17 | End: 2019-08-30 | Stop reason: HOSPADM

## 2019-08-16 RX ORDER — METOPROLOL TARTRATE 50 MG/1
50 TABLET ORAL EVERY 12 HOURS
Status: DISCONTINUED | OUTPATIENT
Start: 2019-08-16 | End: 2019-08-30 | Stop reason: HOSPADM

## 2019-08-16 RX ORDER — INSULIN GLARGINE 100 [IU]/ML
10 INJECTION, SOLUTION SUBCUTANEOUS
Status: CANCELLED | OUTPATIENT
Start: 2019-08-16

## 2019-08-16 RX ORDER — SODIUM CHLORIDE 0.9 % (FLUSH) 0.9 %
5-40 SYRINGE (ML) INJECTION AS NEEDED
Status: DISCONTINUED | OUTPATIENT
Start: 2019-08-16 | End: 2019-08-30 | Stop reason: HOSPADM

## 2019-08-16 RX ORDER — HYDROCODONE BITARTRATE AND ACETAMINOPHEN 5; 325 MG/1; MG/1
1 TABLET ORAL
Status: DISCONTINUED | OUTPATIENT
Start: 2019-08-16 | End: 2019-08-30 | Stop reason: HOSPADM

## 2019-08-16 RX ORDER — VANCOMYCIN 2 GRAM/500 ML IN 0.9 % SODIUM CHLORIDE INTRAVENOUS
2000 ONCE
Status: COMPLETED | OUTPATIENT
Start: 2019-08-16 | End: 2019-08-16

## 2019-08-16 RX ORDER — LEVOTHYROXINE SODIUM 50 UG/1
50 TABLET ORAL
Status: DISCONTINUED | OUTPATIENT
Start: 2019-08-17 | End: 2019-08-30 | Stop reason: HOSPADM

## 2019-08-16 RX ORDER — MAGNESIUM SULFATE 100 %
4 CRYSTALS MISCELLANEOUS AS NEEDED
Status: DISCONTINUED | OUTPATIENT
Start: 2019-08-16 | End: 2019-08-30 | Stop reason: HOSPADM

## 2019-08-16 RX ORDER — VALSARTAN 80 MG/1
320 TABLET ORAL DAILY
Status: DISCONTINUED | OUTPATIENT
Start: 2019-08-17 | End: 2019-08-30 | Stop reason: HOSPADM

## 2019-08-16 RX ORDER — GABAPENTIN 300 MG/1
300-600 CAPSULE ORAL
Status: DISCONTINUED | OUTPATIENT
Start: 2019-08-16 | End: 2019-08-30 | Stop reason: HOSPADM

## 2019-08-16 RX ORDER — HEPARIN SODIUM 5000 [USP'U]/ML
5000 INJECTION, SOLUTION INTRAVENOUS; SUBCUTANEOUS EVERY 8 HOURS
Status: DISCONTINUED | OUTPATIENT
Start: 2019-08-16 | End: 2019-08-30 | Stop reason: HOSPADM

## 2019-08-16 RX ORDER — DEXTROSE MONOHYDRATE 100 MG/ML
250 INJECTION, SOLUTION INTRAVENOUS AS NEEDED
Status: DISCONTINUED | OUTPATIENT
Start: 2019-08-16 | End: 2019-08-17

## 2019-08-16 RX ORDER — SODIUM CHLORIDE 0.9 % (FLUSH) 0.9 %
5-40 SYRINGE (ML) INJECTION EVERY 8 HOURS
Status: DISCONTINUED | OUTPATIENT
Start: 2019-08-16 | End: 2019-08-30 | Stop reason: HOSPADM

## 2019-08-16 RX ADMIN — PIPERACILLIN, TAZOBACTAM 4.5 G: 4; .5 INJECTION, POWDER, LYOPHILIZED, FOR SOLUTION INTRAVENOUS at 15:32

## 2019-08-16 RX ADMIN — PIPERACILLIN SODIUM AND TAZOBACTAM SODIUM 4.5 G: 4; .5 INJECTION, POWDER, LYOPHILIZED, FOR SOLUTION INTRAVENOUS at 21:24

## 2019-08-16 RX ADMIN — GABAPENTIN 300 MG: 300 CAPSULE ORAL at 21:23

## 2019-08-16 RX ADMIN — VANCOMYCIN HYDROCHLORIDE 2000 MG: 10 INJECTION, POWDER, LYOPHILIZED, FOR SOLUTION INTRAVENOUS at 17:41

## 2019-08-16 RX ADMIN — METOPROLOL TARTRATE 50 MG: 50 TABLET ORAL at 21:23

## 2019-08-16 RX ADMIN — HEPARIN SODIUM 5000 UNITS: 5000 INJECTION INTRAVENOUS; SUBCUTANEOUS at 21:24

## 2019-08-16 RX ADMIN — Medication 10 ML: at 21:09

## 2019-08-16 NOTE — ED TRIAGE NOTES
Sent from Dr. Mondragon Doug office for admission for right leg wound care.  To be admitted under Dr. Abhi Harris for right leg ulcer infection

## 2019-08-16 NOTE — ED NOTES
I performed a brief history of the patient here in triage and I have determined that pt will need further treatment and evaluation from the main side ER physician. I have placed initial orders based on the history to help in expediting patients care.        Visit Vitals  /82 (BP 1 Location: Right arm, BP Patient Position: At rest)   Pulse 72   Temp 98 °F (36.7 °C)   Resp 16   Ht 6' 5\" (1.956 m)   Wt (!) 205 kg (452 lb)   SpO2 98%   BMI 53.60 kg/m²      MARZENA Viera 1:16 PM

## 2019-08-16 NOTE — ED NOTES
Dr. Nemo Guy at bedside. Informed him of patient IV appearing to be infiltrated. Albina Kawasaki PA stated she will put order in for patient to have PICC line placed while in ER.  Vancomycin will be held until PICC line placed

## 2019-08-16 NOTE — ED NOTES
While turning off IV pump after receiving zosyn  Patient stated his arm felt tired. Assessed IV site, flushed and had blood return. Charge nurse Sin Chen assessed IV as well. flushed and received blood return.

## 2019-08-16 NOTE — ED NOTES
Updated patient on plan of care. All questions answered. Patient resting in position of comfort on stretcher.   at bedside

## 2019-08-16 NOTE — ED NOTES
Patient family member called me over to bedside stating patient wished to speak to me. Patient stated that his IV site felt fine and was ready for second round of antibiotics. IV flushed, patent and receiving blood return.

## 2019-08-16 NOTE — ED PROVIDER NOTES
EMERGENCY DEPARTMENT HISTORY AND PHYSICAL EXAM    3:12 PM      Date: 8/16/2019  Patient Name: Sarahi Garcia    History of Presenting Illness     Chief Complaint   Patient presents with    Wound Check         HISTORY: Sarahi Garcia is a 81 y.o. male with medical history as listed below who presents with need for admission for antibiotics and surgical debridement. he was sent to the emergency department yesterday by the wound care clinic for consideration for admission. He was evaluated in the emergency department by podiatry and vascular surgery and was sent home with oral Keflex. He was seen by plastic surgeon, Dr. Chelsea Chase today sent the patient to the emergency department for admission. Per the family the plan is to take the patient to the OR for surgical cleanout of the wound and need for antibiotics. Dr. Chelsea Chase did not call the ER prior to arrival.  His nurse walked the patient over with a sticky note which only stated that the patient has a right leg infection. There is no plan noted on the note. He denies fever, chest pain, difficulty breathing or abdominal complaints. PCP: Sarah Cates MD    Current Outpatient Medications   Medication Sig Dispense Refill    doxycycline (MONODOX) 100 mg capsule Take 1 Cap by mouth two (2) times a day for 10 days. 20 Cap 0    methocarbamol (ROBAXIN-750) 750 mg tablet Take 1 Tab by mouth four (4) times daily. As needed for pain or muscle spasm 20 Tab 0    diclofenac-miSOPROStol (ARTHROTEC 50)  mg-mcg per tablet TAKE 1 TABLET BY MOUTH TWICE A  Tab 0    rosuvastatin (CRESTOR) 40 mg tablet TAKE 1 TABLET BY MOUTH NIGHTLY FOR 90 DAYS. 90 Tab 1    furosemide (LASIX) 80 mg tablet TAKE 1 TABLET BY MOUTH EVERY DAY 90 Tab 3    testosterone (ANDRODERM) 4 mg/24 hr pt24 1 Patch by TransDERmal route daily. Max Daily Amount: 1 Patch. 60 Patch 0    colchicine 0.6 mg tablet TAKE 1 TAB BY MOUTH DAILY AS NEEDED.  90 Tab 3    vit b comp & c-fa-copper-zinc (FOLBEE PLUS) 5-1.5-25 mg tab Take 1 Tab by mouth daily. 90 Tab 3    KLOR-CON M20 20 mEq tablet TAKE 1 TABLET BY MOUTH ONCE DAILY 90 Tab 3    ibuprofen (MOTRIN) 600 mg tablet Take 1 Tab by mouth every six (6) hours as needed for Pain. 20 Tab 0    valsartan (DIOVAN) 320 mg tablet Take 1 Tab by mouth daily. 90 Tab 3    levothyroxine (SYNTHROID) 50 mcg tablet TAKE 1 TAB BY MOUTH DAILY (BEFORE BREAKFAST) FOR 90 DAYS. 90 Tab 0    tadalafil (CIALIS) 20 mg tablet Take 1 Tab by mouth every thirty-six (36) hours. Indications: Erectile Dysfunction 12 Tab 5    dulaglutide (TRULICITY) 1.5 TN/0.1 mL sub-q pen 0.5 ML BY SUBCUTANEOUS ROUTE EVERY SEVEN (7) DAYS. 7.5 Syringe 0    gabapentin (NEURONTIN) 300 mg capsule Take 300 mg by mouth nightly.  metoprolol (LOPRESSOR) 50 mg tablet Take 1 Tab by mouth every twelve (12) hours. 60 Tab 0    Fenofibrate 150 mg cap Take 145 mg by mouth daily. Past History     Past Medical History:  Past Medical History:   Diagnosis Date    Anemia of chronic disease     Arthritis     Chronic renal insufficiency     Diabetes ([facility]     Dyslipidemia     Edema     Gout     Gout     Hypertension     Lymphedema     Morbid obesity ([facility]        Past Surgical History:  Past Surgical History:   Procedure Laterality Date    HX ORTHOPAEDIC  3/2015     Lt Foot ulcer       Family History:  Family History   Problem Relation Age of Onset    Cancer Mother     No Known Problems Father        Social History:  Social History     Tobacco Use    Smoking status: Never Smoker    Smokeless tobacco: Never Used   Substance Use Topics    Alcohol use: No    Drug use: No       Allergies:  No Known Allergies      Review of Systems       Review of Systems   Constitutional: Negative for chills. HENT: Negative for congestion and sore throat. Respiratory: Negative for cough and shortness of breath. Cardiovascular: Negative for chest pain.    Gastrointestinal: Negative for abdominal pain, diarrhea, nausea and vomiting. Genitourinary: Negative for dysuria. Musculoskeletal: Negative for back pain. Skin: Positive for wound. Negative for rash. Neurological: Negative for dizziness and headaches. All other systems reviewed and are negative. Physical Exam     Visit Vitals  /82 (BP 1 Location: Right arm, BP Patient Position: At rest)   Pulse 72   Temp 98 °F (36.7 °C)   Resp 16   Ht 6' 5\" (1.956 m)   Wt (!) 205 kg (452 lb)   SpO2 98%   BMI 53.60 kg/m²       Physical Exam  General Exam: Patient is a well developed and well nourished in no distress. Patient does not appear acutely ill or toxic. Eye Exam: Lids and conjunctiva are normal  ENT Exam: The general head and facial exam is normal.  The neck is supple without meningeal signs. No significant adenopathy. Pulmonary Exam: No respiratory distress. The respiratory rate is normal.  No stridor. The breath sounds are equal bilaterally. There are no wheezes, rales, or rhonchi noted. Cardiac Exam: The cardiac rate and rhythm are normal.  No significant murmurs, rubs, or gallops. The peripheral pulses of the upper extremities are normal.  Abdominal Exam: Abdomen is soft and non-distended. No pulsatile masses. There is no local tenderness. There is no rebound or guarding noted. Skin and Soft Tissue: Lymphedema  Musculoskeletal Exam: Lymphedema, bandage right lower extremity which appears saturated with foul-smelling odor. psychiatric: Normal adult with appropriate demeanor and interpersonal interaction. Is oriented to person, place, and time.       Diagnostic Study Results     Labs -  Recent Results (from the past 12 hour(s))   CBC WITH AUTOMATED DIFF    Collection Time: 08/16/19  2:45 PM   Result Value Ref Range    WBC 8.8 4.6 - 13.2 K/uL    RBC 3.50 (L) 4.70 - 5.50 M/uL    HGB 10.8 (L) 13.0 - 16.0 g/dL    HCT 34.9 (L) 36.0 - 48.0 %    MCV 99.7 (H) 74.0 - 97.0 FL    MCH 30.9 24.0 - 34.0 PG    MCHC 30.9 (L) 31.0 - 37.0 g/dL    RDW 13.6 11.6 - 14.5 %    PLATELET 242 926 - 047 K/uL    MPV 9.1 (L) 9.2 - 11.8 FL    NEUTROPHILS 66 40 - 73 %    LYMPHOCYTES 26 21 - 52 %    MONOCYTES 6 3 - 10 %    EOSINOPHILS 2 0 - 5 %    BASOPHILS 0 0 - 2 %    ABS. NEUTROPHILS 5.8 1.8 - 8.0 K/UL    ABS. LYMPHOCYTES 2.3 0.9 - 3.6 K/UL    ABS. MONOCYTES 0.5 0.05 - 1.2 K/UL    ABS. EOSINOPHILS 0.2 0.0 - 0.4 K/UL    ABS. BASOPHILS 0.0 0.0 - 0.1 K/UL    DF AUTOMATED     POC LACTIC ACID    Collection Time: 08/16/19  2:48 PM   Result Value Ref Range    Lactic Acid (POC) 0.65 0.40 - 2.00 mmol/L       Radiologic Studies -   No orders to display         Medical Decision Making   I am the first provider for this patient. I reviewed the vital signs, available nursing notes, past medical history, past surgical history, family history and social history. Vital Signs-Reviewed the patient's vital signs. Records Reviewed: Nursing Notes (Time of Review: 3:12 PM)    ED Course: Progress Notes, Reevaluation, and Consults:      Provider Notes (Medical Decision Making): Pt with RLE wound, recently seen by podiatry, vascular surgery, and now today plastic surgery. Started on antibiotics yesterday. Recent wound culture with GNR. Pt sent by surgeon for admission given complexity of wound. Pt is not septic. Does not have symptoms to suggest DVT in extremity. Admitted for ongoing management. Consult:  Discussed care with Dr. Maximiliano Jo (HOSPITALIST) Standard discussion; including history of patients chief complaint, available diagnostic results, and treatment course. Will admit the pt.  3:43 PM, 8/16/2019       Consult:  Discussed care with Dr. Sandra Durán ([address] [place] [address]). Standard discussion; including history of patients chief complaint, available diagnostic results, and treatment course. He will see the patient.     3:44 PM, 8/16/2019     Consult:  Discussed care with Dr. Reanna Garcia (PODIATRY) Standard discussion; including history of patients chief complaint, available diagnostic results, and treatment course. No need for podiatry involvement, would be happy to see the patient again in the wound care clinic. 3:45 PM, 8/16/2019           Diagnosis     Clinical Impression:     ICD-10-CM ICD-9-CM   1. Ulcer of right lower extremity, unspecified ulcer stage ([facility] L97.919 707.10   2. Cellulitis of right lower extremity L03.115 682.6   3. Lymphedema I89.0 457.1         Disposition: ADMIT    Follow-up Information    None          Patient's Medications   Start Taking    No medications on file   Continue Taking    COLCHICINE 0.6 MG TABLET    TAKE 1 TAB BY MOUTH DAILY AS NEEDED. DICLOFENAC-MISOPROSTOL (ARTHROTEC 50)  MG-MCG PER TABLET    TAKE 1 TABLET BY MOUTH TWICE A DAY    DOXYCYCLINE (MONODOX) 100 MG CAPSULE    Take 1 Cap by mouth two (2) times a day for 10 days. DULAGLUTIDE (TRULICITY) 1.5 DN/0.9 ML SUB-Q PEN    0.5 ML BY SUBCUTANEOUS ROUTE EVERY SEVEN (7) DAYS. FENOFIBRATE 150 MG CAP    Take 145 mg by mouth daily. FUROSEMIDE (LASIX) 80 MG TABLET    TAKE 1 TABLET BY MOUTH EVERY DAY    GABAPENTIN (NEURONTIN) 300 MG CAPSULE    Take 300 mg by mouth nightly. IBUPROFEN (MOTRIN) 600 MG TABLET    Take 1 Tab by mouth every six (6) hours as needed for Pain. KLOR-CON M20 20 MEQ TABLET    TAKE 1 TABLET BY MOUTH ONCE DAILY    LEVOTHYROXINE (SYNTHROID) 50 MCG TABLET    TAKE 1 TAB BY MOUTH DAILY (BEFORE BREAKFAST) FOR 90 DAYS. METHOCARBAMOL (ROBAXIN-750) 750 MG TABLET    Take 1 Tab by mouth four (4) times daily. As needed for pain or muscle spasm    METOPROLOL (LOPRESSOR) 50 MG TABLET    Take 1 Tab by mouth every twelve (12) hours. ROSUVASTATIN (CRESTOR) 40 MG TABLET    TAKE 1 TABLET BY MOUTH NIGHTLY FOR 90 DAYS. TADALAFIL (CIALIS) 20 MG TABLET    Take 1 Tab by mouth every thirty-six (36) hours. Indications: Erectile Dysfunction    TESTOSTERONE (ANDRODERM) 4 MG/24 HR PT24    1 Patch by TransDERmal route daily. Max Daily Amount: 1 Patch.     VALSARTAN (DIOVAN) 320 MG TABLET    Take 1 Tab by mouth daily. VIT B COMP & C-FA-COPPER-ZINC (FOLBEE PLUS) 5-1.5-25 MG TAB    Take 1 Tab by mouth daily. These Medications have changed    No medications on file   Stop Taking    No medications on file     _______________________________    Please note that this dictation was completed with [employer], the computer voice recognition software. Quite often unanticipated grammatical, syntax, homophones, and other interpretive errors are inadvertently transcribed by the computer software. Please disregard these errors. Please excuse any errors that have escaped final proofreading.

## 2019-08-16 NOTE — CONSULTS
[facility]  (A Division of [address] [place] [address])    Infectious Disease Consultation Note      Date of Admission: 8/16/2019    Date of Consultation: 8/16/2019    Referred by: Dr. Zeke Coffman       Reason for Referral: right leg cellulitis, lymphedema       Current Antimicrobials: Prior Antimicrobials   Vancomycin, Zosyn 8/16 - 0      Assessment / Plan:     Infected Ulcer, right lateral leg  - with cellulitis right leg  - wd 8/15: gs gpc prs, gnr.  cx GNR (2) -> continue vancomycin, zosyn pending cx results  -> await PRS input   Chronic massive lymphedema  - bilateral lower extremities    Morbid Obesity    DM    CKD    HTN    Gout      Microbiology:     8/15 Wd gs gpc prs, gnr.  cx gnr(2)  8/16 blcx NGTD x 1    Lines / Catheters:     piv    HPI:     81year-old AA morbidly obese male with DM, HTN, CKD, Gout, Chronic severe lymphedema, admitted to [facility] 8/16/2019 due to infected right leg wound, and cellulitis. In 2011, Vascular surgery (Dr. Roslyn Medina) already noted \"longstanding lymphedema\" but the patient apparently did not comply with recommendations for bariatric and plastic surgery evaluation. He has been followed by [facility] for chronic kidney disease and also by Dr. Kyler Teresa for a dorsal left foot ulcer. Extensive excisional debridement with wound vac was done for the latter in March of 2015. He has also been followed at the [facility] wound center for right lateral ankle ulcers since 2018 and on 10/4/2018 the lateral right ankle wound was debrided and theraskin biograft was applied by Dr. Claudette Jules with subsequent wound closure and healing by February 2019. In July 2019, around three weeks PTA, he scraped his right lateral leg and developed wounds there and subsequently developed foul smelling drainage and pain. He presented to the [facility] ED 7/24 and was worked up for DVT and discharged home after this was ruled out.   He returned to the [facility] ED on 8/9 with the same complaints and was again ruled out for DVT and sent home on Robaxin. He consulted Dr. Nikunj Beltran at the wound clinic on 8/15 and she sent him to the ED for IV antibiotics and possibly general or vascular surgery consult but after discussing with Dr. Nikunj Beltran the ED sent him home on oral antibiotics. He consulted Dr. Blossom Silveira today and was sent right back to the ED for admission.         Active Hospital Problems    Diagnosis Date Noted    Cellulitis of right lower extremity 08/16/2019    Chronic diastolic HF (heart failure) ([facility]) 08/16/2019    Type 2 diabetes mellitus with diabetic neuropathy ([facility] 08/15/2018    Hypothyroidism, adult 09/29/2015    Lymphedema of both lower extremities 07/14/2015    CKD (chronic kidney disease) stage 3, GFR 30-59 ml/min ([facility]) 07/14/2015     Past Medical History:   Diagnosis Date    Anemia of chronic disease     Arthritis     Chronic renal insufficiency     Diabetes ([facility]     Dyslipidemia     Edema     Gout     Gout     Hypertension     Lymphedema     Morbid obesity ([facility]      Past Surgical History:   Procedure Laterality Date    HX ORTHOPAEDIC  3/2015     Lt Foot ulcer     Family History   Problem Relation Age of Onset    Cancer Mother     No Known Problems Father      Social History     Socioeconomic History    Marital status:      Spouse name: Not on file    Number of children: Not on file    Years of education: Not on file    Highest education level: Not on file   Occupational History    Not on file   Social Needs    Financial resource strain: Not on file    Food insecurity:     Worry: Not on file     Inability: Not on file    Transportation needs:     Medical: Not on file     Non-medical: Not on file   Tobacco Use    Smoking status: Never Smoker    Smokeless tobacco: Never Used   Substance and Sexual Activity    Alcohol use: No    Drug use: No    Sexual activity: Not on file   Lifestyle    Physical activity:     Days per week: Not on file Minutes per session: Not on file    Stress: Not on file   Relationships    Social connections:     Talks on phone: Not on file     Gets together: Not on file     Attends [employer] service: Not on file     Active member of club or organization: Not on file     Attends meetings of clubs or organizations: Not on file     Relationship status: Not on file    Intimate partner violence:     Fear of current or ex partner: Not on file     Emotionally abused: Not on file     Physically abused: Not on file     Forced sexual activity: Not on file   Other Topics Concern    Not on file   Social History Narrative    Not on file        Allergies:    Patient has no known allergies. .    Medications:     Current Facility-Administered Medications   Medication Dose Route Frequency    piperacillin-tazobactam (ZOSYN) 4.5 g in 0.9% sodium chloride (MBP/ADV) 100 mL MBP  4.5 g IntraVENous Q6H    vancomycin (VANCOCIN) 2000 mg in  ml infusion  2,000 mg IntraVENous ONCE     Current Outpatient Medications   Medication Sig Dispense    doxycycline (MONODOX) 100 mg capsule Take 1 Cap by mouth two (2) times a day for 10 days. 20 Cap    methocarbamol (ROBAXIN-750) 750 mg tablet Take 1 Tab by mouth four (4) times daily. As needed for pain or muscle spasm 20 Tab    diclofenac-miSOPROStol (ARTHROTEC 50)  mg-mcg per tablet TAKE 1 TABLET BY MOUTH TWICE A  Tab    rosuvastatin (CRESTOR) 40 mg tablet TAKE 1 TABLET BY MOUTH NIGHTLY FOR 90 DAYS. 90 Tab    furosemide (LASIX) 80 mg tablet TAKE 1 TABLET BY MOUTH EVERY DAY 90 Tab    testosterone (ANDRODERM) 4 mg/24 hr pt24 1 Patch by TransDERmal route daily. Max Daily Amount: 1 Patch. 60 Patch    colchicine 0.6 mg tablet TAKE 1 TAB BY MOUTH DAILY AS NEEDED. 90 Tab    vit b comp & c-fa-copper-zinc (FOLBEE PLUS) 5-1.5-25 mg tab Take 1 Tab by mouth daily.  90 Tab    KLOR-CON M20 20 mEq tablet TAKE 1 TABLET BY MOUTH ONCE DAILY 90 Tab    ibuprofen (MOTRIN) 600 mg tablet Take 1 Tab by mouth every six (6) hours as needed for Pain. 20 Tab    valsartan (DIOVAN) 320 mg tablet Take 1 Tab by mouth daily. 90 Tab    levothyroxine (SYNTHROID) 50 mcg tablet TAKE 1 TAB BY MOUTH DAILY (BEFORE BREAKFAST) FOR 90 DAYS. 90 Tab    tadalafil (CIALIS) 20 mg tablet Take 1 Tab by mouth every thirty-six (36) hours. Indications: Erectile Dysfunction 12 Tab    dulaglutide (TRULICITY) 1.5 CT/8.9 mL sub-q pen 0.5 ML BY SUBCUTANEOUS ROUTE EVERY SEVEN (7) DAYS. 7.5 Syringe    gabapentin (NEURONTIN) 300 mg capsule Take 300 mg by mouth nightly.  metoprolol (LOPRESSOR) 50 mg tablet Take 1 Tab by mouth every twelve (12) hours. 60 Tab    Fenofibrate 150 mg cap Take 145 mg by mouth daily. ROS:   A comprehensive review of systems was negative except for that written in the History of Present Illness. Physical Exam:     Temp (24hrs), Av °F (36.7 °C), Min:98 °F (36.7 °C), Max:98 °F (36.7 °C)    Visit Vitals  /71   Pulse 67   Temp 98 °F (36.7 °C)   Resp 17   Ht 6' 5\" (1.956 m)   Wt (!) 205 kg (452 lb)   SpO2 100%   BMI 53.60 kg/m²       General: Well developed, morbidly obese 81 y.o.  male in no acute distress. ENT: ENT exam normal, no neck nodes or sinus tenderness  Head: normocephalic, without obvious abnormality  Mouth:  mucous membranes moist, pharynx normal without lesions  Neck: supple, symmetrical, trachea midline   Cardio:  regular rate and rhythm, S1, S2 normal, no murmur, click, rub or gallop  Chest: inspection normal - no chest wall deformities or tenderness, respiratory effort normal  Lungs: clear to auscultation and no wheezes or rales  Abdomen: obese, soft, non-tender. Bowel sounds normal. No masses, no organomegaly. Extremities:  Massive lymphedema bilateral lower extremities with redundant skin folds and lichenified skin. Right lateral leg ulcer currently packed (see photos). There is also a posterior leg ulcer which I did not examine.   Neuro: Grossly normal              Lab results:     Chemistry  Recent Labs     08/16/19  1445 08/15/19  1705   * 125*    144   K 4.6 5.2    106   CO2 35* 34*   BUN 27* 27*   CREA 1.68* 1.91*   CA 8.7 9.1   AGAP NEG 1 4   BUCR 16 14   AP 57 60   TP 6.4 7.0   ALB 2.8* 2.8*   GLOB 3.6 4.2*   AGRAT 0.8 0.7*       CBC w/ Diff  Recent Labs     08/16/19  1445 08/15/19  1705   WBC 8.8 6.6   RBC 3.50* 2.68*   HGB 10.8* 8.4*   HCT 34.9* 27.2*    158   GRANS 66 66   LYMPH 26 26   EOS 2 2       Microbiology  All Micro Results     Procedure Component Value Units Date/Time    CULTURE, BLOOD [[id number]] Collected:  08/16/19 1445    Order Status:  Completed Specimen:  Blood Updated:  08/16/19 1454    CULTURE, BLOOD [[id number]]     Order Status:  Canceled Specimen:  Blood              Radha Barajas MD, [facility]  Infectious Disease Specialist  Pager [phone]

## 2019-08-16 NOTE — ED NOTES
Assume care of patient at this time. Patient states he was here yesterday and was sent again from wound clinic for infection in the right lower extremity. Wounds wrapped by clinic today. bilat legs have lymphedema. Skin rough bumpy, swollen. Patient ambulatory in care area. States pain is 5/10 that is constant and achy. Denies SOb, breathing even and unlabored. Denies chest pain, blood pressure elevated while in triage. Patient placed on cardiac monitor and continuous pulse ox. 2 techs at bedside to attempt IV access and obtain blood samples. Dr. Denise Aiken at bedside.  Dressing changed today at wound care office, per Dr. Denise Aiken dressing will remain intact in ER

## 2019-08-17 LAB
ANION GAP SERPL CALC-SCNC: 6 MMOL/L (ref 3–18)
BASOPHILS # BLD: 0 K/UL (ref 0–0.1)
BASOPHILS NFR BLD: 0 % (ref 0–2)
BUN SERPL-MCNC: 22 MG/DL (ref 7–18)
BUN/CREAT SERPL: 14 (ref 12–20)
CALCIUM SERPL-MCNC: 8.2 MG/DL (ref 8.5–10.1)
CHLORIDE SERPL-SCNC: 109 MMOL/L (ref 100–111)
CO2 SERPL-SCNC: 28 MMOL/L (ref 21–32)
CREAT SERPL-MCNC: 1.58 MG/DL (ref 0.6–1.3)
DIFFERENTIAL METHOD BLD: ABNORMAL
EOSINOPHIL # BLD: 0.3 K/UL (ref 0–0.4)
EOSINOPHIL NFR BLD: 3 % (ref 0–5)
ERYTHROCYTE [DISTWIDTH] IN BLOOD BY AUTOMATED COUNT: 13.9 % (ref 11.6–14.5)
EST. AVERAGE GLUCOSE BLD GHB EST-MCNC: 163 MG/DL
GLUCOSE BLD STRIP.AUTO-MCNC: 111 MG/DL (ref 70–110)
GLUCOSE BLD STRIP.AUTO-MCNC: 115 MG/DL (ref 70–110)
GLUCOSE BLD STRIP.AUTO-MCNC: 136 MG/DL (ref 70–110)
GLUCOSE BLD STRIP.AUTO-MCNC: 168 MG/DL (ref 70–110)
GLUCOSE SERPL-MCNC: 114 MG/DL (ref 74–99)
HBA1C MFR BLD: 7.3 % (ref 4.2–5.6)
HCT VFR BLD AUTO: 32.3 % (ref 36–48)
HGB BLD-MCNC: 10 G/DL (ref 13–16)
LYMPHOCYTES # BLD: 2.7 K/UL (ref 0.9–3.6)
LYMPHOCYTES NFR BLD: 28 % (ref 21–52)
MCH RBC QN AUTO: 31.7 PG (ref 24–34)
MCHC RBC AUTO-ENTMCNC: 31 G/DL (ref 31–37)
MCV RBC AUTO: 102.5 FL (ref 74–97)
MONOCYTES # BLD: 0.8 K/UL (ref 0.05–1.2)
MONOCYTES NFR BLD: 8 % (ref 3–10)
NEUTS SEG # BLD: 5.9 K/UL (ref 1.8–8)
NEUTS SEG NFR BLD: 61 % (ref 40–73)
PLATELET # BLD AUTO: 239 K/UL (ref 135–420)
PMV BLD AUTO: 10.2 FL (ref 9.2–11.8)
POTASSIUM SERPL-SCNC: 5.6 MMOL/L (ref 3.5–5.5)
RBC # BLD AUTO: 3.15 M/UL (ref 4.7–5.5)
RIGHT DIST ATA VELOCITY: 98.6 CM/S
RIGHT DIST PTA PSV: 65 CM/S
SODIUM SERPL-SCNC: 143 MMOL/L (ref 136–145)
WBC # BLD AUTO: 9.7 K/UL (ref 4.6–13.2)

## 2019-08-17 PROCEDURE — 85025 COMPLETE CBC W/AUTO DIFF WBC: CPT

## 2019-08-17 PROCEDURE — 74011250636 HC RX REV CODE- 250/636: Performed by: HOSPITALIST

## 2019-08-17 PROCEDURE — 74011250636 HC RX REV CODE- 250/636: Performed by: PHYSICIAN ASSISTANT

## 2019-08-17 PROCEDURE — 74011250637 HC RX REV CODE- 250/637: Performed by: PHYSICIAN ASSISTANT

## 2019-08-17 PROCEDURE — 82962 GLUCOSE BLOOD TEST: CPT

## 2019-08-17 PROCEDURE — 74011636637 HC RX REV CODE- 636/637: Performed by: PHYSICIAN ASSISTANT

## 2019-08-17 PROCEDURE — 74011000258 HC RX REV CODE- 258: Performed by: PHYSICIAN ASSISTANT

## 2019-08-17 PROCEDURE — 36415 COLL VENOUS BLD VENIPUNCTURE: CPT

## 2019-08-17 PROCEDURE — 65270000029 HC RM PRIVATE

## 2019-08-17 PROCEDURE — 80048 BASIC METABOLIC PNL TOTAL CA: CPT

## 2019-08-17 PROCEDURE — 93005 ELECTROCARDIOGRAM TRACING: CPT

## 2019-08-17 RX ORDER — DEXTROSE MONOHYDRATE 100 MG/ML
125-250 INJECTION, SOLUTION INTRAVENOUS AS NEEDED
Status: DISCONTINUED | OUTPATIENT
Start: 2019-08-17 | End: 2019-08-30 | Stop reason: HOSPADM

## 2019-08-17 RX ADMIN — PIPERACILLIN SODIUM AND TAZOBACTAM SODIUM 4.5 G: 4; .5 INJECTION, POWDER, LYOPHILIZED, FOR SOLUTION INTRAVENOUS at 13:45

## 2019-08-17 RX ADMIN — HEPARIN SODIUM 5000 UNITS: 5000 INJECTION INTRAVENOUS; SUBCUTANEOUS at 21:38

## 2019-08-17 RX ADMIN — GABAPENTIN 300 MG: 300 CAPSULE ORAL at 21:38

## 2019-08-17 RX ADMIN — HEPARIN SODIUM 5000 UNITS: 5000 INJECTION INTRAVENOUS; SUBCUTANEOUS at 13:45

## 2019-08-17 RX ADMIN — SODIUM CHLORIDE 1750 MG: 900 INJECTION, SOLUTION INTRAVENOUS at 05:28

## 2019-08-17 RX ADMIN — Medication 10 ML: at 21:43

## 2019-08-17 RX ADMIN — PIPERACILLIN SODIUM AND TAZOBACTAM SODIUM 4.5 G: 4; .5 INJECTION, POWDER, LYOPHILIZED, FOR SOLUTION INTRAVENOUS at 05:28

## 2019-08-17 RX ADMIN — Medication 10 ML: at 13:46

## 2019-08-17 RX ADMIN — VALSARTAN 320 MG: 80 TABLET, FILM COATED ORAL at 09:18

## 2019-08-17 RX ADMIN — INSULIN LISPRO 3 UNITS: 100 INJECTION, SOLUTION INTRAVENOUS; SUBCUTANEOUS at 13:46

## 2019-08-17 RX ADMIN — SODIUM CHLORIDE 1750 MG: 900 INJECTION, SOLUTION INTRAVENOUS at 17:03

## 2019-08-17 RX ADMIN — HEPARIN SODIUM 5000 UNITS: 5000 INJECTION INTRAVENOUS; SUBCUTANEOUS at 05:28

## 2019-08-17 RX ADMIN — LEVOTHYROXINE SODIUM 50 MCG: 50 TABLET ORAL at 05:30

## 2019-08-17 RX ADMIN — FUROSEMIDE 80 MG: 40 TABLET ORAL at 09:18

## 2019-08-17 RX ADMIN — PIPERACILLIN SODIUM AND TAZOBACTAM SODIUM 4.5 G: 4; .5 INJECTION, POWDER, LYOPHILIZED, FOR SOLUTION INTRAVENOUS at 21:38

## 2019-08-17 RX ADMIN — METOPROLOL TARTRATE 50 MG: 50 TABLET ORAL at 21:43

## 2019-08-17 RX ADMIN — Medication 10 ML: at 05:31

## 2019-08-17 NOTE — ED NOTES
Report called to [facility]. All questions answered at this time.

## 2019-08-17 NOTE — PROGRESS NOTES
[facility]  (A Division of [address] [place] [address])    Infectious Disease Follow-up Note      Date of Admission: 8/16/2019     Date of Note:  8/17/2019      Summary:     82 y/o AAM with chronic massive bilateral LE lymphedema, morbid obesity, infected right lateral leg ulcer with celllulitis. Comorbid: DM, HTN, CKD, Gout    Interval History:     No new complaints. Afebrile. Tolerating antibiotics. Ambulates to bathroom and back. Awaiting Dr Jessica Mariano evaluation.      Current Antimicrobials: Prior Antimicrobials   Vancomycin, Zosyn 8/16 - 1        Assessment / Plan:      Infected Ulcer, right lateral leg  - with cellulitis right leg  - wd 8/15: gs gpc prs, gnr.  cx GNR (2), NHS, Diphtheroids -> continue vancomycin, zosyn pending cx results  -> await PRS input   Chronic massive lymphedema  - bilateral lower extremities     Morbid Obesity     DM     CKD     HTN     Gout        Microbiology:      8/15      Wd gs gpc prs, gnr.  cx gnr(2)  8/16      blcx NGTD x 1     Lines / Catheters:      piv      Patient Active Problem List   Diagnosis Code    Diabetic foot ulcer ([facility] E11.621, H76.169    Diastolic dysfunction R70.10    Dyslipidemia E78.5    Cellulitis and abscess of foot, except toes L03.119, L02.619    Lymphedema of both lower extremities I89.0    CKD (chronic kidney disease) stage 3, GFR 30-59 ml/min ([facility]) N18.3    Morbid obesity with BMI of 70 and over, adult ([facility]) E66.01, Z68.45    Erectile dysfunction N52.9    Hypothyroidism, adult E03.9    Hypogonadism in male E29.1    Borderline anemia D64.9    Idiopathic gout of multiple sites M10.09    Skin ulcer of ankle with fat layer exposed, right ([facility]) L97.312    Non-pressure chronic ulcer of right calf with fat layer exposed ([facility] L97.212    Type 2 diabetes mellitus with diabetic neuropathy ([facility]) E11.40    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 ([address] L97.213    Puncture wound of multiple sites of right leg S81.831A    Cellulitis of leg without foot, right L03.115    Cellulitis of right lower extremity L03.115    Chronic diastolic HF (heart failure) (H[facility]) I50.32           Current Facility-Administered Medications   Medication Dose Route Frequency    sodium chloride (NS) flush 5-40 mL  5-40 mL IntraVENous Q8H    sodium chloride (NS) flush 5-40 mL  5-40 mL IntraVENous PRN    acetaminophen (TYLENOL) tablet 650 mg  650 mg Oral Q4H PRN    HYDROcodone-acetaminophen (NORCO) 5-325 mg per tablet 1 Tab  1 Tab Oral Q4H PRN    heparin (porcine) injection 5,000 Units  5,000 Units SubCUTAneous Q8H    insulin lispro (HUMALOG) injection   SubCUTAneous AC&HS    glucose chewable tablet 16 g  4 Tab Oral PRN    glucagon (GLUCAGEN) injection 1 mg  1 mg IntraMUSCular PRN    dextrose 10% infusion 250 mL  250 mL IntraVENous PRN    furosemide (LASIX) tablet 80 mg  80 mg Oral DAILY    gabapentin (NEURONTIN) capsule 300-600 mg  300-600 mg Oral QHS    levothyroxine (SYNTHROID) tablet 50 mcg  50 mcg Oral ACB    metoprolol tartrate (LOPRESSOR) tablet 50 mg  50 mg Oral Q12H    valsartan (DIOVAN) tablet 320 mg  320 mg Oral DAILY    piperacillin-tazobactam (ZOSYN) 4.5 g in 0.9% sodium chloride (MBP/ADV) ###EXTENDED 4 HOUR INFUSION###  4.5 g IntraVENous Q8H    vancomycin (VANCOCIN) 1,750 mg in 0.9% sodium chloride 500 mL IVPB  1,750 mg IntraVENous Q12H    [START ON 2019] VANCOMYCIN INFORMATION NOTE   Other ONCE    VANCOMYCIN INFORMATION NOTE 1 Each  1 Each Other Rx Dosing/Monitoring         Review of Systems - Negative except as in interval history       Objective:     Visit Vitals  /72   Pulse 69   Temp 97.9 °F (36.6 °C)   Resp 17   Ht 6' 5\" (1.956 m)   Wt (!) 205 kg (452 lb)   SpO2 96%   BMI 53.60 kg/m²       Temp (24hrs), Av.1 °F (36.7 °C), Min:97.8 °F (36.6 °C), Max:98.5 °F (36.9 °C)      General: Well developed, morbidly obese 81 y.o.  male in no acute distress.   ENT: ENT exam normal, no neck nodes or sinus tenderness  Head: normocephalic, without obvious abnormality  Mouth:  mucous membranes moist, pharynx normal without lesions  Neck: supple, symmetrical, trachea midline   Cardio:  regular rate and rhythm, S1, S2 normal, no murmur, click, rub or gallop  Chest: inspection normal - no chest wall deformities or tenderness, respiratory effort normal  Lungs: clear to auscultation and no wheezes or rales  Abdomen: obese, soft, non-tender. Bowel sounds normal. No masses, no organomegaly. Extremities:  Massive lymphedema bilateral lower extremities with redundant skin folds and lichenified skin. Right dressing not removed (see photos from yesterday 8/16).         8/16              Lab results     Chemistry  Recent Labs     08/17/19  0500 08/16/19  1445 08/15/19  1705   * 105* 125*    141 144   K 5.6* 4.6 5.2    107 106   CO2 28 35* 34*   BUN 22* 27* 27*   CREA 1.58* 1.68* 1.91*   CA 8.2* 8.7 9.1   AGAP 6 NEG 1 4   BUCR 14 16 14   AP  --  57 60   TP  --  6.4 7.0   ALB  --  2.8* 2.8*   GLOB  --  3.6 4.2*   AGRAT  --  0.8 0.7*       CBC w/ Diff  Recent Labs     08/17/19  0500 08/16/19  1445 08/15/19  1705   WBC 9.7 8.8 6.6   RBC 3.15* 3.50* 2.68*   HGB 10.0* 10.8* 8.4*   HCT 32.3* 34.9* 27.2*    394 158   GRANS 61 66 66   LYMPH 28 26 26   EOS 3 2 2       Microbiology  All Micro Results     Procedure Component Value Units Date/Time    CULTURE, BLOOD [[id number]] Collected:  08/16/19 1445    Order Status:  Completed Specimen:  Blood Updated:  08/17/19 1021     Special Requests: PERIPHERAL        Culture result: NO GROWTH AFTER 19 HOURS       CULTURE, BLOOD [[id number]]     Order Status:  Canceled Specimen:  Blood              Tammy Max MD, [facility]  Infectious Disease Specialist  Pager [phone]

## 2019-08-17 NOTE — PROGRESS NOTES
This patient has already diuresed a lot of his dependant oedema using limb elevation and Lasix only - the right lateral ankle ulcer is deep anteriorly and posteriorly with a Gram negative malodor - the wound is well packed and drained though the extent of the deep necrosis is as yet not defined - viability of the foot will depend on the on the opinion of the vascular surgeons.

## 2019-08-17 NOTE — ROUTINE PROCESS
Spoke with Kristen RN caring for patient regarding PICC line order. States she has adequate IV access for present meds. Additional PIV inserted with use of ultrasound for backup IV. No present indication for need of central access.  Will reevaluate PICC line need when cultures are complete

## 2019-08-17 NOTE — PROGRESS NOTES
Podiatry Surgery Progress Note      Patient: aYhaira Em MRN: [id number]  SSN: [id number]    YOB: 1950  Age: 81 y.o. Sex: male      Assessment:     Patient Active Problem List   Diagnosis Code    Diabetic foot ulcer ([facility] E11.621, B92.547    Diastolic dysfunction K81.50    Dyslipidemia E78.5    Cellulitis and abscess of foot, except toes L03.119, L02.619    Lymphedema of both lower extremities I89.0    CKD (chronic kidney disease) stage 3, GFR 30-59 ml/min ([facility]) N18.3    Morbid obesity with BMI of 70 and over, adult ([facility] E66.01, Z68.45    Erectile dysfunction N52.9    Hypothyroidism, adult E03.9    Hypogonadism in male E29.1    Borderline anemia D64.9    Idiopathic gout of multiple sites M10.09    Skin ulcer of ankle with fat layer exposed, right ([facility] L97.312    Non-pressure chronic ulcer of right calf with fat layer exposed ([facility] L97.212    Type 2 diabetes mellitus with diabetic neuropathy ([facility]) E11.40    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 ([facility]) L97.213    Puncture wound of multiple sites of right leg S81.831A    Cellulitis of leg without foot, right L03.115    Cellulitis of right lower extremity L03.115    Chronic diastolic HF (heart failure) ([facility]) I50.32          Plan:   Continue LWC RLE and IV antibx. Previewed culture preliminary cx results. Discussed with Dr Jaziel Yee the possible treatment course for IV antibx with possible washout/wound vac. Call if needed. Total time spent with patient: 30 [facility] discussed with: Patient    Discussed:  Care Plan    Disposition:  Stable      Mr. Shikha Lock is a 81 y.o. male who was seen during AM rounds for RLE/calf ulcerations. No new complaints today.         Subjective:   Past Medical History  Past Medical History:   Diagnosis Date    Anemia of chronic disease     Arthritis     Chronic renal insufficiency     Diabetes ([facility])     Dyslipidemia     Edema     Gout     Gout     Hypertension     Lymphedema     Morbid obesity ([facility]      Social History     Socioeconomic History    Marital status:      Spouse name: Not on file    Number of children: Not on file    Years of education: Not on file    Highest education level: Not on file   Occupational History    Not on file   Social Needs    Financial resource strain: Not on file    Food insecurity:     Worry: Not on file     Inability: Not on file    Transportation needs:     Medical: Not on file     Non-medical: Not on file   Tobacco Use    Smoking status: Never Smoker    Smokeless tobacco: Never Used   Substance and Sexual Activity    Alcohol use: No    Drug use: No    Sexual activity: Not on file   Lifestyle    Physical activity:     Days per week: Not on file     Minutes per session: Not on file    Stress: Not on file   Relationships    Social connections:     Talks on phone: Not on file     Gets together: Not on file     Attends [employer] service: Not on file     Active member of club or organization: Not on file     Attends meetings of clubs or organizations: Not on file     Relationship status: Not on file    Intimate partner violence:     Fear of current or ex partner: Not on file     Emotionally abused: Not on file     Physically abused: Not on file     Forced sexual activity: Not on file   Other Topics Concern    Not on file   Social History Narrative    Not on file       Current Medications  Current Facility-Administered Medications   Medication Dose Route Frequency Provider Last Rate Last Dose    sodium chloride (NS) flush 5-40 mL  5-40 mL IntraVENous Q8H Genesis Pinon PA   10 mL at 08/17/19 0531    sodium chloride (NS) flush 5-40 mL  5-40 mL IntraVENous PRN ReprogleArnoldo, PA        acetaminophen (TYLENOL) tablet 650 mg  650 mg Oral Q4H PRN ReprogleArnoldo, PA        HYDROcodone-acetaminophen (NORCO) 5-325 mg per tablet 1 Tab  1 Tab Oral Q4H PRN Reprogle, Arnoldo Eth, PA        heparin (porcine) injection 5,000 Units  5,000 Units SubCUTAneous Q8H Leighann Pinon PA   5,000 Units at 08/17/19 0528    insulin lispro (HUMALOG) injection   SubCUTAneous AC&HS Leighann Pinon PA   Stopped at 08/16/19 2200    glucose chewable tablet 16 g  4 Tab Oral PRN Leighann Pinon PA        glucagon (GLUCAGEN) injection 1 mg  1 mg IntraMUSCular PRN Leighann Pinon PA        dextrose 10% infusion 250 mL  250 mL IntraVENous PRN Leighann Pinon PA        furosemide (LASIX) tablet 80 mg  80 mg Oral DAILY Leighann Pinon PA        gabapentin (NEURONTIN) capsule 300-600 mg  300-600 mg Oral QHS Genesis Pinon PA   300 mg at 08/16/19 2123    levothyroxine (SYNTHROID) tablet 50 mcg  50 mcg Oral ACB Genesis Pinon PA   50 mcg at 08/17/19 0530    metoprolol tartrate (LOPRESSOR) tablet 50 mg  50 mg Oral Q12H Leighann Pinon PA   50 mg at 08/16/19 2123    valsartan (DIOVAN) tablet 320 mg  320 mg Oral DAILY Leighann Pinon PA        piperacillin-tazobactam (ZOSYN) 4.5 g in 0.9% sodium chloride (MBP/ADV) ###EXTENDED 4 HOUR INFUSION###  4.5 g IntraVENous Q8H Genesis Pinon PA 25 mL/hr at 08/17/19 0528 4.5 g at 08/17/19 0528    vancomycin (VANCOCIN) 1,750 mg in 0.9% sodium chloride 500 mL IVPB  1,750 mg IntraVENous Q12H Trey Watters  mL/hr at 08/17/19 0528 1,750 mg at 08/17/19 0528    [START ON 8/18/2019] VANCOMYCIN INFORMATION NOTE   Other ONCE Trey Watters MD        VANCOMYCIN INFORMATION NOTE 1 Each  1 Each Other Rx Dosing/Monitoring Trey Watters MD           Patient Allergies  No Known Allergies       Objective:   General Exam  alert, cooperative, no distress, appears stated age    Vitals  Visit Vitals  /70   Pulse 70   Temp 98.3 °F (36.8 °C)   Resp 20   Ht 6' 5\" (1.956 m)   Wt (!) 205 kg (452 lb)   SpO2 98%   BMI 53.60 kg/m²       REVIEW OF SYSTEMS:  General: denies chronic fatigue, weight loss, fever, anemia, bruising, depression, nervousness, panic attacks  HEENT: denies ringing in ears, ear infections, dizzy spells, poor vision, glaucoma, sinus trouble, hoarseness, eye infections  GI: denies diarrhea, gas, bloating, heartburn, regurgitation, difficulty swallowing, painful swallowing, nausea, vomiting, constipation, abdominal pain, decreased appetite, blood in stools, black stools, jaundice, dark urine  Lungs: denies pneumonia, asthma, cough, SOB, hemoptysis  Heart: denies chest pain, irregular heart beat, ankle swelling, HTN  Skin: denies rashes, hives, allergic reaction  Urinary: denies UTI, kidney stones, decreased urine force and flow, urination at night, blood in urine, painful urination  Bones and Joints: denies arthritis, rheumatism, back pain, gout, osteoporosis  Neurologic: denies stroke, seizures, headaches, numbness, tingling    Foot Exam  VASCULAR EXAM:. Pedal pulses  2/4 DP and PT are non palpable right and left foot. Skin temperature is warm to cool right and left foot.  Digital capillary fill time is 4 seconds right and left foot. Patient has extensive lymphedema of the right and left lower extremity and feet.       NEUROLOGICAL EXAM:. Sensation is diminished to the right and left foot. Protective sensation diminished with 5.07g monofilament wire Bilateral pt can not feel wire at distal tip of all toes. Babinski is age appropriated, bilateral. Patient with Diabetic peripheral Neuropathy.       MUSCULOSKELETAL EXAM:. There is equinus of the right and left limb.       DERMATOLOGICAL EXAM:. Full thickness ulceration to the lateral aspect of the right calf and posterior RLE. There is warmth noted noted upon palpation of the lateral aspect of the right calf.  Upon examination there tunneling noted with brown exudate noted with odor lateral calf RLE         MYCOTIC NAIL Dystrophic nail 1,2,3,4,5 bilateral. Elongated thickened nails 1,2,3,4,5 bilateral Hypertrophic nails 1,2,3,4,5     Ulcer  Ulcer Location: R lower leg lateral, R lower leg posterior, Ulcer measurements:  3.5 x 2.9 x 8.0 lateral and 0.6 x 2.0 x 8.0 cm posterior Ulcer base: Necrotic eschar and Ulcer exudate: Moderate amount Brown exudate  Azul Scale: Stage 2    Wound Leg Lower Right;Lateral (Active)   Number of days: 562       Wound Leg lower Right;Posterior (Active)   Number of days: 2        Labs  Recent Results (from the past 24 hour(s))   CBC WITH AUTOMATED DIFF    Collection Time: 08/16/19  2:45 PM   Result Value Ref Range    WBC 8.8 4.6 - 13.2 K/uL    RBC 3.50 (L) 4.70 - 5.50 M/uL    HGB 10.8 (L) 13.0 - 16.0 g/dL    HCT 34.9 (L) 36.0 - 48.0 %    MCV 99.7 (H) 74.0 - 97.0 FL    MCH 30.9 24.0 - 34.0 PG    MCHC 30.9 (L) 31.0 - 37.0 g/dL    RDW 13.6 11.6 - 14.5 %    PLATELET 887 999 - 709 K/uL    MPV 9.1 (L) 9.2 - 11.8 FL    NEUTROPHILS 66 40 - 73 %    LYMPHOCYTES 26 21 - 52 %    MONOCYTES 6 3 - 10 %    EOSINOPHILS 2 0 - 5 %    BASOPHILS 0 0 - 2 %    ABS. NEUTROPHILS 5.8 1.8 - 8.0 K/UL    ABS. LYMPHOCYTES 2.3 0.9 - 3.6 K/UL    ABS. MONOCYTES 0.5 0.05 - 1.2 K/UL    ABS. EOSINOPHILS 0.2 0.0 - 0.4 K/UL    ABS. BASOPHILS 0.0 0.0 - 0.1 K/UL    DF AUTOMATED     METABOLIC PANEL, COMPREHENSIVE    Collection Time: 08/16/19  2:45 PM   Result Value Ref Range    Sodium 141 136 - 145 mmol/L    Potassium 4.6 3.5 - 5.5 mmol/L    Chloride 107 100 - 111 mmol/L    CO2 35 (H) 21 - 32 mmol/L    Anion gap NEG 1 3.0 - 18 mmol/L    Glucose 105 (H) 74 - 99 mg/dL    BUN 27 (H) 7.0 - 18 MG/DL    Creatinine 1.68 (H) 0.6 - 1.3 MG/DL    BUN/Creatinine ratio 16 12 - 20      GFR est AA 50 (L) >60 ml/min/1.73m2    GFR est non-AA 41 (L) >60 ml/min/1.73m2    Calcium 8.7 8.5 - 10.1 MG/DL    Bilirubin, total 0.3 0.2 - 1.0 MG/DL    ALT (SGPT) 50 16 - 61 U/L    AST (SGOT) 20 10 - 38 U/L    Alk.  phosphatase 57 45 - 117 U/L    Protein, total 6.4 6.4 - 8.2 g/dL    Albumin 2.8 (L) 3.4 - 5.0 g/dL    Globulin 3.6 2.0 - 4.0 g/dL    A-G Ratio 0.8 0.8 - 1.7     PROTHROMBIN TIME + INR    Collection Time: 08/16/19  2:45 PM   Result Value Ref Range    Prothrombin time 13.0 11.5 - 15.2 sec    INR 1.0 0.8 - 1.2     POC LACTIC ACID    Collection Time: 08/16/19  2:48 PM   Result Value Ref Range    Lactic Acid (POC) 0.65 0.40 - 2.00 mmol/L   HEMOGLOBIN A1C WITH EAG    Collection Time: 08/16/19  2:51 PM   Result Value Ref Range    Hemoglobin A1c 7.3 (H) 4.2 - 5.6 %    Est. average glucose 163 mg/dL   DUPLEX LOW EXT ARTERY RIGHT W MIGDALIA    Collection Time: 08/16/19  4:51 PM   Result Value Ref Range    Right Dist YAHAIRA Velocity 98.6 cm/s    Right Dist PTA PSV 65.0 cm/s   GLUCOSE, POC    Collection Time: 08/16/19  9:29 PM   Result Value Ref Range    Glucose (POC) 134 (H) 70 - 413 mg/dL   METABOLIC PANEL, BASIC    Collection Time: 08/17/19  5:00 AM   Result Value Ref Range    Sodium 143 136 - 145 mmol/L    Potassium 5.6 (H) 3.5 - 5.5 mmol/L    Chloride 109 100 - 111 mmol/L    CO2 28 21 - 32 mmol/L    Anion gap 6 3.0 - 18 mmol/L    Glucose 114 (H) 74 - 99 mg/dL    BUN 22 (H) 7.0 - 18 MG/DL    Creatinine 1.58 (H) 0.6 - 1.3 MG/DL    BUN/Creatinine ratio 14 12 - 20      GFR est AA 53 (L) >60 ml/min/1.73m2    GFR est non-AA 44 (L) >60 ml/min/1.73m2    Calcium 8.2 (L) 8.5 - 10.1 MG/DL   CBC WITH AUTOMATED DIFF    Collection Time: 08/17/19  5:00 AM   Result Value Ref Range    WBC 9.7 4.6 - 13.2 K/uL    RBC 3.15 (L) 4.70 - 5.50 M/uL    HGB 10.0 (L) 13.0 - 16.0 g/dL    HCT 32.3 (L) 36.0 - 48.0 %    .5 (H) 74.0 - 97.0 FL    MCH 31.7 24.0 - 34.0 PG    MCHC 31.0 31.0 - 37.0 g/dL    RDW 13.9 11.6 - 14.5 %    PLATELET 521 342 - 919 K/uL    MPV 10.2 9.2 - 11.8 FL    NEUTROPHILS 61 40 - 73 %    LYMPHOCYTES 28 21 - 52 %    MONOCYTES 8 3 - 10 %    EOSINOPHILS 3 0 - 5 %    BASOPHILS 0 0 - 2 %    ABS. NEUTROPHILS 5.9 1.8 - 8.0 K/UL    ABS. LYMPHOCYTES 2.7 0.9 - 3.6 K/UL    ABS. MONOCYTES 0.8 0.05 - 1.2 K/UL    ABS. EOSINOPHILS 0.3 0.0 - 0.4 K/UL    ABS.  BASOPHILS 0.0 0.0 - 0.1 K/UL    DF AUTOMATED     GLUCOSE, POC    Collection Time: 08/17/19  7:41 AM   Result Value Ref Range    Glucose (POC) 111 (H) 70 - 110 mg/dL     Special Requests:      Preliminary   NO SPECIAL REQUESTS    GRAM STAIN FEW WBC'S      Preliminary   GRAM STAIN      Preliminary   FEW GRAM POSITIVE COCCI IN PAIRS    GRAM STAIN      Preliminary   MODERATE GRAM NEGATIVE RODS    Culture result: Abnormal       Preliminary   MODERATE GRAM NEGATIVE RODS    Culture result: Abnormal       Preliminary   FEW POSSIBLE 2ND GRAM NEGATIVE ETIENNE    Lab and Collection     CULTURE, WOUND Dorthula Beams (Order: [id number]) - 8/15/2019               Duplex images were obtained using 2-D gray scale, color flow, and spectral Doppler analysis. Physiologic testing was performed. See report for details on waveform analysis for continuous wave Doppler and/or pulsed volume recording (PVR). Vitals     Weight Height BSA (calculated - sq m) BP Pulse (Heart Rate)          Interpretation Summary     There is no evidence of any hemodynamically significant peripheral arterial disease at rest in either lower extremity.           Lower Extremity Arterial Findings     Right Lower Arterial     Triphasic Doppler waveforms in the right posterior tibial and dorsalis pedis artery. Study limited to distal posterior tibial and dorsalis pedis artery due to patient body habitus and edema. Right Lower Arterial Measurements      PSV   PTA Dist 65 cm/s         YAHAIRA Dist 98.6 cm/s                                 Procedure Staff     Technologist/Clinician: Linsey Leggett  Supporting Staff: None  Performing Physician/Midlevel: None     Exam Completion Date/Time: 8/16/19  5:01 PM       X-Ray:  T[facility] 08/09/19 at [facility]  Indication: Injury, edema, pain           3 views right ankle show extensive soft tissue large plantar calcaneal spur. No  fracture or dislocation. Vascular calcifications seen in soft tissues.     IMPRESSION  IMPRESSION: Calcaneal spur.  Peripheral vascular disease with extensive soft  tissue swelling.  No acute fracture.       Imaging      XR ANKLE RT MIN 3 V (Order: [id number]) - 8/9/2019          Procedures:   none               Leticia De La Garza DPM  August 17, 2019

## 2019-08-17 NOTE — PROGRESS NOTES
Problem: Diabetes Self-Management  Goal: *Disease process and treatment process  Description  Define diabetes and identify own type of diabetes; list 3 options for treating diabetes. Outcome: Progressing Towards Goal  Goal: *Incorporating nutritional management into lifestyle  Description  Describe effect of type, amount and timing of food on blood glucose; list 3 methods for planning meals. Outcome: Progressing Towards Goal  Goal: *Incorporating physical activity into lifestyle  Description  State effect of exercise on blood glucose levels. Outcome: Progressing Towards Goal  Goal: *Developing strategies to promote health/change behavior  Description  Define the ABC's of diabetes; identify appropriate screenings, schedule and personal plan for screenings. Outcome: Progressing Towards Goal  Goal: *Using medications safely  Description  State effect of diabetes medications on diabetes; name diabetes medication taking, action and side effects. Outcome: Progressing Towards Goal  Goal: *Monitoring blood glucose, interpreting and using results  Description  Identify recommended blood glucose targets  and personal targets. Outcome: Progressing Towards Goal  Goal: *Prevention, detection, treatment of acute complications  Description  List symptoms of hyper- and hypoglycemia; describe how to treat low blood sugar and actions for lowering  high blood glucose level. Outcome: Progressing Towards Goal  Goal: *Prevention, detection and treatment of chronic complications  Description  Define the natural course of diabetes and describe the relationship of blood glucose levels to long term complications of diabetes.   Outcome: Progressing Towards Goal  Goal: *Developing strategies to address psychosocial issues  Description  Describe feelings about living with diabetes; identify support needed and support network  Outcome: Progressing Towards Goal  Goal: *Insulin pump training  Outcome: Progressing Towards Goal  Goal: *Sick day guidelines  Outcome: Progressing Towards Goal  Goal: *Patient Specific Goal (EDIT GOAL, INSERT TEXT)  Outcome: Progressing Towards Goal     Problem: Patient Education: Go to Patient Education Activity  Goal: Patient/Family Education  Outcome: Progressing Towards Goal     Problem: Pressure Injury - Risk of  Goal: *Prevention of pressure injury  Description  Document Stone Scale and appropriate interventions in the flowsheet. Outcome: Progressing Towards Goal  Note:   Pressure Injury Interventions:  Sensory Interventions: Maintain/enhance activity level, Keep linens dry and wrinkle-free, Discuss PT/OT consult with provider    Moisture Interventions: Absorbent underpads, Contain wound drainage    Activity Interventions: PT/OT evaluation    Mobility Interventions: Pressure redistribution bed/mattress (bed type)    Nutrition Interventions: Offer support with meals,snacks and hydration    Friction and Shear Interventions: Foam dressings/transparent film/skin sealants, Apply protective barrier, creams and emollients                Problem: Patient Education: Go to Patient Education Activity  Goal: Patient/Family Education  Outcome: Progressing Towards Goal     Problem: Falls - Risk of  Goal: *Absence of Falls  Description  Document Zuleyma Fall Risk and appropriate interventions in the flowsheet.   Outcome: Progressing Towards Goal  Note:   Fall Risk Interventions:  Mobility Interventions: Patient to call before getting OOB, OT consult for ADLs         Medication Interventions: Evaluate medications/consider consulting pharmacy, Patient to call before getting OOB    Elimination Interventions: Call light in reach, Urinal in reach              Problem: Patient Education: Go to Patient Education Activity  Goal: Patient/Family Education  Outcome: Progressing Towards Goal     Problem: Discharge Planning  Goal: *Discharge to safe environment  Outcome: Progressing Towards Goal     Problem: Pain  Goal: *Control of Pain  Outcome: Progressing Towards Goal  Goal: *PALLIATIVE CARE:  Alleviation of Pain  Outcome: Progressing Towards Goal     Problem: Patient Education: Go to Patient Education Activity  Goal: Patient/Family Education  Outcome: Progressing Towards Goal

## 2019-08-17 NOTE — PROGRESS NOTES
Problem: Discharge Planning  Goal: *Discharge to safe environment  Outcome: Progressing Towards Goal       Plan: home     Reason for Admission:   RLE Cellulitis               RRAT Score:  23                Resources/supports as identified by patient/family:   Family, PCP, ins. Top Challenges facing patient (as identified by patient/family and CM): Finances/Medication cost?     MCR/Cigna               Transportation? Self/spouse              Support system or lack thereof? spouse                     Living arrangements? spouse             Self-care/ADLs/Cognition? Independent          Current Advanced Directive/Advance Care Plan:  Not on file, document on file is [address] for utilizing home health: Will assess for needs. Transition of Care Plan:   Home with spouse & out-pt follow up. Chart reviewed. Met with pt., verified all demographics. States has MCR/Cigna ins. States Dr. Jaida Tuttle is his PCP, last seen 1 week ago. NOK:  Keri Rhodes, with whom he lives with. Has lymphedema pump, no other DME. States independent with ADL's prior to admit, still drives. Will cont to follow for any needs. Goldie Platt RN,ext [phone]. Patient has designated his daughter to participate in his/her discharge plan and to receive any needed information. Name: Maricruz Bautista  Address:  Phone number:  [phone]    Care Management Interventions  PCP Verified by CM: Yes(Dr. Jaida Tuttle, last seen about 1 week ago)  Palliative Care Criteria Met (RRAT>21 & CHF Dx)?: No  Mode of Transport at Discharge:  Other (see comment)(family)  Transition of Care Consult (CM Consult): Discharge Planning  Discharge Durable Medical Equipment: No  Physical Therapy Consult: No  Occupational Therapy Consult: No  Speech Therapy Consult: No  Current Support Network: Lives with Spouse  Confirm Follow Up Transport: Self  Plan discussed with Pt/Family/Caregiver: Yes  Discharge Location  Discharge Placement: Home

## 2019-08-17 NOTE — PROGRESS NOTES
Progress Note      Patient: Sarahi Garcia               Sex: male          DOA: 8/16/2019       YOB: 1950      Age:  81 y.o.        LOS:  LOS: 1 day             CHIEF COMPLAINT:  Wound Check      Assessment/Plan:     Principal Problem:    Cellulitis of right lower extremity (8/16/2019)    Active Problems:    Lymphedema of both lower extremities (7/14/2015)      CKD (chronic kidney disease) stage 3, GFR 30-59 ml/min ([facility]) (7/14/2015)      Hypothyroidism, adult (9/29/2015)      Type 2 diabetes mellitus with diabetic neuropathy ([facility] (8/15/2018)      Chronic diastolic HF (heart failure) ([facility] (8/16/2019)        PLAN:  · Cellulitis with infected ulcer right lateral leg with h/o lymphedema  · Cont vanc zosyn per ID  · PRS evaluated, recommends vascular surgery eval but MIGDALIA done on admission shows \"There is no evidence of any hemodynamically significant peripheral arterial disease at rest in either lower extremity. \", do not see indication for Vascular consult at this time. · Appreciate Podiatry assistance  · Chronic lymphedema b/l LE  · Cont lasix  · Hypothyroidism  · Cont synthroid  · DM  · Cont insulin  · CKD  · Monitor renal function  · DVT protocol        Subjective:     Pt denies complaints. Denies pain    Objective:     Visit Vitals  /72   Pulse 66   Temp 98.7 °F (37.1 °C)   Resp 18   Ht 6' 5\" (1.956 m)   Wt (!) 205 kg (452 lb)   SpO2 97%   BMI 53.60 kg/m²        Physical Exam:  Ears: hearing is intact  Eyes: Icterus is not present  Lungs: clear to auscultation bilaterally  Heart: regular rate and rhythm, S1, S2 normal, no murmur, click, rub or gallop  Gastrointestinal: soft, non-tender.  Bowel sounds normal. No masses,  no organomegaly  Neurological:  New Focal Deficits are not present  Psychiatric:  Mood is stable    Lab/Data Reviewed:  CMP:   Lab Results   Component Value Date/Time     08/17/2019 05:00 AM    K 5.6 (H) 08/17/2019 05:00 AM     08/17/2019 05:00 AM    CO2 28 08/17/2019 05:00 AM    AGAP 6 08/17/2019 05:00 AM     (H) 08/17/2019 05:00 AM    BUN 22 (H) 08/17/2019 05:00 AM    CREA 1.58 (H) 08/17/2019 05:00 AM    GFRAA 53 (L) 08/17/2019 05:00 AM    GFRNA 44 (L) 08/17/2019 05:00 AM    CA 8.2 (L) 08/17/2019 05:00 AM     CBC:   Lab Results   Component Value Date/Time    WBC 9.7 08/17/2019 05:00 AM    HGB 10.0 (L) 08/17/2019 05:00 AM    HCT 32.3 (L) 08/17/2019 05:00 AM     08/17/2019 05:00 AM       Imaging Reviewed:  No results found.

## 2019-08-17 NOTE — PROGRESS NOTES
conducted an initial consultation and Spiritual Assessment for Erasto Cooper, who is a 81 y.o.,male. Patients Primary Language is: [place]. According to the patients EMR Mandaen Affiliation is: [facility].     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 ([address] 08/15/2019    Puncture wound of multiple sites of right leg 08/15/2019    Cellulitis of leg without foot, right 08/15/2019    Type 2 diabetes mellitus with diabetic neuropathy ([address] 08/15/2018    Skin ulcer of ankle with fat layer exposed, right ([address] 02/01/2018    Non-pressure chronic ulcer of right calf with fat layer exposed ([address] 02/01/2018    Idiopathic gout of multiple sites 05/19/2016    Borderline anemia 11/18/2015    Hypothyroidism, adult 09/29/2015    Hypogonadism in male 09/29/2015    Erectile dysfunction 07/15/2015    Lymphedema of both lower extremities 07/14/2015    CKD (chronic kidney disease) stage 3, GFR 30-59 ml/min ([address] 07/14/2015    Morbid obesity with BMI of 70 and over, adult ([address] 07/14/2015    Cellulitis and abscess of foot, except toes 25/61/1140    Diastolic dysfunction 76/08/5266    Dyslipidemia 03/12/2015    Diabetic foot ulcer ([address] 03/02/2015        The  provided the following Interventions:  Initiated a relationship of care and support. Provided information about Spiritual Care Services. Offered prayer and assurance of continued prayers on patient's behalf. The following outcomes were achieved:  Patient expressed gratitude for 's visit. Assessment:  There are no further spiritual or Lutheran issues which require intervention at this time.      Plan:  Chaplains will continue to follow and will provide pastoral care on an as needed/requested basis. Zeenat Ramires M.Div.   , [address]: [phone]/PTV: [phone]

## 2019-08-17 NOTE — ROUTINE PROCESS
3884 Report given by off going nurse Luis; no complaints or distress    1915 -Verbal shift change report given to [facility] (oncoming nurse) by Kristen (offgoing nurse). Report included the following information SBAR, Kardex, Intake/Output, MAR, Accordion, Recent Results and Med Rec Status.

## 2019-08-17 NOTE — PROGRESS NOTES
Pharmacy Dosing Services: Vancomycin    Indication: Diabetic Foot Infections    Day of therapy: 1    Other Antimicrobials (Include dose, start day & day of therapy):  Zosyn 4.5g IV Q8H    Day1          Loading dose (date given): 2000mg  Current Maintenance dose: New Start    Goal Vancomycin Level: 15-20  (Trough 15-20 for most infections, 20 for meningitis/osteomyelitis, pre-HD level ~25)    Vancomycin Level (if drawn): New Start     Significant Cultures: pending    Renal function stable? (unstable defined as SCr increase of 0.5 mg/dL or > 50% increase from baseline, whichever is greater) (Y/N): Y     CAPD, Hemodialysis or Renal Replacement Therapy (Y/N): N     Recent Labs     19  1445 08/15/19  1705   CREA 1.68* 1.91*   BUN 27* 27*   WBC 8.8 6.6     Temp (24hrs), Av °F (36.7 °C), Min:97.9 °F (36.6 °C), Max:98 °F (36.7 °C)    Creatinine Clearance (Creatinine Clearance (ml/min)): 81     Regimen assessment: New Start  Maintenance dose: New Start  Next scheduled level:   @ 0530       Pharmacy will follow daily and adjust medications as appropriate for renal function and/or serum levels.     Thank you,  Hermelindo Juarez, PHARMD

## 2019-08-17 NOTE — PROGRESS NOTES
TRANSFER - IN REPORT:    Verbal report received from [facility] (name) on Marianna Hsu  being received from ED (unit) for routine progression of care      Report consisted of patients Situation, Background, Assessment and   Recommendations(SBAR). Information from the following report(s) SBAR, ED Summary and MAR was reviewed with the receiving nurse. Opportunity for questions and clarification was provided. Assessment completed upon patients arrival to unit and care assumed. 2300: dual skin completed with Kain Soler. Pt irritable stating that he does not want to bothered further. Will wait until AM for required admission documentation. Bedside shift change report given to Kristen RN (oncoming nurse) by Edu Baker RN (offgoing nurse). Report included the following information SBAR, Intake/Output, MAR and Quality Measures. Opportunity for questions and clarification provided.

## 2019-08-18 LAB
ANION GAP SERPL CALC-SCNC: 4 MMOL/L (ref 3–18)
ATRIAL RATE: 70 BPM
BACTERIA SPEC CULT: ABNORMAL
BASOPHILS # BLD: 0 K/UL (ref 0–0.1)
BASOPHILS NFR BLD: 1 % (ref 0–2)
BUN SERPL-MCNC: 17 MG/DL (ref 7–18)
BUN/CREAT SERPL: 11 (ref 12–20)
CALCIUM SERPL-MCNC: 9 MG/DL (ref 8.5–10.1)
CALCULATED P AXIS, ECG09: 52 DEGREES
CALCULATED R AXIS, ECG10: 27 DEGREES
CALCULATED T AXIS, ECG11: 47 DEGREES
CHLORIDE SERPL-SCNC: 109 MMOL/L (ref 100–111)
CO2 SERPL-SCNC: 33 MMOL/L (ref 21–32)
CREAT SERPL-MCNC: 1.58 MG/DL (ref 0.6–1.3)
DATE LAST DOSE: ABNORMAL
DIAGNOSIS, 93000: NORMAL
DIFFERENTIAL METHOD BLD: ABNORMAL
EOSINOPHIL # BLD: 0.2 K/UL (ref 0–0.4)
EOSINOPHIL NFR BLD: 3 % (ref 0–5)
ERYTHROCYTE [DISTWIDTH] IN BLOOD BY AUTOMATED COUNT: 13.7 % (ref 11.6–14.5)
GLUCOSE BLD STRIP.AUTO-MCNC: 112 MG/DL (ref 70–110)
GLUCOSE BLD STRIP.AUTO-MCNC: 121 MG/DL (ref 70–110)
GLUCOSE BLD STRIP.AUTO-MCNC: 128 MG/DL (ref 70–110)
GLUCOSE BLD STRIP.AUTO-MCNC: 89 MG/DL (ref 70–110)
GLUCOSE SERPL-MCNC: 116 MG/DL (ref 74–99)
GRAM STN SPEC: ABNORMAL
HCT VFR BLD AUTO: 32.8 % (ref 36–48)
HGB BLD-MCNC: 10.3 G/DL (ref 13–16)
LYMPHOCYTES # BLD: 2.2 K/UL (ref 0.9–3.6)
LYMPHOCYTES NFR BLD: 26 % (ref 21–52)
MCH RBC QN AUTO: 31.2 PG (ref 24–34)
MCHC RBC AUTO-ENTMCNC: 31.4 G/DL (ref 31–37)
MCV RBC AUTO: 99.4 FL (ref 74–97)
MONOCYTES # BLD: 0.6 K/UL (ref 0.05–1.2)
MONOCYTES NFR BLD: 7 % (ref 3–10)
NEUTS SEG # BLD: 5.3 K/UL (ref 1.8–8)
NEUTS SEG NFR BLD: 63 % (ref 40–73)
P-R INTERVAL, ECG05: 206 MS
PLATELET # BLD AUTO: 366 K/UL (ref 135–420)
PMV BLD AUTO: 9.3 FL (ref 9.2–11.8)
POTASSIUM SERPL-SCNC: 3.9 MMOL/L (ref 3.5–5.5)
Q-T INTERVAL, ECG07: 410 MS
QRS DURATION, ECG06: 84 MS
QTC CALCULATION (BEZET), ECG08: 442 MS
RBC # BLD AUTO: 3.3 M/UL (ref 4.7–5.5)
REPORTED DOSE,DOSE: ABNORMAL UNITS
REPORTED DOSE/TIME,TMG: 600
SERVICE CMNT-IMP: ABNORMAL
SODIUM SERPL-SCNC: 146 MMOL/L (ref 136–145)
VANCOMYCIN TROUGH SERPL-MCNC: 25.5 UG/ML (ref 10–20)
VENTRICULAR RATE, ECG03: 70 BPM
WBC # BLD AUTO: 8.4 K/UL (ref 4.6–13.2)

## 2019-08-18 PROCEDURE — 80202 ASSAY OF VANCOMYCIN: CPT

## 2019-08-18 PROCEDURE — 36415 COLL VENOUS BLD VENIPUNCTURE: CPT

## 2019-08-18 PROCEDURE — 74011000250 HC RX REV CODE- 250: Performed by: PLASTIC SURGERY

## 2019-08-18 PROCEDURE — 74011250637 HC RX REV CODE- 250/637: Performed by: PHYSICIAN ASSISTANT

## 2019-08-18 PROCEDURE — 80048 BASIC METABOLIC PNL TOTAL CA: CPT

## 2019-08-18 PROCEDURE — 74011250636 HC RX REV CODE- 250/636: Performed by: PHYSICIAN ASSISTANT

## 2019-08-18 PROCEDURE — 74011000258 HC RX REV CODE- 258: Performed by: PHYSICIAN ASSISTANT

## 2019-08-18 PROCEDURE — 65270000029 HC RM PRIVATE

## 2019-08-18 PROCEDURE — 85025 COMPLETE CBC W/AUTO DIFF WBC: CPT

## 2019-08-18 PROCEDURE — 74011250636 HC RX REV CODE- 250/636: Performed by: HOSPITALIST

## 2019-08-18 PROCEDURE — 82962 GLUCOSE BLOOD TEST: CPT

## 2019-08-18 RX ADMIN — HYDROCODONE BITARTRATE AND ACETAMINOPHEN 1 TABLET: 5; 325 TABLET ORAL at 23:33

## 2019-08-18 RX ADMIN — FUROSEMIDE 80 MG: 40 TABLET ORAL at 09:57

## 2019-08-18 RX ADMIN — Medication 10 ML: at 05:29

## 2019-08-18 RX ADMIN — Medication 10 ML: at 22:00

## 2019-08-18 RX ADMIN — VALSARTAN 320 MG: 80 TABLET, FILM COATED ORAL at 09:57

## 2019-08-18 RX ADMIN — HYDROCODONE BITARTRATE AND ACETAMINOPHEN 1 TABLET: 5; 325 TABLET ORAL at 11:10

## 2019-08-18 RX ADMIN — HYDROCODONE BITARTRATE AND ACETAMINOPHEN 1 TABLET: 5; 325 TABLET ORAL at 18:47

## 2019-08-18 RX ADMIN — GABAPENTIN 300 MG: 300 CAPSULE ORAL at 22:32

## 2019-08-18 RX ADMIN — SODIUM HYPOCHLORITE: 2.5 SOLUTION TOPICAL at 00:16

## 2019-08-18 RX ADMIN — HEPARIN SODIUM 5000 UNITS: 5000 INJECTION INTRAVENOUS; SUBCUTANEOUS at 22:32

## 2019-08-18 RX ADMIN — METOPROLOL TARTRATE 50 MG: 50 TABLET ORAL at 09:57

## 2019-08-18 RX ADMIN — PIPERACILLIN SODIUM AND TAZOBACTAM SODIUM 4.5 G: 4; .5 INJECTION, POWDER, LYOPHILIZED, FOR SOLUTION INTRAVENOUS at 22:31

## 2019-08-18 RX ADMIN — HEPARIN SODIUM 5000 UNITS: 5000 INJECTION INTRAVENOUS; SUBCUTANEOUS at 13:12

## 2019-08-18 RX ADMIN — PIPERACILLIN SODIUM AND TAZOBACTAM SODIUM 4.5 G: 4; .5 INJECTION, POWDER, LYOPHILIZED, FOR SOLUTION INTRAVENOUS at 05:28

## 2019-08-18 RX ADMIN — PIPERACILLIN SODIUM AND TAZOBACTAM SODIUM 4.5 G: 4; .5 INJECTION, POWDER, LYOPHILIZED, FOR SOLUTION INTRAVENOUS at 13:12

## 2019-08-18 RX ADMIN — SODIUM HYPOCHLORITE: 2.5 SOLUTION TOPICAL at 22:47

## 2019-08-18 RX ADMIN — HEPARIN SODIUM 5000 UNITS: 5000 INJECTION INTRAVENOUS; SUBCUTANEOUS at 05:27

## 2019-08-18 RX ADMIN — METOPROLOL TARTRATE 50 MG: 50 TABLET ORAL at 22:46

## 2019-08-18 RX ADMIN — LEVOTHYROXINE SODIUM 50 MCG: 50 TABLET ORAL at 05:27

## 2019-08-18 RX ADMIN — Medication 10 ML: at 13:15

## 2019-08-18 RX ADMIN — SODIUM CHLORIDE 1750 MG: 900 INJECTION, SOLUTION INTRAVENOUS at 17:30

## 2019-08-18 RX ADMIN — SODIUM CHLORIDE 1750 MG: 900 INJECTION, SOLUTION INTRAVENOUS at 05:33

## 2019-08-18 NOTE — PROGRESS NOTES
Problem: Diabetes Self-Management  Goal: *Disease process and treatment process  Description  Define diabetes and identify own type of diabetes; list 3 options for treating diabetes. Outcome: Progressing Towards Goal  Goal: *Incorporating nutritional management into lifestyle  Description  Describe effect of type, amount and timing of food on blood glucose; list 3 methods for planning meals. Outcome: Progressing Towards Goal  Goal: *Incorporating physical activity into lifestyle  Description  State effect of exercise on blood glucose levels. Outcome: Progressing Towards Goal  Goal: *Developing strategies to promote health/change behavior  Description  Define the ABC's of diabetes; identify appropriate screenings, schedule and personal plan for screenings. Outcome: Progressing Towards Goal  Goal: *Using medications safely  Description  State effect of diabetes medications on diabetes; name diabetes medication taking, action and side effects. Outcome: Progressing Towards Goal  Goal: *Monitoring blood glucose, interpreting and using results  Description  Identify recommended blood glucose targets  and personal targets. Outcome: Progressing Towards Goal  Goal: *Prevention, detection, treatment of acute complications  Description  List symptoms of hyper- and hypoglycemia; describe how to treat low blood sugar and actions for lowering  high blood glucose level. Outcome: Progressing Towards Goal  Goal: *Prevention, detection and treatment of chronic complications  Description  Define the natural course of diabetes and describe the relationship of blood glucose levels to long term complications of diabetes.   Outcome: Progressing Towards Goal  Goal: *Developing strategies to address psychosocial issues  Description  Describe feelings about living with diabetes; identify support needed and support network  Outcome: Progressing Towards Goal  Goal: *Insulin pump training  Outcome: Progressing Towards Goal  Goal: *Sick day guidelines  Outcome: Progressing Towards Goal  Goal: *Patient Specific Goal (EDIT GOAL, INSERT TEXT)  Outcome: Progressing Towards Goal     Problem: Patient Education: Go to Patient Education Activity  Goal: Patient/Family Education  Outcome: Progressing Towards Goal     Problem: Pressure Injury - Risk of  Goal: *Prevention of pressure injury  Description  Document Stone Scale and appropriate interventions in the flowsheet. Outcome: Progressing Towards Goal  Note:   Pressure Injury Interventions:  Sensory Interventions: Assess changes in LOC, Keep linens dry and wrinkle-free, Pressure redistribution bed/mattress (bed type), Monitor skin under medical devices    Moisture Interventions: Absorbent underpads, Check for incontinence Q2 hours and as needed    Activity Interventions: PT/OT evaluation, Increase time out of bed    Mobility Interventions: Float heels, Pressure redistribution bed/mattress (bed type)    Nutrition Interventions: Offer support with meals,snacks and hydration    Friction and Shear Interventions: Foam dressings/transparent film/skin sealants, Apply protective barrier, creams and emollients                Problem: Patient Education: Go to Patient Education Activity  Goal: Patient/Family Education  Outcome: Progressing Towards Goal     Problem: Falls - Risk of  Goal: *Absence of Falls  Description  Document Zuleyma Fall Risk and appropriate interventions in the flowsheet.   Outcome: Progressing Towards Goal  Note:   Fall Risk Interventions:  Mobility Interventions: Patient to call before getting OOB         Medication Interventions: Patient to call before getting OOB    Elimination Interventions: Call light in reach, Patient to call for help with toileting needs              Problem: Patient Education: Go to Patient Education Activity  Goal: Patient/Family Education  Outcome: Progressing Towards Goal     Problem: Discharge Planning  Goal: *Discharge to safe environment  Outcome: Progressing Towards Goal     Problem: Pain  Goal: *Control of Pain  Outcome: Progressing Towards Goal  Goal: *PALLIATIVE CARE:  Alleviation of Pain  Outcome: Progressing Towards Goal     Problem: Patient Education: Go to Patient Education Activity  Goal: Patient/Family Education  Outcome: Progressing Towards Goal

## 2019-08-18 NOTE — ROUTINE PROCESS
0715 -Bedside shift change report given to [address] (oncoming nurse) by Kristen (offgoing nurse). Report included the following information SBAR, Kardex, Intake/Output, MAR, Accordion, Recent Results and Med Rec Status. 2045 -Bedside shift change report given to Katarzyna (oncoming nurse) by [employer] (offgoing nurse). Report included the following information SBAR, Kardex, Intake/Output, MAR, Accordion, Recent Results and Med Rec Status.

## 2019-08-18 NOTE — PROGRESS NOTES
Received patient from Charito Saldivar. Pt awake and in bed. Denies pain or discomfort. Bed locked in lowest position. Frequently used items and call light within reach. Family at bedside. 0020: wound care completed per order. Pt tolerated well.    0725: Bedside shift change report given to Kristen RN (oncoming nurse) by Marc Lowe RN (offgoing nurse). Report included the following information SBAR, Intake/Output, MAR and Quality Measures. Opportunity for questions and clarification provided.

## 2019-08-18 NOTE — PROGRESS NOTES
Progress Note      Patient: Sayda Guzman               Sex: male          DOA: 8/16/2019       YOB: 1950      Age:  81 y.o.        LOS:  LOS: 2 days             CHIEF COMPLAINT:  Wound Check      Assessment/Plan:     Principal Problem:    Cellulitis of right lower extremity (8/16/2019)    Active Problems:    Lymphedema of both lower extremities (7/14/2015)      CKD (chronic kidney disease) stage 3, GFR 30-59 ml/min ([facility]) (7/14/2015)      Hypothyroidism, adult (9/29/2015)      Type 2 diabetes mellitus with diabetic neuropathy ([facility] (8/15/2018)      Chronic diastolic HF (heart failure) ([facility] (8/16/2019)        PLAN:  · Cellulitis with infected ulcer right lateral leg with h/o lymphedema  · Cont vanc zosyn per ID  · Dr. Alanna Camacho (PRS) evaluated, recommends vascular surgery eval but MIGDALIA done on admission shows \"There is no evidence of any hemodynamically significant peripheral arterial disease at rest in either lower extremity. \", do not see indication for Vascular consult at this time, will need to discuss with Dr. Alanna Camacho on Monday to see if he still wants Vascular consult. · Appreciate Podiatry assistance   · Chronic lymphedema b/l LE  · Cont lasix  · Hypothyroidism  · Cont synthroid  · DM  · Cont insulin  · CKD  · Monitor renal function  · DVT protocol        Subjective:     Pt denies complaints. Denies pain. Ambulating. Family at bedside. Objective:     Visit Vitals  /74   Pulse 69   Temp 98 °F (36.7 °C)   Resp 23   Ht 6' 5\" (1.956 m)   Wt (!) 205 kg (452 lb)   SpO2 93%   BMI 53.60 kg/m²        Physical Exam:  Ears: hearing is intact  Eyes: Icterus is not present  Lungs: clear to auscultation bilaterally  Heart: regular rate and rhythm, S1, S2 normal, no murmur, click, rub or gallop  Gastrointestinal: soft, non-tender.  Bowel sounds normal. No masses,  no organomegaly  Neurological:  New Focal Deficits are not present  Psychiatric:  Mood is stable    Lab/Data Reviewed:  CMP:   Lab Results Component Value Date/Time     (H) 08/18/2019 06:10 AM    K 3.9 08/18/2019 06:10 AM     08/18/2019 06:10 AM    CO2 33 (H) 08/18/2019 06:10 AM    AGAP 4 08/18/2019 06:10 AM     (H) 08/18/2019 06:10 AM    BUN 17 08/18/2019 06:10 AM    CREA 1.58 (H) 08/18/2019 06:10 AM    GFRAA 53 (L) 08/18/2019 06:10 AM    GFRNA 44 (L) 08/18/2019 06:10 AM    CA 9.0 08/18/2019 06:10 AM     CBC:   Lab Results   Component Value Date/Time    WBC 8.4 08/18/2019 06:10 AM    HGB 10.3 (L) 08/18/2019 06:10 AM    HCT 32.8 (L) 08/18/2019 06:10 AM     08/18/2019 06:10 AM       Imaging Reviewed:  No results found.

## 2019-08-18 NOTE — PROGRESS NOTES
Problem: Pressure Injury - Risk of  Goal: *Prevention of pressure injury  Description  Document Stone Scale and appropriate interventions in the flowsheet. Outcome: Progressing Towards Goal  Note:   Pressure Injury Interventions:  Sensory Interventions: Monitor skin under medical devices, Keep linens dry and wrinkle-free, Maintain/enhance activity level    Moisture Interventions: Contain wound drainage, Minimize layers    Activity Interventions: Pressure redistribution bed/mattress(bed type)    Mobility Interventions: Float heels, Pressure redistribution bed/mattress (bed type)    Nutrition Interventions: Offer support with meals,snacks and hydration    Friction and Shear Interventions: HOB 30 degrees or less, Lift sheet                Problem: Falls - Risk of  Goal: *Absence of Falls  Description  Document Zuleyma Fall Risk and appropriate interventions in the flowsheet.   Outcome: Progressing Towards Goal  Note:   Fall Risk Interventions:  Mobility Interventions: Patient to call before getting OOB         Medication Interventions: Patient to call before getting OOB, Teach patient to arise slowly    Elimination Interventions: Patient to call for help with toileting needs, Toileting schedule/hourly rounds              Problem: Discharge Planning  Goal: *Discharge to safe environment  Outcome: Progressing Towards Goal     Problem: Pain  Goal: *Control of Pain  Outcome: Progressing Towards Goal

## 2019-08-19 LAB
ANION GAP SERPL CALC-SCNC: 5 MMOL/L (ref 3–18)
BASOPHILS # BLD: 0 K/UL (ref 0–0.1)
BASOPHILS NFR BLD: 0 % (ref 0–2)
BUN SERPL-MCNC: 18 MG/DL (ref 7–18)
BUN/CREAT SERPL: 11 (ref 12–20)
CALCIUM SERPL-MCNC: 8.7 MG/DL (ref 8.5–10.1)
CHLORIDE SERPL-SCNC: 106 MMOL/L (ref 100–111)
CO2 SERPL-SCNC: 32 MMOL/L (ref 21–32)
CREAT SERPL-MCNC: 1.62 MG/DL (ref 0.6–1.3)
DIFFERENTIAL METHOD BLD: ABNORMAL
EOSINOPHIL # BLD: 0.3 K/UL (ref 0–0.4)
EOSINOPHIL NFR BLD: 3 % (ref 0–5)
ERYTHROCYTE [DISTWIDTH] IN BLOOD BY AUTOMATED COUNT: 13.9 % (ref 11.6–14.5)
GLUCOSE BLD STRIP.AUTO-MCNC: 109 MG/DL (ref 70–110)
GLUCOSE BLD STRIP.AUTO-MCNC: 148 MG/DL (ref 70–110)
GLUCOSE BLD STRIP.AUTO-MCNC: 161 MG/DL (ref 70–110)
GLUCOSE BLD STRIP.AUTO-MCNC: 92 MG/DL (ref 70–110)
GLUCOSE SERPL-MCNC: 88 MG/DL (ref 74–99)
HCT VFR BLD AUTO: 32.2 % (ref 36–48)
HGB BLD-MCNC: 10.3 G/DL (ref 13–16)
LYMPHOCYTES # BLD: 2.4 K/UL (ref 0.9–3.6)
LYMPHOCYTES NFR BLD: 26 % (ref 21–52)
MCH RBC QN AUTO: 31.6 PG (ref 24–34)
MCHC RBC AUTO-ENTMCNC: 32 G/DL (ref 31–37)
MCV RBC AUTO: 98.8 FL (ref 74–97)
MONOCYTES # BLD: 0.9 K/UL (ref 0.05–1.2)
MONOCYTES NFR BLD: 10 % (ref 3–10)
NEUTS SEG # BLD: 5.4 K/UL (ref 1.8–8)
NEUTS SEG NFR BLD: 61 % (ref 40–73)
PLATELET # BLD AUTO: 387 K/UL (ref 135–420)
PMV BLD AUTO: 9.4 FL (ref 9.2–11.8)
POTASSIUM SERPL-SCNC: 3.8 MMOL/L (ref 3.5–5.5)
RBC # BLD AUTO: 3.26 M/UL (ref 4.7–5.5)
SODIUM SERPL-SCNC: 143 MMOL/L (ref 136–145)
WBC # BLD AUTO: 8.9 K/UL (ref 4.6–13.2)

## 2019-08-19 PROCEDURE — 74011636637 HC RX REV CODE- 636/637: Performed by: INTERNAL MEDICINE

## 2019-08-19 PROCEDURE — 74011250637 HC RX REV CODE- 250/637: Performed by: PHYSICIAN ASSISTANT

## 2019-08-19 PROCEDURE — 82962 GLUCOSE BLOOD TEST: CPT

## 2019-08-19 PROCEDURE — 65270000029 HC RM PRIVATE

## 2019-08-19 PROCEDURE — 74011250636 HC RX REV CODE- 250/636: Performed by: PHYSICIAN ASSISTANT

## 2019-08-19 PROCEDURE — 85025 COMPLETE CBC W/AUTO DIFF WBC: CPT

## 2019-08-19 PROCEDURE — 80048 BASIC METABOLIC PNL TOTAL CA: CPT

## 2019-08-19 PROCEDURE — 36415 COLL VENOUS BLD VENIPUNCTURE: CPT

## 2019-08-19 PROCEDURE — 74011000258 HC RX REV CODE- 258: Performed by: PHYSICIAN ASSISTANT

## 2019-08-19 PROCEDURE — 74011250636 HC RX REV CODE- 250/636: Performed by: HOSPITALIST

## 2019-08-19 PROCEDURE — 74011000250 HC RX REV CODE- 250: Performed by: PLASTIC SURGERY

## 2019-08-19 RX ORDER — INSULIN LISPRO 100 [IU]/ML
INJECTION, SOLUTION INTRAVENOUS; SUBCUTANEOUS 4 TIMES DAILY
Status: DISCONTINUED | OUTPATIENT
Start: 2019-08-19 | End: 2019-08-30 | Stop reason: HOSPADM

## 2019-08-19 RX ADMIN — Medication 10 ML: at 21:18

## 2019-08-19 RX ADMIN — HEPARIN SODIUM 5000 UNITS: 5000 INJECTION INTRAVENOUS; SUBCUTANEOUS at 05:22

## 2019-08-19 RX ADMIN — HYDROCODONE BITARTRATE AND ACETAMINOPHEN 1 TABLET: 5; 325 TABLET ORAL at 08:40

## 2019-08-19 RX ADMIN — METOPROLOL TARTRATE 50 MG: 50 TABLET ORAL at 21:02

## 2019-08-19 RX ADMIN — HYDROCODONE BITARTRATE AND ACETAMINOPHEN 1 TABLET: 5; 325 TABLET ORAL at 21:02

## 2019-08-19 RX ADMIN — METOPROLOL TARTRATE 50 MG: 50 TABLET ORAL at 08:40

## 2019-08-19 RX ADMIN — Medication 10 ML: at 14:00

## 2019-08-19 RX ADMIN — SODIUM CHLORIDE 1750 MG: 900 INJECTION, SOLUTION INTRAVENOUS at 13:07

## 2019-08-19 RX ADMIN — FUROSEMIDE 80 MG: 40 TABLET ORAL at 08:40

## 2019-08-19 RX ADMIN — GABAPENTIN 300 MG: 300 CAPSULE ORAL at 21:02

## 2019-08-19 RX ADMIN — HYDROCODONE BITARTRATE AND ACETAMINOPHEN 1 TABLET: 5; 325 TABLET ORAL at 15:28

## 2019-08-19 RX ADMIN — INSULIN LISPRO 3 UNITS: 100 INJECTION, SOLUTION INTRAVENOUS; SUBCUTANEOUS at 13:00

## 2019-08-19 RX ADMIN — LEVOTHYROXINE SODIUM 50 MCG: 50 TABLET ORAL at 05:22

## 2019-08-19 RX ADMIN — SODIUM HYPOCHLORITE: 2.5 SOLUTION TOPICAL at 08:31

## 2019-08-19 RX ADMIN — VALSARTAN 320 MG: 80 TABLET, FILM COATED ORAL at 08:40

## 2019-08-19 RX ADMIN — PIPERACILLIN SODIUM AND TAZOBACTAM SODIUM 4.5 G: 4; .5 INJECTION, POWDER, LYOPHILIZED, FOR SOLUTION INTRAVENOUS at 05:22

## 2019-08-19 RX ADMIN — HEPARIN SODIUM 5000 UNITS: 5000 INJECTION INTRAVENOUS; SUBCUTANEOUS at 21:03

## 2019-08-19 RX ADMIN — PIPERACILLIN SODIUM AND TAZOBACTAM SODIUM 4.5 G: 4; .5 INJECTION, POWDER, LYOPHILIZED, FOR SOLUTION INTRAVENOUS at 21:04

## 2019-08-19 RX ADMIN — PIPERACILLIN SODIUM AND TAZOBACTAM SODIUM 4.5 G: 4; .5 INJECTION, POWDER, LYOPHILIZED, FOR SOLUTION INTRAVENOUS at 12:02

## 2019-08-19 RX ADMIN — HEPARIN SODIUM 5000 UNITS: 5000 INJECTION INTRAVENOUS; SUBCUTANEOUS at 12:02

## 2019-08-19 RX ADMIN — SODIUM HYPOCHLORITE: 2.5 SOLUTION TOPICAL at 23:35

## 2019-08-19 RX ADMIN — Medication 10 ML: at 06:00

## 2019-08-19 NOTE — PROGRESS NOTES
Problem: Discharge Planning  Goal: *Discharge to safe environment  Outcome: Progressing Towards Goal       Plan: home     Cm to follow for hh needs.

## 2019-08-19 NOTE — PROGRESS NOTES
Problem: Diabetes Self-Management  Goal: *Disease process and treatment process  Description  Define diabetes and identify own type of diabetes; list 3 options for treating diabetes. Outcome: Progressing Towards Goal  Goal: *Incorporating nutritional management into lifestyle  Description  Describe effect of type, amount and timing of food on blood glucose; list 3 methods for planning meals. Outcome: Progressing Towards Goal  Goal: *Incorporating physical activity into lifestyle  Description  State effect of exercise on blood glucose levels. Outcome: Progressing Towards Goal  Goal: *Developing strategies to promote health/change behavior  Description  Define the ABC's of diabetes; identify appropriate screenings, schedule and personal plan for screenings. Outcome: Progressing Towards Goal  Goal: *Using medications safely  Description  State effect of diabetes medications on diabetes; name diabetes medication taking, action and side effects. Outcome: Progressing Towards Goal  Goal: *Monitoring blood glucose, interpreting and using results  Description  Identify recommended blood glucose targets  and personal targets. Outcome: Progressing Towards Goal  Goal: *Prevention, detection, treatment of acute complications  Description  List symptoms of hyper- and hypoglycemia; describe how to treat low blood sugar and actions for lowering  high blood glucose level. Outcome: Progressing Towards Goal  Goal: *Prevention, detection and treatment of chronic complications  Description  Define the natural course of diabetes and describe the relationship of blood glucose levels to long term complications of diabetes.   Outcome: Progressing Towards Goal  Goal: *Developing strategies to address psychosocial issues  Description  Describe feelings about living with diabetes; identify support needed and support network  Outcome: Progressing Towards Goal  Goal: *Insulin pump training  Outcome: Progressing Towards Goal  Goal: *Sick day guidelines  Outcome: Progressing Towards Goal  Goal: *Patient Specific Goal (EDIT GOAL, INSERT TEXT)  Outcome: Progressing Towards Goal     Problem: Patient Education: Go to Patient Education Activity  Goal: Patient/Family Education  Outcome: Progressing Towards Goal     Problem: Pressure Injury - Risk of  Goal: *Prevention of pressure injury  Description  Document Stone Scale and appropriate interventions in the flowsheet. Outcome: Progressing Towards Goal  Note:   Pressure Injury Interventions:  Sensory Interventions: Assess changes in LOC    Moisture Interventions: Absorbent underpads    Activity Interventions: Increase time out of bed, Pressure redistribution bed/mattress(bed type)    Mobility Interventions: Assess need for specialty bed    Nutrition Interventions: Document food/fluid/supplement intake    Friction and Shear Interventions: Apply protective barrier, creams and emollients                Problem: Patient Education: Go to Patient Education Activity  Goal: Patient/Family Education  Outcome: Progressing Towards Goal     Problem: Falls - Risk of  Goal: *Absence of Falls  Description  Document Doni Vernkye Fall Risk and appropriate interventions in the flowsheet.   Outcome: Progressing Towards Goal  Note:   Fall Risk Interventions:  Mobility Interventions: Communicate number of staff needed for ambulation/transfer         Medication Interventions: Evaluate medications/consider consulting pharmacy, Patient to call before getting OOB    Elimination Interventions: Call light in reach              Problem: Patient Education: Go to Patient Education Activity  Goal: Patient/Family Education  Outcome: Progressing Towards Goal     Problem: Discharge Planning  Goal: *Discharge to safe environment  Outcome: Progressing Towards Goal     Problem: Pain  Goal: *Control of Pain  Outcome: Progressing Towards Goal  Goal: *PALLIATIVE CARE:  Alleviation of Pain  Outcome: Progressing Towards Goal     Problem: Patient Education: Go to Patient Education Activity  Goal: Patient/Family Education  Outcome: Progressing Towards Goal

## 2019-08-19 NOTE — CONSULTS
Surgery Consult      Patient: Leslie Carlson MRN: [id number]  CSN: [id number]      YOB: 1950    Age: 81 y.o. Sex: male      DOA: 8/16/2019       HPI:     Leslie Carlson is a 81 y.o. male who presents with a chronic right leg wound. The patient has been treated for leg/foot wounds for sometime. He presented with a worsening wound and discharge to the ED where he was evaluated. Jerald Silva from plastics and podiatry have evaluated the patient and the hospitalist has requested a consultation for evaluation from vascular surgery. In discussion with the patient about his history it appears he has never been in compression and has not had an Unna boot. He denies fevers or chills or shortness of breath at this time. He complains about frequent urination while on Lasix. Past Medical History:   Diagnosis Date    Anemia of chronic disease     Arthritis     Chronic renal insufficiency     Diabetes ([address]     Dyslipidemia     Edema     Gout     Gout     Hypertension     Lymphedema     Morbid obesity ([facility] [address]        Past Surgical History:   Procedure Laterality Date    HX ORTHOPAEDIC  3/2015     Lt Foot ulcer       Family History   Problem Relation Age of Onset    Cancer Mother     No Known Problems Father        Social History     Socioeconomic History    Marital status:      Spouse name: Not on file    Number of children: Not on file    Years of education: Not on file    Highest education level: Not on file   Tobacco Use    Smoking status: Never Smoker    Smokeless tobacco: Never Used   Substance and Sexual Activity    Alcohol use: No    Drug use: No       Prior to Admission medications    Medication Sig Start Date End Date Taking? Authorizing Provider   doxycycline (MONODOX) 100 mg capsule Take 1 Cap by mouth two (2) times a day for 10 days.  8/15/19 8/25/19 Yes Joana Christy PA-C   methocarbamol (ROBAXIN-750) 750 mg tablet Take 1 Tab by mouth four (4) times daily. As needed for pain or muscle spasm 8/9/19  Yes Juan Alberto Berry PA-C   furosemide (LASIX) 80 mg tablet TAKE 1 TABLET BY MOUTH EVERY DAY 6/30/19  Yes Kelsey Gupta MD   testosterone (ANDRODERM) 4 mg/24 hr pt24 1 Patch by TransDERmal route daily. Max Daily Amount: 1 Patch. 6/3/19  Yes Kelsey Gupta MD   colchicine 0.6 mg tablet TAKE 1 TAB BY MOUTH DAILY AS NEEDED. 12/14/18  Yes Kelsey Gupta MD   vit b comp & c-fa-copper-zinc (FOLBEE PLUS) 5-1.5-25 mg tab Take 1 Tab by mouth daily. 11/27/18  Yes Kelsey Gupta MD   KLOR-CON M20 20 mEq tablet TAKE 1 TABLET BY MOUTH ONCE DAILY 11/9/18  Yes Wilber GALAVIZ MD   valsartan (DIOVAN) 320 mg tablet Take 1 Tab by mouth daily. 9/28/18  Yes Wilber GALAVIZ MD   levothyroxine (SYNTHROID) 50 mcg tablet TAKE 1 TAB BY MOUTH DAILY (BEFORE BREAKFAST) FOR 90 DAYS. 4/27/17  Yes Kelsey Gupta MD   dulaglutide (TRULICITY) 1.5 FS/8.0 mL sub-q pen 0.5 ML BY SUBCUTANEOUS ROUTE EVERY SEVEN (7) DAYS. 8/8/16  Yes Wilber GALAVIZ MD   gabapentin (NEURONTIN) 300 mg capsule Take 300 mg by mouth nightly. Yes Provider, Historical   diclofenac-miSOPROStol (ARTHROTEC 50)  mg-mcg per tablet TAKE 1 TABLET BY MOUTH TWICE A DAY 8/8/19   Wilber GALAVIZ MD   rosuvastatin (CRESTOR) 40 mg tablet TAKE 1 TABLET BY MOUTH NIGHTLY FOR 90 DAYS. 7/22/19   Wilber GALAVIZ MD   ibuprofen (MOTRIN) 600 mg tablet Take 1 Tab by mouth every six (6) hours as needed for Pain. 10/3/18   Meredith CHACON PA-C   tadalafil (CIALIS) 20 mg tablet Take 1 Tab by mouth every thirty-six (36) hours. Indications: Erectile Dysfunction 1/11/17   Wilber GALAVIZ MD   metoprolol (LOPRESSOR) 50 mg tablet Take 1 Tab by mouth every twelve (12) hours. 3/13/15   Hakeem Manuel MD   Fenofibrate 150 mg cap Take 145 mg by mouth daily.     Other, MD Nicholas       No Known Allergies    Physical Exam:      Visit Vitals  /84 (BP 1 Location: Left arm, BP Patient Position: At rest)   Pulse 64   Temp 97.3 °F (36.3 °C)   Resp 20   Ht 6' 5\" (1.956 m)   Wt (!) 452 lb (205 kg)   SpO2 95%   BMI 53.60 kg/m²       GENERAL: alert, cooperative, no distress, appears stated age, EYE: negative findings: anicteric sclera, LYMPHATIC: Cervical, supraclavicular, and axillary nodes normal. , THROAT & NECK: normal, LUNG: coarse, HEART: regular rate and rhythm, ABDOMEN: soft, non-tender. Bowel sounds normal. No masses,  no organomegaly, EXTREMITIES:  Bilateral lower extremities warm and well perfused. Unable to palpate pedal pulses but multiphasic signals (pt sitting in chair at time of exam). Evidence of chronic severe lymphedema with lipodermatosclerosis and lichenification. Lateral right leg ulceration packed. Unable to fully evaluate with patient in chair. No malodorous drainage at time of exam.  Motor and sensory intact    ROS:  Constitutional: negative  Eyes: negative  Ears, nose, mouth, throat, and face: negative  Respiratory: negative  Cardiovascular: negative  Gastrointestinal: negative  Unless otherwise mentioned in the HPI. Data Review:    CBC:   Lab Results   Component Value Date/Time    WBC 8.9 08/19/2019 03:50 AM    RBC 3.26 (L) 08/19/2019 03:50 AM    HGB 10.3 (L) 08/19/2019 03:50 AM    HCT 32.2 (L) 08/19/2019 03:50 AM    PLATELET 387 98/85/9470 03:50 AM      BMP:   Lab Results   Component Value Date/Time    Glucose 88 08/19/2019 03:50 AM    Sodium 143 08/19/2019 03:50 AM    Potassium 3.8 08/19/2019 03:50 AM    Chloride 106 08/19/2019 03:50 AM    CO2 32 08/19/2019 03:50 AM    BUN 18 08/19/2019 03:50 AM    Creatinine 1.62 (H) 08/19/2019 03:50 AM    Calcium 8.7 08/19/2019 03:50 AM     Coagulation:   Lab Results   Component Value Date/Time    Prothrombin time 13.0 08/16/2019 02:45 PM    INR 1.0 08/16/2019 02:45 PM         Assessment/Plan     81 M with chronic bilateral lymphedema and active right leg ulceration    1) Pt needs compression and avoidance of dependent positions as much as feasible.   Pt seen sitting in chair with legs down in no compression. This will only worsen his lymphedema    2) Will re examine wound tomorrow to see if needs to be debrided. Will need to decide who will proceed with this    3) Pt needs compression and would strongly advise an Unna boot. If pt to be debrided can ask to be placed in OR, if not will ask wound care to place. He also needs compression for his left leg.   Given his size it might be best to start with an Unna boot on this side as well until his leg edema decreases to a more manageable size    4) Will follow with you    Principal Problem:    Cellulitis of right lower extremity (8/16/2019)    Active Problems:    Lymphedema of both lower extremities (7/14/2015)      CKD (chronic kidney disease) stage 3, GFR 30-59 ml/min ([facility]) (7/14/2015)      Hypothyroidism, adult (9/29/2015)      Type 2 diabetes mellitus with diabetic neuropathy ([facility] (8/15/2018)      Chronic diastolic HF (heart failure) ([facility] (8/16/2019)        Malgorzata Cole MD  August 19, 2019

## 2019-08-19 NOTE — PROGRESS NOTES
Problem: Diabetes Self-Management  Goal: *Disease process and treatment process  Description  Define diabetes and identify own type of diabetes; list 3 options for treating diabetes. Outcome: Progressing Towards Goal  Goal: *Using medications safely  Description  State effect of diabetes medications on diabetes; name diabetes medication taking, action and side effects. Outcome: Progressing Towards Goal  Goal: *Monitoring blood glucose, interpreting and using results  Description  Identify recommended blood glucose targets  and personal targets. Outcome: Progressing Towards Goal     Problem: Pressure Injury - Risk of  Goal: *Prevention of pressure injury  Description  Document Stone Scale and appropriate interventions in the flowsheet. Outcome: Progressing Towards Goal  Note:   Pressure Injury Interventions:  Sensory Interventions: Assess changes in LOC, Check visual cues for pain, Discuss PT/OT consult with provider, Keep linens dry and wrinkle-free, Maintain/enhance activity level    Moisture Interventions: Apply protective barrier, creams and emollients, Absorbent underpads, Check for incontinence Q2 hours and as needed, Limit adult briefs, Maintain skin hydration (lotion/cream), Offer toileting Q_hr    Activity Interventions: Increase time out of bed, Pressure redistribution bed/mattress(bed type), PT/OT evaluation    Mobility Interventions: Assess need for specialty bed, HOB 30 degrees or less, Pressure redistribution bed/mattress (bed type), PT/OT evaluation, Turn and reposition approx.  every two hours(pillow and wedges)    Nutrition Interventions: Document food/fluid/supplement intake    Friction and Shear Interventions: HOB 30 degrees or less, Sit at 90-degree angle, Transfer aides:transfer board/Mark lift/ceiling lift, Transferring/repositioning devices                Problem: Falls - Risk of  Goal: *Absence of Falls  Description  Document Joanna Ovalles Fall Risk and appropriate interventions in the flowsheet.   Outcome: Progressing Towards Goal  Note:   Fall Risk Interventions:  Mobility Interventions: Communicate number of staff needed for ambulation/transfer, Patient to call before getting OOB, PT Consult for mobility concerns, Strengthening exercises (ROM-active/passive), Utilize walker, cane, or other assistive device         Medication Interventions: Assess postural VS orthostatic hypotension, Evaluate medications/consider consulting pharmacy, Patient to call before getting OOB, Teach patient to arise slowly    Elimination Interventions: Call light in reach, Patient to call for help with toileting needs, Toilet paper/wipes in reach, Toileting schedule/hourly rounds, Urinal in reach              Problem: Pain  Goal: *Control of Pain  Outcome: Progressing Towards Goal

## 2019-08-19 NOTE — PROGRESS NOTES
Wound care provided to patient. Dressing change to lateral right lower leg wound. Pt stated dressing change to back side of leg not to be changed at this time. Patient stating, Edith Linen are only focusing on the outside wound right now. \" Patient tolerated well. Pain addressed post dressing change. Pt set up for bath; wife at bedside. All needs met at this time. Will continue to monitor.

## 2019-08-19 NOTE — PROGRESS NOTES
0700  -- Bedside, Verbal and Written shift change report given to [address] (oncoming nurse) by VALERIA (offgoing nurse). Allergy band placed on pt's wrist. Report included the following information SBAR, Kardex, Intake/Output, MAR and Recent Results.       0840 -- AM medications administered, pt tolerated with ease, will continue to monitor. 1030 -- PER MD request, contact PHARM for updated medication list.    1100 -- Medication list provided and entered.     1230 -- Shift reassessment, pt condition unchanged, will continue to monitor.      1600 --  Shift reassessment, pt condition unchanged, will continue to monitor.        1900  -- Bedside, Verbal and Written shift change report given to [address] nurse) by NASIR (offgoing nurse). Report included the following information SBAR, Kardex, Intake/Output, MAR and Recent Results. Skin assessment completed.

## 2019-08-19 NOTE — PROGRESS NOTES
Received [facility] from [facility]. Pt awake and in bed. Denies pain or discomfort. Bed locked in lowest position. Frequently used items and call light within reach. 2335: wound care complete. Pt tolerated well.    0725: Bedside shift change report given to [place] (oncoming nurse) by Halima June (offgoing nurse). Report included the following information SBAR, MAR and Quality Measures. opportunity for questions and clarification provided.

## 2019-08-19 NOTE — PROGRESS NOTES
[facility]  (A Division of [address] [place] [address])    Infectious Disease Follow-up Note      Date of Admission: 8/16/2019     Date of Note:  8/19/2019      Summary:     82 y/o AAM with chronic massive bilateral LE lymphedema, morbid obesity, infected right lateral leg ulcer with celllulitis. Comorbid: DM, HTN, CKD, Gout    Interval History:     Seen with legs elevated in bed above heart level. Had been seen by vascular earlier and instructed to raise legs. Afebrile. Reports 1-2  Watery stools with antibiotics    Current Antimicrobials: Prior Antimicrobials   Zosyn 8/16 - 3  Vancomycin 8/16 - 3      Assessment / Plan:      Infected Ulcer, right lateral leg  - with cellulitis right leg  - wd 8/15: gs gpc prs, gnr.  cx Citrobacter koseri (2) both pan-sensitive, E faecalis, Diphtheroids (3)  - no mal-odor today -> continue zosyn  -> dc vancomycin  -> possible debridement depending on vascular, PRS re-evaluation tomorrow   Chronic massive lymphedema  - bilateral lower extremities  - slightly improved -> bilateral unna boots recommended by Vascular   NEW Diarrhea  - 1-2 watery stools per day. Likely from abx -> monitor.   If persists or worsens on Zosyn will change to Vanc/Ceftriaxone +/- metronidazole   Morbid Obesity     DM     CKD     HTN     Gout        Microbiology:      8/15       gs gpc prs, gnr.  cx gnr(2)  8/16      blcx NGTD x 1     Lines / Catheters:      piv    Patient Active Problem List   Diagnosis Code    Diabetic foot ulcer ([facility] E11.621, C86.288    Diastolic dysfunction U62.08    Dyslipidemia E78.5    Cellulitis and abscess of foot, except toes L03.119, L02.619    Lymphedema of both lower extremities I89.0    CKD (chronic kidney disease) stage 3, GFR 30-59 ml/min ([facility]) N18.3    Morbid obesity with BMI of 70 and over, adult ([facility] E66.01, Z68.45    Erectile dysfunction N52.9    Hypothyroidism, adult E56.8    Hypogonadism in male E29.1    Borderline anemia D64.9    Idiopathic gout of multiple sites M10.09    Skin ulcer of ankle with fat layer exposed, right ([facility]) L97.312    Non-pressure chronic ulcer of right calf with fat layer exposed ([facility] [address] L97.212    Type 2 diabetes mellitus with diabetic neuropathy ([facility]) E11.40    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 ([facility]) L97.213    Puncture wound of multiple sites of right leg S81.831A    Cellulitis of leg without foot, right L03.115    Cellulitis of right lower extremity L03.115    Chronic diastolic HF (heart failure) ([facility]) I50.32       Current Facility-Administered Medications   Medication Dose Route Frequency    insulin lispro (HUMALOG) injection   SubCUTAneous QID    vancomycin (VANCOCIN) 1,750 mg in 0.9% sodium chloride 500 mL IVPB  1,750 mg IntraVENous Q24H    [START ON 8/21/2019] VANCOMYCIN INFORMATION NOTE   Other ONCE    dextrose 10% infusion 125-250 mL  125-250 mL IntraVENous PRN    sodium hypochlorite (HALF STRENGTH DAKIN'S) 0.25% irrigation (bottle)   Topical Q12H    sodium chloride (NS) flush 5-40 mL  5-40 mL IntraVENous Q8H    sodium chloride (NS) flush 5-40 mL  5-40 mL IntraVENous PRN    acetaminophen (TYLENOL) tablet 650 mg  650 mg Oral Q4H PRN    HYDROcodone-acetaminophen (NORCO) 5-325 mg per tablet 1 Tab  1 Tab Oral Q4H PRN    heparin (porcine) injection 5,000 Units  5,000 Units SubCUTAneous Q8H    glucose chewable tablet 16 g  4 Tab Oral PRN    glucagon (GLUCAGEN) injection 1 mg  1 mg IntraMUSCular PRN    furosemide (LASIX) tablet 80 mg  80 mg Oral DAILY    gabapentin (NEURONTIN) capsule 300-600 mg  300-600 mg Oral QHS    levothyroxine (SYNTHROID) tablet 50 mcg  50 mcg Oral ACB    metoprolol tartrate (LOPRESSOR) tablet 50 mg  50 mg Oral Q12H    valsartan (DIOVAN) tablet 320 mg  320 mg Oral DAILY    piperacillin-tazobactam (ZOSYN) 4.5 g in 0.9% sodium chloride (MBP/ADV) ###EXTENDED 4 HOUR INFUSION###  4.5 g IntraVENous Q8H    VANCOMYCIN INFORMATION NOTE 1 Each  1 Each Other Rx Dosing/Monitoring     Review of Systems - Negative except as in interval history     Objective:     Visit Vitals  /79 (BP 1 Location: Left arm, BP Patient Position: At rest)   Pulse 64   Temp 97.9 °F (36.6 °C)   Resp 20   Ht 6' 5\" (1.956 m)   Wt (!) 205 kg (452 lb)   SpO2 93%   BMI 53.60 kg/m²       Temp (24hrs), Av.7 °F (36.5 °C), Min:97.3 °F (36.3 °C), Max:98 °F (36.7 °C)      General: Well developed, morbidly obese 81 y.o.  male in no acute distress. ENT: ENT exam normal, no neck nodes or sinus tenderness  Head: normocephalic, without obvious abnormality  Mouth:  mucous membranes moist, pharynx normal without lesions  Neck: supple, symmetrical, trachea midline   Cardio:  regular rate and rhythm, S1, S2 normal, no murmur, click, rub or gallop  Chest: inspection normal - no chest wall deformities or tenderness, respiratory effort normal  Lungs: clear to auscultation and no wheezes or rales  Abdomen: obese, soft, non-tender. Bowel sounds normal. No masses, no organomegaly. Extremities:  Massive lymphedema bilateral lower extremities with redundant skin folds and lichenified skin. Right dressing not removed (see photos from ).                       Lab results     Chemistry  Recent Labs     19  0350 19  0610 19  0500   GLU 88 116* 114*    146* 143   K 3.8 3.9 5.6*    109 109   CO2 32 33* 28   BUN 18 17 22*   CREA 1.62* 1.58* 1.58*   CA 8.7 9.0 8.2*   AGAP 5 4 6   BUCR 11* 11* 14       CBC w/ Diff  Recent Labs     19  0350 19  0610 19  0500   WBC 8.9 8.4 9.7   RBC 3.26* 3.30* 3.15*   HGB 10.3* 10.3* 10.0*   HCT 32.2* 32.8* 32.3*    366 239   GRANS 61 63 61   LYMPH 26 26 28   EOS 3 3 3       Microbiology  All Micro Results     Procedure Component Value Units Date/Time    CULTURE, BLOOD [[id number]] Collected:  19 1445    Order Status:  Completed Specimen:  Blood Updated:  19 4341     Special Requests: PERIPHERAL        Culture result: NO GROWTH 3 DAYS       CULTURE, BLOOD [[id number]]     Order Status:  Canceled Specimen:  Blood              Nae Ramírez MD, [facility]  Infectious Disease Specialist  Pager [phone]

## 2019-08-19 NOTE — WOUND CARE
Chart reviewed by wound care, will defer consult to Dr Dee Echols who has placed orders for dressing changes. Will be available if needed.

## 2019-08-19 NOTE — PROGRESS NOTES
Internal Medicine Progress Note        NAME: Sylvia Doherty   :  1950  MRM:  [id number]    Date/Time: 2019        ASSESSMENT/PLAN:    Principal Problem:    Cellulitis of right lower extremity (2019)    Active Problems:    Lymphedema of both lower extremities (2015)      CKD (chronic kidney disease) stage 3, GFR 30-59 ml/min ([facility]) (2015)      Hypothyroidism, adult (2015)      Type 2 diabetes mellitus with diabetic neuropathy ([facility] (8/15/2018)      Chronic diastolic HF (heart failure) ([facility] (2019)      # Cellulitis with infected ulcer right lateral leg with h/o lymphedema.  cx Citrobacter koseri (2) both pan-sensitive, E faecalis, Diphtheroids (3) . Treated with   vanc zosyn, then dc vanco  per ID    #  ulcer right lateral leg with h/o lymphedema, Elephantiasis . Dr. Jessica Mariano [facility]) evaluated, recommends vascular surgery eval . MIGDALIA done on admission shows \"There is no evidence of any hemodynamically significant peripheral arterial disease at rest in either lower extremity. \"  Vascular consulted. Cherelle Wiggins Appreciate Podiatry assistance     # Chronic lymphedema b/l LE . Cont lasix. bilateral unna boots recommended by Vascular    # Hypothyroidism. Cont synthroid    # DM. Provide SSI, hypoglycemia protocol and frequent Accu checks. Provide SSI, hypoglycemia protocol and frequent Accu checks. Education,  diabetic educator     # CKD. Monitor labs. Avoid nephrotoxins. Renally dosing medications. Monitor urine out put.       · DVT protocol    Lab Review:     Recent Labs     19  0350 19  0610 19  0500   WBC 8.9 8.4 9.7   HGB 10.3* 10.3* 10.0*   HCT 32.2* 32.8* 32.3*    366 239     Recent Labs     19  0350 19  0610 19  0500 19  1445    146* 143 141   K 3.8 3.9 5.6* 4.6    109 109 107   CO2 32 33* 28 35*   GLU 88 116* 114* 105*   BUN 18 17 22* 27*   CREA 1.62* 1.58* 1.58* 1.68*   CA 8.7 9.0 8.2* 8.7   ALB  --   --   --  2.8*   TBILI  -- --   --  0.3   SGOT  --   --   --  20   ALT  --   --   --  50   INR  --   --   --  1.0     Lab Results   Component Value Date/Time    Glucose (POC) 109 08/19/2019 07:51 AM    Glucose (POC) 89 08/18/2019 10:07 PM    Glucose (POC) 112 (H) 08/18/2019 04:39 PM    Glucose (POC) 128 (H) 08/18/2019 12:06 PM    Glucose (POC) 121 (H) 08/18/2019 07:48 AM     No results for input(s): PH, PCO2, PO2, HCO3, FIO2 in the last 72 hours. Recent Labs     08/16/19  1445   INR 1.0       No results found for: SDES  Lab Results   Component Value Date/Time    Culture result: NO GROWTH 3 DAYS 08/16/2019 02:45 PM    Culture result: MODERATE CITROBACTER KOSERI (A) 08/15/2019 02:44 PM    Culture result: FEW CITROBACTER KOSERI 2ND MORPHOTYPE (A) 08/15/2019 02:44 PM    Culture result: FEW ENTEROCOCCUS FAECALIS GROUP D (A) 08/15/2019 02:44 PM    Culture result: FEW DIPHTHEROIDS THREE MORPHOTYPES (A) 08/15/2019 02:44 PM       All Cardiac Markers in the last 24 hours: No results found for: CPK, CK, CKMMB, CKMB, RCK3, CKMBT, CKNDX, CKND1, EARLE, TROPT, TROIQ, FLORENTIN, TROPT, TNIPOC, BNP, BNPP           Subjective:     Chief Complaint:      Wanted his current med list to be faxed from pharmacy. His nurse informed. Deny complain. Dr Calista Junior from vascular contacted    ROS:  (bold if positive,otherwise negative)    Fever/chills ,  Dysuria   Cough , Sputum , SOB/SHELTON , Chest Pain     Diarrhea ,Nausea/Vomit , Abd Pain , Constipation   Tolerating PT , Tolerating Diet                Objective:     Vitals:  Last 24hrs VS reviewed since prior progress note.  Most recent are:    Visit Vitals  /70 (BP 1 Location: Left arm, BP Patient Position: At rest)   Pulse 62   Temp 97.8 °F (36.6 °C)   Resp 20   Ht 6' 5\" (1.956 m)   Wt (!) 205 kg (452 lb)   SpO2 95%   BMI 53.60 kg/m²     SpO2 Readings from Last 6 Encounters:   08/19/19 95%   08/15/19 93%   08/15/19 100%   08/09/19 97%   07/24/19 97%   07/22/19 97%            Intake/Output Summary (Last 24 hours) at 8/19/2019 1007  Last data filed at 8/19/2019 0525  Gross per 24 hour   Intake 1500 ml   Output 975 ml   Net 525 ml          Physical Exam:   Ears: hearing is intact  Eyes: Icterus is not present  Lungs: clear to auscultation bilaterally  Heart: regular rate and rhythm, S1, S2 normal, no murmur, click, rub or gallop  Gastrointestinal: soft, non-tender. Bowel sounds normal. No masses,  no organomegaly  Neurological:  New Focal Deficits are not present  Psychiatric:  Mood is stable  Legs elephantiasis.  R LL ulcer dressed     Medications Reviewed: (see below)    Lab Data Reviewed: (see below)    ______________________________________________________________________    Medications:     Current Facility-Administered Medications   Medication Dose Route Frequency    insulin lispro (HUMALOG) injection   SubCUTAneous QID    vancomycin (VANCOCIN) 1,750 mg in 0.9% sodium chloride 500 mL IVPB  1,750 mg IntraVENous Q24H    [START ON 8/21/2019] VANCOMYCIN INFORMATION NOTE   Other ONCE    dextrose 10% infusion 125-250 mL  125-250 mL IntraVENous PRN    sodium hypochlorite (HALF STRENGTH DAKIN'S) 0.25% irrigation (bottle)   Topical Q12H    sodium chloride (NS) flush 5-40 mL  5-40 mL IntraVENous Q8H    sodium chloride (NS) flush 5-40 mL  5-40 mL IntraVENous PRN    acetaminophen (TYLENOL) tablet 650 mg  650 mg Oral Q4H PRN    HYDROcodone-acetaminophen (NORCO) 5-325 mg per tablet 1 Tab  1 Tab Oral Q4H PRN    heparin (porcine) injection 5,000 Units  5,000 Units SubCUTAneous Q8H    glucose chewable tablet 16 g  4 Tab Oral PRN    glucagon (GLUCAGEN) injection 1 mg  1 mg IntraMUSCular PRN    furosemide (LASIX) tablet 80 mg  80 mg Oral DAILY    gabapentin (NEURONTIN) capsule 300-600 mg  300-600 mg Oral QHS    levothyroxine (SYNTHROID) tablet 50 mcg  50 mcg Oral ACB    metoprolol tartrate (LOPRESSOR) tablet 50 mg  50 mg Oral Q12H    valsartan (DIOVAN) tablet 320 mg  320 mg Oral DAILY    piperacillin-tazobactam (ZOSYN) 4.5 g in 0.9% sodium chloride (MBP/ADV) ###EXTENDED 4 HOUR INFUSION###  4.5 g IntraVENous Q8H    VANCOMYCIN INFORMATION NOTE 1 Each  1 Each Other Rx Dosing/Monitoring          Total time spent with patient: 35 minutes                  Care Plan discussed with: Patient, Family, Care Manager, Nursing Staff and Consultant/Specialist    Discussed:  Care Plan    Prophylaxis:  Hep SQ    Disposition:  Home w/Family             Attending Physician: Vince Good MD

## 2019-08-19 NOTE — ROUTINE PROCESS
Assumed care of pt report received from [employer] ,RN. Pt awake, alert and oriented X 3. No verbal distress noted. Bed in lowest position & wheels locked. Call bell within reach. RLE dressing intact. 2200 - Dressing changed completed to RLE per MD orders. 2333 -  Norco 1 tab administered for pain.

## 2019-08-19 NOTE — ROUTINE PROCESS
Bedside and Verbal shift change report given to YanciRN (oncoming nurse) by Nessa Ramon RN BSN ( off going Nurse ) inclusive of SBAR and plan of care, Intake & Output.

## 2019-08-20 LAB
GLUCOSE BLD STRIP.AUTO-MCNC: 107 MG/DL (ref 70–110)
GLUCOSE BLD STRIP.AUTO-MCNC: 109 MG/DL (ref 70–110)
GLUCOSE BLD STRIP.AUTO-MCNC: 111 MG/DL (ref 70–110)
GLUCOSE BLD STRIP.AUTO-MCNC: 120 MG/DL (ref 70–110)

## 2019-08-20 PROCEDURE — 74011000258 HC RX REV CODE- 258: Performed by: PHYSICIAN ASSISTANT

## 2019-08-20 PROCEDURE — 74011250637 HC RX REV CODE- 250/637: Performed by: PHYSICIAN ASSISTANT

## 2019-08-20 PROCEDURE — 65270000029 HC RM PRIVATE

## 2019-08-20 PROCEDURE — 97535 SELF CARE MNGMENT TRAINING: CPT

## 2019-08-20 PROCEDURE — 82962 GLUCOSE BLOOD TEST: CPT

## 2019-08-20 PROCEDURE — 97166 OT EVAL MOD COMPLEX 45 MIN: CPT

## 2019-08-20 PROCEDURE — 97162 PT EVAL MOD COMPLEX 30 MIN: CPT

## 2019-08-20 PROCEDURE — 74011250636 HC RX REV CODE- 250/636: Performed by: PHYSICIAN ASSISTANT

## 2019-08-20 PROCEDURE — 74011250637 HC RX REV CODE- 250/637: Performed by: PLASTIC SURGERY

## 2019-08-20 RX ORDER — COLCHICINE 0.6 MG/1
0.6 TABLET ORAL DAILY
Status: DISCONTINUED | OUTPATIENT
Start: 2019-08-20 | End: 2019-08-30 | Stop reason: HOSPADM

## 2019-08-20 RX ORDER — COLCHICINE 0.6 MG/1
0.6 TABLET ORAL DAILY
Status: DISCONTINUED | OUTPATIENT
Start: 2019-08-21 | End: 2019-08-20

## 2019-08-20 RX ADMIN — HEPARIN SODIUM 5000 UNITS: 5000 INJECTION INTRAVENOUS; SUBCUTANEOUS at 05:05

## 2019-08-20 RX ADMIN — GABAPENTIN 300 MG: 300 CAPSULE ORAL at 22:32

## 2019-08-20 RX ADMIN — METOPROLOL TARTRATE 50 MG: 50 TABLET ORAL at 09:40

## 2019-08-20 RX ADMIN — SODIUM HYPOCHLORITE: 2.5 SOLUTION TOPICAL at 13:29

## 2019-08-20 RX ADMIN — HYDROCODONE BITARTRATE AND ACETAMINOPHEN 1 TABLET: 5; 325 TABLET ORAL at 09:45

## 2019-08-20 RX ADMIN — SODIUM HYPOCHLORITE: 2.5 SOLUTION TOPICAL at 22:42

## 2019-08-20 RX ADMIN — Medication 10 ML: at 05:05

## 2019-08-20 RX ADMIN — PIPERACILLIN SODIUM AND TAZOBACTAM SODIUM 4.5 G: 4; .5 INJECTION, POWDER, LYOPHILIZED, FOR SOLUTION INTRAVENOUS at 22:40

## 2019-08-20 RX ADMIN — LEVOTHYROXINE SODIUM 50 MCG: 50 TABLET ORAL at 05:04

## 2019-08-20 RX ADMIN — Medication 10 ML: at 18:38

## 2019-08-20 RX ADMIN — VALSARTAN 320 MG: 80 TABLET, FILM COATED ORAL at 09:40

## 2019-08-20 RX ADMIN — HEPARIN SODIUM 5000 UNITS: 5000 INJECTION INTRAVENOUS; SUBCUTANEOUS at 14:07

## 2019-08-20 RX ADMIN — METOPROLOL TARTRATE 50 MG: 50 TABLET ORAL at 22:32

## 2019-08-20 RX ADMIN — PIPERACILLIN SODIUM AND TAZOBACTAM SODIUM 4.5 G: 4; .5 INJECTION, POWDER, LYOPHILIZED, FOR SOLUTION INTRAVENOUS at 05:05

## 2019-08-20 RX ADMIN — FUROSEMIDE 80 MG: 40 TABLET ORAL at 09:39

## 2019-08-20 RX ADMIN — Medication 10 ML: at 22:41

## 2019-08-20 RX ADMIN — HYDROCODONE BITARTRATE AND ACETAMINOPHEN 1 TABLET: 5; 325 TABLET ORAL at 22:32

## 2019-08-20 RX ADMIN — HEPARIN SODIUM 5000 UNITS: 5000 INJECTION INTRAVENOUS; SUBCUTANEOUS at 22:41

## 2019-08-20 RX ADMIN — HYDROCODONE BITARTRATE AND ACETAMINOPHEN 1 TABLET: 5; 325 TABLET ORAL at 15:32

## 2019-08-20 RX ADMIN — HYDROCODONE BITARTRATE AND ACETAMINOPHEN 1 TABLET: 5; 325 TABLET ORAL at 05:08

## 2019-08-20 RX ADMIN — PIPERACILLIN SODIUM AND TAZOBACTAM SODIUM 4.5 G: 4; .5 INJECTION, POWDER, LYOPHILIZED, FOR SOLUTION INTRAVENOUS at 14:06

## 2019-08-20 RX ADMIN — COLCHICINE 0.6 MG: 0.6 TABLET, FILM COATED ORAL at 22:32

## 2019-08-20 NOTE — PROGRESS NOTES
Problem: Diabetes Self-Management  Goal: *Disease process and treatment process  Description  Define diabetes and identify own type of diabetes; list 3 options for treating diabetes. Outcome: Progressing Towards Goal  Goal: *Incorporating nutritional management into lifestyle  Description  Describe effect of type, amount and timing of food on blood glucose; list 3 methods for planning meals. Outcome: Progressing Towards Goal  Goal: *Incorporating physical activity into lifestyle  Description  State effect of exercise on blood glucose levels. Outcome: Progressing Towards Goal  Goal: *Developing strategies to promote health/change behavior  Description  Define the ABC's of diabetes; identify appropriate screenings, schedule and personal plan for screenings. Outcome: Progressing Towards Goal  Goal: *Using medications safely  Description  State effect of diabetes medications on diabetes; name diabetes medication taking, action and side effects. Outcome: Progressing Towards Goal  Goal: *Monitoring blood glucose, interpreting and using results  Description  Identify recommended blood glucose targets  and personal targets. Outcome: Progressing Towards Goal  Goal: *Prevention, detection, treatment of acute complications  Description  List symptoms of hyper- and hypoglycemia; describe how to treat low blood sugar and actions for lowering  high blood glucose level. Outcome: Progressing Towards Goal  Goal: *Prevention, detection and treatment of chronic complications  Description  Define the natural course of diabetes and describe the relationship of blood glucose levels to long term complications of diabetes.   Outcome: Progressing Towards Goal  Goal: *Developing strategies to address psychosocial issues  Description  Describe feelings about living with diabetes; identify support needed and support network  Outcome: Progressing Towards Goal  Goal: *Insulin pump training  Outcome: Progressing Towards Goal  Goal: *Sick day guidelines  Outcome: Progressing Towards Goal  Goal: *Patient Specific Goal (EDIT GOAL, INSERT TEXT)  Outcome: Progressing Towards Goal     Problem: Patient Education: Go to Patient Education Activity  Goal: Patient/Family Education  Outcome: Progressing Towards Goal     Problem: Pressure Injury - Risk of  Goal: *Prevention of pressure injury  Description  Document Stone Scale and appropriate interventions in the flowsheet. Outcome: Progressing Towards Goal  Note:   Pressure Injury Interventions:  Sensory Interventions: Assess changes in LOC, Minimize linen layers, Maintain/enhance activity level    Moisture Interventions: Check for incontinence Q2 hours and as needed, Minimize layers    Activity Interventions: Increase time out of bed, Pressure redistribution bed/mattress(bed type)    Mobility Interventions: HOB 30 degrees or less, Pressure redistribution bed/mattress (bed type)    Nutrition Interventions: Offer support with meals,snacks and hydration    Friction and Shear Interventions: Minimize layers                Problem: Patient Education: Go to Patient Education Activity  Goal: Patient/Family Education  Outcome: Progressing Towards Goal     Problem: Falls - Risk of  Goal: *Absence of Falls  Description  Document Zuleyma Fall Risk and appropriate interventions in the flowsheet. Outcome: Progressing Towards Goal  Note:   Fall Risk Interventions:  Mobility Interventions: Patient to call before getting OOB         Medication Interventions: Teach patient to arise slowly, Patient to call before getting OOB    Elimination Interventions:  Toileting schedule/hourly rounds              Problem: Patient Education: Go to Patient Education Activity  Goal: Patient/Family Education  Outcome: Progressing Towards Goal     Problem: Discharge Planning  Goal: *Discharge to safe environment  Outcome: Progressing Towards Goal     Problem: Pain  Goal: *Control of Pain  Outcome: Progressing Towards Goal  Goal: *PALLIATIVE CARE:  Alleviation of Pain  Outcome: Progressing Towards Goal     Problem: Patient Education: Go to Patient Education Activity  Goal: Patient/Family Education  Outcome: Progressing Towards Goal

## 2019-08-20 NOTE — PROGRESS NOTES
Assumed care of pt from night nurse Anita Ace RN. Received with patient in bed, A&O x 4. Patient denies pain and/or discomfort. Call light in reach. Encouraged to call for assistance, Pt verbalized understanding. 0930 - Assessment completed    0940 - Medication given, patient tolerated well. 1200 - Report given to ESME Philippe.    1230 - Dr. Milagros Gordon at patient bedside    1250 - Patient voided using urinal.    Dressing changed, patient tolerated well. \    Patient inquired about R knee xray, advised I would discuss with ESME Philippe and physician. 1600 - Paged Dr. Shannan Thomson and advised of patient request for R knee xray due to pain. Dr. Elsa Bonner advised he prescribed patients gout medication and would like him try that first prior to proceeding with knee xray. Open to xray if medication does not help. Spoke with wound care regarding Dr. Milagros Gordon request for Valeria Bailer boot to L leg. Ray Santiago with wound care advised that wound care would see patient tomorrow morning to assist.  This nurse verbalized understanding and will place wound consult.

## 2019-08-20 NOTE — PROGRESS NOTES
Problem: Self Care Deficits Care Plan (Adult)  Goal: *Acute Goals and Plan of Care (Insert Text)  Description  Occupational Therapy Goals  Initiated 8/20/2019 within 7 day(s). 1.  Patient will perform grooming tasks while standing with supervision/set-up. 2.  Patient will perform lower body dressing with supervision utilizing AE, prn.  3.  Patient will perform functional task in standing for 8 minutes with supervision, fair dynamic standing balance and < 2 rest breaks to increase activity tolerance for ADLs. 4.  Patient will perform toilet transfers with supervision/set-up. 5.  Patient will perform all aspects of toileting with supervision/set-up. 6.  Patient will utilize energy conservation techniques during functional activities with minimal verbal cues. Outcome: Progressing Towards Goal     OCCUPATIONAL THERAPY EVALUATION    Patient: Leslie Carlson (50 y.o. male)  Date: 8/20/2019  Primary Diagnosis: Cellulitis [L03.90]        Precautions:  Fall  PLOF: Pt reports independence with ADLs & functional mobility. ASSESSMENT :  Based on the objective data described below, the patient presents with impairments with regard to bed mobility, functional transfers and ADLs secondary to cellulitis of RLE. Co-treated with PT to maximize pt safety due to body habitus, BLE swelling and decreased activity tolerance. Pt supine on arrival, c/o 8/10 R knee pain pre/post session. Mod A/max A for bed mobility for BLE management due to swelling. Increased time needed for all transitions due to R knee pain. Min A x2 to stand with bariatric walker. CGA x2 for safety & max A for bowel hygiene with RW. At [facility] for safety & energy conservation, supervision for toileting task with urinal and fair/fair+ sitting balance. Pt maneuvered back to supine with max A x2. HOB & BLEs elevated. MD at  bedside at end of session. Recommend continued therapy.     Patient will benefit from skilled intervention to address the above impairments. Patient's rehabilitation potential is considered to be Good  Factors which may influence rehabilitation potential include:   ? None noted  ? Mental ability/status  ? Medical condition  ? Home/family situation and support systems  ? Safety awareness  ? Pain tolerance/management  ? Other:      PLAN :  Recommendations and Planned Interventions:   ?               Self Care Training                  ? Therapeutic Activities  ? Functional Mobility Training   ? Cognitive Retraining  ? Therapeutic Exercises           ? Endurance Activities  ? Balance Training                    ? Neuromuscular Re-Education  ? Visual/Perceptual Training     ? Home Safety Training  ? Patient Education                   ? Family Training/Education  ? Other (comment):    Frequency/Duration: Patient will be followed by occupational therapy 3-5 times a week to address goals. Discharge Recommendations: Home Health  Further Equipment Recommendations for Discharge: shower chair     SUBJECTIVE:   Patient stated [place] you just give me a few seconds?  Blind    OBJECTIVE DATA SUMMARY:     Past Medical History:   Diagnosis Date    Anemia of chronic disease     Arthritis     Chronic renal insufficiency     Diabetes ([facility]     Dyslipidemia     Edema     Gout     Gout     Hypertension     Lymphedema     Morbid obesity ([facility]      Past Surgical History:   Procedure Laterality Date    HX ORTHOPAEDIC  3/2015     Lt Foot ulcer     Barriers to Learning/Limitations: None  Compensate with: visual, verbal, tactile, kinesthetic cues/model    Home Situation:   Home Situation  Home Environment: Private residence  # Steps to Enter: 4  One/Two Story Residence: Two story  # of Interior Steps: 8  Living Alone: No  Support Systems: Child(trevon), Family member(s)  Patient Expects to be Discharged to[de-identified] Private residence  Current DME Used/Available at Home: None  ? Right hand dominant   ? Left hand dominant    Cognitive/Behavioral Status:  Neurologic State: Alert  Orientation Level: Oriented X4  Cognition: Follows commands  Safety/Judgement: Insight into deficits; Fall prevention; Awareness of environment    Skin: Intact (BUEs)  Edema: None noted (BUEs)    Vision/Perceptual:    Acuity: Within Defined Limits      Coordination: BUE  Coordination: Within functional limits  Fine Motor Skills-Upper: Right Intact; Left Intact    Gross Motor Skills-Upper: Right Intact; Left Intact    Balance:  Sitting: Impaired  Sitting - Static: Good (unsupported)  Sitting - Dynamic: Good (unsupported)  Standing: Impaired  Standing - Static: Good  Standing - Dynamic : Fair    Strength: BUE  Strength: Within functional limits    Range of Motion: BUE  AROM: Within functional limits  PROM: Within functional limits    Functional Mobility and Transfers for ADLs:  Bed Mobility:  Supine to Sit: Moderate assistance;Assist x2  Sit to Supine: Moderate assistance;Assist x2    Transfers:  Sit to Stand: Minimum assistance;Assist x2  Stand to Sit: Minimum assistance;Assist x2   Toilet Transfer : (not assessed; pt declined)    ADL Assessment:   Feeding: Modified independent  Oral Facial Hygiene/Grooming: Modified Independent  Bathing: Moderate assistance  Upper Body Dressing: Setup;Supervision  Lower Body Dressing: Moderate assistance  Toileting: Setup;Supervision    ADL Intervention:  Grooming  Grooming Assistance: Set-up; Supervision  Washing Hands: Set-up; Supervision    Toileting  Toileting Assistance: Supervision  Bladder Hygiene: Supervision  Bowel Hygiene: Maximum assistance  Clothing Management: Supervision  Cues: Verbal cues provided    In standing with fair balance and CGA x2 for safety; max A for bowel hygiene with RW and skilled instruction on pacing & positioning.  At [facility] for safety & energy conservation, supervision for toileting task with urinal and fair/fair+ sitting balance. Set-up/supervision for hand hygiene following toileting tasks. Cognitive Retraining  Safety/Judgement: Insight into deficits; Fall prevention; Awareness of environment    Pain:  Pain level pre-treatment: 8/10   Pain level post-treatment: 8/10   R knee    Activity Tolerance: Fair/Fair-  Please refer to the flowsheet for vital signs taken during this treatment. After treatment:   ? Patient left in no apparent distress sitting up in chair  ? Patient left in no apparent distress in bed  ? Call bell left within reach  ? Nursing notified  ? Caregiver present  ? Bed alarm activated    COMMUNICATION/EDUCATION:   ? Role of Occupational Therapy in the acute care setting  ? Home safety education was provided and the patient/caregiver indicated understanding. ? Patient/family have participated as able in goal setting and plan of care. ? Patient/family agree to work toward stated goals and plan of care. ? Patient understands intent and goals of therapy, but is neutral about his/her participation. ? Patient is unable to participate in goal setting and plan of care. Thank you for this referral.  Nicol Anderson MS OTR/L  Time Calculation: 27 mins    Eval Complexity: History: MEDIUM Complexity : Expanded review of history including physical, cognitive and psychosocial  history ; Examination: MEDIUM Complexity : 3-5 performance deficits relating to physical, cognitive , or psychosocial skils that result in activity limitations and / or participation restrictions; Decision Making:MEDIUM Complexity : Patient may present with comorbidities that affect occupational performnce.  Miniml to moderate modification of tasks or assistance (eg, physical or verbal ) with assesment(s) is necessary to enable patient to complete evaluation

## 2019-08-20 NOTE — PROGRESS NOTES
Oedema greatly reduced with elevation - wound opened and desloughing well with the regular wound packing's - The Vascular opinion as to the viability of the foot is awaited

## 2019-08-20 NOTE — PROGRESS NOTES
Pt seen and examined. C/o right knee pain today. No events. Visit Vitals  /61 (BP 1 Location: Left arm, BP Patient Position: Supine)   Pulse 77   Temp 98.2 °F (36.8 °C)   Resp 18   Ht 6' 5\" (1.956 m)   Wt (!) 452 lb (205 kg)   SpO2 94%   BMI 53.60 kg/m²     NAD  RRR  Right lateral leg wound examined. Deep tunneling posterior lateral.  Necrotic skin and fibrinous tissue anteriorly. Visible granulation tissue at the base. No abscess pockets appreciated    I believe Mr. Pastrana would benefit from a debridement. His leg appears viable, but it will take a long time to heal this wound defect. He also needs aggressive compression. Have asked the nurse to contact wound care to place left leg in an Unna boot and hopefully once his leg edema has decreased he can then obtain compression stockings for this leg. Ideally his right leg can be placed in compression after his debridement. If Jerald Whiting and Yahaira Narayanan are unable to do debridement can debride on Thursday afternoon after office.

## 2019-08-20 NOTE — PROGRESS NOTES
[facility]  (A Division of [address] [place] [address])    Infectious Disease Follow-up Note      Date of Admission: 8/16/2019     Date of Note:  8/20/2019      Summary:     82 y/o AAM with chronic massive bilateral LE lymphedema, morbid obesity, infected right lateral leg ulcer with celllulitis. Comorbid: DM, HTN, CKD, Gout    Interval History:     Lying in bed. Says he had one \"runny\" stool     Current Antimicrobials: Prior Antimicrobials   Zosyn 8/16 - 3  Vancomycin 8/16 - 3      Assessment / Plan:      Infected Ulcer, right lateral leg  - with cellulitis right leg  - wd 8/15: gs gpc prs, gnr.  cx Citrobacter koseri (2) both pan-sensitive, E faecalis, Diphtheroids (3)  - edema slightly emproved -> continue zosyn  -> await vascular, PRS re-evaluation - possible debridement   Chronic massive lymphedema  - bilateral lower extremities  - slightly improved -> bilateral unna boots recommended by Vascular   Diarrhea  - 1-2 watery stools per day. Likely from abx  - persists but not worsening - only 1 LBM yeterday -> monitor.   If persists or worsens on Zosyn will change to Vanc/Ceftriaxone +/- metronidazole   Morbid Obesity     DM     CKD     HTN     Gout        Microbiology:      8/15       gs gpc prs, gnr.  cx gnr(2)  8/16      blcx NGTD x 1     Lines / Catheters:      piv    Patient Active Problem List   Diagnosis Code    Diabetic foot ulcer ([facility] E11.621, T67.375    Diastolic dysfunction J45.33    Dyslipidemia E78.5    Cellulitis and abscess of foot, except toes L03.119, L02.619    Lymphedema of both lower extremities I89.0    CKD (chronic kidney disease) stage 3, GFR 30-59 ml/min ([facility]) N18.3    Morbid obesity with BMI of 70 and over, adult ([facility] E66.01, Z68.45    Erectile dysfunction N52.9    Hypothyroidism, adult E03.9    Hypogonadism in male E29.1    Borderline anemia D64.9    Idiopathic gout of multiple sites M10.09    Skin ulcer of ankle with fat layer exposed, right [facility]) L97.312    Non-pressure chronic ulcer of right calf with fat layer exposed ([facility] L97.212    Type 2 diabetes mellitus with diabetic neuropathy ([facility]) E11.40    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 ([facility]) L97.213    Puncture wound of multiple sites of right leg S81.831A    Cellulitis of leg without foot, right L03.115    Cellulitis of right lower extremity L03.115    Chronic diastolic HF (heart failure) ([facility]) I50.32       Current Facility-Administered Medications   Medication Dose Route Frequency    insulin lispro (HUMALOG) injection   SubCUTAneous QID    dextrose 10% infusion 125-250 mL  125-250 mL IntraVENous PRN    sodium hypochlorite (HALF STRENGTH DAKIN'S) 0.25% irrigation (bottle)   Topical Q12H    sodium chloride (NS) flush 5-40 mL  5-40 mL IntraVENous Q8H    sodium chloride (NS) flush 5-40 mL  5-40 mL IntraVENous PRN    acetaminophen (TYLENOL) tablet 650 mg  650 mg Oral Q4H PRN    HYDROcodone-acetaminophen (NORCO) 5-325 mg per tablet 1 Tab  1 Tab Oral Q4H PRN    heparin (porcine) injection 5,000 Units  5,000 Units SubCUTAneous Q8H    glucose chewable tablet 16 g  4 Tab Oral PRN    glucagon (GLUCAGEN) injection 1 mg  1 mg IntraMUSCular PRN    furosemide (LASIX) tablet 80 mg  80 mg Oral DAILY    gabapentin (NEURONTIN) capsule 300-600 mg  300-600 mg Oral QHS    levothyroxine (SYNTHROID) tablet 50 mcg  50 mcg Oral ACB    metoprolol tartrate (LOPRESSOR) tablet 50 mg  50 mg Oral Q12H    valsartan (DIOVAN) tablet 320 mg  320 mg Oral DAILY    piperacillin-tazobactam (ZOSYN) 4.5 g in 0.9% sodium chloride (MBP/ADV) ###EXTENDED 4 HOUR INFUSION###  4.5 g IntraVENous Q8H     Review of Systems - Negative except as in interval history     Objective:     Visit Vitals  /61 (BP 1 Location: Left arm, BP Patient Position: Supine)   Pulse 77   Temp 98.2 °F (36.8 °C)   Resp 18   Ht 6' 5\" (1.956 m)   Wt (!) 205 kg (452 lb)   SpO2 94%   BMI 53.60 kg/m²       Temp (24hrs), Av.1 °F (36.7 °C), Min:97.3 °F (36.3 °C), Max:98.7 °F (37.1 °C)      General: Well developed, morbidly obese 81 y.o.  male in no acute distress. ENT: ENT exam normal, no neck nodes or sinus tenderness  Head: normocephalic, without obvious abnormality  Mouth:  mucous membranes moist, pharynx normal without lesions  Neck: supple, symmetrical, trachea midline   Cardio:  regular rate and rhythm, S1, S2 normal, no murmur, click, rub or gallop  Chest: inspection normal - no chest wall deformities or tenderness, respiratory effort normal  Lungs: clear to auscultation and no wheezes or rales  Abdomen: obese, soft, non-tender. Bowel sounds normal. No masses, no organomegaly. Extremities:  Massive lymphedema bilateral lower extremities with redundant skin folds and lichenified skin.   Right dressing not removed          Lab results     Chemistry  Recent Labs     19  0350 19  0610   GLU 88 116*    146*   K 3.8 3.9    109   CO2 32 33*   BUN 18 17   CREA 1.62* 1.58*   CA 8.7 9.0   AGAP 5 4   BUCR 11* 11*       CBC w/ Diff  Recent Labs     19  0350 19  0610   WBC 8.9 8.4   RBC 3.26* 3.30*   HGB 10.3* 10.3*   HCT 32.2* 32.8*    366   GRANS 61 63   LYMPH 26 26   EOS 3 3       Microbiology  All Micro Results     Procedure Component Value Units Date/Time    CULTURE, BLOOD [[id number]] Collected:  19 1445    Order Status:  Completed Specimen:  Blood Updated:  1956     Special Requests: PERIPHERAL        Culture result: NO GROWTH 4 DAYS       CULTURE, BLOOD [[id number]]     Order Status:  Canceled Specimen:  Blood              Damaris Reis MD, [facility]  Infectious Disease Specialist  Pager [phone]

## 2019-08-20 NOTE — PROGRESS NOTES
Internal Medicine Progress Note        NAME: Sarahi Garcia   :  1950  MRM:  [id number]    Date/Time: 2019        ASSESSMENT/PLAN:    Principal Problem:    Cellulitis of right lower extremity (2019)    Active Problems:    Lymphedema of both lower extremities (2015)      CKD (chronic kidney disease) stage 3, GFR 30-59 ml/min ([facility]) (2015)      Hypothyroidism, adult (2015)      Type 2 diabetes mellitus with diabetic neuropathy ([facility] (8/15/2018)      Chronic diastolic HF (heart failure) ([facility] (2019)      # Cellulitis with infected ulcer right lateral leg with h/o lymphedema.  cx Citrobacter koseri (2) both pan-sensitive, E faecalis, Diphtheroids (3) . Treated with   vanc zosyn, then dc vanco  per ID. conotinue zosyn    #  ulcer right lateral leg with h/o lymphedema, Elephantiasis . Dr. Chelsea Chase [facility]) evaluated, recommends vascular surgery eval . MIGDALIA done on admission shows \"There is no evidence of any hemodynamically significant peripheral arterial disease at rest in either lower extremity. \"  Vascular consulted. Charito January Appreciate Podiatry assistance   - pending surgery plan ; vascular/ PRS     # Chronic lymphedema b/l LE . Cont lasix. bilateral unna boots recommended by Vascular    # Hypothyroidism. Cont synthroid    # DM. Provide SSI, hypoglycemia protocol and frequent Accu checks. Provide SSI, hypoglycemia protocol and frequent Accu checks. Education,  diabetic educator     # CKD. Monitor labs. Avoid nephrotoxins. Renally dosing medications. Monitor urine out put.       · DVT protocol    Lab Review:     Recent Labs     19  0350 19  0610   WBC 8.9 8.4   HGB 10.3* 10.3*   HCT 32.2* 32.8*    366     Recent Labs     19  0350 19  0610    146*   K 3.8 3.9    109   CO2 32 33*   GLU 88 116*   BUN 18 17   CREA 1.62* 1.58*   CA 8.7 9.0     Lab Results   Component Value Date/Time    Glucose (POC) 107 2019 07:52 AM    Glucose (POC) 148 (H) 08/19/2019 08:34 PM    Glucose (POC) 92 08/19/2019 04:55 PM    Glucose (POC) 161 (H) 08/19/2019 12:20 PM    Glucose (POC) 109 08/19/2019 07:51 AM     No results for input(s): PH, PCO2, PO2, HCO3, FIO2 in the last 72 hours. No results for input(s): INR in the last 72 hours. No lab exists for component: INREXT, INREXT    No results found for: SDES  Lab Results   Component Value Date/Time    Culture result: NO GROWTH 4 DAYS 08/16/2019 02:45 PM    Culture result: MODERATE CITROBACTER KOSERI (A) 08/15/2019 02:44 PM    Culture result: FEW CITROBACTER KOSERI 2ND MORPHOTYPE (A) 08/15/2019 02:44 PM    Culture result: FEW ENTEROCOCCUS FAECALIS GROUP D (A) 08/15/2019 02:44 PM    Culture result: FEW DIPHTHEROIDS THREE MORPHOTYPES (A) 08/15/2019 02:44 PM       All Cardiac Markers in the last 24 hours: No results found for: CPK, CK, CKMMB, CKMB, RCK3, CKMBT, CKNDX, CKND1, EARLE, TROPT, TROIQ, FLORENTIN, TROPT, TNIPOC, BNP, BNPP           Subjective:     Chief Complaint:      Wanted his current med list to be faxed from pharmacy. His nurse informed. Deny complain. Dr Damien Mcdonald from vascular contacted    ROS:  (bold if positive,otherwise negative)    Fever/chills ,  Dysuria   Cough , Sputum , SOB/SHELTON , Chest Pain     Diarrhea ,Nausea/Vomit , Abd Pain , Constipation    Tolerating Diet                Objective:     Vitals:  Last 24hrs VS reviewed since prior progress note.  Most recent are:    Visit Vitals  /61 (BP 1 Location: Left arm, BP Patient Position: Supine)   Pulse 77   Temp 98.2 °F (36.8 °C)   Resp 18   Ht 6' 5\" (1.956 m)   Wt (!) 205 kg (452 lb)   SpO2 94%   BMI 53.60 kg/m²     SpO2 Readings from Last 6 Encounters:   08/20/19 94%   08/15/19 93%   08/15/19 100%   08/09/19 97%   07/24/19 97%   07/22/19 97%            Intake/Output Summary (Last 24 hours) at 8/20/2019 1100  Last data filed at 8/20/2019 0512  Gross per 24 hour   Intake    Output 1040 ml   Net -1040 ml          Physical Exam:   Ears: hearing is intact  Eyes: Icterus is not present  Lungs: clear to auscultation bilaterally  Heart: regular rate and rhythm, S1, S2 normal, no murmur, click, rub or gallop  Gastrointestinal: soft, non-tender. Bowel sounds normal. No masses,  no organomegaly  Neurological:  New Focal Deficits are not present  Psychiatric:  Mood is stable  Legs elephantiasis.  R LL ulcer, packed and  dressed     Medications Reviewed: (see below)    Lab Data Reviewed: (see below)    ______________________________________________________________________    Medications:     Current Facility-Administered Medications   Medication Dose Route Frequency    insulin lispro (HUMALOG) injection   SubCUTAneous QID    dextrose 10% infusion 125-250 mL  125-250 mL IntraVENous PRN    sodium hypochlorite (HALF STRENGTH DAKIN'S) 0.25% irrigation (bottle)   Topical Q12H    sodium chloride (NS) flush 5-40 mL  5-40 mL IntraVENous Q8H    sodium chloride (NS) flush 5-40 mL  5-40 mL IntraVENous PRN    acetaminophen (TYLENOL) tablet 650 mg  650 mg Oral Q4H PRN    HYDROcodone-acetaminophen (NORCO) 5-325 mg per tablet 1 Tab  1 Tab Oral Q4H PRN    heparin (porcine) injection 5,000 Units  5,000 Units SubCUTAneous Q8H    glucose chewable tablet 16 g  4 Tab Oral PRN    glucagon (GLUCAGEN) injection 1 mg  1 mg IntraMUSCular PRN    furosemide (LASIX) tablet 80 mg  80 mg Oral DAILY    gabapentin (NEURONTIN) capsule 300-600 mg  300-600 mg Oral QHS    levothyroxine (SYNTHROID) tablet 50 mcg  50 mcg Oral ACB    metoprolol tartrate (LOPRESSOR) tablet 50 mg  50 mg Oral Q12H    valsartan (DIOVAN) tablet 320 mg  320 mg Oral DAILY    piperacillin-tazobactam (ZOSYN) 4.5 g in 0.9% sodium chloride (MBP/ADV) ###EXTENDED 4 HOUR INFUSION###  4.5 g IntraVENous Q8H          Total time spent with patient: 35 minutes                  Care Plan discussed with: Patient, Family, Care Manager, Nursing Staff and Consultant/Specialist    Discussed:  Care Plan    Prophylaxis:  Hep SQ    Disposition:  Home w/Family             Attending Physician: Odessa Sierra MD

## 2019-08-20 NOTE — PROGRESS NOTES
Problem: Mobility Impaired (Adult and Pediatric)  Goal: *Acute Goals and Plan of Care (Insert Text)  Description  Physical Therapy Goals  Initiated 8/20/2019 and to be accomplished within 7 day(s)  1. Patient will move from supine to sit and sit to supine  in bed with supervision/set-up. 2.  Patient will transfer from bed to chair and chair to bed with supervision/set-up using the least restrictive device. 3.  Patient will perform sit to stand with supervision/set-up. 4.  Patient will ambulate with supervision/set-up for 50 feet with the least restrictive device. Outcome: Progressing Towards Goal   PHYSICAL THERAPY EVALUATION    Patient: Nasario Dance (3 y.o. male)  Date: 8/20/2019  Primary Diagnosis: Cellulitis [L03.90]  Precautions: Fall, Skin  PLOF: Independent  ASSESSMENT :  Co-treated with OT to maximize patient safety and participation with functional mobility. Mod A x2 for supine to sit. Min A x2 for sit to stand with ww. Standing balance min A. RLE painful and self limits WB. Able to complete 3 side steps to right with ww and min A. Returned to seated EOB with min A. Mod A x2 for sit to supine. Education provided on bed mobility, transfers, ADLs, balance, amb, safety, exercise, role of PT, plan of care, cognition, skin integrity, vitals as indicated. Educated on need for RN assistance with mobility; verbalized understanding. Call bell in reach. Patient will benefit from skilled intervention to address the above impairments. Patient's rehabilitation potential is considered to be Fair  Factors which may influence rehabilitation potential include:   ? None noted  ? Mental ability/status  ? Medical condition  ? Home/family situation and support systems  ? Safety awareness  ? Pain tolerance/management  ? Other:      PLAN :  Recommendations and Planned Interventions:   ?           Bed Mobility Training             ?     Neuromuscular Re-Education  ? Transfer Training                   ? Orthotic/Prosthetic Training  ? Gait Training                          ? Modalities  ? Therapeutic Exercises           ? Edema Management/Control  ? Therapeutic Activities            ? Family Training/Education  ? Patient Education  ? Other (comment):    Frequency/Duration: Patient will be followed by physical therapy 3-5 times a week to address goals. Discharge Recommendations: Home Health  Further Equipment Recommendations for Discharge: BARIATRIC rolling walker     SUBJECTIVE:   Patient stated I was getting to the bathroom.     OBJECTIVE DATA SUMMARY:     Past Medical History:   Diagnosis Date    Anemia of chronic disease     Arthritis     Chronic renal insufficiency     Diabetes ([facility]     Dyslipidemia     Edema     Gout     Gout     Hypertension     Lymphedema     Morbid obesity ([facility]      Past Surgical History:   Procedure Laterality Date    HX ORTHOPAEDIC  3/2015     Lt Foot ulcer     Barriers to Learning/Limitations: None  Compensate with: Visual Cues, Verbal Cues, Tactile Cues and Kinesthetic Cues    Home Situation:  Home Situation  Home Environment: Private residence  # Steps to Enter: 4  One/Two Story Residence: Two story  # of Interior Steps: 8  Living Alone: No  Support Systems: Child(trevon), Family member(s)  Patient Expects to be Discharged to[de-identified] Private residence  Current DME Used/Available at Home: None    Critical Behavior:  Neurologic State: Alert  Orientation Level: Oriented X4  Cognition: Follows commands  Safety/Judgement: Fall prevention;Decreased insight into deficits  Psychosocial  Patient Behaviors: Cooperative    Strength:    Manual Muscle Testing (LE)         R     L    Hip Flexion:   5/5 5/5  Knee EXT:   5/5 5/5  Knee FLEX:   5/5 5/5  Ankle DF:   5/5 5/5  _________________________________________________   Tone & Sensation:   Tone: BLE normal  Sensation: Not tested  Range Of Motion:  BLE decreased, functionally able to maintain sitting/limited by lymphedema  Functional Mobility:  Bed Mobility:  Supine to Sit: Moderate assistance;Assist x2  Sit to Supine: Moderate assistance;Assist x2  Transfers:  Sit to Stand: Minimum assistance;Assist x2  Stand to Sit: Minimum assistance;Assist x2  Balance:   Sitting: Impaired  Sitting - Static: Good (unsupported)  Sitting - Dynamic: Good (unsupported)  Standing: Impaired  Standing - Static: Good  Standing - Dynamic : Fair    Neuro Re-education:  Seated EOB 10 minutes  Therapeutic Exercises:   Sit to stand  Pain:  Pain level pre-treatment: 8/10   Pain level post-treatment: 8/10     Activity Tolerance:   Fair    After treatment:   ?         Patient left in no apparent distress sitting up in chair  ? Patient left in no apparent distress in bed  ? Call bell left within reach  ? Nursing notified  ? Caregiver present  ? Bed alarm activated  ? SCDs applied    COMMUNICATION/EDUCATION:   ?         Role of physical therapy and plan of care in the acute care setting. ?         Fall prevention education was provided and the patient/caregiver indicated understanding. ? Patient/family have participated as able in goal setting and plan of care. ?         Patient/family agree to work toward stated goals and plan of care. ?         Patient understands intent and goals of therapy, but is neutral about his/her participation. ? Patient is unable to participate in goal setting/plan of care: ongoing with therapy staff.     Thank you for this referral.  Angelica Anderson, PT   Time Calculation: 29 mins    Eval Complexity: History: MEDIUM  Complexity : 1-2 comorbidities / personal factors will impact the outcome/ POC Exam:MEDIUM Complexity : 3 Standardized tests and measures addressing body structure, function, activity limitation and / or participation in recreation  Presentation: MEDIUM Complexity : Evolving with changing characteristics  Clinical Decision Making:Medium Complexity clincial judgement; ROM, MMT, functional mobility  Overall Complexity:MEDIUM

## 2019-08-21 LAB
ANION GAP SERPL CALC-SCNC: 8 MMOL/L (ref 3–18)
BASOPHILS # BLD: 0 K/UL (ref 0–0.1)
BASOPHILS NFR BLD: 0 % (ref 0–2)
BUN SERPL-MCNC: 16 MG/DL (ref 7–18)
BUN/CREAT SERPL: 10 (ref 12–20)
CALCIUM SERPL-MCNC: 8.5 MG/DL (ref 8.5–10.1)
CHLORIDE SERPL-SCNC: 106 MMOL/L (ref 100–111)
CO2 SERPL-SCNC: 31 MMOL/L (ref 21–32)
CREAT SERPL-MCNC: 1.64 MG/DL (ref 0.6–1.3)
DIFFERENTIAL METHOD BLD: ABNORMAL
EOSINOPHIL # BLD: 0.1 K/UL (ref 0–0.4)
EOSINOPHIL NFR BLD: 1 % (ref 0–5)
ERYTHROCYTE [DISTWIDTH] IN BLOOD BY AUTOMATED COUNT: 14.2 % (ref 11.6–14.5)
GLUCOSE BLD STRIP.AUTO-MCNC: 111 MG/DL (ref 70–110)
GLUCOSE BLD STRIP.AUTO-MCNC: 126 MG/DL (ref 70–110)
GLUCOSE BLD STRIP.AUTO-MCNC: 134 MG/DL (ref 70–110)
GLUCOSE BLD STRIP.AUTO-MCNC: 154 MG/DL (ref 70–110)
GLUCOSE SERPL-MCNC: 114 MG/DL (ref 74–99)
HCT VFR BLD AUTO: 31.6 % (ref 36–48)
HGB BLD-MCNC: 10.1 G/DL (ref 13–16)
LYMPHOCYTES # BLD: 1.7 K/UL (ref 0.9–3.6)
LYMPHOCYTES NFR BLD: 16 % (ref 21–52)
MCH RBC QN AUTO: 31.4 PG (ref 24–34)
MCHC RBC AUTO-ENTMCNC: 32 G/DL (ref 31–37)
MCV RBC AUTO: 98.1 FL (ref 74–97)
MONOCYTES # BLD: 1.1 K/UL (ref 0.05–1.2)
MONOCYTES NFR BLD: 11 % (ref 3–10)
NEUTS SEG # BLD: 7.7 K/UL (ref 1.8–8)
NEUTS SEG NFR BLD: 72 % (ref 40–73)
PLATELET # BLD AUTO: 351 K/UL (ref 135–420)
PMV BLD AUTO: 9.5 FL (ref 9.2–11.8)
POTASSIUM SERPL-SCNC: 3.7 MMOL/L (ref 3.5–5.5)
RBC # BLD AUTO: 3.22 M/UL (ref 4.7–5.5)
SODIUM SERPL-SCNC: 145 MMOL/L (ref 136–145)
WBC # BLD AUTO: 10.7 K/UL (ref 4.6–13.2)

## 2019-08-21 PROCEDURE — 74011250637 HC RX REV CODE- 250/637: Performed by: INTERNAL MEDICINE

## 2019-08-21 PROCEDURE — 80048 BASIC METABOLIC PNL TOTAL CA: CPT

## 2019-08-21 PROCEDURE — 74011250637 HC RX REV CODE- 250/637: Performed by: PHYSICIAN ASSISTANT

## 2019-08-21 PROCEDURE — 36415 COLL VENOUS BLD VENIPUNCTURE: CPT

## 2019-08-21 PROCEDURE — 65270000029 HC RM PRIVATE

## 2019-08-21 PROCEDURE — 74011000258 HC RX REV CODE- 258: Performed by: PHYSICIAN ASSISTANT

## 2019-08-21 PROCEDURE — 74011250636 HC RX REV CODE- 250/636: Performed by: PHYSICIAN ASSISTANT

## 2019-08-21 PROCEDURE — 74011636637 HC RX REV CODE- 636/637: Performed by: INTERNAL MEDICINE

## 2019-08-21 PROCEDURE — 85025 COMPLETE CBC W/AUTO DIFF WBC: CPT

## 2019-08-21 PROCEDURE — 82962 GLUCOSE BLOOD TEST: CPT

## 2019-08-21 RX ORDER — METHOCARBAMOL 500 MG/1
750 TABLET, FILM COATED ORAL
Status: DISCONTINUED | OUTPATIENT
Start: 2019-08-21 | End: 2019-08-30 | Stop reason: HOSPADM

## 2019-08-21 RX ORDER — ROSUVASTATIN CALCIUM 10 MG/1
40 TABLET, COATED ORAL
Status: DISCONTINUED | OUTPATIENT
Start: 2019-08-21 | End: 2019-08-30 | Stop reason: HOSPADM

## 2019-08-21 RX ORDER — DICLOFENAC SODIUM AND MISOPROSTOL 50; 200 MG/1; UG/1
1 TABLET, DELAYED RELEASE ORAL 2 TIMES DAILY WITH MEALS
Status: DISCONTINUED | OUTPATIENT
Start: 2019-08-21 | End: 2019-08-30 | Stop reason: HOSPADM

## 2019-08-21 RX ORDER — METHOCARBAMOL 750 MG/1
750 TABLET, FILM COATED ORAL
Status: DISCONTINUED | OUTPATIENT
Start: 2019-08-21 | End: 2019-08-21 | Stop reason: SDUPTHER

## 2019-08-21 RX ADMIN — PIPERACILLIN SODIUM AND TAZOBACTAM SODIUM 4.5 G: 4; .5 INJECTION, POWDER, LYOPHILIZED, FOR SOLUTION INTRAVENOUS at 05:25

## 2019-08-21 RX ADMIN — HEPARIN SODIUM 5000 UNITS: 5000 INJECTION INTRAVENOUS; SUBCUTANEOUS at 05:25

## 2019-08-21 RX ADMIN — VALSARTAN 320 MG: 80 TABLET, FILM COATED ORAL at 09:03

## 2019-08-21 RX ADMIN — HYDROCODONE BITARTRATE AND ACETAMINOPHEN 1 TABLET: 5; 325 TABLET ORAL at 09:02

## 2019-08-21 RX ADMIN — HEPARIN SODIUM 5000 UNITS: 5000 INJECTION INTRAVENOUS; SUBCUTANEOUS at 13:21

## 2019-08-21 RX ADMIN — Medication 10 ML: at 17:39

## 2019-08-21 RX ADMIN — HEPARIN SODIUM 5000 UNITS: 5000 INJECTION INTRAVENOUS; SUBCUTANEOUS at 21:54

## 2019-08-21 RX ADMIN — INSULIN LISPRO 3 UNITS: 100 INJECTION, SOLUTION INTRAVENOUS; SUBCUTANEOUS at 13:19

## 2019-08-21 RX ADMIN — FUROSEMIDE 80 MG: 40 TABLET ORAL at 09:02

## 2019-08-21 RX ADMIN — ROSUVASTATIN CALCIUM 40 MG: 10 TABLET, COATED ORAL at 21:52

## 2019-08-21 RX ADMIN — LEVOTHYROXINE SODIUM 50 MCG: 50 TABLET ORAL at 05:26

## 2019-08-21 RX ADMIN — SODIUM HYPOCHLORITE: 2.5 SOLUTION TOPICAL at 21:57

## 2019-08-21 RX ADMIN — GABAPENTIN 300 MG: 300 CAPSULE ORAL at 21:53

## 2019-08-21 RX ADMIN — METOPROLOL TARTRATE 50 MG: 50 TABLET ORAL at 09:03

## 2019-08-21 RX ADMIN — Medication 10 ML: at 21:56

## 2019-08-21 RX ADMIN — HYDROCODONE BITARTRATE AND ACETAMINOPHEN 1 TABLET: 5; 325 TABLET ORAL at 21:52

## 2019-08-21 RX ADMIN — PIPERACILLIN SODIUM AND TAZOBACTAM SODIUM 4.5 G: 4; .5 INJECTION, POWDER, LYOPHILIZED, FOR SOLUTION INTRAVENOUS at 13:24

## 2019-08-21 RX ADMIN — METOPROLOL TARTRATE 50 MG: 50 TABLET ORAL at 21:53

## 2019-08-21 NOTE — PROGRESS NOTES
conducted a Follow up consultation and Spiritual Assessment for Leslie Carlson, who is a 81 y.o.,male. The  provided the following Interventions:  Continued the relationship of care and support. Listened empathically. Offered prayer and assurance of continued prayer on patients behalf. Chart reviewed. The following outcomes were achieved:  Patient expressed gratitude for pastoral care visit. Assessment:  There are no further spiritual or Sabianist issues which require Spiritual Care Services interventions at this time. Plan:  Chaplains will continue to follow and will provide pastoral care on an as needed/requested basis.  recommends bedside caregivers page  on duty if patient shows signs of acute spiritual or emotional distress.      [address] [facility]   Staff [address] [place] [address]   [phone]

## 2019-08-21 NOTE — PROGRESS NOTES
Problem: Diabetes Self-Management  Goal: *Disease process and treatment process  Description  Define diabetes and identify own type of diabetes; list 3 options for treating diabetes. 8/20/2019 2034 by Kwan JENKINS  Outcome: Progressing Towards Goal  8/20/2019 2032 by Kwan JENKINS  Outcome: Progressing Towards Goal  Goal: *Incorporating nutritional management into lifestyle  Description  Describe effect of type, amount and timing of food on blood glucose; list 3 methods for planning meals. 8/20/2019 2034 by Kwan Doran  Outcome: Progressing Towards Goal  8/20/2019 2032 by Kwan JENKINS  Outcome: Progressing Towards Goal  Goal: *Incorporating physical activity into lifestyle  Description  State effect of exercise on blood glucose levels. 8/20/2019 2034 by Kwan JENKINS  Outcome: Progressing Towards Goal  8/20/2019 2032 by Kwan JENKINS  Outcome: Progressing Towards Goal  Goal: *Developing strategies to promote health/change behavior  Description  Define the ABC's of diabetes; identify appropriate screenings, schedule and personal plan for screenings. 8/20/2019 2034 by Kwan Doran  Outcome: Progressing Towards Goal  8/20/2019 2032 by Kwan JENKINS  Outcome: Progressing Towards Goal  Goal: *Using medications safely  Description  State effect of diabetes medications on diabetes; name diabetes medication taking, action and side effects. 8/20/2019 2034 by Kwan Doran  Outcome: Progressing Towards Goal  8/20/2019 2032 by Kwan JENKINS  Outcome: Progressing Towards Goal  Goal: *Monitoring blood glucose, interpreting and using results  Description  Identify recommended blood glucose targets  and personal targets.   8/20/2019 2034 by Kwan Doran  Outcome: Progressing Towards Goal  8/20/2019 2032 by Kwan JENKINS  Outcome: Progressing Towards Goal  Goal: *Prevention, detection, treatment of acute complications  Description  List symptoms of hyper- and hypoglycemia; describe how to treat low blood sugar and actions for lowering  high blood glucose level. 8/20/2019 2034 by Nilsa Riedel  Outcome: Progressing Towards Goal  8/20/2019 2032 by Nilsa Riedel D  Outcome: Progressing Towards Goal  Goal: *Prevention, detection and treatment of chronic complications  Description  Define the natural course of diabetes and describe the relationship of blood glucose levels to long term complications of diabetes.   8/20/2019 2034 by Nilsa Riedel D  Outcome: Progressing Towards Goal  8/20/2019 2032 by Nilsa Riedel D  Outcome: Progressing Towards Goal  Goal: *Developing strategies to address psychosocial issues  Description  Describe feelings about living with diabetes; identify support needed and support network  8/20/2019 2034 by Nilsa Riedel D  Outcome: Progressing Towards Goal  8/20/2019 2032 by Nilsa Riedel D  Outcome: Progressing Towards Goal  Goal: *Insulin pump training  8/20/2019 2034 by Nilsa Riedel D  Outcome: Progressing Towards Goal  8/20/2019 2032 by Nilsa Riedel D  Outcome: Progressing Towards Goal  Goal: *Sick day guidelines  8/20/2019 2034 by Nilsa Riedel D  Outcome: Progressing Towards Goal  8/20/2019 2032 by Nilsa Riedel D  Outcome: Progressing Towards Goal  Goal: *Patient Specific Goal (EDIT GOAL, INSERT TEXT)  8/20/2019 2034 by Nilsa Riedel D  Outcome: Progressing Towards Goal  8/20/2019 2032 by Nilsa Riedel D  Outcome: Progressing Towards Goal     Problem: Patient Education: Go to Patient Education Activity  Goal: Patient/Family Education  8/20/2019 2034 by Nilsa Riedel  Outcome: Progressing Towards Goal  8/20/2019 2032 by Nilsa Riedel D  Outcome: Progressing Towards Goal     Problem: Pressure Injury - Risk of  Goal: *Prevention of pressure injury  Description  Document Stone Scale and appropriate interventions in the flowsheet. 8/20/2019 2034 by Mor JENKINS  Outcome: Progressing Towards Goal  Note:   Pressure Injury Interventions:  Sensory Interventions: Assess need for specialty bed, Keep linens dry and wrinkle-free, Minimize linen layers, Pressure redistribution bed/mattress (bed type)    Moisture Interventions: Absorbent underpads, Apply protective barrier, creams and emollients, Minimize layers, Moisture barrier, Offer toileting Q_hr    Activity Interventions: Increase time out of bed, Pressure redistribution bed/mattress(bed type), PT/OT evaluation    Mobility Interventions: HOB 30 degrees or less    Nutrition Interventions: Offer support with meals,snacks and hydration    Friction and Shear Interventions: Minimize layers             8/20/2019 2032 by Mor JENKINS  Outcome: Progressing Towards Goal  Note:   Pressure Injury Interventions:  Sensory Interventions: Assess need for specialty bed, Keep linens dry and wrinkle-free, Minimize linen layers, Pressure redistribution bed/mattress (bed type)    Moisture Interventions: Absorbent underpads, Apply protective barrier, creams and emollients, Minimize layers, Moisture barrier, Offer toileting Q_hr    Activity Interventions: Increase time out of bed, Pressure redistribution bed/mattress(bed type), PT/OT evaluation    Mobility Interventions: HOB 30 degrees or less    Nutrition Interventions: Offer support with meals,snacks and hydration    Friction and Shear Interventions: Minimize layers                Problem: Patient Education: Go to Patient Education Activity  Goal: Patient/Family Education  8/20/2019 2034 by Mor JENKINS  Outcome: Progressing Towards Goal  8/20/2019 2032 by Mor JENKINS  Outcome: Progressing Towards Goal     Problem: Falls - Risk of  Goal: *Absence of Falls  Description  Document Vilma Chávez Fall Risk and appropriate interventions in the flowsheet.   8/20/2019 2034 by Corin JENKINS  Outcome: Progressing Towards Goal  Note:   Fall Risk Interventions:  Mobility Interventions: Patient to call before getting OOB         Medication Interventions: Teach patient to arise slowly, Patient to call before getting OOB    Elimination Interventions: Call light in reach, Toilet paper/wipes in reach           8/20/2019 2032 by Corin JENKINS  Outcome: Progressing Towards Goal  Note:   Fall Risk Interventions:  Mobility Interventions: Patient to call before getting OOB         Medication Interventions: Teach patient to arise slowly, Patient to call before getting OOB    Elimination Interventions: Call light in reach, Toilet paper/wipes in reach              Problem: Patient Education: Go to Patient Education Activity  Goal: Patient/Family Education  8/20/2019 2034 by Corin Pearl  Outcome: Progressing Towards Goal  8/20/2019 2032 by Corin JENKINS  Outcome: Progressing Towards Goal     Problem: Discharge Planning  Goal: *Discharge to safe environment  8/20/2019 2034 by Corin Pearl  Outcome: Progressing Towards Goal  8/20/2019 2032 by Corin JENKINS  Outcome: Progressing Towards Goal     Problem: Pain  Goal: *Control of Pain  8/20/2019 2034 by Corin JENKINS  Outcome: Progressing Towards Goal  8/20/2019 2032 by Corin JENKINS  Outcome: Progressing Towards Goal  Goal: *PALLIATIVE CARE:  Alleviation of Pain  8/20/2019 2034 by Corin JENKINS  Outcome: Progressing Towards Goal  8/20/2019 2032 by Corin JENKINS  Outcome: Progressing Towards Goal     Problem: Patient Education: Go to Patient Education Activity  Goal: Patient/Family Education  8/20/2019 2034 by Corin Pearl  Outcome: Progressing Towards Goal  8/20/2019 2032 by Corin Pearl  Outcome: Progressing Towards Goal     Problem: Patient Education: Go to Patient Education Activity  Goal: Patient/Family Education  8/20/2019 2034 by Kenyatta JENKINS  Outcome: Progressing Towards Goal  8/20/2019 2032 by Kenyatta JENKINS  Outcome: Progressing Towards Goal     Problem: Patient Education: Go to Patient Education Activity  Goal: Patient/Family Education  8/20/2019 2034 by Kenyatta JENKINS  Outcome: Progressing Towards Goal  8/20/2019 2032 by Kenyatta JENKINS  Outcome: Progressing Towards Goal

## 2019-08-21 NOTE — PROGRESS NOTES
Recent notes from Vascular Surgery appreciated - they are saying that there is good arterial blood supply to the right lower leg we can nor proceed to a more aggressive approach - his right knee gout is improving - it is imperative that he does not lose his ability to weight bear - Dr Marlene Benson and I can undertake debridement of the ankle sinus to permit easier packing.

## 2019-08-21 NOTE — WOUND CARE
Wound/Ostomy Nurse Progress Note          Patient: Lesley Florez                                                                                      ZJV:[birth year]    MRN: [id number]          Situation: Nurse manager, Lissette Thakkar, requested that wound care apply an Unna boot to left leg, which has been recommended by Dr. Marilee Maldonado, vascular. Background: Patient is well-known to wound care as he has been treated on an outpatient basis in the wound clinic for several years. He has had lymphedema pumps for several years, which he has not used recently, as he states \"I tried and did my best, but they didn't help me at all. \"    Assessment: The patient was sitting up in bed, awake and alert, when wound care arrived for his dressing application. He states he cannot get out of the bed, stand up, or walk, as he has a gout flare up in his right knee which is \"excruciating\". Since the patient is bed bound for now, and non-ambulatory, an Unna boot is not recommended as it will not provide much compression in a non-ambulatory patient. A three layer Profore compression dressing was applied instead, though it is doubtful that this will significantly benefit the patient's edema. The patient has a lot of difficulty lifting his legs due to the size and weight of them. Four nurses (three wound care team and the patient's nurse) assisted in application of the compression.      Recommendation: Patient may follow up with wound care, if necessary, once he is discharged from the care of Dr. Rush Reyes

## 2019-08-21 NOTE — PROGRESS NOTES
Received patient from [facility]. Pt awake and in bed. Son at bedside. Pt is complaining of knee pain, stating he wants his gout meds tonight. Med rescheduled for nighttime. Bed locked in lowest position and call light within reach. 2030: wound care and incontinent care performed. Pt tolerated fairly. Uneventful night for pt. Pt treated per MAR. See eMAR and flow sheet for more details. Bedside shift change report given to Denae VICTORIA (oncoming nurse) by Ronnie VICTORIA (offgoing nurse). Report included the following information SBAR, Intake/Output and Quality Measures. Opportunity for questions and clarification provided.

## 2019-08-21 NOTE — PROGRESS NOTES
Internal Medicine Progress Note        NAME: Sarahi Garcia   :  1950  MRM:  [id number]    Date/Time: 2019        ASSESSMENT/PLAN:    Principal Problem:    Cellulitis of right lower extremity (2019)    Active Problems:    Lymphedema of both lower extremities (2015)      CKD (chronic kidney disease) stage 3, GFR 30-59 ml/min ([facility]) (2015)      Hypothyroidism, adult (2015)      Type 2 diabetes mellitus with diabetic neuropathy ([facility] (8/15/2018)      Chronic diastolic HF (heart failure) ([facility] (2019)      # Cellulitis with infected ulcer right lateral leg with h/o lymphedema.  cx Citrobacter koseri (2) both pan-sensitive, E faecalis, Diphtheroids (3) . Treated with   vanc zosyn, then dc vanco  per ID. conotinue zosyn    #  ulcer right lateral leg with h/o lymphedema, Elephantiasis . Dr. Chelsea Chase [facility]) evaluated, recommends vascular surgery eval . MIGDALIA done on admission shows \"There is no evidence of any hemodynamically significant peripheral arterial disease at rest in either lower extremity. \"  Vascular consulted. Charito January Appreciate Podiatry assistance   - pending surgery plan ; vascular/ PRS     # Chronic lymphedema b/l LE . Cont lasix. bilateral unna boots recommended by Vascular    # Hypothyroidism. Cont synthroid    # DM. Provide SSI, hypoglycemia protocol and frequent Accu checks. Provide SSI, hypoglycemia protocol and frequent Accu checks. Education,  diabetic educator     # CKD. Monitor labs. Avoid nephrotoxins. Renally dosing medications. Monitor urine out put.       · DVT protocol    Lab Review:     Recent Labs     19  0350 19  0350   WBC 10.7 8.9   HGB 10.1* 10.3*   HCT 31.6* 32.2*    387     Recent Labs     19  0350 19  0350    143   K 3.7 3.8    106   CO2 31 32   * 88   BUN 16 18   CREA 1.64* 1.62*   CA 8.5 8.7     Lab Results   Component Value Date/Time    Glucose (POC) 126 (H) 2019 08:34 AM    Glucose (POC) 111 (H) 08/20/2019 09:39 PM    Glucose (POC) 120 (H) 08/20/2019 04:44 PM    Glucose (POC) 109 08/20/2019 01:02 PM    Glucose (POC) 107 08/20/2019 07:52 AM     No results for input(s): PH, PCO2, PO2, HCO3, FIO2 in the last 72 hours. No results for input(s): INR in the last 72 hours. No lab exists for component: INREXT, INREXT    No results found for: SDES  Lab Results   Component Value Date/Time    Culture result: NO GROWTH 5 DAYS 08/16/2019 02:45 PM    Culture result: MODERATE CITROBACTER KOSERI (A) 08/15/2019 02:44 PM    Culture result: FEW CITROBACTER KOSERI 2ND MORPHOTYPE (A) 08/15/2019 02:44 PM    Culture result: FEW ENTEROCOCCUS FAECALIS GROUP D (A) 08/15/2019 02:44 PM    Culture result: FEW DIPHTHEROIDS THREE MORPHOTYPES (A) 08/15/2019 02:44 PM       All Cardiac Markers in the last 24 hours: No results found for: CPK, CK, CKMMB, CKMB, RCK3, CKMBT, CKNDX, CKND1, EARLE, TROPT, TROIQ, FLORENTIN, TROPT, TNIPOC, BNP, BNPP           Subjective:     Chief Complaint:      Offer no complain  Waiting for surgery    ROS:  (bold if positive,otherwise negative)    Fever/chills ,  Dysuria   Cough , Sputum , SOB/SHELTON , Chest Pain     Diarrhea ,Nausea/Vomit , Abd Pain , Constipation    Tolerating Diet                Objective:     Vitals:  Last 24hrs VS reviewed since prior progress note. Most recent are:    Visit Vitals  /75 (BP 1 Location: Right arm)   Pulse 72   Temp 98.4 °F (36.9 °C)   Resp 16   Ht 6' 5\" (1.956 m)   Wt (!) 205 kg (452 lb)   SpO2 91%   BMI 53.60 kg/m²     SpO2 Readings from Last 6 Encounters:   08/21/19 91%   08/15/19 93%   08/15/19 100%   08/09/19 97%   07/24/19 97%   07/22/19 97%            Intake/Output Summary (Last 24 hours) at 8/21/2019 0949  Last data filed at 8/21/2019 0793  Gross per 24 hour   Intake 1570 ml   Output 2800 ml   Net -1230 ml          Physical Exam:   Ears: hearing is intact  Eyes:  Icterus is not present  Lungs: clear to auscultation bilaterally  Heart: regular rate and rhythm, S1, S2 normal, no murmur, click, rub or gallop  Gastrointestinal: soft, non-tender. Bowel sounds normal. No masses,  no organomegaly  Neurological:  New Focal Deficits are not present  Psychiatric:  Mood is stable  Legs elephantiasis.  R LL ulcer, packed and  dressed     Medications Reviewed: (see below)    Lab Data Reviewed: (see below)    ______________________________________________________________________    Medications:     Current Facility-Administered Medications   Medication Dose Route Frequency    colchicine tablet 0.6 mg  0.6 mg Oral DAILY    insulin lispro (HUMALOG) injection   SubCUTAneous QID    dextrose 10% infusion 125-250 mL  125-250 mL IntraVENous PRN    sodium hypochlorite (HALF STRENGTH DAKIN'S) 0.25% irrigation (bottle)   Topical Q12H    sodium chloride (NS) flush 5-40 mL  5-40 mL IntraVENous Q8H    sodium chloride (NS) flush 5-40 mL  5-40 mL IntraVENous PRN    acetaminophen (TYLENOL) tablet 650 mg  650 mg Oral Q4H PRN    HYDROcodone-acetaminophen (NORCO) 5-325 mg per tablet 1 Tab  1 Tab Oral Q4H PRN    heparin (porcine) injection 5,000 Units  5,000 Units SubCUTAneous Q8H    glucose chewable tablet 16 g  4 Tab Oral PRN    glucagon (GLUCAGEN) injection 1 mg  1 mg IntraMUSCular PRN    furosemide (LASIX) tablet 80 mg  80 mg Oral DAILY    gabapentin (NEURONTIN) capsule 300-600 mg  300-600 mg Oral QHS    levothyroxine (SYNTHROID) tablet 50 mcg  50 mcg Oral ACB    metoprolol tartrate (LOPRESSOR) tablet 50 mg  50 mg Oral Q12H    valsartan (DIOVAN) tablet 320 mg  320 mg Oral DAILY    piperacillin-tazobactam (ZOSYN) 4.5 g in 0.9% sodium chloride (MBP/ADV) ###EXTENDED 4 HOUR INFUSION###  4.5 g IntraVENous Q8H          Total time spent with patient: 35 minutes                  Care Plan discussed with: Patient, Family, Care Manager, Nursing Staff and Consultant/Specialist    Discussed:  Care Plan    Prophylaxis:  Hep SQ    Disposition:  Home w/Family             Attending Physician: Fannie Bill Caro Dominguez MD

## 2019-08-21 NOTE — PROGRESS NOTES
Problem: Discharge Planning  Goal: *Discharge to safe environment  Outcome: Progressing Towards Goal       Plan: home possible hh    Patient is pending procedure.

## 2019-08-21 NOTE — PROGRESS NOTES
Bedside shift change report given to ESME Philippe (oncoming nurse) by Janee Barba RN (offgoing nurse). Report included the following information SBAR, Kardex, Intake/Output, MAR and Recent Results. A/O x4, pain noted, IV flushed and capped, call bell and personal belongings in reach.      0903 -- AM medications given, well tolerated, Pain level 7, Norco given, well tolerated. 0930 -- Pain reassessed, patient is resting comfortably, will continue to monitor.      1139 -- Reassessment completed, no change in patient condition, will continue to monitor.       -- Reassessment completed, no change in patient condition, will continue to monitor.      Bedside shift change report given to , RN (oncoming nurse) by Hafsa Urbina RN (offgoing nurse). Report included the following information SBAR, Kardex, Intake/Output, MAR and Recent Results.

## 2019-08-21 NOTE — PROGRESS NOTES
[facility]  (A Division of [address] [place] [address])    Infectious Disease Follow-up Note      Date of Admission: 8/16/2019     Date of Note:  8/21/2019      Summary:     80 y/o AAM with chronic massive bilateral LE lymphedema, morbid obesity, infected right lateral leg ulcer with celllulitis. Comorbid: DM, HTN, CKD, Gout    Interval History:     Lying comfortably in bed. No new complaints. Left leg now enclosed in ACE dressing. R leg edema appears diminished. Current Antimicrobials: Prior Antimicrobials   Zosyn 8/16 - 5  Vancomycin 8/16 - 3      Assessment / Plan:      Infected Ulcer, right lateral leg  - with cellulitis right leg  - wd 8/15: gs gpc prs, gnr.  cx Citrobacter koseri (2) both pan-sensitive, E faecalis, Diphtheroids (3)  - no malodor today -> continue zosyn  -> possible debridement depending on vascular, PRS re-evaluation    Chronic massive lymphedema  - bilateral lower extremities  - left leg compression dressing applied by [facility] today 8/21  - slightly improved -> per others   Diarrhea  - 1-2 watery stools per day. Likely from abx -> monitor.   If persists or worsens on Zosyn will change to Vanc/Ceftriaxone +/- metronidazole   Morbid Obesity     DM     CKD     HTN     Gout        Microbiology:      8/15      Wd gs gpc prs, gnr.  cx gnr(2)  8/16      blcx NGTD x 1     Lines / Catheters:      piv    Patient Active Problem List   Diagnosis Code    Diabetic foot ulcer ([facility] E11.621, O59.580    Diastolic dysfunction V57.26    Dyslipidemia E78.5    Cellulitis and abscess of foot, except toes L03.119, L02.619    Lymphedema of both lower extremities I89.0    CKD (chronic kidney disease) stage 3, GFR 30-59 ml/min ([facility]) N18.3    Morbid obesity with BMI of 70 and over, adult ([facility] E66.01, Z68.45    Erectile dysfunction N52.9    Hypothyroidism, adult E56.8    Hypogonadism in male E29.1    Borderline anemia D64.9    Idiopathic gout of multiple sites M10.09    Skin ulcer of ankle with fat layer exposed, right ([facility] L97.312    Non-pressure chronic ulcer of right calf with fat layer exposed ([facility] L97.212    Type 2 diabetes mellitus with diabetic neuropathy (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) L97.213    Puncture wound of multiple sites of right leg S81.831A    Cellulitis of leg without foot, right L03.115    Cellulitis of right lower extremity L03.115    Chronic diastolic HF (heart failure) ([facility]) I50.32       Current Facility-Administered Medications   Medication Dose Route Frequency    rosuvastatin (CRESTOR) tablet 40 mg  40 mg Oral QHS    diclofenac-miSOPROStol (ARTHROTEC 50)  mg-mcg per tablet - Patient's own med  1 Tab Oral BID WITH MEALS    methocarbamol (ROBAXIN) tablet 750 mg  750 mg Oral QID PRN    colchicine tablet 0.6 mg  0.6 mg Oral DAILY    insulin lispro (HUMALOG) injection   SubCUTAneous QID    dextrose 10% infusion 125-250 mL  125-250 mL IntraVENous PRN    sodium hypochlorite (HALF STRENGTH DAKIN'S) 0.25% irrigation (bottle)   Topical Q12H    sodium chloride (NS) flush 5-40 mL  5-40 mL IntraVENous Q8H    sodium chloride (NS) flush 5-40 mL  5-40 mL IntraVENous PRN    acetaminophen (TYLENOL) tablet 650 mg  650 mg Oral Q4H PRN    HYDROcodone-acetaminophen (NORCO) 5-325 mg per tablet 1 Tab  1 Tab Oral Q4H PRN    heparin (porcine) injection 5,000 Units  5,000 Units SubCUTAneous Q8H    glucose chewable tablet 16 g  4 Tab Oral PRN    glucagon (GLUCAGEN) injection 1 mg  1 mg IntraMUSCular PRN    furosemide (LASIX) tablet 80 mg  80 mg Oral DAILY    gabapentin (NEURONTIN) capsule 300-600 mg  300-600 mg Oral QHS    levothyroxine (SYNTHROID) tablet 50 mcg  50 mcg Oral ACB    metoprolol tartrate (LOPRESSOR) tablet 50 mg  50 mg Oral Q12H    valsartan (DIOVAN) tablet 320 mg  320 mg Oral DAILY    piperacillin-tazobactam (ZOSYN) 4.5 g in 0.9% sodium chloride (MBP/ADV) ###EXTENDED 4 HOUR INFUSION### 4.5 g IntraVENous Q8H     Review of Systems - Negative except as in interval history     Objective:     Visit Vitals  /77 (BP 1 Location: Right arm)   Pulse 63   Temp 98 °F (36.7 °C)   Resp 16   Ht 6' 5\" (1.956 m)   Wt (!) 205 kg (452 lb)   SpO2 98%   BMI 53.60 kg/m²       Temp (24hrs), Av.5 °F (36.9 °C), Min:98 °F (36.7 °C), Max:99.6 °F (37.6 °C)      General: Well developed, morbidly obese 81 y.o.  male in no acute distress. ENT: ENT exam normal, no neck nodes or sinus tenderness  Head: normocephalic, without obvious abnormality  Mouth:  mucous membranes moist, pharynx normal without lesions  Neck: supple, symmetrical, trachea midline   Cardio:  regular rate and rhythm, S1, S2 normal, no murmur, click, rub or gallop  Chest: inspection normal - no chest wall deformities or tenderness, respiratory effort normal  Lungs: clear to auscultation and no wheezes or rales  Abdomen: obese, soft, non-tender. Bowel sounds normal. No masses, no organomegaly. Extremities:  Massive lymphedema bilateral lower extremities with redundant skin folds and lichenified skin. Right dressing not removed.  Left leg enclosed in compression dressing.       Lab results     Chemistry  Recent Labs     19  0350 19  0350   * 88    143   K 3.7 3.8    106   CO2 31 32   BUN 16 18   CREA 1.64* 1.62*   CA 8.5 8.7   AGAP 8 5   BUCR 10* 11*       CBC w/ Diff  Recent Labs     19  0350 19  0350   WBC 10.7 8.9   RBC 3.22* 3.26*   HGB 10.1* 10.3*   HCT 31.6* 32.2*    387   GRANS 72 61   LYMPH 16* 26   EOS 1 3       Microbiology  All Micro Results     Procedure Component Value Units Date/Time    CULTURE, BLOOD [[id number]] Collected:  19 1445    Order Status:  Completed Specimen:  Blood Updated:  19     Special Requests: PERIPHERAL        Culture result: NO GROWTH 5 DAYS       CULTURE, BLOOD [[id number]]     Order Status:  Canceled Specimen:  Blood Azeem Ovalles MD, [facility]  Infectious Disease Specialist  Pager [phone]

## 2019-08-21 NOTE — PROGRESS NOTES
Problem: Diabetes Self-Management  Goal: *Disease process and treatment process  Description  Define diabetes and identify own type of diabetes; list 3 options for treating diabetes. Outcome: Progressing Towards Goal  Goal: *Incorporating nutritional management into lifestyle  Description  Describe effect of type, amount and timing of food on blood glucose; list 3 methods for planning meals. Outcome: Progressing Towards Goal  Goal: *Incorporating physical activity into lifestyle  Description  State effect of exercise on blood glucose levels. Outcome: Progressing Towards Goal  Goal: *Developing strategies to promote health/change behavior  Description  Define the ABC's of diabetes; identify appropriate screenings, schedule and personal plan for screenings. Outcome: Progressing Towards Goal  Goal: *Using medications safely  Description  State effect of diabetes medications on diabetes; name diabetes medication taking, action and side effects. Outcome: Progressing Towards Goal  Goal: *Monitoring blood glucose, interpreting and using results  Description  Identify recommended blood glucose targets  and personal targets. Outcome: Progressing Towards Goal  Goal: *Prevention, detection, treatment of acute complications  Description  List symptoms of hyper- and hypoglycemia; describe how to treat low blood sugar and actions for lowering  high blood glucose level. Outcome: Progressing Towards Goal  Goal: *Prevention, detection and treatment of chronic complications  Description  Define the natural course of diabetes and describe the relationship of blood glucose levels to long term complications of diabetes.   Outcome: Progressing Towards Goal  Goal: *Developing strategies to address psychosocial issues  Description  Describe feelings about living with diabetes; identify support needed and support network  Outcome: Progressing Towards Goal  Goal: *Insulin pump training  Outcome: Progressing Towards Goal  Goal: *Sick day guidelines  Outcome: Progressing Towards Goal  Goal: *Patient Specific Goal (EDIT GOAL, INSERT TEXT)  Outcome: Progressing Towards Goal     Problem: Patient Education: Go to Patient Education Activity  Goal: Patient/Family Education  Outcome: Progressing Towards Goal     Problem: Pressure Injury - Risk of  Goal: *Prevention of pressure injury  Description  Document Stone Scale and appropriate interventions in the flowsheet. Outcome: Progressing Towards Goal  Note:   Pressure Injury Interventions:  Sensory Interventions: Assess need for specialty bed, Keep linens dry and wrinkle-free, Minimize linen layers, Pressure redistribution bed/mattress (bed type)    Moisture Interventions: Absorbent underpads, Apply protective barrier, creams and emollients, Minimize layers, Moisture barrier, Offer toileting Q_hr    Activity Interventions: Increase time out of bed, Pressure redistribution bed/mattress(bed type), PT/OT evaluation    Mobility Interventions: HOB 30 degrees or less    Nutrition Interventions: Offer support with meals,snacks and hydration    Friction and Shear Interventions: Minimize layers                Problem: Patient Education: Go to Patient Education Activity  Goal: Patient/Family Education  Outcome: Progressing Towards Goal     Problem: Falls - Risk of  Goal: *Absence of Falls  Description  Document Zuleyma Fall Risk and appropriate interventions in the flowsheet.   Outcome: Progressing Towards Goal  Note:   Fall Risk Interventions:  Mobility Interventions: Patient to call before getting OOB         Medication Interventions: Teach patient to arise slowly, Patient to call before getting OOB    Elimination Interventions: Call light in reach, Toilet paper/wipes in reach              Problem: Patient Education: Go to Patient Education Activity  Goal: Patient/Family Education  Outcome: Progressing Towards Goal     Problem: Discharge Planning  Goal: *Discharge to safe environment  Outcome: Progressing Towards Goal     Problem: Pain  Goal: *Control of Pain  Outcome: Progressing Towards Goal  Goal: *PALLIATIVE CARE:  Alleviation of Pain  Outcome: Progressing Towards Goal     Problem: Patient Education: Go to Patient Education Activity  Goal: Patient/Family Education  Outcome: Progressing Towards Goal     Problem: Patient Education: Go to Patient Education Activity  Goal: Patient/Family Education  Outcome: Progressing Towards Goal     Problem: Patient Education: Go to Patient Education Activity  Goal: Patient/Family Education  Outcome: Progressing Towards Goal

## 2019-08-21 NOTE — PROGRESS NOTES
Problem: Diabetes Self-Management  Goal: *Disease process and treatment process  Description  Define diabetes and identify own type of diabetes; list 3 options for treating diabetes. Outcome: Progressing Towards Goal  Goal: *Incorporating nutritional management into lifestyle  Description  Describe effect of type, amount and timing of food on blood glucose; list 3 methods for planning meals. Outcome: Progressing Towards Goal  Goal: *Incorporating physical activity into lifestyle  Description  State effect of exercise on blood glucose levels. Outcome: Progressing Towards Goal  Goal: *Developing strategies to promote health/change behavior  Description  Define the ABC's of diabetes; identify appropriate screenings, schedule and personal plan for screenings. Outcome: Progressing Towards Goal  Goal: *Using medications safely  Description  State effect of diabetes medications on diabetes; name diabetes medication taking, action and side effects. Outcome: Progressing Towards Goal  Goal: *Monitoring blood glucose, interpreting and using results  Description  Identify recommended blood glucose targets  and personal targets. Outcome: Progressing Towards Goal  Goal: *Prevention, detection, treatment of acute complications  Description  List symptoms of hyper- and hypoglycemia; describe how to treat low blood sugar and actions for lowering  high blood glucose level. Outcome: Progressing Towards Goal  Goal: *Prevention, detection and treatment of chronic complications  Description  Define the natural course of diabetes and describe the relationship of blood glucose levels to long term complications of diabetes.   Outcome: Progressing Towards Goal  Goal: *Developing strategies to address psychosocial issues  Description  Describe feelings about living with diabetes; identify support needed and support network  Outcome: Progressing Towards Goal  Goal: *Insulin pump training  Outcome: Progressing Towards Goal  Goal: *Sick day guidelines  Outcome: Progressing Towards Goal  Goal: *Patient Specific Goal (EDIT GOAL, INSERT TEXT)  Outcome: Progressing Towards Goal     Problem: Patient Education: Go to Patient Education Activity  Goal: Patient/Family Education  Outcome: Progressing Towards Goal     Problem: Pressure Injury - Risk of  Goal: *Prevention of pressure injury  Description  Document Stone Scale and appropriate interventions in the flowsheet. Outcome: Progressing Towards Goal  Note:   Pressure Injury Interventions:  Sensory Interventions: Avoid rigorous massage over bony prominences, Keep linens dry and wrinkle-free, Maintain/enhance activity level, Minimize linen layers    Moisture Interventions: Check for incontinence Q2 hours and as needed, Minimize layers    Activity Interventions: Pressure redistribution bed/mattress(bed type), Increase time out of bed    Mobility Interventions: HOB 30 degrees or less, Pressure redistribution bed/mattress (bed type)    Nutrition Interventions: Offer support with meals,snacks and hydration    Friction and Shear Interventions: Lift sheet, Lift team/patient mobility team, Minimize layers                Problem: Patient Education: Go to Patient Education Activity  Goal: Patient/Family Education  Outcome: Progressing Towards Goal     Problem: Falls - Risk of  Goal: *Absence of Falls  Description  Document Zuleyma Fall Risk and appropriate interventions in the flowsheet.   Outcome: Progressing Towards Goal  Note:   Fall Risk Interventions:  Mobility Interventions: Patient to call before getting OOB         Medication Interventions: Patient to call before getting OOB, Teach patient to arise slowly    Elimination Interventions: Call light in reach, Toileting schedule/hourly rounds              Problem: Patient Education: Go to Patient Education Activity  Goal: Patient/Family Education  Outcome: Progressing Towards Goal     Problem: Discharge Planning  Goal: *Discharge to safe environment  Outcome: Progressing Towards Goal     Problem: Pain  Goal: *Control of Pain  Outcome: Progressing Towards Goal  Goal: *PALLIATIVE CARE:  Alleviation of Pain  Outcome: Progressing Towards Goal     Problem: Patient Education: Go to Patient Education Activity  Goal: Patient/Family Education  Outcome: Progressing Towards Goal     Problem: Patient Education: Go to Patient Education Activity  Goal: Patient/Family Education  Outcome: Progressing Towards Goal     Problem: Patient Education: Go to Patient Education Activity  Goal: Patient/Family Education  Outcome: Progressing Towards Goal

## 2019-08-22 ENCOUNTER — ANESTHESIA EVENT (OUTPATIENT)
Dept: SURGERY | Age: 69
DRG: 623 | End: 2019-08-22
Payer: MEDICARE

## 2019-08-22 LAB
ANION GAP SERPL CALC-SCNC: 2 MMOL/L (ref 3–18)
BACTERIA SPEC CULT: NORMAL
BUN SERPL-MCNC: 16 MG/DL (ref 7–18)
BUN/CREAT SERPL: 10 (ref 12–20)
CALCIUM SERPL-MCNC: 8.7 MG/DL (ref 8.5–10.1)
CHLORIDE SERPL-SCNC: 104 MMOL/L (ref 100–111)
CO2 SERPL-SCNC: 33 MMOL/L (ref 21–32)
CREAT SERPL-MCNC: 1.53 MG/DL (ref 0.6–1.3)
GLUCOSE BLD STRIP.AUTO-MCNC: 124 MG/DL (ref 70–110)
GLUCOSE BLD STRIP.AUTO-MCNC: 134 MG/DL (ref 70–110)
GLUCOSE BLD STRIP.AUTO-MCNC: 138 MG/DL (ref 70–110)
GLUCOSE BLD STRIP.AUTO-MCNC: 141 MG/DL (ref 70–110)
GLUCOSE SERPL-MCNC: 121 MG/DL (ref 74–99)
POTASSIUM SERPL-SCNC: 3.1 MMOL/L (ref 3.5–5.5)
SERVICE CMNT-IMP: NORMAL
SODIUM SERPL-SCNC: 139 MMOL/L (ref 136–145)

## 2019-08-22 PROCEDURE — 82962 GLUCOSE BLOOD TEST: CPT

## 2019-08-22 PROCEDURE — 80048 BASIC METABOLIC PNL TOTAL CA: CPT

## 2019-08-22 PROCEDURE — 74011000258 HC RX REV CODE- 258: Performed by: PHYSICIAN ASSISTANT

## 2019-08-22 PROCEDURE — 65270000029 HC RM PRIVATE

## 2019-08-22 PROCEDURE — 36415 COLL VENOUS BLD VENIPUNCTURE: CPT

## 2019-08-22 PROCEDURE — 74011250636 HC RX REV CODE- 250/636: Performed by: PHYSICIAN ASSISTANT

## 2019-08-22 PROCEDURE — 74011250637 HC RX REV CODE- 250/637: Performed by: PHYSICIAN ASSISTANT

## 2019-08-22 PROCEDURE — 74011250637 HC RX REV CODE- 250/637: Performed by: INTERNAL MEDICINE

## 2019-08-22 PROCEDURE — 74011250637 HC RX REV CODE- 250/637: Performed by: PLASTIC SURGERY

## 2019-08-22 RX ORDER — POTASSIUM CHLORIDE 20 MEQ/1
40 TABLET, EXTENDED RELEASE ORAL
Status: COMPLETED | OUTPATIENT
Start: 2019-08-22 | End: 2019-08-22

## 2019-08-22 RX ADMIN — POTASSIUM CHLORIDE 40 MEQ: 20 TABLET, EXTENDED RELEASE ORAL at 14:00

## 2019-08-22 RX ADMIN — HYDROCODONE BITARTRATE AND ACETAMINOPHEN 1 TABLET: 5; 325 TABLET ORAL at 06:42

## 2019-08-22 RX ADMIN — SODIUM HYPOCHLORITE: 2.5 SOLUTION TOPICAL at 19:38

## 2019-08-22 RX ADMIN — Medication 10 ML: at 14:01

## 2019-08-22 RX ADMIN — HEPARIN SODIUM 5000 UNITS: 5000 INJECTION INTRAVENOUS; SUBCUTANEOUS at 14:00

## 2019-08-22 RX ADMIN — ROSUVASTATIN CALCIUM 40 MG: 10 TABLET, COATED ORAL at 22:35

## 2019-08-22 RX ADMIN — Medication 10 ML: at 06:37

## 2019-08-22 RX ADMIN — HEPARIN SODIUM 5000 UNITS: 5000 INJECTION INTRAVENOUS; SUBCUTANEOUS at 06:36

## 2019-08-22 RX ADMIN — HYDROCODONE BITARTRATE AND ACETAMINOPHEN 1 TABLET: 5; 325 TABLET ORAL at 10:55

## 2019-08-22 RX ADMIN — COLCHICINE 0.6 MG: 0.6 TABLET, FILM COATED ORAL at 08:57

## 2019-08-22 RX ADMIN — HYDROCODONE BITARTRATE AND ACETAMINOPHEN 1 TABLET: 5; 325 TABLET ORAL at 19:37

## 2019-08-22 RX ADMIN — GABAPENTIN 300 MG: 300 CAPSULE ORAL at 22:35

## 2019-08-22 RX ADMIN — LEVOTHYROXINE SODIUM 50 MCG: 50 TABLET ORAL at 06:36

## 2019-08-22 RX ADMIN — PIPERACILLIN SODIUM AND TAZOBACTAM SODIUM 4.5 G: 4; .5 INJECTION, POWDER, LYOPHILIZED, FOR SOLUTION INTRAVENOUS at 20:49

## 2019-08-22 RX ADMIN — SODIUM HYPOCHLORITE: 2.5 SOLUTION TOPICAL at 07:27

## 2019-08-22 RX ADMIN — PIPERACILLIN SODIUM AND TAZOBACTAM SODIUM 4.5 G: 4; .5 INJECTION, POWDER, LYOPHILIZED, FOR SOLUTION INTRAVENOUS at 06:35

## 2019-08-22 RX ADMIN — HEPARIN SODIUM 5000 UNITS: 5000 INJECTION INTRAVENOUS; SUBCUTANEOUS at 20:49

## 2019-08-22 RX ADMIN — Medication 10 ML: at 22:36

## 2019-08-22 RX ADMIN — VALSARTAN 320 MG: 80 TABLET, FILM COATED ORAL at 08:57

## 2019-08-22 RX ADMIN — METOPROLOL TARTRATE 50 MG: 50 TABLET ORAL at 20:48

## 2019-08-22 RX ADMIN — METOPROLOL TARTRATE 50 MG: 50 TABLET ORAL at 08:57

## 2019-08-22 RX ADMIN — PIPERACILLIN SODIUM AND TAZOBACTAM SODIUM 4.5 G: 4; .5 INJECTION, POWDER, LYOPHILIZED, FOR SOLUTION INTRAVENOUS at 14:01

## 2019-08-22 RX ADMIN — FUROSEMIDE 80 MG: 40 TABLET ORAL at 08:57

## 2019-08-22 NOTE — PROGRESS NOTES
Problem: Diabetes Self-Management  Goal: *Disease process and treatment process  Description  Define diabetes and identify own type of diabetes; list 3 options for treating diabetes. Outcome: Progressing Towards Goal  Goal: *Incorporating nutritional management into lifestyle  Description  Describe effect of type, amount and timing of food on blood glucose; list 3 methods for planning meals. Outcome: Progressing Towards Goal  Goal: *Incorporating physical activity into lifestyle  Description  State effect of exercise on blood glucose levels. Outcome: Progressing Towards Goal  Goal: *Developing strategies to promote health/change behavior  Description  Define the ABC's of diabetes; identify appropriate screenings, schedule and personal plan for screenings. Outcome: Progressing Towards Goal  Goal: *Using medications safely  Description  State effect of diabetes medications on diabetes; name diabetes medication taking, action and side effects. Outcome: Progressing Towards Goal  Goal: *Monitoring blood glucose, interpreting and using results  Description  Identify recommended blood glucose targets  and personal targets. Outcome: Progressing Towards Goal  Goal: *Prevention, detection, treatment of acute complications  Description  List symptoms of hyper- and hypoglycemia; describe how to treat low blood sugar and actions for lowering  high blood glucose level. Outcome: Progressing Towards Goal  Goal: *Prevention, detection and treatment of chronic complications  Description  Define the natural course of diabetes and describe the relationship of blood glucose levels to long term complications of diabetes.   Outcome: Progressing Towards Goal  Goal: *Developing strategies to address psychosocial issues  Description  Describe feelings about living with diabetes; identify support needed and support network  Outcome: Progressing Towards Goal  Goal: *Insulin pump training  Outcome: Progressing Towards Goal  Goal: *Sick day guidelines  Outcome: Progressing Towards Goal  Goal: *Patient Specific Goal (EDIT GOAL, INSERT TEXT)  Outcome: Progressing Towards Goal     Problem: Patient Education: Go to Patient Education Activity  Goal: Patient/Family Education  Outcome: Progressing Towards Goal     Problem: Pressure Injury - Risk of  Goal: *Prevention of pressure injury  Description  Document Stone Scale and appropriate interventions in the flowsheet. Outcome: Progressing Towards Goal  Note:   Pressure Injury Interventions:  Sensory Interventions: Avoid rigorous massage over bony prominences, Check visual cues for pain, Minimize linen layers, Pressure redistribution bed/mattress (bed type)    Moisture Interventions: Absorbent underpads, Minimize layers, Offer toileting Q_hr    Activity Interventions: Pressure redistribution bed/mattress(bed type)    Mobility Interventions: Pressure redistribution bed/mattress (bed type)    Nutrition Interventions: Document food/fluid/supplement intake    Friction and Shear Interventions: Apply protective barrier, creams and emollients, HOB 30 degrees or less                Problem: Patient Education: Go to Patient Education Activity  Goal: Patient/Family Education  Outcome: Progressing Towards Goal     Problem: Falls - Risk of  Goal: *Absence of Falls  Description  Document Zuleyma Fall Risk and appropriate interventions in the flowsheet.   Outcome: Progressing Towards Goal  Note:   Fall Risk Interventions:  Mobility Interventions: Patient to call before getting OOB         Medication Interventions: Patient to call before getting OOB, Teach patient to arise slowly    Elimination Interventions: Call light in reach              Problem: Patient Education: Go to Patient Education Activity  Goal: Patient/Family Education  Outcome: Progressing Towards Goal     Problem: Discharge Planning  Goal: *Discharge to safe environment  Outcome: Progressing Towards Goal     Problem: Pain  Goal: *Control of Pain  Outcome: Progressing Towards Goal  Goal: *PALLIATIVE CARE:  Alleviation of Pain  Outcome: Progressing Towards Goal     Problem: Patient Education: Go to Patient Education Activity  Goal: Patient/Family Education  Outcome: Progressing Towards Goal     Problem: Patient Education: Go to Patient Education Activity  Goal: Patient/Family Education  Outcome: Progressing Towards Goal     Problem: Patient Education: Go to Patient Education Activity  Goal: Patient/Family Education  Outcome: Progressing Towards Goal

## 2019-08-22 NOTE — PROGRESS NOTES
Debridement scheduled for 0730 AM tomorrow with Dr Glo Lr - patient continues to complain of \"gout\" in right knee and now in the right elbow and wrist ?

## 2019-08-22 NOTE — PROGRESS NOTES
OT attempted, pt politely refusing c/o L elbow and R knee pain 2/2 gout flare up. Will f/u 8/23/19. Of note, bed elevated to unsafe level. Pt and spouse report that doctors want it that way for BLE. Alerted charge nurse, Norris Miles.

## 2019-08-22 NOTE — PROGRESS NOTES
1925 Bedside, Verbal and Written shift change report given to Connor (oncoming nurse) by Jarvis Ambrocio RN (offgoing nurse). Report included the following information SBAR, Kardex, Intake/Output, MAR and Recent Results. Pt awake, alert and oriented x4. Reports pain with knee movements d/t gout. Wound care and dressing change completed to RLE.       2200 PM medications administered, pain medication given per [facility] for pain 8/10 pt tolerated with ease, will continue to monitor. 0000 Shift reassessment, pt condition unchanged, will continue to monitor. 0400  Shift reassessment, pt condition unchanged, will continue to monitor. 0645 pt c/o 8/10 pain to R  elbow and knee joint. Medicated per MAR      0730 wound care and dressing change completed with shift change. 0740  Bedside, Verbal and Written shift change report given to Denae VICTORIA  (oncoming nurse) by Morgan Hughes (offgoing nurse). Report included the following information SBAR, Kardex, Intake/Output, MAR and Recent Results. Skin assessment completed.

## 2019-08-22 NOTE — PROGRESS NOTES
Problem: Diabetes Self-Management  Goal: *Disease process and treatment process  Description  Define diabetes and identify own type of diabetes; list 3 options for treating diabetes. Outcome: Progressing Towards Goal  Goal: *Incorporating nutritional management into lifestyle  Description  Describe effect of type, amount and timing of food on blood glucose; list 3 methods for planning meals. Outcome: Progressing Towards Goal  Goal: *Incorporating physical activity into lifestyle  Description  State effect of exercise on blood glucose levels. Outcome: Progressing Towards Goal  Goal: *Developing strategies to promote health/change behavior  Description  Define the ABC's of diabetes; identify appropriate screenings, schedule and personal plan for screenings. Outcome: Progressing Towards Goal  Goal: *Using medications safely  Description  State effect of diabetes medications on diabetes; name diabetes medication taking, action and side effects. Outcome: Progressing Towards Goal  Goal: *Monitoring blood glucose, interpreting and using results  Description  Identify recommended blood glucose targets  and personal targets. Outcome: Progressing Towards Goal  Goal: *Prevention, detection, treatment of acute complications  Description  List symptoms of hyper- and hypoglycemia; describe how to treat low blood sugar and actions for lowering  high blood glucose level. Outcome: Progressing Towards Goal  Goal: *Prevention, detection and treatment of chronic complications  Description  Define the natural course of diabetes and describe the relationship of blood glucose levels to long term complications of diabetes.   Outcome: Progressing Towards Goal  Goal: *Developing strategies to address psychosocial issues  Description  Describe feelings about living with diabetes; identify support needed and support network  Outcome: Progressing Towards Goal  Goal: *Insulin pump training  Outcome: Progressing Towards Goal  Goal: *Sick day guidelines  Outcome: Progressing Towards Goal  Goal: *Patient Specific Goal (EDIT GOAL, INSERT TEXT)  Outcome: Progressing Towards Goal     Problem: Patient Education: Go to Patient Education Activity  Goal: Patient/Family Education  Outcome: Progressing Towards Goal     Problem: Pressure Injury - Risk of  Goal: *Prevention of pressure injury  Description  Document Stone Scale and appropriate interventions in the flowsheet. Outcome: Progressing Towards Goal  Note:   Pressure Injury Interventions:  Sensory Interventions: Pressure redistribution bed/mattress (bed type)    Moisture Interventions: Check for incontinence Q2 hours and as needed    Activity Interventions: Pressure redistribution bed/mattress(bed type)    Mobility Interventions: Pressure redistribution bed/mattress (bed type)    Nutrition Interventions: Document food/fluid/supplement intake    Friction and Shear Interventions: Apply protective barrier, creams and emollients, HOB 30 degrees or less                Problem: Patient Education: Go to Patient Education Activity  Goal: Patient/Family Education  Outcome: Progressing Towards Goal     Problem: Falls - Risk of  Goal: *Absence of Falls  Description  Document Zuleyma Fall Risk and appropriate interventions in the flowsheet.   Outcome: Progressing Towards Goal  Note:   Fall Risk Interventions:  Mobility Interventions: Bed/chair exit alarm, Patient to call before getting OOB         Medication Interventions: Bed/chair exit alarm, Patient to call before getting OOB    Elimination Interventions: Bed/chair exit alarm, Call light in reach, Patient to call for help with toileting needs              Problem: Patient Education: Go to Patient Education Activity  Goal: Patient/Family Education  Outcome: Progressing Towards Goal     Problem: Discharge Planning  Goal: *Discharge to safe environment  Outcome: Progressing Towards Goal     Problem: Pain  Goal: *Control of Pain  Outcome: Progressing Towards Goal  Goal: *PALLIATIVE CARE:  Alleviation of Pain  Outcome: Progressing Towards Goal     Problem: Patient Education: Go to Patient Education Activity  Goal: Patient/Family Education  Outcome: Progressing Towards Goal     Problem: Patient Education: Go to Patient Education Activity  Goal: Patient/Family Education  Outcome: Progressing Towards Goal     Problem: Patient Education: Go to Patient Education Activity  Goal: Patient/Family Education  Outcome: Progressing Towards Goal     Problem: Risk for Spread of Infection  Goal: Prevent transmission of infectious organism to others  Description  Prevent the transmission of infectious organisms to other patients, staff members, and visitors.   Outcome: Progressing Towards Goal     Problem: Patient Education:  Go to Education Activity  Goal: Patient/Family Education  Outcome: Progressing Towards Goal

## 2019-08-22 NOTE — PROGRESS NOTES
Received patient from [facility]. Pt awake and in bed. Alert and oriented x4. Bed locked in lowest position. Frequently used items and call light within reach. Dressing change completed on RLE and pain meds adminisitered. Pt tolerated well. Pt left unit for surgery at 0635:     0715: shift change report given to Denae VICTORIA (oncoming nurse) by Katlin Phillips RN (offgoing nurse). Report included the following information SBAR, MAR and Quality Measures.  opportunity for questions and clarification provided

## 2019-08-22 NOTE — PROGRESS NOTES
Internal Medicine Progress Note        NAME: Delia Babcock   :  1950  MRM:  [id number]    Date/Time: 2019        ASSESSMENT/PLAN:    Principal Problem:    Cellulitis of right lower extremity (2019)    Active Problems:    Lymphedema of both lower extremities (2015)      CKD (chronic kidney disease) stage 3, GFR 30-59 ml/min ([facility]) (2015)      Hypothyroidism, adult (2015)      Type 2 diabetes mellitus with diabetic neuropathy ([facility] (8/15/2018)      Chronic diastolic HF (heart failure) ([facility] (2019)      # Cellulitis with infected ulcer right lateral leg with h/o lymphedema.  cx Citrobacter koseri (2) both pan-sensitive, E faecalis, Diphtheroids (3) . Treated with   vanc zosyn, then dc vanco  per ID. conotinue zosyn    #  ulcer right lateral leg with h/o lymphedema, Elephantiasis . [facility], recommends vascular surgery eval . MIGDALIA done on admission shows \"There is no evidence of any hemodynamically significant peripheral arterial disease at rest in either lower extremity. \"  Vascular consulted. Ethan Hernandez Appreciate Podiatry assistance   - pending surgery plan and date for the procedure  ; vascular/ PRS     # Chronic lymphedema b/l LE . Cont lasix. bilateral unna boots recommended by Vascular    # Hypothyroidism. Cont synthroid    # Diarrhea. Abx. Colchicine. Monitor. # Acute on chronic gout. Affecting his R elbow and Knee , L toes. Report colchicine started working. No need extra  agents for now. Short course steroid if needed and pt agree. He will dw kitchen his diet requirements     # Hypokalemia. Replete , trend     # DM. Provide SSI, hypoglycemia protocol and frequent Accu checks. Provide SSI, hypoglycemia protocol and frequent Accu checks. Education,  diabetic educator     # CKD. Monitor labs. Avoid nephrotoxins. Renally dosing medications. Monitor urine out put.       · DVT protocol    Lab Review:     Recent Labs     19  0350   WBC 10.7   HGB 10.1*   HCT 31.6*        Recent Labs     08/22/19  0450 08/21/19  0350    145   K 3.1* 3.7    106   CO2 33* 31   * 114*   BUN 16 16   CREA 1.53* 1.64*   CA 8.7 8.5     Lab Results   Component Value Date/Time    Glucose (POC) 134 (H) 08/22/2019 07:49 AM    Glucose (POC) 134 (H) 08/21/2019 09:45 PM    Glucose (POC) 111 (H) 08/21/2019 04:14 PM    Glucose (POC) 154 (H) 08/21/2019 12:27 PM    Glucose (POC) 126 (H) 08/21/2019 08:34 AM     No results for input(s): PH, PCO2, PO2, HCO3, FIO2 in the last 72 hours. No results for input(s): INR in the last 72 hours. No lab exists for component: INREXT, INREXT    No results found for: SDES  Lab Results   Component Value Date/Time    Culture result: NO GROWTH 6 DAYS 08/16/2019 02:45 PM    Culture result: MODERATE CITROBACTER KOSERI (A) 08/15/2019 02:44 PM    Culture result: FEW CITROBACTER KOSERI 2ND MORPHOTYPE (A) 08/15/2019 02:44 PM    Culture result: FEW ENTEROCOCCUS FAECALIS GROUP D (A) 08/15/2019 02:44 PM    Culture result: FEW DIPHTHEROIDS THREE MORPHOTYPES (A) 08/15/2019 02:44 PM       All Cardiac Markers in the last 24 hours: No results found for: CPK, CK, CKMMB, CKMB, RCK3, CKMBT, CKNDX, CKND1, EARLE, TROPT, TROIQ, FLORENTIN, TROPT, TNIPOC, BNP, BNPP           Subjective:     Chief Complaint:      Gout better , no Offer no complain  Still Waiting for surgery    ROS:  (bold if positive,otherwise negative)    Fever/chills ,  Dysuria   Cough , Sputum , SOB/SHELTON , Chest Pain     Diarrhea ,Nausea/Vomit , Abd Pain , Constipation    Tolerating Diet                Objective:     Vitals:  Last 24hrs VS reviewed since prior progress note.  Most recent are:    Visit Vitals  /55   Pulse 64   Temp 98.6 °F (37 °C)   Resp 20   Ht 6' 5\" (1.956 m)   Wt (!) 205 kg (452 lb)   SpO2 94%   BMI 53.60 kg/m²     SpO2 Readings from Last 6 Encounters:   08/22/19 94%   08/15/19 93%   08/15/19 100%   08/09/19 97%   07/24/19 97%   07/22/19 97%            Intake/Output Summary (Last 24 hours) at 8/22/2019 1151  Last data filed at 8/21/2019 1844  Gross per 24 hour   Intake 700 ml   Output 1575 ml   Net -875 ml          Physical Exam:   Ears: hearing is intact  Eyes: Icterus is not present  Lungs: clear to auscultation bilaterally  Heart: regular rate and rhythm, S1, S2 normal, no murmur, click, rub or gallop  Gastrointestinal: soft, non-tender. Bowel sounds normal. No masses,  no organomegaly  Neurological:  New Focal Deficits are not present  MS: R elbow , knee inflammatory changes, swelling and tenderness. Psychiatric:  Mood is stable  Legs elephantiasis.  R LL ulcer, packed and  dressed     Medications Reviewed: (see below)    Lab Data Reviewed: (see below)    ______________________________________________________________________    Medications:     Current Facility-Administered Medications   Medication Dose Route Frequency    rosuvastatin (CRESTOR) tablet 40 mg  40 mg Oral QHS    diclofenac-miSOPROStol (ARTHROTEC 50)  mg-mcg per tablet - Patient's own med  1 Tab Oral BID WITH MEALS    methocarbamol (ROBAXIN) tablet 750 mg  750 mg Oral QID PRN    colchicine tablet 0.6 mg  0.6 mg Oral DAILY    insulin lispro (HUMALOG) injection   SubCUTAneous QID    dextrose 10% infusion 125-250 mL  125-250 mL IntraVENous PRN    sodium hypochlorite (HALF STRENGTH DAKIN'S) 0.25% irrigation (bottle)   Topical Q12H    sodium chloride (NS) flush 5-40 mL  5-40 mL IntraVENous Q8H    sodium chloride (NS) flush 5-40 mL  5-40 mL IntraVENous PRN    acetaminophen (TYLENOL) tablet 650 mg  650 mg Oral Q4H PRN    HYDROcodone-acetaminophen (NORCO) 5-325 mg per tablet 1 Tab  1 Tab Oral Q4H PRN    heparin (porcine) injection 5,000 Units  5,000 Units SubCUTAneous Q8H    glucose chewable tablet 16 g  4 Tab Oral PRN    glucagon (GLUCAGEN) injection 1 mg  1 mg IntraMUSCular PRN    furosemide (LASIX) tablet 80 mg  80 mg Oral DAILY    gabapentin (NEURONTIN) capsule 300-600 mg  300-600 mg Oral QHS    levothyroxine (SYNTHROID) tablet 50 mcg  50 mcg Oral ACB    metoprolol tartrate (LOPRESSOR) tablet 50 mg  50 mg Oral Q12H    valsartan (DIOVAN) tablet 320 mg  320 mg Oral DAILY    piperacillin-tazobactam (ZOSYN) 4.5 g in 0.9% sodium chloride (MBP/ADV) ###EXTENDED 4 HOUR INFUSION###  4.5 g IntraVENous Q8H          Total time spent with patient: 25 minutes                  Care Plan discussed with: Patient, Family, Care Manager, Nursing Staff and Consultant/Specialist    Discussed:  Care Plan    Prophylaxis:  Hep SQ    Disposition:  Home w/Family             Attending Physician: Colby Fragoso MD

## 2019-08-22 NOTE — PROGRESS NOTES
Bedside shift change report given to Denae RN (oncoming nurse) by Nelson Patino RN (offgoing nurse). Report included the following information SBAR, Kardex, Intake/Output, MAR and Recent Results. A/O x4, no pain noted, non-tele, IV flushed and infusing, call bell and personal belongings in reach.      0857 -- AM medications given, well tolerated, will continue to monitor.      1120 -- Reassessment completed, no change in patient condition, will continue to monitor.       -- Reassessment completed, no change in patient condition, will continue to monitor.      Bedside shift change report given to , RN (oncoming nurse) by Rocio Junior RN (offgoing nurse). Report included the following information SBAR, Kardex, Intake/Output, MAR and Recent Results.

## 2019-08-22 NOTE — PROGRESS NOTES
[facility]  (A Division of [address] [place] [address])    Infectious Disease Follow-up Note      Date of Admission: 8/16/2019     Date of Note:  8/22/2019      Summary:     80 y/o AAM with chronic massive bilateral LE lymphedema, morbid obesity, infected right lateral leg ulcer with celllulitis. Comorbid: DM, HTN, CKD, Gout    Interval History:     Gout flaring up: right knee yesterday, both elbows today. Started on colchicine - has more loose stools-  3x already today. Current Antimicrobials: Prior Antimicrobials   Zosyn 8/16 - 6  Vancomycin 8/16 - 3      Assessment / Plan:      Infected Ulcer, right lateral leg  - with cellulitis right leg  - wd 8/15: gs gpc prs, gnr.  cx Citrobacter koseri (2) both pan-sensitive, E faecalis, Diphtheroids (3) -> continue zosyn  -> debridement PRS, Podiatry   Chronic massive lymphedema  - bilateral lower extremities  - left leg compression [place] [facility] [place]  - slightly improved -> per others   Diarrhea  - 1-2 watery stools per day.  Likely from abx  - worsened on colchicine -> monitor w/ Primary team   Morbid Obesity     DM     CKD     HTN     Gout        Microbiology:      8/15      Wd gs gpc prs, gnr.  cx gnr(2)  8/16      blcx NGTD x 1     Lines / Catheters:      piv    Patient Active Problem List   Diagnosis Code    Diabetic foot ulcer ([facility] E11.621, R03.840    Diastolic dysfunction L01.64    Dyslipidemia E78.5    Cellulitis and abscess of foot, except toes L03.119, L02.619    Lymphedema of both lower extremities I89.0    CKD (chronic kidney disease) stage 3, GFR 30-59 ml/min ([facility]) N18.3    Morbid obesity with BMI of 70 and over, adult ([facility] E66.01, Z68.45    Erectile dysfunction N52.9    Hypothyroidism, adult E03.9    Hypogonadism in male E29.1    Borderline anemia D64.9    Idiopathic gout of multiple sites M10.09    Skin ulcer of ankle with fat layer exposed, right [facility] L97.312    Non-pressure chronic ulcer of right calf with fat layer exposed ([facility] L97.212    Type 2 diabetes mellitus with diabetic neuropathy ([facility]) E11.40    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 ([facility]) L97.213    Puncture wound of multiple sites of right leg S81.831A    Cellulitis of leg without foot, right L03.115    Cellulitis of right lower extremity L03.115    Chronic diastolic HF (heart failure) ([facility]) I50.32       Current Facility-Administered Medications   Medication Dose Route Frequency    potassium chloride (K-DUR, KLOR-CON) SR tablet 40 mEq  40 mEq Oral NOW    rosuvastatin (CRESTOR) tablet 40 mg  40 mg Oral QHS    diclofenac-miSOPROStol (ARTHROTEC 50)  mg-mcg per tablet - Patient's own med  1 Tab Oral BID WITH MEALS    methocarbamol (ROBAXIN) tablet 750 mg  750 mg Oral QID PRN    colchicine tablet 0.6 mg  0.6 mg Oral DAILY    insulin lispro (HUMALOG) injection   SubCUTAneous QID    dextrose 10% infusion 125-250 mL  125-250 mL IntraVENous PRN    sodium hypochlorite (HALF STRENGTH DAKIN'S) 0.25% irrigation (bottle)   Topical Q12H    sodium chloride (NS) flush 5-40 mL  5-40 mL IntraVENous Q8H    sodium chloride (NS) flush 5-40 mL  5-40 mL IntraVENous PRN    acetaminophen (TYLENOL) tablet 650 mg  650 mg Oral Q4H PRN    HYDROcodone-acetaminophen (NORCO) 5-325 mg per tablet 1 Tab  1 Tab Oral Q4H PRN    heparin (porcine) injection 5,000 Units  5,000 Units SubCUTAneous Q8H    glucose chewable tablet 16 g  4 Tab Oral PRN    glucagon (GLUCAGEN) injection 1 mg  1 mg IntraMUSCular PRN    furosemide (LASIX) tablet 80 mg  80 mg Oral DAILY    gabapentin (NEURONTIN) capsule 300-600 mg  300-600 mg Oral QHS    levothyroxine (SYNTHROID) tablet 50 mcg  50 mcg Oral ACB    metoprolol tartrate (LOPRESSOR) tablet 50 mg  50 mg Oral Q12H    valsartan (DIOVAN) tablet 320 mg  320 mg Oral DAILY    piperacillin-tazobactam (ZOSYN) 4.5 g in 0.9% sodium chloride (MBP/ADV) ###EXTENDED 4 HOUR INFUSION###  4.5 g IntraVENous Q8H Review of Systems - Negative except as in interval history     Objective:     Visit Vitals  /55   Pulse 64   Temp 98.6 °F (37 °C)   Resp 20   Ht 6' 5\" (1.956 m)   Wt (!) 205 kg (452 lb)   SpO2 94%   BMI 53.60 kg/m²       Temp (24hrs), Av °F (37.2 °C), Min:98 °F (36.7 °C), Max:100.1 °F (37.8 °C)      General: Well developed, morbidly obese 81 y.o.  male in no acute distress. ENT: ENT exam normal, no neck nodes or sinus tenderness  Head: normocephalic, without obvious abnormality  Mouth:  mucous membranes moist, pharynx normal without lesions  Neck: supple, symmetrical, trachea midline   Cardio:  regular rate and rhythm, S1, S2 normal, no murmur, click, rub or gallop  Chest: inspection normal - no chest wall deformities or tenderness, respiratory effort normal  Lungs: clear to auscultation and no wheezes or rales  Abdomen: obese, soft, non-tender. Bowel sounds normal. No masses, no organomegaly. Extremities:  Massive lymphedema bilateral lower extremities with redundant skin folds and lichenified skin. Right dressing not removed.  Left leg enclosed in compression dressing.       Lab results     Chemistry  Recent Labs     19  0450 19  0350   * 114*    145   K 3.1* 3.7    106   CO2 33* 31   BUN 16 16   CREA 1.53* 1.64*   CA 8.7 8.5   AGAP 2* 8   BUCR 10* 10*       CBC w/ Diff  Recent Labs     19  0350   WBC 10.7   RBC 3.22*   HGB 10.1*   HCT 31.6*      GRANS 72   LYMPH 16*   EOS 1       Microbiology  All Micro Results     Procedure Component Value Units Date/Time    CULTURE, BLOOD [[id number]] Collected:  19 1445    Order Status:  Completed Specimen:  Blood Updated:  19     Special Requests: PERIPHERAL        Culture result: NO GROWTH 6 DAYS       CULTURE, BLOOD [[id number]]     Order Status:  Canceled Specimen:  Blood              Radha Barajas MD, [facility]  Infectious Disease Specialist  Pager [phone]

## 2019-08-23 ENCOUNTER — ANESTHESIA (OUTPATIENT)
Dept: SURGERY | Age: 69
DRG: 623 | End: 2019-08-23
Payer: MEDICARE

## 2019-08-23 LAB
ANION GAP SERPL CALC-SCNC: 3 MMOL/L (ref 3–18)
BUN SERPL-MCNC: 18 MG/DL (ref 7–18)
BUN/CREAT SERPL: 12 (ref 12–20)
CALCIUM SERPL-MCNC: 8.7 MG/DL (ref 8.5–10.1)
CHLORIDE SERPL-SCNC: 102 MMOL/L (ref 100–111)
CO2 SERPL-SCNC: 34 MMOL/L (ref 21–32)
CREAT SERPL-MCNC: 1.45 MG/DL (ref 0.6–1.3)
GLUCOSE BLD STRIP.AUTO-MCNC: 115 MG/DL (ref 70–110)
GLUCOSE BLD STRIP.AUTO-MCNC: 155 MG/DL (ref 70–110)
GLUCOSE BLD STRIP.AUTO-MCNC: 176 MG/DL (ref 70–110)
GLUCOSE BLD STRIP.AUTO-MCNC: 250 MG/DL (ref 70–110)
GLUCOSE SERPL-MCNC: 123 MG/DL (ref 74–99)
POTASSIUM SERPL-SCNC: 3.4 MMOL/L (ref 3.5–5.5)
SODIUM SERPL-SCNC: 139 MMOL/L (ref 136–145)

## 2019-08-23 PROCEDURE — 65270000029 HC RM PRIVATE

## 2019-08-23 PROCEDURE — 87070 CULTURE OTHR SPECIMN AEROBIC: CPT

## 2019-08-23 PROCEDURE — 74011250637 HC RX REV CODE- 250/637: Performed by: INTERNAL MEDICINE

## 2019-08-23 PROCEDURE — 77030021678 HC GLIDESCP STAT DISP VERT -B: Performed by: ANESTHESIOLOGY

## 2019-08-23 PROCEDURE — 74011250637 HC RX REV CODE- 250/637: Performed by: PHYSICIAN ASSISTANT

## 2019-08-23 PROCEDURE — 77030018846 HC SOL IRR STRL H20 ICUM -A: Performed by: PLASTIC SURGERY

## 2019-08-23 PROCEDURE — 74011250636 HC RX REV CODE- 250/636

## 2019-08-23 PROCEDURE — 74011000258 HC RX REV CODE- 258: Performed by: PHYSICIAN ASSISTANT

## 2019-08-23 PROCEDURE — 74011636637 HC RX REV CODE- 636/637: Performed by: INTERNAL MEDICINE

## 2019-08-23 PROCEDURE — 76210000016 HC OR PH I REC 1 TO 1.5 HR: Performed by: PLASTIC SURGERY

## 2019-08-23 PROCEDURE — 76060000032 HC ANESTHESIA 0.5 TO 1 HR: Performed by: PLASTIC SURGERY

## 2019-08-23 PROCEDURE — 87075 CULTR BACTERIA EXCEPT BLOOD: CPT

## 2019-08-23 PROCEDURE — 77030013189 HC SLV COMPR SCD HUNT -B: Performed by: PLASTIC SURGERY

## 2019-08-23 PROCEDURE — 74011000272 HC RX REV CODE- 272: Performed by: PLASTIC SURGERY

## 2019-08-23 PROCEDURE — 76010000138 HC OR TIME 0.5 TO 1 HR: Performed by: PLASTIC SURGERY

## 2019-08-23 PROCEDURE — 87186 SC STD MICRODIL/AGAR DIL: CPT

## 2019-08-23 PROCEDURE — 80048 BASIC METABOLIC PNL TOTAL CA: CPT

## 2019-08-23 PROCEDURE — 74011250636 HC RX REV CODE- 250/636: Performed by: PLASTIC SURGERY

## 2019-08-23 PROCEDURE — 74011000250 HC RX REV CODE- 250

## 2019-08-23 PROCEDURE — 74011250636 HC RX REV CODE- 250/636: Performed by: PHYSICIAN ASSISTANT

## 2019-08-23 PROCEDURE — 74011250637 HC RX REV CODE- 250/637: Performed by: NURSE ANESTHETIST, CERTIFIED REGISTERED

## 2019-08-23 PROCEDURE — 36415 COLL VENOUS BLD VENIPUNCTURE: CPT

## 2019-08-23 PROCEDURE — 82962 GLUCOSE BLOOD TEST: CPT

## 2019-08-23 PROCEDURE — 77030018836 HC SOL IRR NACL ICUM -A: Performed by: PLASTIC SURGERY

## 2019-08-23 PROCEDURE — 87077 CULTURE AEROBIC IDENTIFY: CPT

## 2019-08-23 PROCEDURE — 77030008683 HC TU ET CUF COVD -A: Performed by: ANESTHESIOLOGY

## 2019-08-23 PROCEDURE — 74011250637 HC RX REV CODE- 250/637: Performed by: PLASTIC SURGERY

## 2019-08-23 PROCEDURE — 88305 TISSUE EXAM BY PATHOLOGIST: CPT

## 2019-08-23 RX ORDER — SODIUM CHLORIDE 9 MG/ML
25 INJECTION, SOLUTION INTRAVENOUS CONTINUOUS
Status: DISCONTINUED | OUTPATIENT
Start: 2019-08-23 | End: 2019-08-23 | Stop reason: HOSPADM

## 2019-08-23 RX ORDER — POTASSIUM CHLORIDE 20 MEQ/1
40 TABLET, EXTENDED RELEASE ORAL
Status: COMPLETED | OUTPATIENT
Start: 2019-08-23 | End: 2019-08-23

## 2019-08-23 RX ORDER — AMOXICILLIN AND CLAVULANATE POTASSIUM 875; 125 MG/1; MG/1
1 TABLET, FILM COATED ORAL 2 TIMES DAILY WITH MEALS
Status: COMPLETED | OUTPATIENT
Start: 2019-08-23 | End: 2019-08-26

## 2019-08-23 RX ORDER — LIDOCAINE HYDROCHLORIDE 10 MG/ML
0.1 INJECTION, SOLUTION EPIDURAL; INFILTRATION; INTRACAUDAL; PERINEURAL AS NEEDED
Status: DISCONTINUED | OUTPATIENT
Start: 2019-08-23 | End: 2019-08-23 | Stop reason: HOSPADM

## 2019-08-23 RX ORDER — ROCURONIUM BROMIDE 10 MG/ML
INJECTION, SOLUTION INTRAVENOUS AS NEEDED
Status: DISCONTINUED | OUTPATIENT
Start: 2019-08-23 | End: 2019-08-23 | Stop reason: HOSPADM

## 2019-08-23 RX ORDER — DEXTROSE MONOHYDRATE 100 MG/ML
100 INJECTION, SOLUTION INTRAVENOUS ONCE
Status: DISCONTINUED | OUTPATIENT
Start: 2019-08-23 | End: 2019-08-23

## 2019-08-23 RX ORDER — PROPOFOL 10 MG/ML
INJECTION, EMULSION INTRAVENOUS AS NEEDED
Status: DISCONTINUED | OUTPATIENT
Start: 2019-08-23 | End: 2019-08-23 | Stop reason: HOSPADM

## 2019-08-23 RX ORDER — LIDOCAINE HYDROCHLORIDE 20 MG/ML
INJECTION, SOLUTION EPIDURAL; INFILTRATION; INTRACAUDAL; PERINEURAL AS NEEDED
Status: DISCONTINUED | OUTPATIENT
Start: 2019-08-23 | End: 2019-08-23 | Stop reason: HOSPADM

## 2019-08-23 RX ORDER — PREDNISONE 5 MG/1
20 TABLET ORAL 2 TIMES DAILY WITH MEALS
Status: DISCONTINUED | OUTPATIENT
Start: 2019-08-23 | End: 2019-08-25

## 2019-08-23 RX ORDER — ONDANSETRON HYDROCHLORIDE 4 MG/2ML
INJECTION, SOLUTION INTRAMUSCULAR; INTRAVENOUS AS NEEDED
Status: DISCONTINUED | OUTPATIENT
Start: 2019-08-23 | End: 2019-08-23 | Stop reason: HOSPADM

## 2019-08-23 RX ORDER — FENTANYL CITRATE 50 UG/ML
INJECTION, SOLUTION INTRAMUSCULAR; INTRAVENOUS AS NEEDED
Status: DISCONTINUED | OUTPATIENT
Start: 2019-08-23 | End: 2019-08-23 | Stop reason: HOSPADM

## 2019-08-23 RX ORDER — SODIUM CHLORIDE, SODIUM LACTATE, POTASSIUM CHLORIDE, CALCIUM CHLORIDE 600; 310; 30; 20 MG/100ML; MG/100ML; MG/100ML; MG/100ML
25 INJECTION, SOLUTION INTRAVENOUS CONTINUOUS
Status: DISCONTINUED | OUTPATIENT
Start: 2019-08-23 | End: 2019-08-23 | Stop reason: HOSPADM

## 2019-08-23 RX ORDER — FENTANYL CITRATE 50 UG/ML
50 INJECTION, SOLUTION INTRAMUSCULAR; INTRAVENOUS AS NEEDED
Status: DISCONTINUED | OUTPATIENT
Start: 2019-08-23 | End: 2019-08-23 | Stop reason: HOSPADM

## 2019-08-23 RX ORDER — MAGNESIUM SULFATE 100 %
4 CRYSTALS MISCELLANEOUS AS NEEDED
Status: DISCONTINUED | OUTPATIENT
Start: 2019-08-23 | End: 2019-08-23

## 2019-08-23 RX ORDER — FENTANYL CITRATE 50 UG/ML
INJECTION, SOLUTION INTRAMUSCULAR; INTRAVENOUS
Status: COMPLETED
Start: 2019-08-23 | End: 2019-08-23

## 2019-08-23 RX ORDER — DEXAMETHASONE SODIUM PHOSPHATE 4 MG/ML
INJECTION, SOLUTION INTRA-ARTICULAR; INTRALESIONAL; INTRAMUSCULAR; INTRAVENOUS; SOFT TISSUE AS NEEDED
Status: DISCONTINUED | OUTPATIENT
Start: 2019-08-23 | End: 2019-08-23 | Stop reason: HOSPADM

## 2019-08-23 RX ORDER — SODIUM CHLORIDE 9 MG/ML
INJECTION, SOLUTION INTRAVENOUS
Status: DISCONTINUED | OUTPATIENT
Start: 2019-08-23 | End: 2019-08-23 | Stop reason: HOSPADM

## 2019-08-23 RX ORDER — FAMOTIDINE 20 MG/1
20 TABLET, FILM COATED ORAL ONCE
Status: COMPLETED | OUTPATIENT
Start: 2019-08-23 | End: 2019-08-23

## 2019-08-23 RX ORDER — MIDAZOLAM HYDROCHLORIDE 1 MG/ML
INJECTION, SOLUTION INTRAMUSCULAR; INTRAVENOUS AS NEEDED
Status: DISCONTINUED | OUTPATIENT
Start: 2019-08-23 | End: 2019-08-23 | Stop reason: HOSPADM

## 2019-08-23 RX ORDER — INSULIN LISPRO 100 [IU]/ML
INJECTION, SOLUTION INTRAVENOUS; SUBCUTANEOUS ONCE
Status: DISCONTINUED | OUTPATIENT
Start: 2019-08-23 | End: 2019-08-23 | Stop reason: HOSPADM

## 2019-08-23 RX ORDER — ONDANSETRON 2 MG/ML
4 INJECTION INTRAMUSCULAR; INTRAVENOUS ONCE
Status: DISCONTINUED | OUTPATIENT
Start: 2019-08-23 | End: 2019-08-23 | Stop reason: HOSPADM

## 2019-08-23 RX ADMIN — HEPARIN SODIUM 5000 UNITS: 5000 INJECTION INTRAVENOUS; SUBCUTANEOUS at 12:38

## 2019-08-23 RX ADMIN — Medication 10 ML: at 21:49

## 2019-08-23 RX ADMIN — Medication 10 ML: at 05:48

## 2019-08-23 RX ADMIN — FUROSEMIDE 80 MG: 40 TABLET ORAL at 12:38

## 2019-08-23 RX ADMIN — GABAPENTIN 300 MG: 300 CAPSULE ORAL at 21:48

## 2019-08-23 RX ADMIN — PIPERACILLIN SODIUM AND TAZOBACTAM SODIUM 4.5 G: 4; .5 INJECTION, POWDER, LYOPHILIZED, FOR SOLUTION INTRAVENOUS at 12:38

## 2019-08-23 RX ADMIN — HYDROCODONE BITARTRATE AND ACETAMINOPHEN 1 TABLET: 5; 325 TABLET ORAL at 03:22

## 2019-08-23 RX ADMIN — FENTANYL CITRATE 50 MCG: 50 INJECTION, SOLUTION INTRAMUSCULAR; INTRAVENOUS at 09:04

## 2019-08-23 RX ADMIN — FAMOTIDINE 20 MG: 20 TABLET ORAL at 07:01

## 2019-08-23 RX ADMIN — PIPERACILLIN SODIUM AND TAZOBACTAM SODIUM 4.5 G: 4; .5 INJECTION, POWDER, LYOPHILIZED, FOR SOLUTION INTRAVENOUS at 05:47

## 2019-08-23 RX ADMIN — METHOCARBAMOL TABLETS 750 MG: 500 TABLET, COATED ORAL at 05:54

## 2019-08-23 RX ADMIN — PREDNISONE 20 MG: 5 TABLET ORAL at 17:19

## 2019-08-23 RX ADMIN — ONDANSETRON HYDROCHLORIDE 4 MG: 4 INJECTION, SOLUTION INTRAMUSCULAR; INTRAVENOUS at 08:17

## 2019-08-23 RX ADMIN — ROCURONIUM BROMIDE 50 MG: 10 INJECTION, SOLUTION INTRAVENOUS at 07:50

## 2019-08-23 RX ADMIN — COLCHICINE 0.6 MG: 0.6 TABLET, FILM COATED ORAL at 05:57

## 2019-08-23 RX ADMIN — HYDROCODONE BITARTRATE AND ACETAMINOPHEN 1 TABLET: 5; 325 TABLET ORAL at 17:19

## 2019-08-23 RX ADMIN — POTASSIUM CHLORIDE 40 MEQ: 20 TABLET, EXTENDED RELEASE ORAL at 12:38

## 2019-08-23 RX ADMIN — ROSUVASTATIN CALCIUM 40 MG: 10 TABLET, COATED ORAL at 21:48

## 2019-08-23 RX ADMIN — FENTANYL CITRATE 100 MCG: 50 INJECTION, SOLUTION INTRAMUSCULAR; INTRAVENOUS at 07:50

## 2019-08-23 RX ADMIN — VALSARTAN 320 MG: 80 TABLET, FILM COATED ORAL at 12:38

## 2019-08-23 RX ADMIN — AMOXICILLIN AND CLAVULANATE POTASSIUM 1 TABLET: 875; 125 TABLET, FILM COATED ORAL at 17:19

## 2019-08-23 RX ADMIN — INSULIN LISPRO 9 UNITS: 100 INJECTION, SOLUTION INTRAVENOUS; SUBCUTANEOUS at 12:39

## 2019-08-23 RX ADMIN — METOPROLOL TARTRATE 50 MG: 50 TABLET ORAL at 12:38

## 2019-08-23 RX ADMIN — METHOCARBAMOL TABLETS 750 MG: 500 TABLET, COATED ORAL at 19:19

## 2019-08-23 RX ADMIN — INSULIN LISPRO 3 UNITS: 100 INJECTION, SOLUTION INTRAVENOUS; SUBCUTANEOUS at 17:19

## 2019-08-23 RX ADMIN — HYDROCODONE BITARTRATE AND ACETAMINOPHEN 1 TABLET: 5; 325 TABLET ORAL at 21:55

## 2019-08-23 RX ADMIN — INSULIN LISPRO 3 UNITS: 100 INJECTION, SOLUTION INTRAVENOUS; SUBCUTANEOUS at 22:48

## 2019-08-23 RX ADMIN — PROPOFOL 200 MG: 10 INJECTION, EMULSION INTRAVENOUS at 07:50

## 2019-08-23 RX ADMIN — HYDROCODONE BITARTRATE AND ACETAMINOPHEN 1 TABLET: 5; 325 TABLET ORAL at 12:25

## 2019-08-23 RX ADMIN — LEVOTHYROXINE SODIUM 50 MCG: 50 TABLET ORAL at 05:48

## 2019-08-23 RX ADMIN — Medication 10 ML: at 14:52

## 2019-08-23 RX ADMIN — MIDAZOLAM HYDROCHLORIDE 2 MG: 1 INJECTION, SOLUTION INTRAMUSCULAR; INTRAVENOUS at 07:50

## 2019-08-23 RX ADMIN — LIDOCAINE HYDROCHLORIDE 100 MG: 20 INJECTION, SOLUTION EPIDURAL; INFILTRATION; INTRACAUDAL; PERINEURAL at 07:50

## 2019-08-23 RX ADMIN — HEPARIN SODIUM 5000 UNITS: 5000 INJECTION INTRAVENOUS; SUBCUTANEOUS at 21:48

## 2019-08-23 RX ADMIN — DEXAMETHASONE SODIUM PHOSPHATE 4 MG: 4 INJECTION, SOLUTION INTRA-ARTICULAR; INTRALESIONAL; INTRAMUSCULAR; INTRAVENOUS; SOFT TISSUE at 08:16

## 2019-08-23 RX ADMIN — SODIUM CHLORIDE: 9 INJECTION, SOLUTION INTRAVENOUS at 07:45

## 2019-08-23 RX ADMIN — METOPROLOL TARTRATE 50 MG: 50 TABLET ORAL at 21:48

## 2019-08-23 NOTE — PROGRESS NOTES
Internal Medicine Progress Note        NAME: Stella Klein   :  1950  MRM:  [id number]    Date/Time: 2019        ASSESSMENT/PLAN:     # Cellulitis with infected ulcer right lateral leg with h/o lymphedema.  cx Citrobacter koseri (2) both pan-sensitive, E faecalis, Diphtheroids (3) . Treated with   vanc zosyn, then dc vanco  per ID. conotinue zosyn    #  ulcer right lateral leg with h/o lymphedema, Elephantiasis . Dr. Dee Echols [facility]) evaluated, recommends vascular surgery eval . MIGDALIA done on admission shows \"There is no evidence of any hemodynamically significant peripheral arterial disease at rest in either lower extremity. \"  Vascular consulted. Allen Jones Appreciate Podiatry assistance   - s/p INCISION AND DRAINAGE RIGHT LOWER LEG - CALF AND LATERAL ANKLE SINUS ULCERS 19. # Chronic lymphedema b/l LE . Cont lasix. bilateral unna boots recommended by Vascular    # Hypothyroidism. Cont synthroid    # Diarrhea. Abx. Colchicine. Monitor. # Acute on chronic gout. Affecting his R elbow and Knee , L toes. Report colchicine started working. Agree on  Short course steroid . Adjust diet     # Hypokalemia. Replete , trend     # DM. Provide SSI, hypoglycemia protocol and frequent Accu checks. Provide SSI, hypoglycemia protocol and frequent Accu checks. Education,  diabetic educator     # CKD. Monitor labs. Avoid nephrotoxins. Renally dosing medications. Monitor urine out put.       · DVT protocol    Lab Review:     Recent Labs     19  0350   WBC 10.7   HGB 10.1*   HCT 31.6*        Recent Labs     19  0400 19  0450 19  0350    139 145   K 3.4* 3.1* 3.7    104 106   CO2 34* 33* 31   * 121* 114*   BUN 18 16 16   CREA 1.45* 1.53* 1.64*   CA 8.7 8.7 8.5     Lab Results   Component Value Date/Time    Glucose (POC) 115 (H) 2019 08:57 AM    Glucose (POC) 141 (H) 2019 10:02 PM    Glucose (POC) 124 (H) 2019 04:33 PM    Glucose (POC) 138 (H) 2019 11:59 AM    Glucose (POC) 134 (H) 08/22/2019 07:49 AM     No results for input(s): PH, PCO2, PO2, HCO3, FIO2 in the last 72 hours. No results for input(s): INR in the last 72 hours. No lab exists for component: INREXT, INREXT    No results found for: SDES  Lab Results   Component Value Date/Time    Culture result: NO GROWTH 6 DAYS 08/16/2019 02:45 PM    Culture result: MODERATE CITROBACTER KOSERI (A) 08/15/2019 02:44 PM    Culture result: FEW CITROBACTER KOSERI 2ND MORPHOTYPE (A) 08/15/2019 02:44 PM    Culture result: FEW ENTEROCOCCUS FAECALIS GROUP D (A) 08/15/2019 02:44 PM    Culture result: FEW DIPHTHEROIDS THREE MORPHOTYPES (A) 08/15/2019 02:44 PM       All Cardiac Markers in the last 24 hours: No results found for: CPK, CK, CKMMB, CKMB, RCK3, CKMBT, CKNDX, CKND1, EARLE, TROPT, TROIQ, FLORENTIN, TROPT, TNIPOC, BNP, BNPP           Subjective:     Chief Complaint:      Back from surgery  Gout still sever pain     ROS:  (bold if positive,otherwise negative)    Fever/chills ,  Dysuria   Cough , Sputum , SOB/SHELTON , Chest Pain     Diarrhea ,Nausea/Vomit , Abd Pain , Constipation    Tolerating Diet                Objective:     Vitals:  Last 24hrs VS reviewed since prior progress note. Most recent are:    Visit Vitals  /58   Pulse 61   Temp 99.4 °F (37.4 °C)   Resp 23   Ht 6' 5\" (1.956 m)   Wt (!) 195.7 kg (431 lb 8 oz)   SpO2 98%   BMI 51.17 kg/m²     SpO2 Readings from Last 6 Encounters:   08/23/19 98%   08/15/19 93%   08/15/19 100%   08/09/19 97%   07/24/19 97%   07/22/19 97%    O2 Flow Rate (L/min): 2 l/min       Intake/Output Summary (Last 24 hours) at 8/23/2019 1133  Last data filed at 8/23/2019 1039  Gross per 24 hour   Intake 840 ml   Output 800 ml   Net 40 ml          Physical Exam:   Ears: hearing is intact  Eyes: Icterus is not present  Lungs: clear to auscultation bilaterally  Heart: regular rate and rhythm, S1, S2 normal, no murmur, click, rub or gallop  Gastrointestinal: soft, non-tender.  Bowel sounds normal. No masses,  no organomegaly  Neurological:  New Focal Deficits are not present  MS: R elbow , knee inflammatory changes, swelling and tenderness. Psychiatric:  Mood is stable  Legs elephantiasis.  R LL ulcer, packed and  dressed     Medications Reviewed: (see below)    Lab Data Reviewed: (see below)    ______________________________________________________________________    Medications:     Current Facility-Administered Medications   Medication Dose Route Frequency    potassium chloride (K-DUR, KLOR-CON) SR tablet 40 mEq  40 mEq Oral NOW    predniSONE (DELTASONE) tablet 20 mg  20 mg Oral BID WITH MEALS    rosuvastatin (CRESTOR) tablet 40 mg  40 mg Oral QHS    diclofenac-miSOPROStol (ARTHROTEC 50)  mg-mcg per tablet - Patient's own med  1 Tab Oral BID WITH MEALS    methocarbamol (ROBAXIN) tablet 750 mg  750 mg Oral QID PRN    colchicine tablet 0.6 mg  0.6 mg Oral DAILY    insulin lispro (HUMALOG) injection   SubCUTAneous QID    dextrose 10% infusion 125-250 mL  125-250 mL IntraVENous PRN    sodium hypochlorite (HALF STRENGTH DAKIN'S) 0.25% irrigation (bottle)   Topical Q12H    sodium chloride (NS) flush 5-40 mL  5-40 mL IntraVENous Q8H    sodium chloride (NS) flush 5-40 mL  5-40 mL IntraVENous PRN    acetaminophen (TYLENOL) tablet 650 mg  650 mg Oral Q4H PRN    HYDROcodone-acetaminophen (NORCO) 5-325 mg per tablet 1 Tab  1 Tab Oral Q4H PRN    heparin (porcine) injection 5,000 Units  5,000 Units SubCUTAneous Q8H    glucose chewable tablet 16 g  4 Tab Oral PRN    glucagon (GLUCAGEN) injection 1 mg  1 mg IntraMUSCular PRN    furosemide (LASIX) tablet 80 mg  80 mg Oral DAILY    gabapentin (NEURONTIN) capsule 300-600 mg  300-600 mg Oral QHS    levothyroxine (SYNTHROID) tablet 50 mcg  50 mcg Oral ACB    metoprolol tartrate (LOPRESSOR) tablet 50 mg  50 mg Oral Q12H    valsartan (DIOVAN) tablet 320 mg  320 mg Oral DAILY    piperacillin-tazobactam (ZOSYN) 4.5 g in 0.9% sodium chloride (MBP/ADV) ###EXTENDED 4 HOUR INFUSION###  4.5 g IntraVENous Q8H          Total time spent with patient: 25 minutes                  Care Plan discussed with: Patient, Family, Care Manager, Nursing Staff and Consultant/Specialist    Discussed:  Care Plan    Prophylaxis:  Hep SQ    Disposition:  Home w/Family             Attending Physician: Teena Dorsey MD

## 2019-08-23 NOTE — PROGRESS NOTES
NUTRITION  Patient/Family Education Record    FACTORS THAT MAY INFLUENCE PATIENTS ABILITY TO LEARN:   []   Language barrier    []   Cultural needs   []   Motivation    []   Cognitive limitation    []   Physical   []   Education   []   Physiological factors   []   Hearing/vision/speaking impairment   []   Yarsani beliefs    []   Financial limitations    []  Other:   [x]   No barriers limiting ability to learn     Person Instructed:   [x]   Patient   []   Family   []  Other     Preference for Learning:   [x]   Verbal   [x]   Written   []  Demonstration     Patient educated on:   [] Cardiac/heart healthy diet  [] 2gm Sodium diet  [] Vitamin K regulated diet (coumadin)  [] Weight loss/portion control  [] High protein  [x] Other: Low Purine Diet for gout    Goal:  Patient will demonstrate understanding of modified diet by implementing suggestions by 8/27    Outcome:   [x]  Patient verbalized understanding of education and willing to comply with recommendations.   []  Patient declined education  []  Patient needs follow up education; scheduled date for follow up:  [x]  Written information provided  []  RD contact information provided    Tyesha Fermin

## 2019-08-23 NOTE — PROGRESS NOTES
Problem: Diabetes Self-Management  Goal: *Disease process and treatment process  Description  Define diabetes and identify own type of diabetes; list 3 options for treating diabetes. Outcome: Progressing Towards Goal  Goal: *Incorporating nutritional management into lifestyle  Description  Describe effect of type, amount and timing of food on blood glucose; list 3 methods for planning meals. Outcome: Progressing Towards Goal  Goal: *Incorporating physical activity into lifestyle  Description  State effect of exercise on blood glucose levels. Outcome: Progressing Towards Goal  Goal: *Developing strategies to promote health/change behavior  Description  Define the ABC's of diabetes; identify appropriate screenings, schedule and personal plan for screenings. Outcome: Progressing Towards Goal  Goal: *Using medications safely  Description  State effect of diabetes medications on diabetes; name diabetes medication taking, action and side effects. Outcome: Progressing Towards Goal  Goal: *Monitoring blood glucose, interpreting and using results  Description  Identify recommended blood glucose targets  and personal targets. Outcome: Progressing Towards Goal  Goal: *Prevention, detection, treatment of acute complications  Description  List symptoms of hyper- and hypoglycemia; describe how to treat low blood sugar and actions for lowering  high blood glucose level. Outcome: Progressing Towards Goal  Goal: *Prevention, detection and treatment of chronic complications  Description  Define the natural course of diabetes and describe the relationship of blood glucose levels to long term complications of diabetes.   Outcome: Progressing Towards Goal  Goal: *Developing strategies to address psychosocial issues  Description  Describe feelings about living with diabetes; identify support needed and support network  Outcome: Progressing Towards Goal  Goal: *Insulin pump training  Outcome: Progressing Towards Goal  Goal: *Sick day guidelines  Outcome: Progressing Towards Goal  Goal: *Patient Specific Goal (EDIT GOAL, INSERT TEXT)  Outcome: Progressing Towards Goal     Problem: Patient Education: Go to Patient Education Activity  Goal: Patient/Family Education  Outcome: Progressing Towards Goal     Problem: Pressure Injury - Risk of  Goal: *Prevention of pressure injury  Description  Document Stone Scale and appropriate interventions in the flowsheet. Outcome: Progressing Towards Goal  Note:   Pressure Injury Interventions:  Sensory Interventions: Avoid rigorous massage over bony prominences, Maintain/enhance activity level, Keep linens dry and wrinkle-free    Moisture Interventions: Check for incontinence Q2 hours and as needed    Activity Interventions: Pressure redistribution bed/mattress(bed type)    Mobility Interventions: HOB 30 degrees or less    Nutrition Interventions: Offer support with meals,snacks and hydration    Friction and Shear Interventions: Minimize layers                Problem: Patient Education: Go to Patient Education Activity  Goal: Patient/Family Education  Outcome: Progressing Towards Goal     Problem: Falls - Risk of  Goal: *Absence of Falls  Description  Document Zuleyma Fall Risk and appropriate interventions in the flowsheet.   Outcome: Progressing Towards Goal  Note:   Fall Risk Interventions:  Mobility Interventions: Patient to call before getting OOB         Medication Interventions: Teach patient to arise slowly, Patient to call before getting OOB    Elimination Interventions: Patient to call for help with toileting needs              Problem: Patient Education: Go to Patient Education Activity  Goal: Patient/Family Education  Outcome: Progressing Towards Goal     Problem: Discharge Planning  Goal: *Discharge to safe environment  Outcome: Progressing Towards Goal     Problem: Pain  Goal: *Control of Pain  Outcome: Progressing Towards Goal  Goal: *PALLIATIVE CARE:  Alleviation of Pain  Outcome: Progressing Towards Goal     Problem: Patient Education: Go to Patient Education Activity  Goal: Patient/Family Education  Outcome: Progressing Towards Goal     Problem: Patient Education: Go to Patient Education Activity  Goal: Patient/Family Education  Outcome: Progressing Towards Goal     Problem: Patient Education: Go to Patient Education Activity  Goal: Patient/Family Education  Outcome: Progressing Towards Goal     Problem: Risk for Spread of Infection  Goal: Prevent transmission of infectious organism to others  Description  Prevent the transmission of infectious organisms to other patients, staff members, and visitors.   Outcome: Progressing Towards Goal     Problem: Patient Education:  Go to Education Activity  Goal: Patient/Family Education  Outcome: Progressing Towards Goal     Problem: Altered nutrition  Goal: *Optimize nutritional status  Outcome: Progressing Towards Goal

## 2019-08-23 NOTE — ANCILLARY DISCHARGE INSTRUCTIONS
Patient and/or next of kin has been given the [facility] Important Message From Medicare About Your Rights\" letter and all questions were answered. Patient is aware of \"Important Message\" Patient con't to have copy of signed letter in room, patient states he does not need to sign another letter.

## 2019-08-23 NOTE — PERIOP NOTES
2410 Patient arrived to PACU. Assumed care. Connected to monitor. VSS. Will continue to monitor    0910 patient moved to size wise bed    0935 patient with multiple wound sites. Documented on patients new surgical site to right leg, but was noted that patient has other wound areas. 7200 TRANSFER - OUT REPORT:    [facility]  being transferred to    (unit) for routine post - op       Report consisted of patients Situation, Background, Assessment and   Recommendations(SBAR). Information from the following report(s) SBAR, OR Summary, Procedure Summary, MAR and Cardiac Rhythm NSR was reviewed with the receiving nurse. Lines:   Peripheral IV 08/17/19 Left;Upper Cephalic (Active)   Site Assessment Clean, dry, & intact 8/23/2019  8:46 AM   Phlebitis Assessment 0 8/23/2019  8:46 AM   Infiltration Assessment 0 8/23/2019  8:46 AM   Dressing Status Clean, dry, & intact 8/23/2019  8:46 AM   Dressing Type Transparent 8/23/2019  8:46 AM   Hub Color/Line Status Pink; Infusing 8/23/2019  8:46 AM   Action Taken Open ports on tubing capped 8/23/2019  4:28 AM   Alcohol Cap Used Yes 8/23/2019  4:28 AM        Opportunity for questions and clarification was provided.       Patient transported with:   [employer]

## 2019-08-23 NOTE — ANCILLARY DISCHARGE INSTRUCTIONS
Patient and/or next of kin has been given the [facility] Important Message From Medicare About Your Rights\" letter and all questions were answered.

## 2019-08-23 NOTE — PROGRESS NOTES
[facility]  (A Division of [address] [place] [address])    Infectious Disease Follow-up Note      Date of Admission: 8/16/2019     Date of Note:  8/23/2019    Will plan to check back on Monday 8/26 but will be available by phone this weekend. I can be reached at [phone] if needed. Summary:     82 y/o AAM with chronic massive bilateral LE lymphedema, morbid obesity, infected right lateral leg ulcer with celllulitis. Comorbid: DM, HTN, CKD, Gout    Interval History:     Had I&D by Dr. Cevallos Fees today. Low grade fever yesterday afternoon but none since. Back in room from OR. No complaints. Says he feels good    Current Antimicrobials: Prior Antimicrobials   Augmentin 8/23 - 0  Vancomycin 8/16 - 3  Zosyn 8/16 - 7      Assessment / Plan:      Infected Ulcer, right lateral leg  - with cellulitis right leg  -  8/15: gs gpc prs, gnr.  cx Citrobacter koseri (2) both pan-sensitive, E faecalis, Diphtheroids (3)  - s/p 8/23 I&D lower leg, calf, lateral ankle sinus ulcers -> dc zosyn  -> augmentin 875/125 bid x 3 days     Chronic massive lymphedema  - bilateral lower extremities  - left leg compression dressing applied by [facility] 8/21  - slightly improved -> per others   Diarrhea  - 1-2 watery stools per day.  Likely from abx  - worsened on colchicine -> monitor w/ Primary team   Morbid Obesity     DM     CKD     HTN     Gout        Microbiology:      8/15       gs gpc prs, gnr.  cx gnr(2)  8/16      blcx NGTD x 1     Lines / Catheters:      piv    Patient Active Problem List   Diagnosis Code    Di[facility] ulcer [facility] E11.621, N64.301    Diastolic dysfunction V08.33    Dyslipidemia E78.5    Cellulitis and abscess of foot, except toes L03.119, L02.619    Lymphedema of both lower extremities I89.0    CKD (chronic kidney disease) stage 3, GFR 30-59 ml/min ([facility]) N18.3    Morbid obesity with BMI of 70 and over, adult ([facility]) E66.01, Z68.45    Erectile dysfunction N52.9    Hypothyroidism, adult E56.8    Hypogonadism in male E29.1    Borderline anemia D64.9    Idiopathic gout of multiple sites M10.09    Skin ulcer of ankle with fat layer exposed, right ([facility]) L97.312    Non-pressure chronic ulcer of right calf with fat layer exposed ([address] L97.212    Type 2 diabetes mellitus with diabetic neuropathy ([facility]) E11.40    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 ([facility]) L97.213    Puncture wound of multiple sites of right leg S81.831A    Cellulitis of leg without foot, right L03.115    Cellulitis of right lower extremity L03.115    Chronic diastolic HF (heart failure) ([facility]) I50.32       Current Facility-Administered Medications   Medication Dose Route Frequency    predniSONE (DELTASONE) tablet 20 mg  20 mg Oral BID WITH MEALS    rosuvastatin (CRESTOR) tablet 40 mg  40 mg Oral QHS    diclofenac-miSOPROStol (ARTHROTEC 50)  mg-mcg per tablet - Patient's own med  1 Tab Oral BID WITH MEALS    methocarbamol (ROBAXIN) tablet 750 mg  750 mg Oral QID PRN    colchicine tablet 0.6 mg  0.6 mg Oral DAILY    insulin lispro (HUMALOG) injection   SubCUTAneous QID    dextrose 10% infusion 125-250 mL  125-250 mL IntraVENous PRN    sodium hypochlorite (HALF STRENGTH DAKIN'S) 0.25% irrigation (bottle)   Topical Q12H    sodium chloride (NS) flush 5-40 mL  5-40 mL IntraVENous Q8H    sodium chloride (NS) flush 5-40 mL  5-40 mL IntraVENous PRN    acetaminophen (TYLENOL) tablet 650 mg  650 mg Oral Q4H PRN    HYDROcodone-acetaminophen (NORCO) 5-325 mg per tablet 1 Tab  1 Tab Oral Q4H PRN    heparin (porcine) injection 5,000 Units  5,000 Units SubCUTAneous Q8H    glucose chewable tablet 16 g  4 Tab Oral PRN    glucagon (GLUCAGEN) injection 1 mg  1 mg IntraMUSCular PRN    furosemide (LASIX) tablet 80 mg  80 mg Oral DAILY    gabapentin (NEURONTIN) capsule 300-600 mg  300-600 mg Oral QHS    levothyroxine (SYNTHROID) tablet 50 mcg  50 mcg Oral ACB    metoprolol tartrate (LOPRESSOR) tablet 50 mg  50 mg Oral Q12H    valsartan (DIOVAN) tablet 320 mg  320 mg Oral DAILY    piperacillin-tazobactam (ZOSYN) 4.5 g in 0.9% sodium chloride (MBP/ADV) ###EXTENDED 4 HOUR INFUSION###  4.5 g IntraVENous Q8H     Review of Systems - Negative except as in interval history     Objective:     Visit Vitals  /69   Pulse 81   Temp 98.8 °F (37.1 °C)   Resp 20   Ht 6' 5\" (1.956 m)   Wt (!) 195.7 kg (431 lb 8 oz)   SpO2 91%   BMI 51.17 kg/m²       Temp (24hrs), Av.2 °F (37.3 °C), Min:98.4 °F (36.9 °C), Max:100.9 °F (38.3 °C)      General: Well developed, morbidly obese 81 y.o.  male in no acute distress. ENT: ENT exam normal, no neck nodes or sinus tenderness  Head: normocephalic, without obvious abnormality  Mouth:  mucous membranes moist, pharynx normal without lesions  Neck: supple, symmetrical, trachea midline   Cardio:  regular rate and rhythm, S1, S2 normal, no murmur, click, rub or gallop  Chest: inspection normal - no chest wall deformities or tenderness, respiratory effort normal  Lungs: clear to auscultation and no wheezes or rales  Abdomen: obese, soft, non-tender. Bowel sounds normal. No masses, no organomegaly. Extremities:  Massive lymphedema bilateral lower extremities with redundant skin folds and lichenified skin. Right leg edema/induration less. Dressing in place.  Left leg enclosed in compression dressing.       Lab results     Chemistry  Recent Labs     19  0400 19  0450 19  0350   * 121* 114*    139 145   K 3.4* 3.1* 3.7    104 106   CO2 34* 33* 31   BUN 18 16 16   CREA 1.45* 1.53* 1.64*   CA 8.7 8.7 8.5   AGAP 3 2* 8   BUCR 12 10* 10*       CBC w/ Diff  Recent Labs     19  0350   WBC 10.7   RBC 3.22*   HGB 10.1*   HCT 31.6*      GRANS 72   LYMPH 16*   EOS 1       Microbiology  All Micro Results     Procedure Component Value Units Date/Time    CULTURE, ANAEROBIC [[id number]] Collected:  19 0945    Order Status: Completed Specimen:  Surgical Specimen Updated:  08/23/19 1238    CULTURE, TISSUE W Tayla Mcnair [[id number]] Collected:  08/23/19 0945    Order Status:  Completed Specimen:  Surgical Specimen Updated:  08/23/19 1237    C. DIFFICILE AG & TOXIN A/B [[id number]]     Order Status:  Canceled Specimen:  Stool     CULTURE, BLOOD [[id number]] Collected:  08/16/19 1445    Order Status:  Completed Specimen:  Blood Updated:  08/22/19 0807     Special Requests: PERIPHERAL        Culture result: NO GROWTH 6 DAYS       CULTURE, BLOOD [[id number]]     Order Status:  Canceled Specimen:  Blood              Brissa De Souza MD, [facility]  Infectious Disease Specialist  Pager [phone]

## 2019-08-23 NOTE — BRIEF OP NOTE
BRIEF OPERATIVE NOTE    Date of Procedure: 8/23/2019   Preoperative Diagnosis: cellulitis of the right leg  Postoperative Diagnosis: Right ankle and calf infected necrotic sinuses    Procedure(s):  INCISION AND DRAINAGE RIGHT LOWER LEG - CALF AND LATERAL ANKLE SINUS ULCERS    Surgeon(s) and Role:     * Dru Dominguez Surgery        Dr Mitzi Schumacher - Podiatric    Surgical Assistant: Colin Gant SA    Surgical Staff:  Circ-1: Ivan Massey Tech-1: Cherri Lara  Surg Asst-1: Jacinta Townsend  Event Time In Time Out   Incision Start 08/23/2019 0813    Incision Close 08/23/2019 0827      Anesthesia: General   Estimated Blood Loss: Minimal  Specimens: Necrotic tissue from both wounds  Findings: Necrotic subcutaneous fat - cultures taken  Complications: none

## 2019-08-23 NOTE — PROGRESS NOTES
Bedside shift change report given to ESME Philippe (oncoming nurse) by ,Narinder Forbes RN (offgoing nurse). Report included the following information SBAR, Kardex, Intake/Output, MAR and Recent Results. 5997 -- Report from [facility] (PACU). 1001 -- Patient back on the unit. 1030 -- Shift assessment completed, will continue to monitor.      1238 -- Medications given, well tolerated, will continue to monitor.      1543 -- Reassessment completed, no change in patient condition, will continue to monitor.      Bedside shift change report given to [facility], RN (oncoming nurse) by Florencio Rob RN (offgoing nurse). Report included the following information SBAR, Kardex, Intake/Output, MAR and Recent Results.

## 2019-08-23 NOTE — PROGRESS NOTES
Nutrition initial assessment/Plan of care      RECOMMENDATIONS:   1. Consistent CHO 2g Na Diet  2. Monitor labs, weight and PO intake  3. RD to follow     GOALS:   1. PO intake meets >75% of protein/calorie needs by 8/27  2. Weight Maintenance/gradual loss (1-2 lb/week) by 8/29     ASSESSMENT:   Wt status is classified as obese per Body mass index is 51.17 kg/m². Adequate PO intake. Labs noted. BG range (111-138) over the past 24 hours; A1c (7.3%). Pt w/ hypokalemia and elevated Cr; GFR (55). Nutrition recommendations listed. RD to follow. Nutrition Diagnoses:   Obesity related to excess energy intake PTA as evidenced by a BMI of 51.2 and 207% IBW. Nutrition Risk:  [] High  [x] Moderate []  Low    SUBJECTIVE/OBJECTIVE:    Pt admitted for cellulitis. Noted wound documented by nursing. Pt seen in room with wife at bedside. Denies having any food allergies or problems chewing/swallowing;  lb. Noted weight loss from UBW , but weight taken with bed scale and Pt is on a diuretic so weight does fluctuate with fluid. Reports having a good appetite and consuming 3 meals per day at home. Stated something he ate is causing a gout flare up for him; on medication. Discussed some nutrition recommendations for low purine diet; handouts provided. Also gave him another menu so he can see what is available and implement preferences with dietary. Pt c/o  Diarrhea; likely from medications he is on. Plastics consulted: plan for debridement of RLE ulcer tomorrow morning. Will continue to monitor. Information Obtained from:    [x] Chart Review   [x] Patient   [] Family/Caregiver   [] Nurse/Physician   [] Interdisciplinary Meeting/Rounds      Diet: Consistent CHO Diet  Medications: [x] Reviewed  Colchicine, Lasix, Lopressor, Crestor, Diovan    Allergies: [x] Reviewed   Encounter Diagnoses     ICD-10-CM ICD-9-CM   1. Ulcer of right lower extremity, unspecified ulcer stage ([facility] L97.919 707.10   2.  Cellulitis of right lower extremity L03.115 682.6   3. Lymphedema I89.0 457.1     Past Medical History:   Diagnosis Date    Anemia of chronic disease     Arthritis     Chronic renal insufficiency     Diabetes ([facility]     Dyslipidemia     Edema     Gout     Gout     Hypertension     Lymphedema     Morbid obesity ([facility]       Labs:    Lab Results   Component Value Date/Time    Sodium 139 08/22/2019 04:50 AM    Potassium 3.1 (L) 08/22/2019 04:50 AM    Chloride 104 08/22/2019 04:50 AM    CO2 33 (H) 08/22/2019 04:50 AM    Anion gap 2 (L) 08/22/2019 04:50 AM    Glucose 121 (H) 08/22/2019 04:50 AM    BUN 16 08/22/2019 04:50 AM    Creatinine 1.53 (H) 08/22/2019 04:50 AM    Calcium 8.7 08/22/2019 04:50 AM    Magnesium 1.9 04/19/2018 04:10 PM    Phosphorus 3.3 04/19/2018 04:10 PM    Albumin 2.8 (L) 08/16/2019 02:45 PM     Anthropometrics: BMI (calculated): 51.2  Last 3 Recorded Weights in this Encounter    08/16/19 1312 08/22/19 2050   Weight: (!) 205 kg (452 lb) (!) 195.7 kg (431 lb 8 oz)      Ht Readings from Last 1 Encounters:   08/16/19 6' 5\" (1.956 m)     Weight Metrics 8/22/2019 8/15/2019 8/9/2019 7/24/2019 7/22/2019 5/24/2019 11/12/2018   Weight 431 lb 8 oz 452 lb 452 lb 453 lb 14.8 oz 447 lb 463 lb 455 lb 3.2 oz   BMI 51.17 kg/m2 53.6 kg/m2 53.6 kg/m2 53.83 kg/m2 53.01 kg/m2 54.9 kg/m2 53.98 kg/m2       Patient Vitals for the past 100 hrs:   % Diet Eaten   08/21/19 1844 30 %   08/21/19 1414 80 %   08/21/19 1010 100 %   08/20/19 1800 100 %   08/20/19 1400 100 %       IBW: 208 lb %IBW: 207% UBW: 450 lb  [x] Weight Loss [] Weight Gain [] Weight Stable    Estimated Nutrition Needs: [x] MSJ x 1.0  [x] Other: 15 kcal/kg  Calories: 9391-8901 kcal Based on:   [x] Actual BW    Protein:   157-176 g Based on:   [x] Actual BW    Fluid:       2900- 3500 ml Based on:   [x] Actual BW      [x] No Cultural, Methodist or ethnic dietary need identified.     [] Cultural, Methodist and ethnic food preferences identified and addressed     Wt Status: [] Normal (18.6 - 24.9) [] Underweight (<18.5) [] Overweight (25 - 29.9) [] Mild Obesity (30 - 34.9)  [] Moderate Obesity (35 - 39.9) [x] Morbid Obesity (40+)       Nutrition Problems Identified:   [] Suboptimal PO intake   [] Food Allergies  [] Difficulty chewing/swallowing/poor dentition  [x] Constipation/Diarrhea   [] Nausea/Vomiting   [] None  [x] Other: Wound healing    Plan:   [x] Therapeutic Diet  [x]  Obtained/adjusted food preferences/tolerances and/or snacks options   []  Supplements added   [] Occupational therapy following for feeding techniques  []  HS snack added   []  Modify diet texture   []  Modify diet for food allergies   [x]  Educate patient   []  Assist with menu selection   [x]  Monitor PO intake on meal rounds   [x]  Continue inpatient monitoring and intervention   [x]  Participated in discharge planning/Interdisciplinary rounds/Team meetings   []  Other:     Education Needs:   [] Not appropriate for teaching at this time due to:   [x] Identified and addressed    Nutrition Monitoring and Evaluation:  [x] Continue ongoing monitoring and intervention  [] Other    Rambo Whitehead

## 2019-08-23 NOTE — ANESTHESIA POSTPROCEDURE EVALUATION
Procedure(s):  INCISION AND DRAINAGE RIGHT LOWER LEG - CALF AND LATERAL ANKLE SINUS ULCERS . general    Anesthesia Post Evaluation      Multimodal analgesia: multimodal analgesia used between 6 hours prior to anesthesia start to PACU discharge  Patient location during evaluation: bedside  Patient participation: complete - patient participated  Level of consciousness: awake  Pain management: adequate  Airway patency: patent  Anesthetic complications: no  Cardiovascular status: stable  Respiratory status: acceptable  Hydration status: acceptable  Post anesthesia nausea and vomiting:  controlled      Vitals Value Taken Time   /58 8/23/2019  9:47 AM   Temp 37.4 °C (99.4 °F) 8/23/2019  8:46 AM   Pulse 66 8/23/2019 10:00 AM   Resp 18 8/23/2019 10:00 AM   SpO2 98 % 8/23/2019 10:00 AM   Vitals shown include unvalidated device data.

## 2019-08-24 LAB
GLUCOSE BLD STRIP.AUTO-MCNC: 161 MG/DL (ref 70–110)
GLUCOSE BLD STRIP.AUTO-MCNC: 162 MG/DL (ref 70–110)
GLUCOSE BLD STRIP.AUTO-MCNC: 206 MG/DL (ref 70–110)
GLUCOSE BLD STRIP.AUTO-MCNC: 213 MG/DL (ref 70–110)

## 2019-08-24 PROCEDURE — 74011250637 HC RX REV CODE- 250/637: Performed by: INTERNAL MEDICINE

## 2019-08-24 PROCEDURE — 82962 GLUCOSE BLOOD TEST: CPT

## 2019-08-24 PROCEDURE — 97164 PT RE-EVAL EST PLAN CARE: CPT

## 2019-08-24 PROCEDURE — 65270000029 HC RM PRIVATE

## 2019-08-24 PROCEDURE — 74011000250 HC RX REV CODE- 250: Performed by: PLASTIC SURGERY

## 2019-08-24 PROCEDURE — 74011636637 HC RX REV CODE- 636/637: Performed by: INTERNAL MEDICINE

## 2019-08-24 PROCEDURE — 74011250637 HC RX REV CODE- 250/637: Performed by: PLASTIC SURGERY

## 2019-08-24 PROCEDURE — 74011250637 HC RX REV CODE- 250/637: Performed by: PHYSICIAN ASSISTANT

## 2019-08-24 PROCEDURE — 74011250636 HC RX REV CODE- 250/636: Performed by: PHYSICIAN ASSISTANT

## 2019-08-24 RX ADMIN — HYDROCODONE BITARTRATE AND ACETAMINOPHEN 1 TABLET: 5; 325 TABLET ORAL at 22:26

## 2019-08-24 RX ADMIN — HYDROCODONE BITARTRATE AND ACETAMINOPHEN 1 TABLET: 5; 325 TABLET ORAL at 10:44

## 2019-08-24 RX ADMIN — Medication 10 ML: at 14:00

## 2019-08-24 RX ADMIN — PREDNISONE 20 MG: 5 TABLET ORAL at 09:00

## 2019-08-24 RX ADMIN — HEPARIN SODIUM 5000 UNITS: 5000 INJECTION INTRAVENOUS; SUBCUTANEOUS at 12:42

## 2019-08-24 RX ADMIN — INSULIN LISPRO 6 UNITS: 100 INJECTION, SOLUTION INTRAVENOUS; SUBCUTANEOUS at 22:24

## 2019-08-24 RX ADMIN — Medication 10 ML: at 05:23

## 2019-08-24 RX ADMIN — INSULIN LISPRO 3 UNITS: 100 INJECTION, SOLUTION INTRAVENOUS; SUBCUTANEOUS at 17:16

## 2019-08-24 RX ADMIN — INSULIN LISPRO 3 UNITS: 100 INJECTION, SOLUTION INTRAVENOUS; SUBCUTANEOUS at 12:42

## 2019-08-24 RX ADMIN — PREDNISONE 20 MG: 5 TABLET ORAL at 17:15

## 2019-08-24 RX ADMIN — METOPROLOL TARTRATE 50 MG: 50 TABLET ORAL at 09:01

## 2019-08-24 RX ADMIN — SODIUM HYPOCHLORITE: 2.5 SOLUTION TOPICAL at 22:27

## 2019-08-24 RX ADMIN — HEPARIN SODIUM 5000 UNITS: 5000 INJECTION INTRAVENOUS; SUBCUTANEOUS at 05:22

## 2019-08-24 RX ADMIN — AMOXICILLIN AND CLAVULANATE POTASSIUM 1 TABLET: 875; 125 TABLET, FILM COATED ORAL at 09:00

## 2019-08-24 RX ADMIN — HYDROCODONE BITARTRATE AND ACETAMINOPHEN 1 TABLET: 5; 325 TABLET ORAL at 17:15

## 2019-08-24 RX ADMIN — GABAPENTIN 600 MG: 300 CAPSULE ORAL at 22:25

## 2019-08-24 RX ADMIN — VALSARTAN 320 MG: 80 TABLET, FILM COATED ORAL at 09:00

## 2019-08-24 RX ADMIN — COLCHICINE 0.6 MG: 0.6 TABLET, FILM COATED ORAL at 06:42

## 2019-08-24 RX ADMIN — HEPARIN SODIUM 5000 UNITS: 5000 INJECTION INTRAVENOUS; SUBCUTANEOUS at 22:26

## 2019-08-24 RX ADMIN — FUROSEMIDE 80 MG: 40 TABLET ORAL at 09:01

## 2019-08-24 RX ADMIN — ROSUVASTATIN CALCIUM 40 MG: 10 TABLET, COATED ORAL at 22:26

## 2019-08-24 RX ADMIN — Medication 10 ML: at 22:27

## 2019-08-24 RX ADMIN — INSULIN LISPRO 6 UNITS: 100 INJECTION, SOLUTION INTRAVENOUS; SUBCUTANEOUS at 08:59

## 2019-08-24 RX ADMIN — METOPROLOL TARTRATE 50 MG: 50 TABLET ORAL at 22:26

## 2019-08-24 RX ADMIN — LEVOTHYROXINE SODIUM 50 MCG: 50 TABLET ORAL at 05:22

## 2019-08-24 RX ADMIN — HYDROCODONE BITARTRATE AND ACETAMINOPHEN 1 TABLET: 5; 325 TABLET ORAL at 05:22

## 2019-08-24 RX ADMIN — SODIUM HYPOCHLORITE: 2.5 SOLUTION TOPICAL at 15:00

## 2019-08-24 RX ADMIN — AMOXICILLIN AND CLAVULANATE POTASSIUM 1 TABLET: 875; 125 TABLET, FILM COATED ORAL at 17:15

## 2019-08-24 NOTE — PROGRESS NOTES
Internal Medicine Progress Note        NAME: Stella Klein   :  1950  MRM:  [id number]    Date/Time: 2019        ASSESSMENT/PLAN:     # Cellulitis with infected ulcer right lateral leg with h/o lymphedema.  cx Citrobacter koseri (2) both pan-sensitive, E faecalis, Diphtheroids (3) . Treated with   vanc zosyn, then dc vanco  per ID. Zosyn changed to augmentin 875/125 bid x 3 days    #  ulcer right lateral leg with h/o lymphedema, Elephantiasis . Dr. Dee Echols [facility]) evaluated, recommends vascular surgery eval . MIGDALIA done on admission shows \"There is no evidence of any hemodynamically significant peripheral arterial disease at rest in either lower extremity. \"  Vascular consulted. - s/p INCISION AND DRAINAGE RIGHT LOWER LEG - CALF AND LATERAL ANKLE SINUS ULCERS 19. Further per surgery. # Chronic lymphedema b/l LE . Cont lasix. bilateral unna boots recommended by Vascular    # Hypothyroidism. Cont synthroid    # Diarrhea. Abx. Colchicine. Monitor. # Acute on chronic gout. Affecting his R elbow and Knee , L toes. Report colchicine started working. Agree on  Short course steroid . Adjust diet     # Hypokalemia. Replete , trend     # DM. Provide SSI, hypoglycemia protocol and frequent Accu checks. Provide SSI, hypoglycemia protocol and frequent Accu checks. Education,  diabetic educator     # CKD. Monitor labs. Avoid nephrotoxins. Renally dosing medications. Monitor urine out put. · DVT protocol    Lab Review:     No results for input(s): WBC, HGB, HCT, PLT, HGBEXT, HCTEXT, PLTEXT, HGBEXT, HCTEXT, PLTEXT in the last 72 hours.   Recent Labs     19  0400 19  0450    139   K 3.4* 3.1*    104   CO2 34* 33*   * 121*   BUN 18 16   CREA 1.45* 1.53*   CA 8.7 8.7     Lab Results   Component Value Date/Time    Glucose (POC) 155 (H) 2019 10:20 PM    Glucose (POC) 176 (H) 2019 04:57 PM    Glucose (POC) 250 (H) 2019 12:18 PM    Glucose (POC) 115 (H) 08/23/2019 08:57 AM    Glucose (POC) 141 (H) 08/22/2019 10:02 PM     No results for input(s): PH, PCO2, PO2, HCO3, FIO2 in the last 72 hours. No results for input(s): INR in the last 72 hours. No lab exists for component: INREXT, INREXT    No results found for: SDES  Lab Results   Component Value Date/Time    Culture result: PENDING 08/23/2019 09:45 AM    Culture result: NO GROWTH 6 DAYS 08/16/2019 02:45 PM    Culture result: MODERATE CITROBACTER KOSERI (A) 08/15/2019 02:44 PM    Culture result: FEW CITROBACTER KOSERI 2ND MORPHOTYPE (A) 08/15/2019 02:44 PM    Culture result: FEW ENTEROCOCCUS FAECALIS GROUP D (A) 08/15/2019 02:44 PM    Culture result: FEW DIPHTHEROIDS THREE MORPHOTYPES (A) 08/15/2019 02:44 PM       All Cardiac Markers in the last 24 hours: No results found for: CPK, CK, CKMMB, CKMB, RCK3, CKMBT, CKNDX, CKND1, EARLE, TROPT, TROIQ, FLORENTIN, TROPT, TNIPOC, BNP, BNPP           Subjective:     Chief Complaint:      No complain  Gout swelling and pains improving slowly    ROS:  (bold if positive,otherwise negative)    Fever/chills ,  Dysuria   Cough , Sputum , SOB/SHELTON , Chest Pain     Diarrhea ,Nausea/Vomit , Abd Pain , Constipation    Tolerating Diet                Objective:     Vitals:  Last 24hrs VS reviewed since prior progress note. Most recent are:    Visit Vitals  /63 (BP 1 Location: Left arm, BP Patient Position: At rest)   Pulse 69   Temp 97.9 °F (36.6 °C)   Resp 18   Ht 6' 5\" (1.956 m)   Wt (!) 195.7 kg (431 lb 8 oz)   SpO2 93%   BMI 51.17 kg/m²     SpO2 Readings from Last 6 Encounters:   08/24/19 93%   08/15/19 93%   08/15/19 100%   08/09/19 97%   07/24/19 97%   07/22/19 97%    O2 Flow Rate (L/min): 2 l/min       Intake/Output Summary (Last 24 hours) at 8/24/2019 0758  Last data filed at 8/24/2019 0645  Gross per 24 hour   Intake 1660 ml   Output 1600 ml   Net 60 ml          Physical Exam:   Ears: hearing is intact  Eyes:  Icterus is not present  Lungs: clear to auscultation bilaterally  Heart: regular rate and rhythm, S1, S2 normal, no murmur, click, rub or gallop  Gastrointestinal: soft, non-tender. Bowel sounds normal. No masses,  no organomegaly  Neurological:  New Focal Deficits are not present  MS: R elbow , knee inflammatory changes, swelling and tenderness , decrease  ROM improving . Psychiatric:  Mood is stable  Legs elephantiasis.  R LL ulcer, dressed     Medications Reviewed: (see below)    Lab Data Reviewed: (see below)    ______________________________________________________________________    Medications:     Current Facility-Administered Medications   Medication Dose Route Frequency    predniSONE (DELTASONE) tablet 20 mg  20 mg Oral BID WITH MEALS    amoxicillin-clavulanate (AUGMENTIN) 875-125 mg per tablet 1 Tab  1 Tab Oral BID WITH MEALS    rosuvastatin (CRESTOR) tablet 40 mg  40 mg Oral QHS    diclofenac-miSOPROStol (ARTHROTEC 50)  mg-mcg per tablet - Patient's own med  1 Tab Oral BID WITH MEALS    methocarbamol (ROBAXIN) tablet 750 mg  750 mg Oral QID PRN    colchicine tablet 0.6 mg  0.6 mg Oral DAILY    insulin lispro (HUMALOG) injection   SubCUTAneous QID    dextrose 10% infusion 125-250 mL  125-250 mL IntraVENous PRN    sodium hypochlorite (HALF STRENGTH DAKIN'S) 0.25% irrigation (bottle)   Topical Q12H    sodium chloride (NS) flush 5-40 mL  5-40 mL IntraVENous Q8H    sodium chloride (NS) flush 5-40 mL  5-40 mL IntraVENous PRN    acetaminophen (TYLENOL) tablet 650 mg  650 mg Oral Q4H PRN    HYDROcodone-acetaminophen (NORCO) 5-325 mg per tablet 1 Tab  1 Tab Oral Q4H PRN    heparin (porcine) injection 5,000 Units  5,000 Units SubCUTAneous Q8H    glucose chewable tablet 16 g  4 Tab Oral PRN    glucagon (GLUCAGEN) injection 1 mg  1 mg IntraMUSCular PRN    furosemide (LASIX) tablet 80 mg  80 mg Oral DAILY    gabapentin (NEURONTIN) capsule 300-600 mg  300-600 mg Oral QHS    levothyroxine (SYNTHROID) tablet 50 mcg  50 mcg Oral ACB    metoprolol tartrate (LOPRESSOR) tablet 50 mg  50 mg Oral Q12H    valsartan (DIOVAN) tablet 320 mg  320 mg Oral DAILY          Total time spent with patient: 25 minutes                  Care Plan discussed with: Patient, Family, Care Manager, Nursing Staff and Consultant/Specialist    Discussed:  Care Plan    Prophylaxis:  Hep SQ    Disposition:  Home w/Family             Attending Physician: Ramona Lewis MD

## 2019-08-24 NOTE — ROUTINE PROCESS
1919: Assumed care. Awake. HOB elevated. No sob on RA. Bilateral lower extremities dressing CDI. 2148: Due meds given. 2155: Medicated for pain per patient request.    8895: . Coverage provided per protocol. 6225: No change from previous assessment. 0200: Sleeping.    0403: No change from previous assessment. 0522: Incontinence care provided. Due meds given. Medicated for pain per patient request.    4385: Slept good thru night. Needs attended. Due med given. Son at bedside. Brought early breakfast.    0720: Bedside and Verbal shift change report given to Jose Juan Moyer RN (oncoming nurse) by me (offgoing nurse). Report included the following information SBAR, Kardex, Intake/Output, MAR and Recent Results.

## 2019-08-24 NOTE — ROUTINE PROCESS
1930 Bedside and Verbal shift change report given to [facility]  RN(oncoming nurse) by me (offgoing nurse). Report included the following information SBAR, Kardex, Intake/Output, MAR and Recent Results.

## 2019-08-24 NOTE — PROGRESS NOTES
Patient very relieved to know that both sinuses in the right lower extremity were relatively superficial and confined to the subcutaneous tissues [ ie not involving joint structures nor the muscle layers ] - great concern that the gout setback may lead to total immobility and therefore a decubitus ulcer - which could be fatal for him - special bariatric bed ordered that has an \"alternating air pocket\" mechanism built in - discussed with Dr Ting Baez in detail as well as the nursing supervisor.

## 2019-08-24 NOTE — PROGRESS NOTES
Problem: Mobility Impaired (Adult and Pediatric)  Goal: *Acute Goals and Plan of Care (Insert Text)  Description  Physical Therapy Goals: will continue x 7 days (8/31)  Initiated 8/20/2019 and to be accomplished within 7 day(s)  1. Patient will move from supine to sit and sit to supine  in bed with supervision/set-up. 2.  Patient will transfer from bed to chair and chair to bed with supervision/set-up using the least restrictive device. 3.  Patient will perform sit to stand with supervision/set-up. 4.  Patient will ambulate with supervision/set-up for 50 feet with the least restrictive device. Outcome: Progressing Towards Goal   PHYSICAL THERAPY RE-EVALUATION    Patient: Quinton Armendariz [de-identified]81 y.o. male)  Date: 8/24/2019  Primary Diagnosis: Cellulitis [L03.90]  Procedure(s) (LRB):  INCISION AND DRAINAGE RIGHT LOWER LEG - CALF AND LATERAL ANKLE SINUS ULCERS  (Right) 1 Day Post-Op   Precautions:  Fall  PLOF: MOD I    ASSESSMENT :  Patient supine in bed, agreeable to participation with PT. Assessed at bed level for safety. Patient s/p I/D 8/23; bandaging present on posterior aspect of L LE. Patient able to clear B heels from bed; c/o gout pain in B ankles/great toes/elbows. Pain attributed to gout Patient educated on performing gentle therex as tolerated to encourage LE mobility and prevent deconditioning. Patient left supine in bed with all needs. Gout pain rated 8/10. Will resume OOB when additional assist available. Patient educated on role of PT, PT POC, bed mobility, transfers, gait, and safety with mobility as indicated. Patient will continue to benefit from skilled PT in order to progress functional mobility and maximize safety with OOB mobility     Patient will benefit from skilled intervention to address the above impairments. Patient's rehabilitation potential is considered to be Good  Factors which may influence rehabilitation potential include:   ? None noted  ?          Mental ability/status  ? Medical condition  ? Home/family situation and support systems  ? Safety awareness  ? Pain tolerance/management  ? Other:      PLAN :  Recommendations and Planned Interventions:   ?           Bed Mobility Training             ? Neuromuscular Re-Education  ? Transfer Training                   ? Orthotic/Prosthetic Training  ? Gait Training                          ? Modalities  ? Therapeutic Exercises           ? Edema Management/Control  ? Therapeutic Activities            ? Family Training/Education  ? Patient Education  ? Other (comment):    Frequency/Duration: Patient will be followed by physical therapy 3 - 5 times a week to address goals. Discharge Recommendations: Home Health  Further Equipment Recommendations for Discharge: Bariatric RW     SUBJECTIVE:   Patient stated I ate something that flared my gout up.     OBJECTIVE DATA SUMMARY:   Hospital course since last seen and reason for re-evaluation:   Prolonged hospital course d/t medical complexity/procedure.      Past Medical History:   Diagnosis Date    Anemia of chronic disease     Arthritis     Chronic renal insufficiency     Diabetes (HCC)     Dyslipidemia     Edema     Gout     Gout     Hypertension     Lymphedema     Morbid obesity ([facility] [address]      Past Surgical History:   Procedure Laterality Date    HX ORTHOPAEDIC  3/2015     Lt Foot ulcer       Barriers to Learning/Limitations: None  Compensate with: N/A  Home Situation:   Home Situation  Home Environment: Private residence  # Steps to Enter: 4  One/Two Story Residence: Two story  # of Interior Steps: 8  Living Alone: No  Support Systems: Child(trevon), Family member(s)  Patient Expects to be Discharged to[de-identified] Private residence  Current DME Used/Available at Home: None  Critical Behavior:  Neurologic State: Alert  Orientation Level: Oriented X4  Cognition: Follows commands Psychosocial  Patient Behaviors: Calm; Cooperative  Purposeful Interaction: Yes    Strength:    Strength: Generally decreased, functional; limited assessment d/t pain        Range Of Motion:  AROM: Generally decreased, functional    PROM: (NT)      Functional Mobility:  NT; mobility not assessed for safety concerns    Therapeutic Exercises:       EXERCISE   Sets   Reps   Active Active Assist   Passive Self ROM   Comments   Ankle Pumps 1 10  ? ? ? ?    Quad Sets/Glut Sets   ? ? ? ? Hamstring Sets   ? ? ? ? Short Arc Quads   ? ? ? ? Heel Slides   ? ? ? ? Straight Leg Raises   ? ? ? ? Hip Abd/Add   ? ? ? ? Long Arc Quads   ? ? ? ? Seated Marching   ? ? ? ? Standing Marching   ? ? ? ?       ? ? ? ? Pain:  Gout pain   Pain level pre-treatment: 8/10   Pain level post-treatment: 8/10   Pain Intervention(s) : Rest    Activity Tolerance:   Poor; limited by pain    Please refer to the flowsheet for vital signs taken during this treatment. After treatment:   ?         Patient left in no apparent distress sitting up in chair  ? Patient left in no apparent distress in bed  ? Call bell left within reach  ? Nursing notified  ? Caregiver present  ? Bed alarm activated  ? SCDs applied    COMMUNICATION/EDUCATION:   ?         Role of Physical Therapy in the acute care setting. ?         Fall prevention education was provided and the patient/caregiver indicated understanding. ? Patient/family have participated as able in goal setting and plan of care. ?         Patient/family agree to work toward stated goals and plan of care. ?         Patient understands intent and goals of therapy, but is neutral about his/her participation. ? Patient is unable to participate in goal setting/plan of care: ongoing with therapy staff. ?         Other:     Thank you for this referral.  Akanksha Sutton   Time Calculation: 11 mins normal... Well appearing, well nourished, awake, alert, oriented to person, place, time/situation and in no apparent distress.

## 2019-08-25 LAB
GLUCOSE BLD STRIP.AUTO-MCNC: 141 MG/DL (ref 70–110)
GLUCOSE BLD STRIP.AUTO-MCNC: 197 MG/DL (ref 70–110)
GLUCOSE BLD STRIP.AUTO-MCNC: 211 MG/DL (ref 70–110)
GLUCOSE BLD STRIP.AUTO-MCNC: 238 MG/DL (ref 70–110)

## 2019-08-25 PROCEDURE — 74011250637 HC RX REV CODE- 250/637: Performed by: INTERNAL MEDICINE

## 2019-08-25 PROCEDURE — 74011636637 HC RX REV CODE- 636/637: Performed by: INTERNAL MEDICINE

## 2019-08-25 PROCEDURE — 74011250637 HC RX REV CODE- 250/637: Performed by: PHYSICIAN ASSISTANT

## 2019-08-25 PROCEDURE — 65270000029 HC RM PRIVATE

## 2019-08-25 PROCEDURE — 74011250636 HC RX REV CODE- 250/636: Performed by: PHYSICIAN ASSISTANT

## 2019-08-25 PROCEDURE — 82962 GLUCOSE BLOOD TEST: CPT

## 2019-08-25 PROCEDURE — 74011250637 HC RX REV CODE- 250/637: Performed by: PLASTIC SURGERY

## 2019-08-25 PROCEDURE — 74011636637 HC RX REV CODE- 636/637

## 2019-08-25 RX ORDER — INSULIN LISPRO 100 [IU]/ML
INJECTION, SOLUTION INTRAVENOUS; SUBCUTANEOUS
Status: COMPLETED
Start: 2019-08-25 | End: 2019-08-25

## 2019-08-25 RX ORDER — PREDNISONE 10 MG/1
20 TABLET ORAL 2 TIMES DAILY WITH MEALS
Status: COMPLETED | OUTPATIENT
Start: 2019-08-25 | End: 2019-08-25

## 2019-08-25 RX ADMIN — INSULIN LISPRO 6 UNITS: 100 INJECTION, SOLUTION INTRAVENOUS; SUBCUTANEOUS at 22:16

## 2019-08-25 RX ADMIN — GABAPENTIN 600 MG: 300 CAPSULE ORAL at 22:17

## 2019-08-25 RX ADMIN — SODIUM HYPOCHLORITE: 2.5 SOLUTION TOPICAL at 22:18

## 2019-08-25 RX ADMIN — SODIUM HYPOCHLORITE: 2.5 SOLUTION TOPICAL at 09:59

## 2019-08-25 RX ADMIN — HYDROCODONE BITARTRATE AND ACETAMINOPHEN 1 TABLET: 5; 325 TABLET ORAL at 22:18

## 2019-08-25 RX ADMIN — AMOXICILLIN AND CLAVULANATE POTASSIUM 1 TABLET: 875; 125 TABLET, FILM COATED ORAL at 17:25

## 2019-08-25 RX ADMIN — INSULIN LISPRO 6 UNITS: 100 INJECTION, SOLUTION INTRAVENOUS; SUBCUTANEOUS at 12:41

## 2019-08-25 RX ADMIN — HEPARIN SODIUM 5000 UNITS: 5000 INJECTION INTRAVENOUS; SUBCUTANEOUS at 12:42

## 2019-08-25 RX ADMIN — HEPARIN SODIUM 5000 UNITS: 5000 INJECTION INTRAVENOUS; SUBCUTANEOUS at 22:17

## 2019-08-25 RX ADMIN — LEVOTHYROXINE SODIUM 50 MCG: 50 TABLET ORAL at 06:24

## 2019-08-25 RX ADMIN — Medication 10 ML: at 22:19

## 2019-08-25 RX ADMIN — HYDROCODONE BITARTRATE AND ACETAMINOPHEN 1 TABLET: 5; 325 TABLET ORAL at 06:28

## 2019-08-25 RX ADMIN — VALSARTAN 320 MG: 80 TABLET, FILM COATED ORAL at 09:55

## 2019-08-25 RX ADMIN — COLCHICINE 0.6 MG: 0.6 TABLET, FILM COATED ORAL at 06:24

## 2019-08-25 RX ADMIN — HYDROCODONE BITARTRATE AND ACETAMINOPHEN 1 TABLET: 5; 325 TABLET ORAL at 11:55

## 2019-08-25 RX ADMIN — HYDROCODONE BITARTRATE AND ACETAMINOPHEN 1 TABLET: 5; 325 TABLET ORAL at 17:28

## 2019-08-25 RX ADMIN — ROSUVASTATIN CALCIUM 40 MG: 10 TABLET, COATED ORAL at 22:17

## 2019-08-25 RX ADMIN — INSULIN LISPRO 3 UNITS: 100 INJECTION, SOLUTION INTRAVENOUS; SUBCUTANEOUS at 17:24

## 2019-08-25 RX ADMIN — METOPROLOL TARTRATE 50 MG: 50 TABLET ORAL at 09:55

## 2019-08-25 RX ADMIN — AMOXICILLIN AND CLAVULANATE POTASSIUM 1 TABLET: 875; 125 TABLET, FILM COATED ORAL at 09:55

## 2019-08-25 RX ADMIN — HEPARIN SODIUM 5000 UNITS: 5000 INJECTION INTRAVENOUS; SUBCUTANEOUS at 06:24

## 2019-08-25 RX ADMIN — PREDNISONE 20 MG: 10 TABLET ORAL at 09:55

## 2019-08-25 RX ADMIN — Medication 10 ML: at 06:24

## 2019-08-25 RX ADMIN — METOPROLOL TARTRATE 50 MG: 50 TABLET ORAL at 22:17

## 2019-08-25 RX ADMIN — PREDNISONE 20 MG: 10 TABLET ORAL at 17:24

## 2019-08-25 RX ADMIN — FUROSEMIDE 80 MG: 40 TABLET ORAL at 09:55

## 2019-08-25 RX ADMIN — Medication 10 ML: at 17:25

## 2019-08-25 NOTE — PROGRESS NOTES
Patient now moving the right leg and the right arm - excellent management of the gout responsible - wounds all cleaning up well - specialty bed still to arrive - he ought to be ready for ambulating with PT in the am - as soon as he can walk again independently he could go home with Home Health care to do the daily dressing changes

## 2019-08-25 NOTE — PROGRESS NOTES
Problem: Diabetes Self-Management  Goal: *Monitoring blood glucose, interpreting and using results  Description  Identify recommended blood glucose targets  and personal targets. Outcome: Progressing Towards Goal  Goal: *Prevention, detection, treatment of acute complications  Description  List symptoms of hyper- and hypoglycemia; describe how to treat low blood sugar and actions for lowering  high blood glucose level. Outcome: Progressing Towards Goal  Goal: *Prevention, detection and treatment of chronic complications  Description  Define the natural course of diabetes and describe the relationship of blood glucose levels to long term complications of diabetes. Outcome: Progressing Towards Goal     Problem: Patient Education: Go to Patient Education Activity  Goal: Patient/Family Education  Outcome: Progressing Towards Goal     Problem: Pressure Injury - Risk of  Goal: *Prevention of pressure injury  Description  Document Stone Scale and appropriate interventions in the flowsheet. Outcome: Progressing Towards Goal  Note:   Pressure Injury Interventions:  Sensory Interventions: Assess need for specialty bed, Float heels, Keep linens dry and wrinkle-free, Sit a 90-degree angle/use footstool if needed    Moisture Interventions: Absorbent underpads, Contain wound drainage    Activity Interventions: Pressure redistribution bed/mattress(bed type)    Mobility Interventions: Float heels, Pressure redistribution bed/mattress (bed type)    Nutrition Interventions: Document food/fluid/supplement intake    Friction and Shear Interventions: Apply protective barrier, creams and emollients, Minimize layers, Sit at 90-degree angle                Problem: Patient Education: Go to Patient Education Activity  Goal: Patient/Family Education  Outcome: Progressing Towards Goal     Problem: Falls - Risk of  Goal: *Absence of Falls  Description  Document Shane Mccloud Fall Risk and appropriate interventions in the flowsheet.   Outcome: Progressing Towards Goal  Note:   Fall Risk Interventions:  Mobility Interventions: Communicate number of staff needed for ambulation/transfer, Patient to call before getting OOB         Medication Interventions: Patient to call before getting OOB    Elimination Interventions: Call light in reach, Patient to call for help with toileting needs, Urinal in reach              Problem: Patient Education: Go to Patient Education Activity  Goal: Patient/Family Education  Outcome: Progressing Towards Goal     Problem: Discharge Planning  Goal: *Discharge to safe environment  Outcome: Progressing Towards Goal     Problem: Pain  Goal: *Control of Pain  Outcome: Progressing Towards Goal     Problem: Patient Education: Go to Patient Education Activity  Goal: Patient/Family Education  Outcome: Progressing Towards Goal     Problem: Altered nutrition  Goal: *Optimize nutritional status  Outcome: Progressing Towards Goal     Problem: Cellulitis Care Plan (Adult)  Goal: *Control of acute pain  Outcome: Progressing Towards Goal  Goal: *Skin integrity maintained  Outcome: Progressing Towards Goal  Goal: *Absence of infection signs and symptoms  Outcome: Progressing Towards Goal     Problem: Patient Education: Go to Patient Education Activity  Goal: Patient/Family Education  Outcome: Progressing Towards Goal     Problem: Heart Failure: Discharge Outcomes  Goal: *Demonstrates ability to perform prescribed activity without shortness of breath or discomfort  Outcome: Progressing Towards Goal  Goal: *Verbalizes understanding and describes prescribed diet  Outcome: Progressing Towards Goal  Goal: *Verbalizes understanding/describes prescribed medications  Outcome: Progressing Towards Goal  Goal: *Describes available resources and support systems  Description  (eg: Home Health, Palliative Care, Advanced Medical Directive)  Outcome: Progressing Towards Goal

## 2019-08-25 NOTE — PROGRESS NOTES
Internal Medicine Progress Note        NAME: Monae Kruse   :  1950  MRM:  [id number]    Date/Time: 2019        ASSESSMENT/PLAN: gout pains improving much but still there , extend steroid to another day or 2 depend on progress ( 20 mg BID)      # Cellulitis with infected ulcer right lateral leg with h/o lymphedema.  cx Citrobacter koseri (2) both pan-sensitive, E faecalis, Diphtheroids (3) . Treated with   vanc zosyn, then dc vanco  per ID. Zosyn changed to augmentin 875/125 bid x 3 days    #  ulcer right lateral leg with h/o lymphedema, Elephantiasis . Dr. Lacy Edmondson [facility]) evaluated, recommends vascular surgery eval . MIGDALIA done on admission shows \"There is no evidence of any hemodynamically significant peripheral arterial disease at rest in either lower extremity. \"  Vascular consulted. - s/p INCISION AND DRAINAGE RIGHT LOWER LEG - CALF AND LATERAL ANKLE SINUS ULCERS 19. Further per surgery. # Chronic lymphedema b/l LE . Cont lasix. bilateral unna boots recommended by Vascular    # Hypothyroidism. Cont synthroid    # Diarrhea. Abx. Colchicine. Monitor. # Acute on chronic gout. Affecting his R elbow and Knee , L toes. Report colchicine started working. Agree on  Short course steroid . Adjust diet. # Hypokalemia. Replete , trend     # DM. Provide SSI, hypoglycemia protocol and frequent Accu checks. Provide SSI, hypoglycemia protocol and frequent Accu checks. Education,  diabetic educator     # CKD. Monitor labs. Avoid nephrotoxins. Renally dosing medications. Monitor urine out put. · DVT protocol    Lab Review:     No results for input(s): WBC, HGB, HCT, PLT, HGBEXT, HCTEXT, PLTEXT, HGBEXT, HCTEXT, PLTEXT in the last 72 hours.   Recent Labs     19  0400      K 3.4*      CO2 34*   *   BUN 18   CREA 1.45*   CA 8.7     Lab Results   Component Value Date/Time    Glucose (POC) 141 (H) 2019 08:33 AM    Glucose (POC) 213 (H) 2019 09:43 PM    Glucose (POC) 161 (H) 08/24/2019 04:13 PM    Glucose (POC) 162 (H) 08/24/2019 12:08 PM    Glucose (POC) 206 (H) 08/24/2019 08:36 AM     No results for input(s): PH, PCO2, PO2, HCO3, FIO2 in the last 72 hours. No results for input(s): INR in the last 72 hours. No lab exists for component: INREXT, INREXT    No results found for: SDES  Lab Results   Component Value Date/Time    Culture result: CULTURE IN PROGRESS,FURTHER UPDATES TO FOLLOW 08/23/2019 09:45 AM    Culture result: FEW GRAM NEGATIVE RODS (A) 08/23/2019 09:45 AM    Culture result: RARE STAPHYLOCOCCUS SPECIES (A) 08/23/2019 09:45 AM    Culture result: RARE DIPHTHEROIDS (A) 08/23/2019 09:45 AM       All Cardiac Markers in the last 24 hours: No results found for: CPK, CK, CKMMB, CKMB, RCK3, CKMBT, CKNDX, CKND1, EARLE, TROPT, TROIQ, FLORENTIN, TROPT, TNIPOC, BNP, BNPP           Subjective:     Chief Complaint:      No complain  Gout swelling and pains improving slowly  Decline to be able to go home today    ROS:  (bold if positive,otherwise negative)    Fever/chills ,  Dysuria   Cough , Sputum , SOB/SHELTON , Chest Pain     Diarrhea ,Nausea/Vomit , Abd Pain , Constipation    Tolerating Diet                Objective:     Vitals:  Last 24hrs VS reviewed since prior progress note. Most recent are:    Visit Vitals  /79   Pulse 74   Temp 97.8 °F (36.6 °C)   Resp 18   Ht 6' 5\" (1.956 m)   Wt (!) 195.7 kg (431 lb 8 oz)   SpO2 91%   BMI 51.17 kg/m²     SpO2 Readings from Last 6 Encounters:   08/25/19 91%   08/15/19 93%   08/15/19 100%   08/09/19 97%   07/24/19 97%   07/22/19 97%    O2 Flow Rate (L/min): 2 l/min       Intake/Output Summary (Last 24 hours) at 8/25/2019 4508  Last data filed at 8/25/2019 3441  Gross per 24 hour   Intake 960 ml   Output 1400 ml   Net -440 ml          Physical Exam:   Ears: hearing is intact  Eyes:  Icterus is not present  Lungs: clear to auscultation bilaterally  Heart: regular rate and rhythm, S1, S2 normal, no murmur, click, rub or gallop  Gastrointestinal: soft, non-tender. Bowel sounds normal. No masses,  no organomegaly  Neurological:  New Focal Deficits are not present  MS: R elbow , knee inflammatory changes, swelling and tenderness , decrease  ROM improving progresively. Psychiatric:  Mood is stable  Legs elephantiasis.  R LL ulcer, dressed     Medications Reviewed: (see below)    Lab Data Reviewed: (see below)    ______________________________________________________________________    Medications:     Current Facility-Administered Medications   Medication Dose Route Frequency    predniSONE (DELTASONE) tablet 20 mg  20 mg Oral BID WITH MEALS    predniSONE (DELTASONE) tablet 20 mg  20 mg Oral BID WITH MEALS    amoxicillin-clavulanate (AUGMENTIN) 875-125 mg per tablet 1 Tab  1 Tab Oral BID WITH MEALS    rosuvastatin (CRESTOR) tablet 40 mg  40 mg Oral QHS    diclofenac-miSOPROStol (ARTHROTEC 50)  mg-mcg per tablet - Patient's own med  1 Tab Oral BID WITH MEALS    methocarbamol (ROBAXIN) tablet 750 mg  750 mg Oral QID PRN    colchicine tablet 0.6 mg  0.6 mg Oral DAILY    insulin lispro (HUMALOG) injection   SubCUTAneous QID    dextrose 10% infusion 125-250 mL  125-250 mL IntraVENous PRN    sodium hypochlorite (HALF STRENGTH DAKIN'S) 0.25% irrigation (bottle)   Topical Q12H    sodium chloride (NS) flush 5-40 mL  5-40 mL IntraVENous Q8H    sodium chloride (NS) flush 5-40 mL  5-40 mL IntraVENous PRN    acetaminophen (TYLENOL) tablet 650 mg  650 mg Oral Q4H PRN    HYDROcodone-acetaminophen (NORCO) 5-325 mg per tablet 1 Tab  1 Tab Oral Q4H PRN    heparin (porcine) injection 5,000 Units  5,000 Units SubCUTAneous Q8H    glucose chewable tablet 16 g  4 Tab Oral PRN    glucagon (GLUCAGEN) injection 1 mg  1 mg IntraMUSCular PRN    furosemide (LASIX) tablet 80 mg  80 mg Oral DAILY    gabapentin (NEURONTIN) capsule 300-600 mg  300-600 mg Oral QHS    levothyroxine (SYNTHROID) tablet 50 mcg  50 mcg Oral ACB    metoprolol tartrate (LOPRESSOR) tablet 50 mg  50 mg Oral Q12H    valsartan (DIOVAN) tablet 320 mg  320 mg Oral DAILY          Total time spent with patient: 25 minutes                  Care Plan discussed with: Patient, Family, Care Manager, Nursing Staff and Consultant/Specialist    Discussed:  Care Plan    Prophylaxis:  Hep SQ    Disposition:  Home w/Family             Attending Physician: Tye Parson MD

## 2019-08-25 NOTE — PROGRESS NOTES
Problem: Falls - Risk of  Goal: *Absence of Falls  Description  Document Darío Starr Fall Risk and appropriate interventions in the flowsheet.   Outcome: Progressing Towards Goal  Note:   Fall Risk Interventions:  Mobility Interventions: OT consult for ADLs, Patient to call before getting OOB, PT Consult for mobility concerns, PT Consult for assist device competence         Medication Interventions: Patient to call before getting OOB    Elimination Interventions: Call light in reach, Patient to call for help with toileting needs              Problem: Pain  Goal: *Control of Pain  Outcome: Progressing Towards Goal     Problem: Cellulitis Care Plan (Adult)  Goal: *Control of acute pain  Outcome: Progressing Towards Goal  Goal: *Skin integrity maintained  Outcome: Progressing Towards Goal  Goal: *Absence of infection signs and symptoms  Outcome: Progressing Towards Goal

## 2019-08-25 NOTE — ROUTINE PROCESS
1920: Assumed care. Awake. Quietly resting in bed. BLE dressing intact. Call light within reach. Family at bedside. 2227: Due meds given. . Coverage provided per protocol. Medicated for pain per patient request.    0034: No change from previous assessment. 0200: Rt leg dressing changed done. Procedure tolerated well.     0423: No change from previous assessment. 2706: Slept good thru night. Needs attended. Due meds given. 6693: Medicated for pain per patient request.    5285: Bedside and Verbal shift change report given to Aldair Meier RN (oncoming nurse) by me (offgoing nurse). Report included the following information SBAR, Kardex, Intake/Output, MAR and Recent Results.

## 2019-08-26 LAB
ANION GAP SERPL CALC-SCNC: 6 MMOL/L (ref 3–18)
BASOPHILS # BLD: 0 K/UL (ref 0–0.1)
BASOPHILS NFR BLD: 0 % (ref 0–2)
BUN SERPL-MCNC: 35 MG/DL (ref 7–18)
BUN/CREAT SERPL: 24 (ref 12–20)
CALCIUM SERPL-MCNC: 9.6 MG/DL (ref 8.5–10.1)
CHLORIDE SERPL-SCNC: 100 MMOL/L (ref 100–111)
CO2 SERPL-SCNC: 35 MMOL/L (ref 21–32)
CREAT SERPL-MCNC: 1.43 MG/DL (ref 0.6–1.3)
DIFFERENTIAL METHOD BLD: ABNORMAL
EOSINOPHIL # BLD: 0 K/UL (ref 0–0.4)
EOSINOPHIL NFR BLD: 0 % (ref 0–5)
ERYTHROCYTE [DISTWIDTH] IN BLOOD BY AUTOMATED COUNT: 13.5 % (ref 11.6–14.5)
GLUCOSE BLD STRIP.AUTO-MCNC: 153 MG/DL (ref 70–110)
GLUCOSE BLD STRIP.AUTO-MCNC: 158 MG/DL (ref 70–110)
GLUCOSE BLD STRIP.AUTO-MCNC: 158 MG/DL (ref 70–110)
GLUCOSE BLD STRIP.AUTO-MCNC: 211 MG/DL (ref 70–110)
GLUCOSE SERPL-MCNC: 161 MG/DL (ref 74–99)
HCT VFR BLD AUTO: 35.4 % (ref 36–48)
HGB BLD-MCNC: 11.3 G/DL (ref 13–16)
LYMPHOCYTES # BLD: 1.8 K/UL (ref 0.9–3.6)
LYMPHOCYTES NFR BLD: 17 % (ref 21–52)
MAGNESIUM SERPL-MCNC: 1.9 MG/DL (ref 1.6–2.6)
MCH RBC QN AUTO: 31.1 PG (ref 24–34)
MCHC RBC AUTO-ENTMCNC: 31.9 G/DL (ref 31–37)
MCV RBC AUTO: 97.5 FL (ref 74–97)
MONOCYTES # BLD: 0.8 K/UL (ref 0.05–1.2)
MONOCYTES NFR BLD: 7 % (ref 3–10)
NEUTS SEG # BLD: 8 K/UL (ref 1.8–8)
NEUTS SEG NFR BLD: 76 % (ref 40–73)
PHOSPHATE SERPL-MCNC: 3.9 MG/DL (ref 2.5–4.9)
PLATELET # BLD AUTO: 407 K/UL (ref 135–420)
PMV BLD AUTO: 10.3 FL (ref 9.2–11.8)
POTASSIUM SERPL-SCNC: 4.2 MMOL/L (ref 3.5–5.5)
RBC # BLD AUTO: 3.63 M/UL (ref 4.7–5.5)
SODIUM SERPL-SCNC: 141 MMOL/L (ref 136–145)
WBC # BLD AUTO: 10.6 K/UL (ref 4.6–13.2)

## 2019-08-26 PROCEDURE — 97535 SELF CARE MNGMENT TRAINING: CPT

## 2019-08-26 PROCEDURE — 80048 BASIC METABOLIC PNL TOTAL CA: CPT

## 2019-08-26 PROCEDURE — 82962 GLUCOSE BLOOD TEST: CPT

## 2019-08-26 PROCEDURE — 65270000029 HC RM PRIVATE

## 2019-08-26 PROCEDURE — 83735 ASSAY OF MAGNESIUM: CPT

## 2019-08-26 PROCEDURE — 74011250637 HC RX REV CODE- 250/637: Performed by: INTERNAL MEDICINE

## 2019-08-26 PROCEDURE — 97168 OT RE-EVAL EST PLAN CARE: CPT

## 2019-08-26 PROCEDURE — 74011636637 HC RX REV CODE- 636/637: Performed by: INTERNAL MEDICINE

## 2019-08-26 PROCEDURE — 74011250637 HC RX REV CODE- 250/637: Performed by: PLASTIC SURGERY

## 2019-08-26 PROCEDURE — 74011250637 HC RX REV CODE- 250/637: Performed by: PHYSICIAN ASSISTANT

## 2019-08-26 PROCEDURE — 97530 THERAPEUTIC ACTIVITIES: CPT

## 2019-08-26 PROCEDURE — 74011000250 HC RX REV CODE- 250: Performed by: PLASTIC SURGERY

## 2019-08-26 PROCEDURE — 74011250636 HC RX REV CODE- 250/636: Performed by: PHYSICIAN ASSISTANT

## 2019-08-26 PROCEDURE — 85025 COMPLETE CBC W/AUTO DIFF WBC: CPT

## 2019-08-26 PROCEDURE — 84100 ASSAY OF PHOSPHORUS: CPT

## 2019-08-26 PROCEDURE — 97116 GAIT TRAINING THERAPY: CPT

## 2019-08-26 PROCEDURE — 36415 COLL VENOUS BLD VENIPUNCTURE: CPT

## 2019-08-26 RX ADMIN — HYDROCODONE BITARTRATE AND ACETAMINOPHEN 1 TABLET: 5; 325 TABLET ORAL at 06:06

## 2019-08-26 RX ADMIN — INSULIN LISPRO 6 UNITS: 100 INJECTION, SOLUTION INTRAVENOUS; SUBCUTANEOUS at 12:20

## 2019-08-26 RX ADMIN — Medication 10 ML: at 14:13

## 2019-08-26 RX ADMIN — GABAPENTIN 300 MG: 300 CAPSULE ORAL at 21:56

## 2019-08-26 RX ADMIN — HYDROCODONE BITARTRATE AND ACETAMINOPHEN 1 TABLET: 5; 325 TABLET ORAL at 12:23

## 2019-08-26 RX ADMIN — ROSUVASTATIN CALCIUM 40 MG: 10 TABLET, COATED ORAL at 21:57

## 2019-08-26 RX ADMIN — HEPARIN SODIUM 5000 UNITS: 5000 INJECTION INTRAVENOUS; SUBCUTANEOUS at 21:57

## 2019-08-26 RX ADMIN — INSULIN LISPRO 3 UNITS: 100 INJECTION, SOLUTION INTRAVENOUS; SUBCUTANEOUS at 17:14

## 2019-08-26 RX ADMIN — SODIUM HYPOCHLORITE: 2.5 SOLUTION TOPICAL at 22:44

## 2019-08-26 RX ADMIN — METOPROLOL TARTRATE 50 MG: 50 TABLET ORAL at 21:57

## 2019-08-26 RX ADMIN — VALSARTAN 320 MG: 80 TABLET, FILM COATED ORAL at 08:26

## 2019-08-26 RX ADMIN — Medication 10 ML: at 23:14

## 2019-08-26 RX ADMIN — HEPARIN SODIUM 5000 UNITS: 5000 INJECTION INTRAVENOUS; SUBCUTANEOUS at 12:20

## 2019-08-26 RX ADMIN — INSULIN LISPRO 3 UNITS: 100 INJECTION, SOLUTION INTRAVENOUS; SUBCUTANEOUS at 08:26

## 2019-08-26 RX ADMIN — METOPROLOL TARTRATE 50 MG: 50 TABLET ORAL at 08:26

## 2019-08-26 RX ADMIN — INSULIN LISPRO 3 UNITS: 100 INJECTION, SOLUTION INTRAVENOUS; SUBCUTANEOUS at 23:14

## 2019-08-26 RX ADMIN — HYDROCODONE BITARTRATE AND ACETAMINOPHEN 1 TABLET: 5; 325 TABLET ORAL at 17:14

## 2019-08-26 RX ADMIN — FUROSEMIDE 80 MG: 40 TABLET ORAL at 08:26

## 2019-08-26 RX ADMIN — HYDROCODONE BITARTRATE AND ACETAMINOPHEN 1 TABLET: 5; 325 TABLET ORAL at 22:02

## 2019-08-26 RX ADMIN — LEVOTHYROXINE SODIUM 50 MCG: 50 TABLET ORAL at 06:00

## 2019-08-26 RX ADMIN — Medication 10 ML: at 06:00

## 2019-08-26 RX ADMIN — AMOXICILLIN AND CLAVULANATE POTASSIUM 1 TABLET: 875; 125 TABLET, FILM COATED ORAL at 08:26

## 2019-08-26 RX ADMIN — HEPARIN SODIUM 5000 UNITS: 5000 INJECTION INTRAVENOUS; SUBCUTANEOUS at 06:00

## 2019-08-26 RX ADMIN — COLCHICINE 0.6 MG: 0.6 TABLET, FILM COATED ORAL at 06:00

## 2019-08-26 NOTE — ROUTINE PROCESS
1947: Assumed care. AAOX4. Behavior appropriate to situations. HOB elevated. Assessment done. Call light within reach. 2218: Due meds given. . Coverage provided per protocol. Medicated for pain per patient request.    0017: No change from previous assessment. 0230: Rt LE dressing change done. 2525: No change from previous assessment.    0606: Slept on & off thru night. Needs attended. Due meds given. Medicated for pain per patient request.    6144: Bedside and Verbal shift change report given to Denae RN (oncoming nurse) by me (offgoing nurse). Report included the following information SBAR, Kardex, Intake/Output, MAR and Recent Results.

## 2019-08-26 NOTE — PROGRESS NOTES
Plan is home with [facility] referral placed in [facility] and called to [facility]. Bariatric walker ordered from [employer] medical information faxed, patient will have to pick ait up upon discharge.      Spoke with daughter and patient and family will transport patient home when d/c'd     Will need specfic wound care orders from MD to update hh.

## 2019-08-26 NOTE — PROGRESS NOTES
Problem: Diabetes Self-Management  Goal: *Disease process and treatment process  Description  Define diabetes and identify own type of diabetes; list 3 options for treating diabetes. Outcome: Progressing Towards Goal  Goal: *Incorporating nutritional management into lifestyle  Description  Describe effect of type, amount and timing of food on blood glucose; list 3 methods for planning meals. Outcome: Progressing Towards Goal  Goal: *Incorporating physical activity into lifestyle  Description  State effect of exercise on blood glucose levels. Outcome: Progressing Towards Goal  Goal: *Developing strategies to promote health/change behavior  Description  Define the ABC's of diabetes; identify appropriate screenings, schedule and personal plan for screenings. Outcome: Progressing Towards Goal  Goal: *Using medications safely  Description  State effect of diabetes medications on diabetes; name diabetes medication taking, action and side effects. Outcome: Progressing Towards Goal  Goal: *Monitoring blood glucose, interpreting and using results  Description  Identify recommended blood glucose targets  and personal targets. Outcome: Progressing Towards Goal  Goal: *Prevention, detection, treatment of acute complications  Description  List symptoms of hyper- and hypoglycemia; describe how to treat low blood sugar and actions for lowering  high blood glucose level. Outcome: Progressing Towards Goal  Goal: *Prevention, detection and treatment of chronic complications  Description  Define the natural course of diabetes and describe the relationship of blood glucose levels to long term complications of diabetes.   Outcome: Progressing Towards Goal  Goal: *Developing strategies to address psychosocial issues  Description  Describe feelings about living with diabetes; identify support needed and support network  Outcome: Progressing Towards Goal  Goal: *Insulin pump training  Outcome: Progressing Towards Goal  Goal: *Sick day guidelines  Outcome: Progressing Towards Goal  Goal: *Patient Specific Goal (EDIT GOAL, INSERT TEXT)  Outcome: Progressing Towards Goal     Problem: Patient Education: Go to Patient Education Activity  Goal: Patient/Family Education  Outcome: Progressing Towards Goal     Problem: Pressure Injury - Risk of  Goal: *Prevention of pressure injury  Description  Document Stone Scale and appropriate interventions in the flowsheet. Outcome: Progressing Towards Goal  Note:   Pressure Injury Interventions:  Sensory Interventions: Check visual cues for pain, Keep linens dry and wrinkle-free, Minimize linen layers, Pressure redistribution bed/mattress (bed type)    Moisture Interventions: Absorbent underpads, Minimize layers, Moisture barrier    Activity Interventions: Pressure redistribution bed/mattress(bed type)    Mobility Interventions: HOB 30 degrees or less    Nutrition Interventions: Document food/fluid/supplement intake    Friction and Shear Interventions: HOB 30 degrees or less, Sit at 90-degree angle, Minimize layers                Problem: Patient Education: Go to Patient Education Activity  Goal: Patient/Family Education  Outcome: Progressing Towards Goal     Problem: Falls - Risk of  Goal: *Absence of Falls  Description  Document [place] Fall Risk and appropriate interventions in the flowsheet.   Outcome: Progressing Towards Goal  Note:   Fall Risk Interventions:  Mobility Interventions: Communicate number of staff needed for ambulation/transfer         Medication Interventions: Evaluate medications/consider consulting pharmacy    Elimination Interventions: Call light in reach              Problem: Patient Education: Go to Patient Education Activity  Goal: Patient/Family Education  Outcome: Progressing Towards Goal     Problem: Discharge Planning  Goal: *Discharge to safe environment  Outcome: Progressing Towards Goal     Problem: Pain  Goal: *Control of Pain  Outcome: Progressing Towards Goal  Goal: *PALLIATIVE CARE:  Alleviation of Pain  Outcome: Progressing Towards Goal     Problem: Patient Education: Go to Patient Education Activity  Goal: Patient/Family Education  Outcome: Progressing Towards Goal     Problem: Patient Education: Go to Patient Education Activity  Goal: Patient/Family Education  Outcome: Progressing Towards Goal     Problem: Patient Education: Go to Patient Education Activity  Goal: Patient/Family Education  Outcome: Progressing Towards Goal     Problem: Risk for Spread of Infection  Goal: Prevent transmission of infectious organism to others  Description  Prevent the transmission of infectious organisms to other patients, staff members, and visitors.   Outcome: Progressing Towards Goal     Problem: Patient Education:  Go to Education Activity  Goal: Patient/Family Education  Outcome: Progressing Towards Goal     Problem: Altered nutrition  Goal: *Optimize nutritional status  Outcome: Progressing Towards Goal     Problem: Cellulitis Care Plan (Adult)  Goal: *Control of acute pain  Outcome: Progressing Towards Goal  Goal: *Skin integrity maintained  Outcome: Progressing Towards Goal  Goal: *Absence of infection signs and symptoms  Outcome: Progressing Towards Goal     Problem: Patient Education: Go to Patient Education Activity  Goal: Patient/Family Education  Outcome: Progressing Towards Goal     Problem: Heart Failure: Discharge Outcomes  Goal: *Demonstrates ability to perform prescribed activity without shortness of breath or discomfort  Outcome: Progressing Towards Goal  Goal: *Left ventricular function assessment completed prior to or during stay, or planned for post-discharge  Outcome: Progressing Towards Goal  Goal: *ACEI prescribed if LVEF less than 40% and no contraindications or ARB prescribed  Outcome: Progressing Towards Goal  Goal: *Verbalizes understanding and describes prescribed diet  Outcome: Progressing Towards Goal  Goal: *Verbalizes understanding/describes prescribed medications  Outcome: Progressing Towards Goal  Goal: *Describes available resources and support systems  Description  (eg: Home Health, Palliative Care, Advanced Medical Directive)  Outcome: Progressing Towards Goal  Goal: *Describes smoking cessation resources  Outcome: Progressing Towards Goal  Goal: *Understands and describes signs and symptoms to report to providers(Stroke Metric)  Outcome: Progressing Towards Goal  Goal: *Describes/verbalizes understanding of follow-up/return appt  Description  (eg: to physicians, diabetes treatment coordinator, and other resources  Outcome: Progressing Towards Goal  Goal: *Describes importance of continuing daily weights and changes to report to physician  Outcome: Progressing Towards Goal

## 2019-08-26 NOTE — PROGRESS NOTES
Bedside shift change report given to ESME Philippe (oncoming nurse) by Anderson Alicea RN (offgoing nurse). Report included the following information SBAR, Kardex, Intake/Output, MAR and Recent Results. A/O x4, no pain noted, non-tele, IV flushed and capped, call bell and personal belongings in reach.      7523 -- AM medications given, well tolerated, will continue to monitor.      1122 -- Reassessment completed, no change in patient condition, will continue to monitor. 1223 -- Pain level 7, Norco given well tolerated. 1245 -- Pain reassessed, patient is resting, with his family.     1525 -- Reassessment completed, no change in patient condition, will continue to monitor. 1714 -- Pain level 7, Norco given, well tolerated.      Bedside shift change report given to Kelley Montana RN (oncoming nurse) by Sai Luz RN (offgoing nurse). Report included the following information SBAR, Kardex, Intake/Output, MAR and Recent Results.

## 2019-08-26 NOTE — PROGRESS NOTES
Problem: Mobility Impaired (Adult and Pediatric)  Goal: *Acute Goals and Plan of Care (Insert Text)  Description  Physical Therapy Goals:   Goals met 8/26/2019; updated to reflect prior level of function  1. Patient will move from supine to sit and sit to supine  in bed with modified independence. 2.  Patient will transfer from bed to chair and chair to bed with modified independence using the least restrictive device. 3.  Patient will perform sit to stand with modified independence. 4.  Patient will ambulate with modified independence for 150 feet with the least restrictive device. 5.  Patient will ascend/descend 4 stairs with handrail(s) with modified independence. Will continue x 7 days (8/31)  Initiated 8/20/2019 and to be accomplished within 7 day(s)  1. Patient will move from supine to sit and sit to supine  in bed with supervision/set-up. 2.  Patient will transfer from bed to chair and chair to bed with supervision/set-up using the least restrictive device. 3.  Patient will perform sit to stand with supervision/set-up. 4.  Patient will ambulate with supervision/set-up for 50 feet with the least restrictive device. Outcome: Progressing Towards Goal   PHYSICAL THERAPY TREATMENT    Patient: Erasto Cooper (14 y.o. male)  Date: 8/26/2019  Diagnosis: Cellulitis [L03.90] Cellulitis of right lower extremity  Procedure(s) (LRB):  INCISION AND DRAINAGE RIGHT LOWER LEG - CALF AND LATERAL ANKLE SINUS ULCERS  (Right) 3 Days Post-Op  Precautions: Fall, Skin  PLOF: Mod I    ASSESSMENT:  Goals met this session; updated to reflect PLOF. Supervision for supine to sit. Intact seated balance. Supervision for sit to stand. Amb 50ft with bariatric ww and supervision. Improved gait mechanics with amb in hallway. Left seated EOB with OT. Education provided on bed mobility, transfers, ADLs, balance, amb, safety, exercise, role of PT, plan of care, cognition, skin integrity, vitals as indicated.  Educated on need for RN assistance with mobility; verbalized understanding. Call bell in reach. Progression toward goals:   ?      Improving appropriately and progressing toward goals  ? Improving slowly and progressing toward goals  ? Not making progress toward goals and plan of care will be adjusted     PLAN:  Patient continues to benefit from skilled intervention to address the above impairments. Continue treatment per established plan of care. Discharge Recommendations:  Home Health  Further Equipment Recommendations for Discharge:  BARIATRIC walker      SUBJECTIVE:   Patient stated I'm doing better than last time.     OBJECTIVE DATA SUMMARY:   Critical Behavior:  Neurologic State: Alert(Simultaneous filing. User may not have seen previous data.)  Orientation Level: Oriented X4(Simultaneous filing. User may not have seen previous data.)  Cognition: Appropriate decision making, Follows commands(Simultaneous filing. User may not have seen previous data.)  Safety/Judgement: Awareness of environment, Fall prevention, Insight into deficits  Functional Mobility Training:  Bed Mobility:  Supine to Sit: Supervision  Transfers:  Sit to Stand: Supervision  Stand to Sit: Supervision  Balance:  Sitting: Impaired  Sitting - Static: Good (unsupported)  Sitting - Dynamic: Fair (occasional)  Standing: Impaired; With support  Standing - Static: Fair(Fair+)  Standing - Dynamic : Fair   Ambulation/Gait Training:  Distance (ft): 50 Feet (ft)  Assistive Device: Walker, rolling  Ambulation - Level of Assistance: Supervision    Pain:  Pain level pre-treatment: 7/10  Pain level post-treatment: 7/10     Activity Tolerance:   Good    After treatment:   ? Patient left in no apparent distress sitting up in chair  ? Patient left in no apparent distress in bed; EOB with OT  ? Call bell left within reach  ? Nursing notified  ? Caregiver present  ? Bed alarm activated  ?  SCDs applied      COMMUNICATION/EDUCATION:   ?         Role of physical therapy in the acute care setting. ?         Fall prevention education was provided and the patient/caregiver indicated understanding. ? Patient/family have participated as able in working toward goals and plan of care. ?         Patient/family agree to work toward stated goals and plan of care. ?         Patient understands intent and goals of therapy, but is neutral about his/her participation. ? Patient is unable to participate in stated goals/plan of care: ongoing with therapy staff.       Caridad Farrell, PT   Time Calculation: 16 mins

## 2019-08-26 NOTE — PROGRESS NOTES
Problem: Self Care Deficits Care Plan (Adult)  Goal: *Acute Goals and Plan of Care (Insert Text)  Description  Occupational Therapy Goals  Initiated 8/20/2019 within 7 day(s). Re-evaluated on 8/26/19 following debridement of LLE. Pt making good progress towards all goals. Updated goals listed below. UPDATED GOALS Edward Chan, MS OTR/L; 8/26/19)  1. Patient will perform grooming tasks while standing with modified independence and < 3 rest breaks. 2.  Patient will perform lower body dressing with modified independence utilizing modified independence, fair dynamic standing balance and < 2 rest breaks to increase activity tolerance for ADLs. 4.  Patient will perform toilet transfers with modified independence. 5.  Patient will perform all aspects of toileting with modified independence. 6.  Patient will utilize energy conservation techniques during functional activities with minimal verbal cues. 1.  Patient will perform grooming tasks while standing with supervision/set-up. 2.  Patient will perform lower body dressing with supervision utilizing AE, prn.  3.  Patient will perform functional task in standing for 8 minutes with supervision, fair dynamic standing balance and < 2 rest breaks to increase activity tolerance for ADLs. 4.  Patient will perform toilet transfers with supervision/set-up. 5.  Patient will perform all aspects of toileting with supervision/set-up. 6.  Patient will utilize energy conservation techniques during functional activities with minimal verbal cues. 8/26/2019 1300 by Riley Ruelas OT  Outcome: Progressing Towards Goal    OCCUPATIONAL THERAPY RE-EVALUATION    Patient: Krystle Rivera (31 y.o. male)  Date: 8/26/2019  Primary Diagnosis: Cellulitis [L03.90]  Procedure(s) (LRB):  INCISION AND DRAINAGE RIGHT LOWER LEG - CALF AND LATERAL ANKLE SINUS ULCERS  (Right) 3 Days Post-Op   Precautions:  Fall, Skin  PLOF: Pt was independent with ADLs & functional mobility.     ASSESSMENT :  Based on the objective data described below, the patient presents with impairments with regard to bed mobility, functional transfers and ADLs. Pt supine on arrival, agreeable to therapy with encouragement. Co-treated with PT in beginning of session to ensure pt safety & participation. Re-evaluated following debridement of RLE. Pt c/o 7/10 pain pre/post session. Supervision for supine-->sit which is dramatic improvement compared to initial evaluation. Good AROM/strength of BUEs; RUE slightly decreased due to gout flare up. Pt maneuvered to bathroom with bariatric RW and performed UB/LB ADLs with supervision & mod A; performed tasks while seated on toilet for energy conservation. Returned to EOB; mod A For BLE management to return supine. HOB elevated, BLEs elevated. Pt making good progress towards all goals; recommend Located within [facility] upon d/c. Patient will benefit from skilled intervention to address the above impairments. Patient's rehabilitation potential is considered to be Good  Factors which may influence rehabilitation potential include:   ? None noted  ? Mental ability/status  ? Medical condition  ? Home/family situation and support systems  ? Safety awareness  ? Pain tolerance/management  ? Other:      PLAN :  Recommendations and Planned Interventions:   ?               Self Care Training                  ? Therapeutic Activities  ? Functional Mobility Training   ? Cognitive Retraining  ? Therapeutic Exercises           ? Endurance Activities  ? Balance Training                    ? Neuromuscular Re-Education  ? Visual/Perceptual Training     ? Home Safety Training  ? Patient Education                   ? Family Training/Education  ?                Other (comment):    Frequency/Duration: Patient will be followed by occupational therapy 1-2 more visits to address goals. Discharge Recommendations: Home Health  Further Equipment Recommendations for Discharge: bedside commode and N/A     SUBJECTIVE:   Patient stated Zeina Little just need a second to catch my breath.     OBJECTIVE DATA SUMMARY:   Hospital course since last seen and reason for reevaluation: Pt re-evaluated following debridement of RLE and ~1 week on OT caseload. Pt now requires less assistance for bed mobility and demo's increased activity tolerance compared to initial evaluation. Past Medical History:   Diagnosis Date    Anemia of chronic disease     Arthritis     Chronic renal insufficiency     Diabetes ([facility]     Dyslipidemia     Edema     Gout     Gout     Hypertension     Lymphedema     Morbid obesity ([facility] [address]      Past Surgical History:   Procedure Laterality Date    HX ORTHOPAEDIC  3/2015     Lt Foot ulcer     Barriers to Learning/Limitations: None  Compensate with: visual, verbal, tactile, kinesthetic cues/model    Home Situation:   Home Situation  Home Environment: Private residence  # Steps to Enter: 4  One/Two Story Residence: Two story  # of Interior Steps: 8  Living Alone: No  Support Systems: Child(trevon), Family member(s)  Patient Expects to be Discharged to[de-identified] Private residence  Current DME Used/Available at Home: None  ? Right hand dominant   ? Left hand dominant    Cognitive/Behavioral Status:  Neurologic State: Alert(Simultaneous filing. User may not have seen previous data.)  Orientation Level: Oriented X4(Simultaneous filing. User may not have seen previous data.)  Cognition: Appropriate decision making; Follows commands(Simultaneous filing. User may not have seen previous data.)  Safety/Judgement: Awareness of environment; Fall prevention; Insight into deficits    Skin: Intact (BUEs)  Edema: None noted (BUEs)    Vision/Perceptual:    Acuity: Within Defined Limits      Coordination: BUE  Coordination: Within functional limits  Fine Motor Skills-Upper: Right Intact; Left Intact Gross Motor Skills-Upper: Right Intact; Left Intact    Balance:  Sitting: Impaired  Sitting - Static: Good (unsupported)  Sitting - Dynamic: Fair (occasional)  Standing: Impaired; With support  Standing - Static: Fair(Fair+)  Standing - Dynamic : Fair    Strength: BUE  Strength: Within functional limits    Range of Motion: BUE  AROM: Within functional limits  PROM: Within functional limits    Functional Mobility and Transfers for ADLs:  Bed Mobility:  Supine to Sit: Stand-by assistance; Additional time  Sit to Supine: Moderate assistance; Additional time(for BLE management)    Transfers:  Sit to Stand: Stand-by assistance;Contact guard assistance  Stand to Sit: Stand-by assistance   Toilet Transfer : Stand-by assistance    ADL Assessment:   Feeding: Modified independent  Oral Facial Hygiene/Grooming: Setup;Supervision  Bathing: Setup;Supervision; Additional time  Upper Body Dressing: Modified independent  Lower Body Dressing: Setup;Supervision  Toileting: Setup;Supervision    ADL Intervention:  Grooming  Grooming Assistance: Set-up; Supervision  Washing Face: Set-up; Supervision  Brushing Teeth: Set-up; Supervision  Cues: Verbal cues provided    Upper Body Bathing  Bathing Assistance: Moderate assistance  Position Performed: Seated in chair  Cues: Physical assistance;Verbal cues provided    Lower Body Bathing  Bathing Assistance: Moderate assistance  Perineal  : Moderate assistance  Position Performed: Seated on toilet  Lower Body : Set-up; Supervision  Position Performed: Seated in chair  Cues: Verbal cues provided  Adaptive Equipment: Grab bar;Walker    Upper Body Dressing Assistance  Dressing Assistance: Set-up; Supervision    Toileting  Toileting Assistance: Stand-by assistance;Supervision  Clothing Management: Stand-by assistance  Cues: Verbal cues provided  Adaptive Equipment: Grab bars; Walker    While seated on toilet for energy conservation, pt performed UB/LB bathing with mod A due body habitus and decreased RUE ROM due to gout flare up. Set-up/supervision for oral care & facial hygiene while seated due to increase fatigue. Cognitive Retraining  Safety/Judgement: Awareness of environment; Fall prevention; Insight into deficits    Therapeutic Activity:  Functional mobility from EOB-->bathroom with RW and skilled instruction on RW positioning & activity pacing. 3 sit to stands in bathroom in prep for toileting & UB/LB ADLs. Pain:  Pain level pre-treatment: 7/10   Pain level post-treatment: 7/10    Activity Tolerance:  Fair  Please refer to the flowsheet for vital signs taken during this treatment. After treatment:   ? Patient left in no apparent distress sitting up in chair  ? Patient left in no apparent distress in bed  ? Call bell left within reach  ? Nursing notified  ? Caregiver present  ? Bed alarm activated    COMMUNICATION/EDUCATION:   ? Role of Occupational Therapy in the acute care setting  ? Home safety education was provided and the patient/caregiver indicated understanding. ? Patient/family have participated as able in goal setting and plan of care. ? Patient/family agree to work toward stated goals and plan of care. ? Patient understands intent and goals of therapy, but is neutral about his/her participation. ? Patient is unable to participate in goal setting and plan of care.     Thank you for this referral.  Tawana Roman MS OTR/L  Time Calculation: 48 mins

## 2019-08-26 NOTE — PROGRESS NOTES
Internal Medicine Progress Note        NAME: Monae Kruse   :  1950  MRM:  [id number]    Date/Time: 2019        ASSESSMENT/PLAN: gout pains improving much but still there , extend steroid to another day or 2 depend on progress ( 20 mg BID)      # Cellulitis with infected ulcer right lateral leg with h/o lymphedema.  cx Citrobacter koseri (2) both pan-sensitive, E faecalis, Diphtheroids (3) . Treated with   vanc zosyn, then dc vanco  per ID. Zosyn changed to augmentin 875/125 bid x 3 days    #  ulcer right lateral leg with h/o lymphedema, Elephantiasis . Dr. Lacy Edmondson [facility]) evaluated, recommends vascular surgery eval . MIGDALIA done on admission shows \"There is no evidence of any hemodynamically significant peripheral arterial disease at rest in either lower extremity. \"  Vascular consulted. - s/p INCISION AND DRAINAGE RIGHT LOWER LEG - CALF AND LATERAL ANKLE SINUS ULCERS 19. Further per surgery. # Chronic lymphedema b/l LE . Cont lasix. bilateral unna boots recommended by Vascular    # Hypothyroidism. Cont synthroid    # Diarrhea. Abx. Colchicine. Monitor. # Acute on chronic gout. Affecting his R elbow and Knee , L toes. Report colchicine started working. Agree on  Short course steroid . Adjust diet. # Hypokalemia. Replete , trend     # DM. Provide SSI, hypoglycemia protocol and frequent Accu checks. Provide SSI, hypoglycemia protocol and frequent Accu checks. Education,  diabetic educator     # CKD. Monitor labs. Avoid nephrotoxins. Renally dosing medications. Monitor urine out put.       DISPO: home health, d/c tomorrow  · DVT protocol    Lab Review:     Recent Labs     19  0515   WBC 10.6   HGB 11.3*   HCT 35.4*        Recent Labs     19  0515      K 4.2      CO2 35*   *   BUN 35*   CREA 1.43*   CA 9.6   MG 1.9   PHOS 3.9     Lab Results   Component Value Date/Time    Glucose (POC) 211 (H) 2019 12:11 PM    Glucose (POC) 158 (H) 2019 08:15 AM    Glucose (POC) 211 (H) 08/25/2019 10:03 PM    Glucose (POC) 197 (H) 08/25/2019 04:37 PM    Glucose (POC) 238 (H) 08/25/2019 12:31 PM     No results for input(s): PH, PCO2, PO2, HCO3, FIO2 in the last 72 hours. No results for input(s): INR in the last 72 hours. No lab exists for component: INREXT, INREXT    No results found for: SDES  Lab Results   Component Value Date/Time    Culture result: NO ANAEROBES ISOLATED 3 DAYS 08/23/2019 09:45 AM    Culture result: FEW GRAM NEGATIVE RODS (A) 08/23/2019 09:45 AM    Culture result: RARE STAPHYLOCOCCUS EPIDERMIDIS (A) 08/23/2019 09:45 AM    Culture result: RARE DIPHTHEROIDS (A) 08/23/2019 09:45 AM       All Cardiac Markers in the last 24 hours: No results found for: CPK, CK, CKMMB, CKMB, RCK3, CKMBT, CKNDX, CKND1, EARLE, TROPT, TROIQ, FLORENTIN, TROPT, TNIPOC, BNP, BNPP           Subjective:     Chief Complaint:      No complaints. Discussed with daughter on the phone at the request of the patient, she does not agree to take pt home today, states Dr. Dee Echols said Tuesday or wednesday and said there is nothing ready for him to come home today. ROS:  (bold if positive,otherwise negative)    Fever/chills ,  Dysuria   Cough , Sputum , SOB/SHELTON , Chest Pain     Diarrhea ,Nausea/Vomit , Abd Pain , Constipation    Tolerating Diet                Objective:     Vitals:  Last 24hrs VS reviewed since prior progress note.  Most recent are:    Visit Vitals  /86   Pulse 79   Temp 97.9 °F (36.6 °C)   Resp 20   Ht 6' 5\" (1.956 m)   Wt (!) 195.7 kg (431 lb 8 oz)   SpO2 95%   BMI 51.17 kg/m²     SpO2 Readings from Last 6 Encounters:   08/26/19 95%   08/15/19 93%   08/15/19 100%   08/09/19 97%   07/24/19 97%   07/22/19 97%    O2 Flow Rate (L/min): 2 l/min       Intake/Output Summary (Last 24 hours) at 8/26/2019 1219  Last data filed at 8/26/2019 4512  Gross per 24 hour   Intake 1320 ml   Output 2425 ml   Net -1105 ml          Physical Exam:   Ears: hearing is intact  Eyes: Icterus is not present  Lungs: clear to auscultation bilaterally  Heart: regular rate and rhythm, S1, S2 normal, no murmur, click, rub or gallop  Gastrointestinal: soft, non-tender. Bowel sounds normal. No masses,  no organomegaly  Neurological:  New Focal Deficits are not present  MS: R elbow , knee inflammatory changes, swelling and tenderness , decrease  ROM improving progresively. Psychiatric:  Mood is stable  Legs elephantiasis.  R LL ulcer, dressed     Medications Reviewed: (see below)    Lab Data Reviewed: (see below)    ______________________________________________________________________    Medications:     Current Facility-Administered Medications   Medication Dose Route Frequency    rosuvastatin (CRESTOR) tablet 40 mg  40 mg Oral QHS    diclofenac-miSOPROStol (ARTHROTEC 50)  mg-mcg per tablet - Patient's own med  1 Tab Oral BID WITH MEALS    methocarbamol (ROBAXIN) tablet 750 mg  750 mg Oral QID PRN    colchicine tablet 0.6 mg  0.6 mg Oral DAILY    insulin lispro (HUMALOG) injection   SubCUTAneous QID    dextrose 10% infusion 125-250 mL  125-250 mL IntraVENous PRN    sodium hypochlorite (HALF STRENGTH DAKIN'S) 0.25% irrigation (bottle)   Topical Q12H    sodium chloride (NS) flush 5-40 mL  5-40 mL IntraVENous Q8H    sodium chloride (NS) flush 5-40 mL  5-40 mL IntraVENous PRN    acetaminophen (TYLENOL) tablet 650 mg  650 mg Oral Q4H PRN    HYDROcodone-acetaminophen (NORCO) 5-325 mg per tablet 1 Tab  1 Tab Oral Q4H PRN    heparin (porcine) injection 5,000 Units  5,000 Units SubCUTAneous Q8H    glucose chewable tablet 16 g  4 Tab Oral PRN    glucagon (GLUCAGEN) injection 1 mg  1 mg IntraMUSCular PRN    furosemide (LASIX) tablet 80 mg  80 mg Oral DAILY    gabapentin (NEURONTIN) capsule 300-600 mg  300-600 mg Oral QHS    levothyroxine (SYNTHROID) tablet 50 mcg  50 mcg Oral ACB    metoprolol tartrate (LOPRESSOR) tablet 50 mg  50 mg Oral Q12H    valsartan (DIOVAN) tablet 320 mg  320 mg Oral DAILY          Total time spent with patient: 25 minutes                  Care Plan discussed with: Patient, Family, Care Manager, Nursing Staff and Consultant/Specialist    Discussed:  Care Plan    Prophylaxis:  Hep SQ    Disposition:  Home w/Family             Attending Physician: Julio Hale MD

## 2019-08-26 NOTE — ROUTINE PROCESS
1955 Bedside and Verbal shift change report given to Tara VICTORIA (oncoming nurse) by me (offgoing nurse). Report included the following information SBAR, Kardex, Intake/Output, MAR and Recent Results.

## 2019-08-26 NOTE — PROGRESS NOTES
Podiatry Surgery Progress Note    Patient: Lesley Florez MRN: [id number] LOS: 8     YOB: 1950  Age: 81 y.o. Sex: male        Admit Date: 8/16/2019    POD 3 Days Post-Op  Procedure:   Procedure(s):  INCISION AND DRAINAGE RIGHT LOWER LEG - CALF AND LATERAL ANKLE SINUS ULCERS         Subjective:   Lesley Florez is a 80 y/o seen at bedside this morning state over all feeling fine - less pain with his gout symptoms. Patient is  currently resting quiet and no apparent distress.        Objective:     Vitals  Patient Vitals for the past 8 hrs:   BP Temp Pulse Resp SpO2   08/26/19 0700 (!) 150/92 97.6 °F (36.4 °C) 74 20 99 %   08/26/19 0437 135/82 98 °F (36.7 °C) 72 18 92 %       I/O Current Shift  08/26 0701 - 08/26 1900  In: 360 [P.O.:360]  Out: 375 [Urine:375]    I/O Last Three Shifts  08/24 1901 - 08/26 0700  In: 2040 [P.O.:2040]  Out: 3700 [Urine:3700]      Data Review  Recent Results (from the past 24 hour(s))   GLUCOSE, POC    Collection Time: 08/25/19 12:31 PM   Result Value Ref Range    Glucose (POC) 238 (H) 70 - 110 mg/dL   GLUCOSE, POC    Collection Time: 08/25/19  4:37 PM   Result Value Ref Range    Glucose (POC) 197 (H) 70 - 110 mg/dL   GLUCOSE, POC    Collection Time: 08/25/19 10:03 PM   Result Value Ref Range    Glucose (POC) 211 (H) 70 - 426 mg/dL   METABOLIC PANEL, BASIC    Collection Time: 08/26/19  5:15 AM   Result Value Ref Range    Sodium 141 136 - 145 mmol/L    Potassium 4.2 3.5 - 5.5 mmol/L    Chloride 100 100 - 111 mmol/L    CO2 35 (H) 21 - 32 mmol/L    Anion gap 6 3.0 - 18 mmol/L    Glucose 161 (H) 74 - 99 mg/dL    BUN 35 (H) 7.0 - 18 MG/DL    Creatinine 1.43 (H) 0.6 - 1.3 MG/DL    BUN/Creatinine ratio 24 (H) 12 - 20      GFR est AA 60 (L) >60 ml/min/1.73m2    GFR est non-AA 49 (L) >60 ml/min/1.73m2    Calcium 9.6 8.5 - 10.1 MG/DL   CBC WITH AUTOMATED DIFF    Collection Time: 08/26/19  5:15 AM   Result Value Ref Range    WBC 10.6 4.6 - 13.2 K/uL    RBC 3.63 (L) 4.70 - 5.50 M/uL    HGB 11.3 (L) 13.0 - 16.0 g/dL    HCT 35.4 (L) 36.0 - 48.0 %    MCV 97.5 (H) 74.0 - 97.0 FL    MCH 31.1 24.0 - 34.0 PG    MCHC 31.9 31.0 - 37.0 g/dL    RDW 13.5 11.6 - 14.5 %    PLATELET 234 965 - 837 K/uL    MPV 10.3 9.2 - 11.8 FL    NEUTROPHILS 76 (H) 40 - 73 %    LYMPHOCYTES 17 (L) 21 - 52 %    MONOCYTES 7 3 - 10 %    EOSINOPHILS 0 0 - 5 %    BASOPHILS 0 0 - 2 %    ABS. NEUTROPHILS 8.0 1.8 - 8.0 K/UL    ABS. LYMPHOCYTES 1.8 0.9 - 3.6 K/UL    ABS. MONOCYTES 0.8 0.05 - 1.2 K/UL    ABS. EOSINOPHILS 0.0 0.0 - 0.4 K/UL    ABS. BASOPHILS 0.0 0.0 - 0.1 K/UL    DF AUTOMATED     MAGNESIUM    Collection Time: 08/26/19  5:15 AM   Result Value Ref Range    Magnesium 1.9 1.6 - 2.6 mg/dL   PHOSPHORUS    Collection Time: 08/26/19  5:15 AM   Result Value Ref Range    Phosphorus 3.9 2.5 - 4.9 MG/DL   GLUCOSE, POC    Collection Time: 08/26/19  8:15 AM   Result Value Ref Range    Glucose (POC) 158 (H) 70 - 110 mg/dL     Physical Exam:  Leslie Carlson  is a 81 y.o. male who is pleasant, alert and oriented x3, in no apparent distress, and looks their given age. Patient is well-developed and nourished, with good attention to hygiene and body habitus. Mood and affect normal, appropriate to situation. Right leg wound: DSD intact and stable NO erythema or drainage. Vascular Exam:   Dorsalis Pedis and Posterior Tibial intact but diminished bilaterally. Skin temp warm to warm. Pedal hair is absent. There are not varicosities present. There is severe lymphedema bilat. Neurological Exam:   Light touch protective sensation is intact to both feet but decreased. There is some noted Loss of protective sensation. There are non reproducible paresthesias to bilateral lower extremities. There are no Tinel's or 's signs present to the nerves crossing the ankle joint. Musculoskeletal Exam:   Muscle tone is normal for age. There is equinus of the left limb.  The muscle strength is 5/5 for the flexors, extensors, inverters, and everter's. Dermatological Exam:   Skin is of abnormal texture and turgor with some atrophic skin changes noting absent hair growth, nail changes (thickening), pigmentary changes, skin texture (thin,shiney), skin color (rubor, red) . R/L Bilateral. There is diffuse xerosis. There is noted subungual debris. Wound Presentation:  Wound Leg Lower Right;Lateral (Active)   Dressing Status  Changed per order 8/22/2019  7:30 AM   Dressing Type  Gauze wrap (giselle); Wet to dry 8/22/2019  7:30 AM   Number of days: 571       Wound Leg lower Right;Posterior (Active)   Dressing Status Changed per order 8/25/2019 11:30 AM   Dressing Type ABD pad;Gauze 8/25/2019 11:30 AM   Drainage Amount Moderate 8/25/2019 11:30 AM   Drainage Color Serosanguinous 8/25/2019 11:30 AM   Wound Odor Mild 8/25/2019 11:30 AM   Dressing Changed Changed/New 8/25/2019 11:30 AM   Dressing Type Applied Gauze;ABD pad;4 x 4 8/25/2019 11:30 AM   Procedure Tolerated Well 8/25/2019 11:30 AM   Number of days: 11       Wound Leg Right (Active)   Dressing Status Changed per order 8/25/2019 11:30 AM   Dressing Type ABD binder;Gauze 8/25/2019 11:30 AM   Drainage Color Serosanguinous 8/25/2019 11:30 AM   Wound Odor None 8/25/2019 11:30 AM   Stacie-wound Assessment Edema 8/25/2019 11:30 AM   Cleansing and Cleansing Agents  Dermal wound cleanser 8/25/2019 11:30 AM   Dressing Changed Changed/New 8/25/2019 11:30 AM   Dressing Type Applied Gauze;ABD pad 8/25/2019 11:30 AM   Procedure Tolerated Well 8/25/2019 11:30 AM   Number of days: 3          Assessment:     Principal Problem:    Cellulitis of right lower extremity (8/16/2019)    Active Problems:    Lymphedema of both lower extremities (7/14/2015)      CKD (chronic kidney disease) stage 3, GFR 30-59 ml/min ([facility]) (7/14/2015)      Hypothyroidism, adult (9/29/2015)      Type 2 diabetes mellitus with diabetic neuropathy ([facility]) (8/15/2018)      Chronic diastolic HF (heart failure) ([facility] (8/16/2019)          Plan/Recommendations/Medical Decision Making:   . Discussed the overall treatment plan. All questions were answered and he indicated that he understood. Discharge per Plastic Surgery - from Podiatric Surgery point of view good for discharge. He is to follow up with Dr Radhames Woodward at the wound care center upon discharge. Continue current wound care / dressing changes per plastic surgery.        Dr Tommie [facility]  C) [phone]  8/26/2019  12:00 PM

## 2019-08-26 NOTE — PROGRESS NOTES
[facility]  (A Division of [address] [place] [address])    Infectious Disease Follow-up Note      Date of Admission: 8/16/2019     Date of Note:  8/26/2019      Summary:     82 y/o AAM with chronic massive bilateral LE lymphedema, morbid obesity, infected right lateral leg ulcer with celllulitis. Comorbid: DM, HTN, CKD, Gout    Interval History:     Comfortable sitting up in bed. No new complaints. Says right elbow pain is improved but still lingering. Able to move right arm more now. No fever. Current Antimicrobials: Prior Antimicrobials   Augmentin 8/23 - 3  Vancomycin 8/16 - 3  Zosyn 8/16 - 7      Assessment / Plan:      Infected Ulcer, right lateral leg  - with cellulitis right leg  -  8/15: gs gpc prs, gnr.  cx Citrobacter koseri (2) both pan-sensitive, E faecalis, Diphtheroids (3)  - s/p 8/23 I&D lower leg, calf, lateral ankle sinus ulcers -> dc augmentin and observe off abx     Chronic massive lymphedema  - bilateral lower extremities  - left leg compression dressing applied by [facility] 8/21  - slightly improved -> per others   Gout  - improved on colchicine, arthrotec    Diarrhea  - 1-2 watery stools per day.  Likely from abx  - worsened on colchicine -> monitor w/ Primary team   Morbid Obesity     DM     CKD     HTN             Microbiology:      8/15       gs gpc prs, gnr.  cx gnr(2)  8/16      blcx NGTD x 1     Lines / Catheters:      piv    Patient Active Problem List   Diagnosis Code    Diabetic foot ulcer ([facility] E11.621, A91.174    Diastolic dysfunction G71.31    Dyslipidemia E78.5    Cellulitis and abscess of foot, except toes L03.119, L02.619    Lymphedema of both lower extremities I89.0    CKD (chronic kidney disease) stage 3, GFR 30-59 ml/min ([facility]) N18.3    Morbid obesity with BMI of 70 and over, adult ([facility] E66.01, Z68.45    Erectile dysfunction N52.9    Hypothyroidism, adult E03.9    Hypogonadism in male E29.1    Borderline anemia D64.9    Idiopathic gout of multiple sites M10.09    Skin ulcer of ankle with fat layer exposed, right ([facility] L97.312    Non-pressure chronic ulcer of right calf with fat layer exposed ([facility] L97.212    Type 2 diabetes mellitus with diabetic neuropathy ([facility]) E11.40    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 ([facility]) L97.213    Puncture wound of multiple sites of right leg S81.831A    Cellulitis of leg without foot, right L03.115    Cellulitis of right lower extremity L03.115    Chronic diastolic HF (heart failure) ([facility]) I50.32       Current Facility-Administered Medications   Medication Dose Route Frequency    rosuvastatin (CRESTOR) tablet 40 mg  40 mg Oral QHS    diclofenac-miSOPROStol (ARTHROTEC 50)  mg-mcg per tablet - Patient's own med  1 Tab Oral BID WITH MEALS    methocarbamol (ROBAXIN) tablet 750 mg  750 mg Oral QID PRN    colchicine tablet 0.6 mg  0.6 mg Oral DAILY    insulin lispro (HUMALOG) injection   SubCUTAneous QID    dextrose 10% infusion 125-250 mL  125-250 mL IntraVENous PRN    sodium hypochlorite (HALF STRENGTH DAKIN'S) 0.25% irrigation (bottle)   Topical Q12H    sodium chloride (NS) flush 5-40 mL  5-40 mL IntraVENous Q8H    sodium chloride (NS) flush 5-40 mL  5-40 mL IntraVENous PRN    acetaminophen (TYLENOL) tablet 650 mg  650 mg Oral Q4H PRN    HYDROcodone-acetaminophen (NORCO) 5-325 mg per tablet 1 Tab  1 Tab Oral Q4H PRN    heparin (porcine) injection 5,000 Units  5,000 Units SubCUTAneous Q8H    glucose chewable tablet 16 g  4 Tab Oral PRN    glucagon (GLUCAGEN) injection 1 mg  1 mg IntraMUSCular PRN    furosemide (LASIX) tablet 80 mg  80 mg Oral DAILY    gabapentin (NEURONTIN) capsule 300-600 mg  300-600 mg Oral QHS    levothyroxine (SYNTHROID) tablet 50 mcg  50 mcg Oral ACB    metoprolol tartrate (LOPRESSOR) tablet 50 mg  50 mg Oral Q12H    valsartan (DIOVAN) tablet 320 mg  320 mg Oral DAILY     Review of Systems - Negative except as in interval history Objective:     Visit Vitals  /71   Pulse 74   Temp 97.5 °F (36.4 °C)   Resp 20   Ht 6' 5\" (1.956 m)   Wt (!) 195.7 kg (431 lb 8 oz)   SpO2 97%   BMI 51.17 kg/m²       Temp (24hrs), Av.8 °F (36.6 °C), Min:97.5 °F (36.4 °C), Max:98.2 °F (36.8 °C)      General: Well developed, morbidly obese 81 y.o.  male in no acute distress. ENT: ENT exam normal, no neck nodes or sinus tenderness  Head: normocephalic, without obvious abnormality  Mouth:  mucous membranes moist, pharynx normal without lesions  Neck: supple, symmetrical, trachea midline   Cardio:  regular rate and rhythm, S1, S2 normal, no murmur, click, rub or gallop  Chest: inspection normal - no chest wall deformities or tenderness, respiratory effort normal  Lungs: clear to auscultation and no wheezes or rales  Abdomen: obese, soft, non-tender. Bowel sounds normal. No masses, no organomegaly. Extremities:  Massive lymphedema bilateral lower extremities with redundant skin folds and lichenified skin. Right leg edema/induration less. Dressing in place.  Left leg remains enclosed in compression dressing.       Lab results     Chemistry  Recent Labs     19  0515   *      K 4.2      CO2 35*   BUN 35*   CREA 1.43*   CA 9.6   AGAP 6   BUCR 24*       CBC w/ Diff  Recent Labs     19  0515   WBC 10.6   RBC 3.63*   HGB 11.3*   HCT 35.4*      GRANS 76*   LYMPH 17*   EOS 0       Microbiology  All Micro Results     Procedure Component Value Units Date/Time    CULTURE, TISSUE Joao Wakefield STAIN [[id number]]  (Abnormal)  (Susceptibility) Collected:  19 0943    Order Status:  Completed Specimen:  Surgical Specimen Updated:  1916     Special Requests: SOFT TISSUE FROM RIGHT CALF     GRAM STAIN MODERATE WBC'S         NO ORGANISMS SEEN        Culture result: FEW GRAM NEGATIVE RODS               RARE STAPHYLOCOCCUS EPIDERMIDIS            RARE DIPHTHEROIDS       CULTURE, ANAEROBIC [[id number]] Collected: 08/23/19 0945    Order Status:  Completed Specimen:  Surgical Specimen Updated:  08/26/19 0833     Special Requests: SOFT TISSUE FROM RIGHT CALF     Culture result:       NO ANAEROBES ISOLATED 3 DAYS          C. DIFFICILE AG & TOXIN A/B [[id number]]     Order Status:  Canceled Specimen:  Stool     CULTURE, BLOOD [[id number]] Collected:  08/16/19 1445    Order Status:  Completed Specimen:  Blood Updated:  08/22/19 0807     Special Requests: PERIPHERAL        Culture result: NO GROWTH 6 DAYS       CULTURE, BLOOD [[id number]]     Order Status:  Canceled Specimen:  Blood              Damaris Reis MD, [facility]  Infectious Disease Specialist  Pager [phone]

## 2019-08-27 ENCOUNTER — HOME HEALTH ADMISSION (OUTPATIENT)
Dept: HOME HEALTH SERVICES | Facility: HOME HEALTH | Age: 69
End: 2019-08-27
Payer: MEDICARE

## 2019-08-27 LAB
BACTERIA SPEC CULT: ABNORMAL
GLUCOSE BLD STRIP.AUTO-MCNC: 147 MG/DL (ref 70–110)
GLUCOSE BLD STRIP.AUTO-MCNC: 155 MG/DL (ref 70–110)
GLUCOSE BLD STRIP.AUTO-MCNC: 187 MG/DL (ref 70–110)
GLUCOSE BLD STRIP.AUTO-MCNC: 195 MG/DL (ref 70–110)
GRAM STN SPEC: ABNORMAL
GRAM STN SPEC: ABNORMAL
SERVICE CMNT-IMP: ABNORMAL

## 2019-08-27 PROCEDURE — 74011250637 HC RX REV CODE- 250/637: Performed by: PHYSICIAN ASSISTANT

## 2019-08-27 PROCEDURE — 65270000029 HC RM PRIVATE

## 2019-08-27 PROCEDURE — 74011250637 HC RX REV CODE- 250/637: Performed by: PLASTIC SURGERY

## 2019-08-27 PROCEDURE — 74011000250 HC RX REV CODE- 250: Performed by: PLASTIC SURGERY

## 2019-08-27 PROCEDURE — 82962 GLUCOSE BLOOD TEST: CPT

## 2019-08-27 PROCEDURE — 74011250637 HC RX REV CODE- 250/637: Performed by: INTERNAL MEDICINE

## 2019-08-27 PROCEDURE — 74011636637 HC RX REV CODE- 636/637: Performed by: INTERNAL MEDICINE

## 2019-08-27 PROCEDURE — 97530 THERAPEUTIC ACTIVITIES: CPT

## 2019-08-27 PROCEDURE — 74011250636 HC RX REV CODE- 250/636: Performed by: PHYSICIAN ASSISTANT

## 2019-08-27 RX ADMIN — HYDROCODONE BITARTRATE AND ACETAMINOPHEN 1 TABLET: 5; 325 TABLET ORAL at 06:00

## 2019-08-27 RX ADMIN — COLCHICINE 0.6 MG: 0.6 TABLET, FILM COATED ORAL at 06:00

## 2019-08-27 RX ADMIN — INSULIN LISPRO 3 UNITS: 100 INJECTION, SOLUTION INTRAVENOUS; SUBCUTANEOUS at 08:58

## 2019-08-27 RX ADMIN — VALSARTAN 320 MG: 80 TABLET, FILM COATED ORAL at 08:58

## 2019-08-27 RX ADMIN — HYDROCODONE BITARTRATE AND ACETAMINOPHEN 1 TABLET: 5; 325 TABLET ORAL at 17:07

## 2019-08-27 RX ADMIN — HEPARIN SODIUM 5000 UNITS: 5000 INJECTION INTRAVENOUS; SUBCUTANEOUS at 05:56

## 2019-08-27 RX ADMIN — ROSUVASTATIN CALCIUM 40 MG: 10 TABLET, COATED ORAL at 22:26

## 2019-08-27 RX ADMIN — Medication 10 ML: at 05:57

## 2019-08-27 RX ADMIN — HEPARIN SODIUM 5000 UNITS: 5000 INJECTION INTRAVENOUS; SUBCUTANEOUS at 22:26

## 2019-08-27 RX ADMIN — LEVOTHYROXINE SODIUM 50 MCG: 50 TABLET ORAL at 05:56

## 2019-08-27 RX ADMIN — METOPROLOL TARTRATE 50 MG: 50 TABLET ORAL at 08:58

## 2019-08-27 RX ADMIN — INSULIN LISPRO 3 UNITS: 100 INJECTION, SOLUTION INTRAVENOUS; SUBCUTANEOUS at 12:35

## 2019-08-27 RX ADMIN — FUROSEMIDE 80 MG: 40 TABLET ORAL at 08:58

## 2019-08-27 RX ADMIN — INSULIN LISPRO 3 UNITS: 100 INJECTION, SOLUTION INTRAVENOUS; SUBCUTANEOUS at 22:24

## 2019-08-27 RX ADMIN — Medication 10 ML: at 22:27

## 2019-08-27 RX ADMIN — Medication 10 ML: at 17:08

## 2019-08-27 RX ADMIN — HEPARIN SODIUM 5000 UNITS: 5000 INJECTION INTRAVENOUS; SUBCUTANEOUS at 12:07

## 2019-08-27 RX ADMIN — GABAPENTIN 600 MG: 300 CAPSULE ORAL at 22:25

## 2019-08-27 RX ADMIN — METOPROLOL TARTRATE 50 MG: 50 TABLET ORAL at 22:26

## 2019-08-27 RX ADMIN — SODIUM HYPOCHLORITE: 2.5 SOLUTION TOPICAL at 14:53

## 2019-08-27 RX ADMIN — HYDROCODONE BITARTRATE AND ACETAMINOPHEN 1 TABLET: 5; 325 TABLET ORAL at 22:26

## 2019-08-27 RX ADMIN — HYDROCODONE BITARTRATE AND ACETAMINOPHEN 1 TABLET: 5; 325 TABLET ORAL at 12:05

## 2019-08-27 RX ADMIN — SODIUM HYPOCHLORITE: 2.5 SOLUTION TOPICAL at 23:45

## 2019-08-27 NOTE — PROGRESS NOTES
Bedside shift report received from [address]    2010: Kehinde Brady, on room air, resting in bed, family at bedside; denies any pain or other discomforts at this time; shift assessment completed    2207: pain med given as requested; dressing change on right leg done; gown and linens changed    2300: resting comfortably; no complaints voiced    0100: sleeping; needs within reach    0300: sleeping; no significant changes noted    0600: awake; Norco 1 tab given po as requested for pain; scheduled pain meds given    0650: resting comfortably; no visual indicators for pain    0730: Bedside and Verbal shift change report given to Stalin Fan RN (oncoming nurse) by Kory Dunaway RN (offgoing nurse). Report included the following information SBAR, Kardex, Intake/Output and Recent Results.

## 2019-08-27 NOTE — PROGRESS NOTES
Received notification from [facility] via  fax regarding patient's APPEAL of their discharge. Request for records sent to HIM. Notified [facility] [place] office of formal appeal notification. Notified nursing unit and Director of Care-management. 1700--DND completed and given to LESLIE Abdul who obtained patient signature. Faxed DND to [facility]. Copy of appeal documents placed on chart.     Maryland Hails RN    Outcomes Manager  [phone]  [phone]

## 2019-08-27 NOTE — ROUTINE PROCESS
1945 Bedside and Verbal shift change report given to jennifer diaz (oncoming nurse) by Braulio Purdy RN   (offgoing nurse). Report included the following information Rashida, MAR and Recent Results.

## 2019-08-27 NOTE — PROGRESS NOTES
[facility]  (A Division of [address] [place] [address])    Infectious Disease Follow-up Note      Date of Admission: 8/16/2019     Date of Note:  8/27/2019      Summary:     82 y/o AAM with chronic massive bilateral LE lymphedema, morbid obesity, infected right lateral leg ulcer with celllulitis. Comorbid: DM, HTN, CKD, Gout    Interval History:     Discharge challenged by patient. Had some postural dizziness today. Off antibiotics now. Current Antimicrobials: Prior Antimicrobials   None 8/26 - 1  Vancomycin 8/16 - 3  Zosyn 8/16 - 7 Augmentin 8/23 - 3      Assessment / Plan:      Infected Ulcer, right lateral leg  - with cellulitis right leg  - wd 8/15: gs gpc prs, gnr.  cx Citrobacter koseri (2) both pan-sensitive, E faecalis, Diphtheroids (3)  - s/p 8/23 I&D lower leg, calf, lateral ankle sinus ulcers -> maintain off abx  -> will be available as needed     Chronic massive lymphedema  - bilateral lower extremities  - left leg compression dressing applied by [facility] 8/21  - slightly improved -> per others   Gout  - improved on colchicine, arthrotec    Diarrhea  - 1-2 watery stools per day.  Likely from abx  - worsened on colchicine -> monitor w/ Primary team   Morbid Obesity     DM     CKD     HTN             Microbiology:      8/15      Wd gs gpc prs, gnr.  cx Citrobacter koseri (2) both pan-sensitive, E faecalis, Diphtheroids (3))  8/16      blcx NG x 1     Lines / Catheters:      piv    Patient Active Problem List   Diagnosis Code    Diabetic foot ulcer ([facility] E11.621, O67.921    Diastolic dysfunction P41.72    Dyslipidemia E78.5    Cellulitis and abscess of foot, except toes L03.119, L02.619    Lymphedema of both lower extremities I89.0    CKD (chronic kidney disease) stage 3, GFR 30-59 ml/min ([facility]) N18.3    Morbid obesity with BMI of 70 and over, adult ([facility] E66.01, Z68.45    Erectile dysfunction N52.9    Hypothyroidism, adult E03.9    Hypogonadism in male E29.1    Borderline anemia D64.9    Idiopathic gout of multiple sites M10.09    Skin ulcer of ankle with fat layer exposed, right ([facility]) L97.312    Non-pressure chronic ulcer of right calf with fat layer exposed ([address] L97.212    Type 2 diabetes mellitus with diabetic neuropathy ([facility]) E11.40    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 ([facility]) L97.213    Puncture wound of multiple sites of right leg S81.831A    Cellulitis of leg without foot, right L03.115    Cellulitis of right lower extremity L03.115    Chronic diastolic HF (heart failure) ([facility]) I50.32       Current Facility-Administered Medications   Medication Dose Route Frequency    rosuvastatin (CRESTOR) tablet 40 mg  40 mg Oral QHS    diclofenac-miSOPROStol (ARTHROTEC 50)  mg-mcg per tablet - Patient's own med  1 Tab Oral BID WITH MEALS    methocarbamol (ROBAXIN) tablet 750 mg  750 mg Oral QID PRN    colchicine tablet 0.6 mg  0.6 mg Oral DAILY    insulin lispro (HUMALOG) injection   SubCUTAneous QID    dextrose 10% infusion 125-250 mL  125-250 mL IntraVENous PRN    sodium hypochlorite (HALF STRENGTH DAKIN'S) 0.25% irrigation (bottle)   Topical Q12H    sodium chloride (NS) flush 5-40 mL  5-40 mL IntraVENous Q8H    sodium chloride (NS) flush 5-40 mL  5-40 mL IntraVENous PRN    acetaminophen (TYLENOL) tablet 650 mg  650 mg Oral Q4H PRN    HYDROcodone-acetaminophen (NORCO) 5-325 mg per tablet 1 Tab  1 Tab Oral Q4H PRN    heparin (porcine) injection 5,000 Units  5,000 Units SubCUTAneous Q8H    glucose chewable tablet 16 g  4 Tab Oral PRN    glucagon (GLUCAGEN) injection 1 mg  1 mg IntraMUSCular PRN    furosemide (LASIX) tablet 80 mg  80 mg Oral DAILY    gabapentin (NEURONTIN) capsule 300-600 mg  300-600 mg Oral QHS    levothyroxine (SYNTHROID) tablet 50 mcg  50 mcg Oral ACB    metoprolol tartrate (LOPRESSOR) tablet 50 mg  50 mg Oral Q12H    valsartan (DIOVAN) tablet 320 mg  320 mg Oral DAILY     Review of Systems - Negative except as in interval history     Objective:     Visit Vitals  /64 (BP 1 Location: Right arm, BP Patient Position: At rest)   Pulse 84   Temp 98 °F (36.7 °C)   Resp 20   Ht 6' 5\" (1.956 m)   Wt (!) 195.7 kg (431 lb 8 oz)   SpO2 97%   BMI 51.17 kg/m²       Temp (24hrs), Av.8 °F (36.6 °C), Min:97.5 °F (36.4 °C), Max:98 °F (36.7 °C)      General: Well developed, morbidly obese 81 y.o.  male in no acute distress. ENT: ENT exam normal, no neck nodes or sinus tenderness  Head: normocephalic, without obvious abnormality  Mouth:  mucous membranes moist, pharynx normal without lesions  Neck: supple, symmetrical, trachea midline   Cardio:  regular rate and rhythm, S1, S2 normal, no murmur, click, rub or gallop  Chest: inspection normal - no chest wall deformities or tenderness, respiratory effort normal  Lungs: clear to auscultation and no wheezes or rales  Abdomen: obese, soft, non-tender. Bowel sounds normal. No masses, no organomegaly. Extremities:  Massive lymphedema bilateral lower extremities with redundant skin folds and lichenified skin. Right leg edema/induration less. Dressing in place.  Left leg remains enclosed in compression dressing.       Lab results     Chemistry  Recent Labs     19  0515   *      K 4.2      CO2 35*   BUN 35*   CREA 1.43*   CA 9.6   AGAP 6   BUCR 24*       CBC w/ Diff  Recent Labs     19  0515   WBC 10.6   RBC 3.63*   HGB 11.3*   HCT 35.4*      GRANS 76*   LYMPH 17*   EOS 0       Microbiology  All Micro Results     Procedure Component Value Units Date/Time    CULTURE, TISSUE Dareen Ruelas STAIN [[id number]]  (Abnormal)  (Susceptibility) Collected:  1949    Order Status:  Completed Specimen:  Surgical Specimen Updated:  19     Special Requests: SOFT TISSUE FROM RIGHT CALF     GRAM STAIN MODERATE WBC'S         NO ORGANISMS SEEN        Culture result: FEW CITROBACTER KOSERI               RARE STAPHYLOCOCCUS EPIDERMIDIS            RARE DIPHTHEROIDS       CULTURE, ANAEROBIC [[id number]] Collected:  08/23/19 0945    Order Status:  Completed Specimen:  Surgical Specimen Updated:  08/27/19 0803     Special Requests: SOFT TISSUE FROM RIGHT CALF     Culture result:       NO ANAEROBES ISOLATED 4 DAYS          C. DIFFICILE AG & TOXIN A/B [[id number]]     Order Status:  Canceled Specimen:  Stool     CULTURE, BLOOD [[id number]] Collected:  08/16/19 1445    Order Status:  Completed Specimen:  Blood Updated:  08/22/19 0807     Special Requests: PERIPHERAL        Culture result: NO GROWTH 6 DAYS       CULTURE, BLOOD [[id number]]     Order Status:  Canceled Specimen:  Blood              Margaret Peterson MD, [facility]  Infectious Disease Specialist  Pager [phone]

## 2019-08-27 NOTE — PROGRESS NOTES
conducted a Follow up consultation and Spiritual Assessment for Marci Novak, who is a 81 y.o.,male. The  provided the following Interventions:  Continued the relationship of care and support. Listened empathically. Offered prayer and assurance of continued prayer on patients behalf. Chart reviewed. The following outcomes were achieved:  Patient expressed gratitude for pastoral care visit. Assessment:  There are no further spiritual or Anglican issues which require Spiritual Care Services interventions at this time. Plan:  Chaplains will continue to follow and will provide pastoral care on an as needed/requested basis.  recommends bedside caregivers page  on duty if patient shows signs of acute spiritual or emotional distress.      [facility]   Staff [address] [facility]   [phone]

## 2019-08-27 NOTE — PROGRESS NOTES
Internal Medicine Progress Note        NAME: Jayme Shook   :  1950  MRM:  [id number]    Date/Time: 2019        ASSESSMENT/PLAN: Pt was stable to be discharged today to home with home health per PT recs, but daughter appealed discharge. # Cellulitis with infected ulcer right lateral leg with h/o lymphedema.  cx Citrobacter koseri (2) both pan-sensitive, E faecalis, Diphtheroids (3) . Treated with   vanc zosyn, then dc vanco  per ID. No more atbx per ID. #  ulcer right lateral leg with h/o lymphedema, Elephantiasis . Dr. Sahra Akers [facility]) evaluated, recommends vascular surgery eval . MIGDALIA done on admission shows \"There is no evidence of any hemodynamically significant peripheral arterial disease at rest in either lower extremity. \"  Vascular consulted. - s/p INCISION AND DRAINAGE RIGHT LOWER LEG - CALF AND LATERAL ANKLE SINUS ULCERS 19. # Chronic lymphedema b/l LE . Cont lasix. bilateral unna boots recommended by Vascular    # Hypothyroidism. Cont synthroid    # Diarrhea. Abx. Colchicine. Monitor. # Acute on chronic gout. Affecting his R elbow and Knee , L toes. Report colchicine started working. Agree on  Short course steroid . Adjust diet. # Hypokalemia. Replete , trend     # DM. Provide SSI, hypoglycemia protocol and frequent Accu checks. Provide SSI, hypoglycemia protocol and frequent Accu checks. Education,  diabetic educator     # CKD. Monitor labs. Avoid nephrotoxins. Renally dosing medications. Monitor urine out put.       DISPO: home health, d/c tomorrow  · DVT protocol    Lab Review:     Recent Labs     19  0515   WBC 10.6   HGB 11.3*   HCT 35.4*        Recent Labs     19  0515      K 4.2      CO2 35*   *   BUN 35*   CREA 1.43*   CA 9.6   MG 1.9   PHOS 3.9     Lab Results   Component Value Date/Time    Glucose (POC) 195 (H) 2019 11:47 AM    Glucose (POC) 155 (H) 2019 08:24 AM    Glucose (POC) 158 (H) 2019 10:12 PM    Glucose (POC) 153 (H) 08/26/2019 04:23 PM    Glucose (POC) 211 (H) 08/26/2019 12:11 PM     No results for input(s): PH, PCO2, PO2, HCO3, FIO2 in the last 72 hours. No results for input(s): INR in the last 72 hours. No lab exists for component: INREXT, INREXT    No results found for: SDES  Lab Results   Component Value Date/Time    Culture result: NO ANAEROBES ISOLATED 4 DAYS 08/23/2019 09:45 AM    Culture result: FEW CITROBACTER KOSERI (A) 08/23/2019 09:45 AM    Culture result: RARE STAPHYLOCOCCUS EPIDERMIDIS (A) 08/23/2019 09:45 AM    Culture result: RARE DIPHTHEROIDS (A) 08/23/2019 09:45 AM       All Cardiac Markers in the last 24 hours: No results found for: CPK, CK, CKMMB, CKMB, RCK3, CKMBT, CKNDX, CKND1, EARLE, TROPT, TROIQ, FLORENTIN, TROPT, TNIPOC, BNP, BNPP           Subjective:     Chief Complaint:      No complaints. Pt was informed of discharge. He agreed. Later daughter appealed discharge. Objective:     Vitals:  Last 24hrs VS reviewed since prior progress note. Most recent are:    Visit Vitals  /67 (BP 1 Location: Right arm, BP Patient Position: At rest)   Pulse 78   Temp 97.8 °F (36.6 °C)   Resp 20   Ht 6' 5\" (1.956 m)   Wt (!) 195.7 kg (431 lb 8 oz)   SpO2 98%   BMI 51.17 kg/m²     SpO2 Readings from Last 6 Encounters:   08/27/19 98%   08/15/19 93%   08/15/19 100%   08/09/19 97%   07/24/19 97%   07/22/19 97%    O2 Flow Rate (L/min): 2 l/min       Intake/Output Summary (Last 24 hours) at 8/27/2019 1421  Last data filed at 8/27/2019 1154  Gross per 24 hour   Intake 720 ml   Output 2100 ml   Net -1380 ml          Physical Exam:   Ears: hearing is intact  Eyes: Icterus is not present  Lungs: clear to auscultation bilaterally  Heart: regular rate and rhythm, S1, S2 normal, no murmur, click, rub or gallop  Gastrointestinal: soft, non-tender.  Bowel sounds normal. No masses,  no organomegaly  Neurological:  New Focal Deficits are not present  MS: R elbow , knee inflammatory changes, swelling and tenderness , decrease  ROM improving progresively. Psychiatric:  Mood is stable  Legs elephantiasis.  R LL ulcer, dressed     Medications Reviewed: (see below)    Lab Data Reviewed: (see below)    ______________________________________________________________________    Medications:     Current Facility-Administered Medications   Medication Dose Route Frequency    rosuvastatin (CRESTOR) tablet 40 mg  40 mg Oral QHS    diclofenac-miSOPROStol (ARTHROTEC 50)  mg-mcg per tablet - Patient's own med  1 Tab Oral BID WITH MEALS    methocarbamol (ROBAXIN) tablet 750 mg  750 mg Oral QID PRN    colchicine tablet 0.6 mg  0.6 mg Oral DAILY    insulin lispro (HUMALOG) injection   SubCUTAneous QID    dextrose 10% infusion 125-250 mL  125-250 mL IntraVENous PRN    sodium hypochlorite (HALF STRENGTH DAKIN'S) 0.25% irrigation (bottle)   Topical Q12H    sodium chloride (NS) flush 5-40 mL  5-40 mL IntraVENous Q8H    sodium chloride (NS) flush 5-40 mL  5-40 mL IntraVENous PRN    acetaminophen (TYLENOL) tablet 650 mg  650 mg Oral Q4H PRN    HYDROcodone-acetaminophen (NORCO) 5-325 mg per tablet 1 Tab  1 Tab Oral Q4H PRN    heparin (porcine) injection 5,000 Units  5,000 Units SubCUTAneous Q8H    glucose chewable tablet 16 g  4 Tab Oral PRN    glucagon (GLUCAGEN) injection 1 mg  1 mg IntraMUSCular PRN    furosemide (LASIX) tablet 80 mg  80 mg Oral DAILY    gabapentin (NEURONTIN) capsule 300-600 mg  300-600 mg Oral QHS    levothyroxine (SYNTHROID) tablet 50 mcg  50 mcg Oral ACB    metoprolol tartrate (LOPRESSOR) tablet 50 mg  50 mg Oral Q12H    valsartan (DIOVAN) tablet 320 mg  320 mg Oral DAILY          Total time spent with patient: 25 minutes                  Care Plan discussed with: Patient, Family, Care Manager, Nursing Staff and Consultant/Specialist    Discussed:  Care Plan    Prophylaxis:  Hep SQ    Disposition:  Home w/Family             Attending Physician: Quentin Huerta MD

## 2019-08-27 NOTE — PROGRESS NOTES
Problem: Discharge Planning  Goal: *Discharge to safe environment  Outcome: Resolved/Met    Home with home health    [facility]     Care Management Interventions  PCP Verified by CM: Yes(Dr. Kelsey Leiva, last seen about 1 week ago)  Palliative Care Criteria Met (RRAT>21 & CHF Dx)?: No  Mode of Transport at Discharge: Other (see comment)(family)  Transition of Care Consult (CM Consult): Home Health([facility])  [address] Yes  Discharge Durable Medical Equipment: No  Physical Therapy Consult: No  Occupational Therapy Consult: No  Speech Therapy Consult: No  Current Support Network: Lives with Spouse  Confirm Follow Up Transport: Family(son)  Plan discussed with Pt/Family/Caregiver: Yes  Discharge Location  Discharge Placement: Home with home health([facility])     Spoke with [facility] they have the Bariatric walker available and family will have to . [facility] stated that will call family as well. Patient will need specific wound care orders to update home health. [facility] made awre of D/c today and updated wound care orders in connect care. Family to transport patient home. Call from daughter stating that her father walked yesterday with no problems, but today patient is feeling dizzy and not feeling like he can walk today. She stated that they are appealing their discharge.

## 2019-08-27 NOTE — PROGRESS NOTES
Problem: Diabetes Self-Management  Goal: *Disease process and treatment process  Description  Define diabetes and identify own type of diabetes; list 3 options for treating diabetes. Outcome: Progressing Towards Goal  Goal: *Incorporating nutritional management into lifestyle  Description  Describe effect of type, amount and timing of food on blood glucose; list 3 methods for planning meals. Outcome: Progressing Towards Goal  Goal: *Incorporating physical activity into lifestyle  Description  State effect of exercise on blood glucose levels. Outcome: Progressing Towards Goal  Goal: *Developing strategies to promote health/change behavior  Description  Define the ABC's of diabetes; identify appropriate screenings, schedule and personal plan for screenings. Outcome: Progressing Towards Goal  Goal: *Using medications safely  Description  State effect of diabetes medications on diabetes; name diabetes medication taking, action and side effects. Outcome: Progressing Towards Goal  Goal: *Monitoring blood glucose, interpreting and using results  Description  Identify recommended blood glucose targets  and personal targets. Outcome: Progressing Towards Goal  Goal: *Prevention, detection, treatment of acute complications  Description  List symptoms of hyper- and hypoglycemia; describe how to treat low blood sugar and actions for lowering  high blood glucose level. Outcome: Progressing Towards Goal  Goal: *Prevention, detection and treatment of chronic complications  Description  Define the natural course of diabetes and describe the relationship of blood glucose levels to long term complications of diabetes.   Outcome: Progressing Towards Goal  Goal: *Developing strategies to address psychosocial issues  Description  Describe feelings about living with diabetes; identify support needed and support network  Outcome: Progressing Towards Goal  Goal: *Sick day guidelines  Outcome: Progressing Towards Goal  Goal: *Patient Specific Goal (EDIT GOAL, INSERT TEXT)  Outcome: Progressing Towards Goal     Problem: Patient Education: Go to Patient Education Activity  Goal: Patient/Family Education  Outcome: Progressing Towards Goal     Problem: Pressure Injury - Risk of  Goal: *Prevention of pressure injury  Description  Document Stone Scale and appropriate interventions in the flowsheet. Outcome: Progressing Towards Goal  Note:   Pressure Injury Interventions:  Sensory Interventions: Assess changes in LOC, Check visual cues for pain, Float heels, Keep linens dry and wrinkle-free, Pressure redistribution bed/mattress (bed type)    Moisture Interventions: Absorbent underpads, Maintain skin hydration (lotion/cream), Moisture barrier    Activity Interventions: Pressure redistribution bed/mattress(bed type)    Mobility Interventions: HOB 30 degrees or less, Pressure redistribution bed/mattress (bed type)    Nutrition Interventions: Document food/fluid/supplement intake    Friction and Shear Interventions: HOB 30 degrees or less                Problem: Patient Education: Go to Patient Education Activity  Goal: Patient/Family Education  Outcome: Progressing Towards Goal     Problem: Falls - Risk of  Goal: *Absence of Falls  Description  Document Zuleyma Fall Risk and appropriate interventions in the flowsheet.   Outcome: Progressing Towards Goal  Note:   Fall Risk Interventions:  Mobility Interventions: Patient to call before getting OOB, Strengthening exercises (ROM-active/passive)         Medication Interventions: Evaluate medications/consider consulting pharmacy    Elimination Interventions: Call light in reach, Toileting schedule/hourly rounds, Urinal in reach              Problem: Patient Education: Go to Patient Education Activity  Goal: Patient/Family Education  Outcome: Progressing Towards Goal     Problem: Discharge Planning  Goal: *Discharge to safe environment  Outcome: Progressing Towards Goal     Problem: Pain  Goal: *Control of Pain  Outcome: Progressing Towards Goal     Problem: Patient Education: Go to Patient Education Activity  Goal: Patient/Family Education  Outcome: Progressing Towards Goal     Problem: Altered nutrition  Goal: *Optimize nutritional status  Outcome: Progressing Towards Goal     Problem: Cellulitis Care Plan (Adult)  Goal: *Control of acute pain  Outcome: Progressing Towards Goal  Goal: *Skin integrity maintained  Outcome: Progressing Towards Goal  Goal: *Absence of infection signs and symptoms  Outcome: Progressing Towards Goal     Problem: Patient Education: Go to Patient Education Activity  Goal: Patient/Family Education  Outcome: Progressing Towards Goal     Problem: Heart Failure: Discharge Outcomes  Goal: *Demonstrates ability to perform prescribed activity without shortness of breath or discomfort  Outcome: Progressing Towards Goal  Goal: *Left ventricular function assessment completed prior to or during stay, or planned for post-discharge  Outcome: Progressing Towards Goal  Goal: *ACEI prescribed if LVEF less than 40% and no contraindications or ARB prescribed  Outcome: Progressing Towards Goal  Goal: *Verbalizes understanding and describes prescribed diet  Outcome: Progressing Towards Goal  Goal: *Verbalizes understanding/describes prescribed medications  Outcome: Progressing Towards Goal  Goal: *Describes available resources and support systems  Description  (eg: Home Health, Palliative Care, Advanced Medical Directive)  Outcome: Progressing Towards Goal  Goal: *Describes smoking cessation resources  Outcome: Progressing Towards Goal  Goal: *Understands and describes signs and symptoms to report to providers(Stroke Metric)  Outcome: Progressing Towards Goal  Goal: *Describes/verbalizes understanding of follow-up/return appt  Description  (eg: to physicians, diabetes treatment coordinator, and other resources  Outcome: Progressing Towards Goal  Goal: *Describes importance of continuing daily weights and changes to report to physician  Outcome: Progressing Towards Goal

## 2019-08-27 NOTE — ANCILLARY DISCHARGE INSTRUCTIONS
Patient and/or next of kin has been given the [facility] Important Message From Medicare About Your Rights\" letter and all questions were answered. Patient unable to sign, daughter in room stating she called to appeal the discharge. Nurse made aware.

## 2019-08-27 NOTE — PROGRESS NOTES
Nutrition initial assessment/Plan of care      RECOMMENDATIONS:   1. Consistent CHO 2g Na Diet  2. Monitor labs, weight and PO intake  3. RD to follow     GOALS:   1. Met/Ongoing: PO intake meets >75% of protein/calorie needs by 9/3  2. Ongoing: Weight Maintenance/gradual loss (1-2 lb/week) by 9/3    ASSESSMENT:   Wt status is classified as obese per Body mass index is 51.17 kg/m². Adequate PO intake. Labs noted. BG range (153-211) over the past 24 hours; A1c (7.3%). Pt w/elevated BUN/Cr. Nutrition recommendations listed. RD to follow. Nutrition Risk:  [] High  [] Moderate [x]  Low    SUBJECTIVE/OBJECTIVE:   (8/27): Pt is s/p INCISION AND DRAINAGE RIGHT LOWER LEG - CALF AND LATERAL ANKLE SINUS ULCERS on (8/23).  Adequate PO intake per vitals (100%). Last BM on (8/25) per I/Os. Noted improvement with gout pain per MD notes. Pt seen in room this afternoon. Reports his pain from his gout flare up is a little bit better. Encouraged to continue with recommendations for a low purine diet. Will continue to monitor.     (8/22): Pt admitted for cellulitis. Noted wound documented by nursing. Pt seen in room with wife at bedside. Denies having any food allergies or problems chewing/swallowing;  lb. Noted weight loss from UBW , but weight taken with bed scale and Pt is on a diuretic so weight does fluctuate with fluid. Reports having a good appetite and consuming 3 meals per day at home. Stated something he ate is causing a gout flare up for him; on medication. Discussed some nutrition recommendations for low purine diet; handouts provided. Also gave him another menu so he can see what is available and implement preferences with dietary. Pt c/o  Diarrhea; likely from medications he is on. Plastics consulted: plan for debridement of RLE ulcer tomorrow morning. Will continue to monitor.       Information Obtained from:    [x] Chart Review   [x] Patient   [] Family/Caregiver   [] Nurse/Physician   [] Interdisciplinary Meeting/Rounds      Diet: Consistent CHO Diet  Medications: [x] Reviewed  Colchicine, Lasix, Lopressor, Crestor, Diovan    Allergies: [x] Reviewed   Encounter Diagnoses     ICD-10-CM ICD-9-CM   1. Ulcer of right lower extremity, unspecified ulcer stage ([facility] L97.919 707.10   2. Cellulitis of right lower extremity L03.115 682.6   3.  Lymphedema I89.0 457.1     Past Medical History:   Diagnosis Date    Anemia of chronic disease     Arthritis     Chronic renal insufficiency     Diabetes ([facility]     Dyslipidemia     Edema     Gout     Gout     Hypertension     Lymphedema     Morbid obesity ([facility]       Labs:    Lab Results   Component Value Date/Time    Sodium 141 08/26/2019 05:15 AM    Potassium 4.2 08/26/2019 05:15 AM    Chloride 100 08/26/2019 05:15 AM    CO2 35 (H) 08/26/2019 05:15 AM    Anion gap 6 08/26/2019 05:15 AM    Glucose 161 (H) 08/26/2019 05:15 AM    BUN 35 (H) 08/26/2019 05:15 AM    Creatinine 1.43 (H) 08/26/2019 05:15 AM    Calcium 9.6 08/26/2019 05:15 AM    Magnesium 1.9 08/26/2019 05:15 AM    Phosphorus 3.9 08/26/2019 05:15 AM    Albumin 2.8 (L) 08/16/2019 02:45 PM     Anthropometrics: BMI (calculated): 51.2  Last 3 Recorded Weights in this Encounter    08/16/19 1312 08/22/19 2050   Weight: (!) 205 kg (452 lb) (!) 195.7 kg (431 lb 8 oz)      Ht Readings from Last 1 Encounters:   08/16/19 6' 5\" (1.956 m)     Weight Metrics 8/22/2019 8/15/2019 8/9/2019 7/24/2019 7/22/2019 5/24/2019 11/12/2018   Weight 431 lb 8 oz 452 lb 452 lb 453 lb 14.8 oz 447 lb 463 lb 455 lb 3.2 oz   BMI 51.17 kg/m2 53.6 kg/m2 53.6 kg/m2 53.83 kg/m2 53.01 kg/m2 54.9 kg/m2 53.98 kg/m2       Patient Vitals for the past 100 hrs:   % Diet Eaten   08/27/19 0854 100 %   08/26/19 0814 100 %   08/25/19 1732 100 %   08/25/19 0930 100 %   08/24/19 1245 100 %   08/23/19 1720 100 %   08/23/19 1238 100 %   08/23/19 1039 100 %       UBW: 450 lb  [x] Weight Loss [] Weight Gain [] Weight Stable    Estimated Nutrition Needs: [x] MSJ x 1.0  [x] Other: 15 kcal/kg  Calories: 8755-7688 kcal Based on:   [x] Actual BW    Protein:   157-176 g Based on:   [x] Actual BW    Fluid:       2900- 3500 ml Based on:   [x] Actual BW       Nutrition Problems Identified:   [] Suboptimal PO intake   [] Food Allergies  [] Difficulty chewing/swallowing/poor dentition  [x] Constipation/Diarrhea (Last BM on 8/25)  [] Nausea/Vomiting   [] None  [x] Other: Wound healing    Plan:   [x] Therapeutic Diet  [x]  Obtained/adjusted food preferences/tolerances and/or snacks options   []  Supplements added   [] Occupational therapy following for feeding techniques  []  HS snack added   []  Modify diet texture   []  Modify diet for food allergies   [x]  Educate patient   []  Assist with menu selection   [x]  Monitor PO intake on meal rounds   [x]  Continue inpatient monitoring and intervention   [x]  Participated in discharge planning/Interdisciplinary rounds/Team meetings   []  Other:     Education Needs:   [] Not appropriate for teaching at this time due to:   [x] Identified and addressed    Nutrition Monitoring and Evaluation:  [x] Continue ongoing monitoring and intervention  [] Other    Loida Michele

## 2019-08-27 NOTE — DISCHARGE SUMMARY
Discharge Summary    Patient: Yahaira Em               Sex: male          DOA: 8/16/2019       YOB: 1950      Age:  81 y.o.        LOS:  LOS: 11 days                Admit Date: 8/16/2019    Discharge Date: 8/27/2019    Admission Diagnoses: Cellulitis [L03.90]    Discharge Diagnoses:    Hospital Problems  Date Reviewed: 8/15/2019          Codes Class Noted POA    * (Principal) Cellulitis of right lower extremity ICD-10-CM: L03.115  ICD-9-CM: 682.6  8/16/2019         Chronic diastolic HF (heart failure) ([facility]) ICD-10-CM: I50.32  ICD-9-CM: 428.32  8/16/2019 Unknown        Type 2 diabetes mellitus with diabetic neuropathy ([facility] ICD-10-CM: E11.40  ICD-9-CM: 250.60, 357.2  8/15/2018 Yes        Hypothyroidism, adult ICD-10-CM: E03.9  ICD-9-CM: 244.9  9/29/2015 Yes        Lymphedema of both lower extremities ICD-10-CM: I89.0  ICD-9-CM: 457.1  7/14/2015 Yes        CKD (chronic kidney disease) stage 3, GFR 30-59 ml/min ([facility]) ICD-10-CM: N18.3  ICD-9-CM: 585.3  7/14/2015 Yes              Discharge Disposition: Home Health Care [facility]     Discharge Condition:  Improved    Hospital Course:  71M w/ h/o severe chronic lymphedema of both lower extremities, Q9EE, chronic diastolic CHF, CKD stage 3 who presented with chronic right leg wound with active right leg ulceration. ID, Vascular Surgery, Podiatry, and Plastic Surgery were consulted. Dr. Lexx Montaño and Dr. Yonas Perez in [facility] and [address] [place] [address] performed I&D of right lower leg calf and lateral ankle sinus ulcers on 8/23. Wound care was provided. Per ID recommendation pt was treated with antibiotics to cover wound cultures and completed course while admitted. He was also treated for acute on chronic gout with short course of colchicine and steroids. PT recommended home with home health with bariatric worker. Home health arrangements were made. Patient was discharged in stable condition.        Consults:    Infectious Disease, Vascular Surgery and Plastic Surgery, Podiatry    Labs:  Labs: Results:       Chemistry Recent Labs     08/26/19  0515   *      K 4.2      CO2 35*   BUN 35*   CREA 1.43*   CA 9.6   AGAP 6   BUCR 24*      CBC w/Diff Recent Labs     08/26/19  0515   WBC 10.6   RBC 3.63*   HGB 11.3*   HCT 35.4*      GRANS 76*   LYMPH 17*   EOS 0      Cardiac Enzymes No results for input(s): CPK, CKND1, EARLE in the last 72 hours. No lab exists for component: CKRMB, TROIP   Coagulation No results for input(s): PTP, INR, APTT in the last 72 hours. No lab exists for component: INREXT    Lipid Panel Lab Results   Component Value Date/Time    Cholesterol, total 122 05/24/2019 11:59 AM    HDL Cholesterol 51 05/24/2019 11:59 AM    LDL, calculated 46.6 05/24/2019 11:59 AM    VLDL, calculated 24.4 05/24/2019 11:59 AM    Triglyceride 122 05/24/2019 11:59 AM    CHOL/HDL Ratio 2.4 05/24/2019 11:59 AM      BNP No results for input(s): BNPP in the last 72 hours. Liver Enzymes No results for input(s): TP, ALB, TBIL, AP, SGOT, GPT in the last 72 hours. No lab exists for component: DBIL   Thyroid Studies Lab Results   Component Value Date/Time    TSH 1.790 04/19/2018 04:10 PM            Significant Diagnostic Studies:   Xr Ankle Rt Min 3 V    Result Date: 8/9/2019  Indication: Injury, edema, pain 3 views right ankle show extensive soft tissue large plantar calcaneal spur. No fracture or dislocation. Vascular calcifications seen in soft tissues. IMPRESSION: Calcaneal spur. Peripheral vascular disease with extensive soft tissue swelling. No acute fracture.      Duplex Lower Ext Venous Right    Result Date: 8/13/2019                                                                Study ID: [id number]                                        [facility]                                           [address],                                         [address] [facility] [address]                          Lower Extremity Venous Report Name: Pat Kelly Date: 2019 01:57 PM MRN: [id number]                      Patient Location: [id number] : 1950                  Age: 81 yrs Gender: Male                     Account #: [de-identified] Reason For Study: Pain in leg Ordering Physician: Spike Hassan Performed By: Ling Barrios, MAYCO Interpretation Summary No evidence of acute deep vein thrombosis noted in the right common femoral, femoral and popliteal veins. Unable to visualize the right posterior tibial, peroneal veins and the saphenofemoral junction. Unable to visualize the contralateral/left common femoral vein due to patient's suboptimal positioning with his left leg off the bed. Severely limited study due to lymphedema, skin condition and suboptimal positioning. _____________________________________________________________________________ __ QUALITY/PROCEDURE Limited unilateral venous duplex performed of the right leg. HISTORY/SYMPTOMS Lymphedema, Pain in right leg, Morbid obesity ICD10 - M79.605. RIGHT LEG The common femoral, femoral, popliteal,veins were examined with duplex ultrasound. The common femoral, proximal to mid femoral and popliteal veins were patent and compressible with no evidence of intraluminal thrombus. The distal femoral vein was grossly patent by Color and Spectral Doppler only, therefore unable to rule out a non-occlusive deep venous thrombosis in this segment. The posterior tibial and peroneal veins were not visualized due to patient's skin condition, lymphedema and suboptimal positioning. Spontaneous, phasic venous flow noted throughout the right leg. RIGHT SAPHENOUS VEINS The right saphenofemoral junction was not visualized.  LEFT LEG Unable to assess the contralateral/left common femoral vein due to body habitus and suboptimal positioning. Electronically signed byDr. Coco Contreras M.D   08/13/2019 07:42 AM      Discharge Medications:     Current Discharge Medication List      CONTINUE these medications which have NOT CHANGED    Details   methocarbamol (ROBAXIN-750) 750 mg tablet Take 1 Tab by mouth four (4) times daily. As needed for pain or muscle spasm  Qty: 20 Tab, Refills: 0      furosemide (LASIX) 80 mg tablet TAKE 1 TABLET BY MOUTH EVERY DAY  Qty: 90 Tab, Refills: 3      testosterone (ANDRODERM) 4 mg/24 hr pt24 1 Patch by TransDERmal route daily. Max Daily Amount: 1 Patch. Qty: 60 Patch, Refills: 0    Associated Diagnoses: Hypogonadism in male      colchicine 0.6 mg tablet TAKE 1 TAB BY MOUTH DAILY AS NEEDED. Qty: 90 Tab, Refills: 3      vit b comp & c-fa-copper-zinc (FOLBEE PLUS) 5-1.5-25 mg tab Take 1 Tab by mouth daily. Qty: 90 Tab, Refills: 3      KLOR-CON M20 20 mEq tablet TAKE 1 TABLET BY MOUTH ONCE DAILY  Qty: 90 Tab, Refills: 3    Associated Diagnoses: Essential hypertension with goal blood pressure less than 130/80      valsartan (DIOVAN) 320 mg tablet Take 1 Tab by mouth daily. Qty: 90 Tab, Refills: 3      levothyroxine (SYNTHROID) 50 mcg tablet TAKE 1 TAB BY MOUTH DAILY (BEFORE BREAKFAST) FOR 90 DAYS. Qty: 90 Tab, Refills: 0      dulaglutide (TRULICITY) 1.5 FB/4.5 mL sub-q pen 0.5 ML BY SUBCUTANEOUS ROUTE EVERY SEVEN (7) DAYS. Qty: 7.5 Syringe, Refills: 0    Associated Diagnoses: Type 2 diabetes mellitus with diabetic nephropathy (HCC)      gabapentin (NEURONTIN) 300 mg capsule Take 300 mg by mouth nightly. diclofenac-miSOPROStol (ARTHROTEC 50)  mg-mcg per tablet TAKE 1 TABLET BY MOUTH TWICE A DAY  Qty: 180 Tab, Refills: 0      rosuvastatin (CRESTOR) 40 mg tablet TAKE 1 TABLET BY MOUTH NIGHTLY FOR 90 DAYS. Qty: 90 Tab, Refills: 1      tadalafil (CIALIS) 20 mg tablet Take 1 Tab by mouth every thirty-six (36) hours.  Indications: Erectile Dysfunction  Qty: 12 Tab, Refills: 5    Associated Diagnoses: Erectile dysfunction, unspecified erectile dysfunction type      metoprolol (LOPRESSOR) 50 mg tablet Take 1 Tab by mouth every twelve (12) hours. Qty: 60 Tab, Refills: 0      Fenofibrate 150 mg cap Take 145 mg by mouth daily. STOP taking these medications       doxycycline (MONODOX) 100 mg capsule Comments:   Reason for Stopping:         ibuprofen (MOTRIN) 600 mg tablet Comments:   Reason for Stopping:               Activity: Activity as tolerated    Diet: Cardiac Diet     Wound Care: On pt right lower leg wash out wound with \"dakins\" (from pharmacy) then wet to dry with curlex. Lightly pack wound with curlex soaked in \"dakins\" then wrap in gauze. perform this every 8 hours. Follow-up: PCP in 1 week.          Total time spent including time spent on final examination and discharge discussion, discharge documentation and records reviewed and medication reconciliation: > 30 minutes    Ingrid Castillo MD  [facility]

## 2019-08-27 NOTE — PROGRESS NOTES
Problem: Mobility Impaired (Adult and Pediatric)  Goal: *Acute Goals and Plan of Care (Insert Text)  Description  Physical Therapy Goals:   Goals met 8/26/2019; updated to reflect prior level of function  1. Patient will move from supine to sit and sit to supine  in bed with modified independence. 2.  Patient will transfer from bed to chair and chair to bed with modified independence using the least restrictive device. 3.  Patient will perform sit to stand with modified independence. 4.  Patient will ambulate with modified independence for 150 feet with the least restrictive device. 5.  Patient will ascend/descend 4 stairs with handrail(s) with modified independence. Will continue x 7 days (8/31)  Initiated 8/20/2019 and to be accomplished within 7 day(s)  1. Patient will move from supine to sit and sit to supine  in bed with supervision/set-up. 2.  Patient will transfer from bed to chair and chair to bed with supervision/set-up using the least restrictive device. 3.  Patient will perform sit to stand with supervision/set-up. 4.  Patient will ambulate with supervision/set-up for 50 feet with the least restrictive device. Outcome: Progressing Towards Goal   PHYSICAL THERAPY TREATMENT    Patient: Marci Novak (83 y.o. male)  Date: 8/27/2019  Diagnosis: Cellulitis [L03.90] Cellulitis of right lower extremity  Procedure(s) (LRB):  INCISION AND DRAINAGE RIGHT LOWER LEG - CALF AND LATERAL ANKLE SINUS ULCERS  (Right) 4 Days Post-Op  Precautions: Fall, Skin  PLOF: Mod I    ASSESSMENT:  Pt very motivated this am, supervision for bed mobility, sup>sit, sit>stand. Pt with nausea upon standing, stood X3 min then amb in room, returned to sup with min a to assist with LE's into bed. Progression toward goals:   ?      Improving appropriately and progressing toward goals  ? Improving slowly and progressing toward goals  ?       Not making progress toward goals and plan of care will be adjusted PLAN:  Patient continues to benefit from skilled intervention to address the above impairments. Continue treatment per established plan of care. Discharge Recommendations:  Home Health  Further Equipment Recommendations for Discharge:  Bariatric walker      SUBJECTIVE:   Patient stated I didn't sleep well last night and dont feel up to walking this morning.     OBJECTIVE DATA SUMMARY:   Critical Behavior:  Neurologic State: Alert, Appropriate for age, Eyes open spontaneously  Orientation Level: Oriented X4, Appropriate for age  Cognition: Follows commands  Safety/Judgement: Awareness of environment, Fall prevention, Insight into deficits  Functional Mobility Training:  Bed Mobility:  Supine to Sit: Supervision  Sit to Supine: Minimum assistance(assist with LE's into bed)  Transfers:  Sit to Stand: Supervision  Stand to Sit: Supervision  Balance:  Sitting: Intact  Standing: Impaired  Standing - Static: Good  Standing - Dynamic : Good     Pain:  Pain level pre-treatment: 0/10  Pain level post-treatment: 0/10   Pain Intervention(s): n/a      Activity Tolerance:   Fair   Please refer to the flowsheet for vital signs taken during this treatment. After treatment:   ? Patient left in no apparent distress sitting up in chair  ? Patient left in no apparent distress in bed  ? Call bell left within reach  ? Nursing notified  ? Caregiver present  ? Bed alarm activated  ? SCDs applied      COMMUNICATION/EDUCATION:   ?           ? Fall prevention education was provided and the patient/caregiver indicated understanding. ? Patient/family have participated as able in working toward goals and plan of care. ?         Patient/family agree to work toward stated goals and plan of care. ?         Patient understands intent and goals of therapy, but is neutral about his/her participation. ? Patient is unable to participate in stated goals/plan of care: ongoing with therapy staff.   ?         Role of Physical Therapy in the acute care setting.         Nimesh Langley, STEVIE   Time Calculation: 24 mins

## 2019-08-28 ENCOUNTER — HOME CARE VISIT (OUTPATIENT)
Dept: HOME HEALTH SERVICES | Facility: HOME HEALTH | Age: 69
End: 2019-08-28

## 2019-08-28 LAB
BACTERIA SPEC CULT: NORMAL
GLUCOSE BLD STRIP.AUTO-MCNC: 128 MG/DL (ref 70–110)
GLUCOSE BLD STRIP.AUTO-MCNC: 153 MG/DL (ref 70–110)
GLUCOSE BLD STRIP.AUTO-MCNC: 165 MG/DL (ref 70–110)
GLUCOSE BLD STRIP.AUTO-MCNC: 177 MG/DL (ref 70–110)
SERVICE CMNT-IMP: NORMAL

## 2019-08-28 PROCEDURE — 74011250637 HC RX REV CODE- 250/637: Performed by: PLASTIC SURGERY

## 2019-08-28 PROCEDURE — 74011250637 HC RX REV CODE- 250/637: Performed by: INTERNAL MEDICINE

## 2019-08-28 PROCEDURE — 74011250637 HC RX REV CODE- 250/637: Performed by: PHYSICIAN ASSISTANT

## 2019-08-28 PROCEDURE — 82962 GLUCOSE BLOOD TEST: CPT

## 2019-08-28 PROCEDURE — 74011636637 HC RX REV CODE- 636/637: Performed by: INTERNAL MEDICINE

## 2019-08-28 PROCEDURE — 74011250636 HC RX REV CODE- 250/636: Performed by: PHYSICIAN ASSISTANT

## 2019-08-28 PROCEDURE — 65270000029 HC RM PRIVATE

## 2019-08-28 RX ADMIN — HEPARIN SODIUM 5000 UNITS: 5000 INJECTION INTRAVENOUS; SUBCUTANEOUS at 06:25

## 2019-08-28 RX ADMIN — DICLOFENAC SODIUM AND MISOPROSTOL 1 TABLET: 50; 200 TABLET, FILM COATED ORAL at 17:00

## 2019-08-28 RX ADMIN — VALSARTAN 320 MG: 80 TABLET, FILM COATED ORAL at 09:26

## 2019-08-28 RX ADMIN — SODIUM HYPOCHLORITE: 2.5 SOLUTION TOPICAL at 09:00

## 2019-08-28 RX ADMIN — FUROSEMIDE 80 MG: 40 TABLET ORAL at 09:26

## 2019-08-28 RX ADMIN — HYDROCODONE BITARTRATE AND ACETAMINOPHEN 1 TABLET: 5; 325 TABLET ORAL at 06:26

## 2019-08-28 RX ADMIN — Medication 10 ML: at 06:27

## 2019-08-28 RX ADMIN — INSULIN LISPRO 2 UNITS: 100 INJECTION, SOLUTION INTRAVENOUS; SUBCUTANEOUS at 17:27

## 2019-08-28 RX ADMIN — Medication 10 ML: at 14:00

## 2019-08-28 RX ADMIN — DICLOFENAC SODIUM AND MISOPROSTOL 1 TABLET: 50; 200 TABLET, FILM COATED ORAL at 08:00

## 2019-08-28 RX ADMIN — HYDROCODONE BITARTRATE AND ACETAMINOPHEN 1 TABLET: 5; 325 TABLET ORAL at 18:59

## 2019-08-28 RX ADMIN — METOPROLOL TARTRATE 50 MG: 50 TABLET ORAL at 09:26

## 2019-08-28 RX ADMIN — INSULIN LISPRO 2 UNITS: 100 INJECTION, SOLUTION INTRAVENOUS; SUBCUTANEOUS at 12:43

## 2019-08-28 RX ADMIN — METOPROLOL TARTRATE 50 MG: 50 TABLET ORAL at 23:56

## 2019-08-28 RX ADMIN — COLCHICINE 0.6 MG: 0.6 TABLET, FILM COATED ORAL at 06:26

## 2019-08-28 RX ADMIN — ROSUVASTATIN CALCIUM 40 MG: 10 TABLET, COATED ORAL at 23:56

## 2019-08-28 RX ADMIN — LEVOTHYROXINE SODIUM 50 MCG: 50 TABLET ORAL at 06:26

## 2019-08-28 RX ADMIN — GABAPENTIN 600 MG: 300 CAPSULE ORAL at 23:56

## 2019-08-28 RX ADMIN — INSULIN LISPRO 2 UNITS: 100 INJECTION, SOLUTION INTRAVENOUS; SUBCUTANEOUS at 09:26

## 2019-08-28 RX ADMIN — HYDROCODONE BITARTRATE AND ACETAMINOPHEN 1 TABLET: 5; 325 TABLET ORAL at 10:02

## 2019-08-28 RX ADMIN — HYDROCODONE BITARTRATE AND ACETAMINOPHEN 1 TABLET: 5; 325 TABLET ORAL at 23:56

## 2019-08-28 RX ADMIN — Medication 10 ML: at 23:57

## 2019-08-28 RX ADMIN — HEPARIN SODIUM 5000 UNITS: 5000 INJECTION INTRAVENOUS; SUBCUTANEOUS at 23:57

## 2019-08-28 NOTE — DISCHARGE SUMMARY
Discharge Summary    Patient: Leslie Carlson               Sex: male          DOA: 8/16/2019       YOB: 1950      Age:  81 y.o.        LOS:  LOS: 12 days                Admit Date: 8/16/2019    Discharge Date: 8/28/2019    Admission Diagnoses: Cellulitis [L03.90]    Discharge Diagnoses:    Hospital Problems  Date Reviewed: 8/15/2019          Codes Class Noted POA    * (Principal) Cellulitis of right lower extremity ICD-10-CM: L03.115  ICD-9-CM: 682.6  8/16/2019         Chronic diastolic HF (heart failure) ([facility]) ICD-10-CM: I50.32  ICD-9-CM: 428.32  8/16/2019 Unknown        Type 2 diabetes mellitus with diabetic neuropathy [facility] ICD-10-CM: E11.40  ICD-9-CM: 250.60, 357.2  8/15/2018 Yes        Hypothyroidism, adult ICD-10-CM: E03.9  ICD-9-CM: 244.9  9/29/2015 Yes        Lymphedema of both lower extremities ICD-10-CM: I89.0  ICD-9-CM: 457.1  7/14/2015 Yes        CKD (chronic kidney disease) stage 3, GFR 30-59 ml/min ([facility]) ICD-10-CM: N18.3  ICD-9-CM: 585.3  7/14/2015 Yes              Discharge Disposition: Home Health Care [facility]     Discharge Condition:  Improved    Hospital Course:  71M w/ h/o severe chronic lymphedema of both lower extremities, X9CZ, chronic diastolic CHF, CKD stage 3 who presented with chronic right leg wound with active right leg ulceration. ID, Vascular Surgery, Podiatry, and Plastic Surgery were consulted. Dr. Magda Mendez and Dr. Blanco in [facility] and [address] [place] [address] performed I&D of right lower leg calf and lateral ankle sinus ulcers on 8/23. Wound care was provided. Per ID recommendation pt was treated with antibiotics to cover wound cultures and completed course while admitted. He was also treated for acute on chronic gout with short course of colchicine and steroids. PT recommended home with home health with bariatric worker. Home health arrangements were made include home PT. Patient was discharged in stable condition.        Consults:    Infectious Disease, Vascular Surgery and Plastic Surgery, Podiatry    Labs:  Labs: Results:       Chemistry Recent Labs     08/26/19  0515   *      K 4.2      CO2 35*   BUN 35*   CREA 1.43*   CA 9.6   AGAP 6   BUCR 24*      CBC w/Diff Recent Labs     08/26/19  0515   WBC 10.6   RBC 3.63*   HGB 11.3*   HCT 35.4*      GRANS 76*   LYMPH 17*   EOS 0      Cardiac Enzymes No results for input(s): CPK, CKND1, EARLE in the last 72 hours. No lab exists for component: CKRMB, TROIP   Coagulation No results for input(s): PTP, INR, APTT in the last 72 hours. No lab exists for component: INREXT, INREXT    Lipid Panel Lab Results   Component Value Date/Time    Cholesterol, total 122 05/24/2019 11:59 AM    HDL Cholesterol 51 05/24/2019 11:59 AM    LDL, calculated 46.6 05/24/2019 11:59 AM    VLDL, calculated 24.4 05/24/2019 11:59 AM    Triglyceride 122 05/24/2019 11:59 AM    CHOL/HDL Ratio 2.4 05/24/2019 11:59 AM      BNP No results for input(s): BNPP in the last 72 hours. Liver Enzymes No results for input(s): TP, ALB, TBIL, AP, SGOT, GPT in the last 72 hours. No lab exists for component: DBIL   Thyroid Studies Lab Results   Component Value Date/Time    TSH 1.790 04/19/2018 04:10 PM            Significant Diagnostic Studies:   Xr Ankle Rt Min 3 V    Result Date: 8/9/2019  Indication: Injury, edema, pain 3 views right ankle show extensive soft tissue large plantar calcaneal spur. No fracture or dislocation. Vascular calcifications seen in soft tissues. IMPRESSION: Calcaneal spur. Peripheral vascular disease with extensive soft tissue swelling. No acute fracture.      Duplex Lower Ext Venous Right    Result Date: 8/13/2019                                                                Study ID: [id number]                                        [facility]                                           [address],                                         [address] [facility] [address]                          Lower Extremity Venous Report Name: Krupa Freitas Date: 2019 01:57 PM MRN: [id number]                      Patient Location: XS^[id number]^NOPQ : 1950                  Age: 81 yrs Gender: Male                     Account #: [de-identified] Reason For Study: Pain in leg Ordering Physician: Cande Aparicio Performed By: Cassius Escobar, RVT Interpretation Summary No evidence of acute deep vein thrombosis noted in the right common femoral, femoral and popliteal veins. Unable to visualize the right posterior tibial, peroneal veins and the saphenofemoral junction. Unable to visualize the contralateral/left common femoral vein due to patient's suboptimal positioning with his left leg off the bed. Severely limited study due to lymphedema, skin condition and suboptimal positioning. _____________________________________________________________________________ __ QUALITY/PROCEDURE Limited unilateral venous duplex performed of the right leg. HISTORY/SYMPTOMS Lymphedema, Pain in right leg, Morbid obesity ICD10 - M79.605. RIGHT LEG The common femoral, femoral, popliteal,veins were examined with duplex ultrasound. The common femoral, proximal to mid femoral and popliteal veins were patent and compressible with no evidence of intraluminal thrombus. The distal femoral vein was grossly patent by Color and Spectral Doppler only, therefore unable to rule out a non-occlusive deep venous thrombosis in this segment. The posterior tibial and peroneal veins were not visualized due to patient's skin condition, lymphedema and suboptimal positioning. Spontaneous, phasic venous flow noted throughout the right leg. RIGHT SAPHENOUS VEINS The right saphenofemoral junction was not visualized.  LEFT LEG Unable to assess the contralateral/left common femoral vein due to body habitus and suboptimal positioning. Electronically signed byDr. Eliel Mcgrath M.D   08/13/2019 07:42 AM      Discharge Medications:     Current Discharge Medication List      CONTINUE these medications which have NOT CHANGED    Details   methocarbamol (ROBAXIN-750) 750 mg tablet Take 1 Tab by mouth four (4) times daily. As needed for pain or muscle spasm  Qty: 20 Tab, Refills: 0      furosemide (LASIX) 80 mg tablet TAKE 1 TABLET BY MOUTH EVERY DAY  Qty: 90 Tab, Refills: 3      testosterone (ANDRODERM) 4 mg/24 hr pt24 1 Patch by TransDERmal route daily. Max Daily Amount: 1 Patch. Qty: 60 Patch, Refills: 0    Associated Diagnoses: Hypogonadism in male      colchicine 0.6 mg tablet TAKE 1 TAB BY MOUTH DAILY AS NEEDED. Qty: 90 Tab, Refills: 3      vit b comp & c-fa-copper-zinc (FOLBEE PLUS) 5-1.5-25 mg tab Take 1 Tab by mouth daily. Qty: 90 Tab, Refills: 3      KLOR-CON M20 20 mEq tablet TAKE 1 TABLET BY MOUTH ONCE DAILY  Qty: 90 Tab, Refills: 3    Associated Diagnoses: Essential hypertension with goal blood pressure less than 130/80      valsartan (DIOVAN) 320 mg tablet Take 1 Tab by mouth daily. Qty: 90 Tab, Refills: 3      levothyroxine (SYNTHROID) 50 mcg tablet TAKE 1 TAB BY MOUTH DAILY (BEFORE BREAKFAST) FOR 90 DAYS. Qty: 90 Tab, Refills: 0      dulaglutide (TRULICITY) 1.5 OQ/2.3 mL sub-q pen 0.5 ML BY SUBCUTANEOUS ROUTE EVERY SEVEN (7) DAYS. Qty: 7.5 Syringe, Refills: 0    Associated Diagnoses: Type 2 diabetes mellitus with diabetic nephropathy ([facility])      gabapentin (NEURONTIN) 300 mg capsule Take 300 mg by mouth nightly. diclofenac-miSOPROStol (ARTHROTEC 50)  mg-mcg per tablet TAKE 1 TABLET BY MOUTH TWICE A DAY  Qty: 180 Tab, Refills: 0      rosuvastatin (CRESTOR) 40 mg tablet TAKE 1 TABLET BY MOUTH NIGHTLY FOR 90 DAYS.   Qty: 90 Tab, Refills: 1      tadalafil (CIALIS) 20 mg tablet Take 1 Tab by mouth every thirty-six (36) hours. Indications: Erectile Dysfunction  Qty: 12 Tab, Refills: 5    Associated Diagnoses: Erectile dysfunction, unspecified erectile dysfunction type      metoprolol (LOPRESSOR) 50 mg tablet Take 1 Tab by mouth every twelve (12) hours. Qty: 60 Tab, Refills: 0      Fenofibrate 150 mg cap Take 145 mg by mouth daily. STOP taking these medications       doxycycline (MONODOX) 100 mg capsule Comments:   Reason for Stopping:         ibuprofen (MOTRIN) 600 mg tablet Comments:   Reason for Stopping:               Activity: Activity as tolerated    Diet: Cardiac Diet     Wound Care: On pt right lower leg wash out wound with \"dakins\" (from pharmacy) then wet to dry with curlex. Lightly pack wound with curlex soaked in \"dakins\" then wrap in gauze. perform this every 8 hours. Follow-up: PCP in 1 week.          Total time spent including time spent on final examination and discharge discussion, discharge documentation and records reviewed and medication reconciliation: > 30 minutes    Shahbaz Evans MD  [facility]

## 2019-08-28 NOTE — PROGRESS NOTES
0700  -- Bedside, Verbal and Written shift change report given to 2309 Sheila  (oncoming nurse) by [employer] nurse). Report included the following information SBAR, Kardex, Intake/Output, MAR and Recent Results.       5417 -- AM medications administered, pt tolerated with ease, will continue to monitor. 1000 -- PRN pain medications administered, pt tolerated with ease, will continue to monitor.     1245 -- Shift reassessment, pt condition unchanged, will continue to monitor. 1350 -- Wound care completed by SALBADOR RN.     1545 --  Shift reassessment, pt condition unchanged, will continue to monitor.         1900 -- Bedside, Verbal and Written shift change report given to [address]) by NASIR (offgoing nurse). Report included the following information SBAR, Kardex, Intake/Output, MAR and Recent Results. Skin assessment completed.

## 2019-08-28 NOTE — PROGRESS NOTES
Problem: Diabetes Self-Management  Goal: *Disease process and treatment process  Description  Define diabetes and identify own type of diabetes; list 3 options for treating diabetes. Outcome: Progressing Towards Goal  Goal: *Incorporating nutritional management into lifestyle  Description  Describe effect of type, amount and timing of food on blood glucose; list 3 methods for planning meals. Outcome: Progressing Towards Goal  Goal: *Incorporating physical activity into lifestyle  Description  State effect of exercise on blood glucose levels. Outcome: Progressing Towards Goal  Goal: *Developing strategies to promote health/change behavior  Description  Define the ABC's of diabetes; identify appropriate screenings, schedule and personal plan for screenings. Outcome: Progressing Towards Goal  Goal: *Using medications safely  Description  State effect of diabetes medications on diabetes; name diabetes medication taking, action and side effects. Outcome: Progressing Towards Goal  Goal: *Monitoring blood glucose, interpreting and using results  Description  Identify recommended blood glucose targets  and personal targets. Outcome: Progressing Towards Goal  Goal: *Prevention, detection, treatment of acute complications  Description  List symptoms of hyper- and hypoglycemia; describe how to treat low blood sugar and actions for lowering  high blood glucose level. Outcome: Progressing Towards Goal  Goal: *Prevention, detection and treatment of chronic complications  Description  Define the natural course of diabetes and describe the relationship of blood glucose levels to long term complications of diabetes.   Outcome: Progressing Towards Goal  Goal: *Developing strategies to address psychosocial issues  Description  Describe feelings about living with diabetes; identify support needed and support network  Outcome: Progressing Towards Goal  Goal: *Insulin pump training  Outcome: Progressing Towards Goal  Goal: *Sick day guidelines  Outcome: Progressing Towards Goal  Goal: *Patient Specific Goal (EDIT GOAL, INSERT TEXT)  Outcome: Progressing Towards Goal     Problem: Patient Education: Go to Patient Education Activity  Goal: Patient/Family Education  Outcome: Progressing Towards Goal     Problem: Pressure Injury - Risk of  Goal: *Prevention of pressure injury  Description  Document Stone Scale and appropriate interventions in the flowsheet. Outcome: Progressing Towards Goal  Note:   Pressure Injury Interventions:  Sensory Interventions: Check visual cues for pain, Keep linens dry and wrinkle-free, Pressure redistribution bed/mattress (bed type), Use 30-degree side-lying position    Moisture Interventions: Absorbent underpads, Minimize layers    Activity Interventions: Pressure redistribution bed/mattress(bed type), Increase time out of bed    Mobility Interventions: Float heels, HOB 30 degrees or less, Pressure redistribution bed/mattress (bed type)    Nutrition Interventions: Document food/fluid/supplement intake    Friction and Shear Interventions: HOB 30 degrees or less, Minimize layers                Problem: Patient Education: Go to Patient Education Activity  Goal: Patient/Family Education  Outcome: Progressing Towards Goal     Problem: Falls - Risk of  Goal: *Absence of Falls  Description  Document Zuleyma Fall Risk and appropriate interventions in the flowsheet.   Outcome: Progressing Towards Goal  Note:   Fall Risk Interventions:  Mobility Interventions: Communicate number of staff needed for ambulation/transfer, Patient to call before getting OOB, Utilize walker, cane, or other assistive device         Medication Interventions: Patient to call before getting OOB    Elimination Interventions: Call light in reach, Patient to call for help with toileting needs, Urinal in reach              Problem: Patient Education: Go to Patient Education Activity  Goal: Patient/Family Education  Outcome: Progressing Towards Goal Problem: Pain  Goal: *Control of Pain  Outcome: Progressing Towards Goal  Goal: *PALLIATIVE CARE:  Alleviation of Pain  Outcome: Progressing Towards Goal     Problem: Patient Education: Go to Patient Education Activity  Goal: Patient/Family Education  Outcome: Progressing Towards Goal     Problem: Patient Education: Go to Patient Education Activity  Goal: Patient/Family Education  Outcome: Progressing Towards Goal     Problem: Patient Education: Go to Patient Education Activity  Goal: Patient/Family Education  Outcome: Progressing Towards Goal     Problem: Risk for Spread of Infection  Goal: Prevent transmission of infectious organism to others  Description  Prevent the transmission of infectious organisms to other patients, staff members, and visitors.   Outcome: Progressing Towards Goal     Problem: Patient Education:  Go to Education Activity  Goal: Patient/Family Education  Outcome: Progressing Towards Goal     Problem: Altered nutrition  Goal: *Optimize nutritional status  Outcome: Progressing Towards Goal     Problem: Cellulitis Care Plan (Adult)  Goal: *Control of acute pain  Outcome: Progressing Towards Goal  Goal: *Skin integrity maintained  Outcome: Progressing Towards Goal  Goal: *Absence of infection signs and symptoms  Outcome: Progressing Towards Goal     Problem: Patient Education: Go to Patient Education Activity  Goal: Patient/Family Education  Outcome: Progressing Towards Goal     Problem: Heart Failure: Discharge Outcomes  Goal: *Demonstrates ability to perform prescribed activity without shortness of breath or discomfort  Outcome: Progressing Towards Goal  Goal: *Left ventricular function assessment completed prior to or during stay, or planned for post-discharge  Outcome: Progressing Towards Goal  Goal: *ACEI prescribed if LVEF less than 40% and no contraindications or ARB prescribed  Outcome: Progressing Towards Goal  Goal: *Verbalizes understanding and describes prescribed diet  Outcome: Progressing Towards Goal  Goal: *Verbalizes understanding/describes prescribed medications  Outcome: Progressing Towards Goal  Goal: *Describes available resources and support systems  Description  (eg: Home Health, Palliative Care, Advanced Medical Directive)  Outcome: Progressing Towards Goal  Goal: *Describes smoking cessation resources  Outcome: Progressing Towards Goal  Goal: *Understands and describes signs and symptoms to report to providers(Stroke Metric)  Outcome: Progressing Towards Goal  Goal: *Describes/verbalizes understanding of follow-up/return appt  Description  (eg: to physicians, diabetes treatment coordinator, and other resources  Outcome: Progressing Towards Goal  Goal: *Describes importance of continuing daily weights and changes to report to physician  Outcome: Progressing Towards Goal

## 2019-08-28 NOTE — PROGRESS NOTES
Problem: Diabetes Self-Management  Goal: *Disease process and treatment process  Description  Define diabetes and identify own type of diabetes; list 3 options for treating diabetes. Outcome: Progressing Towards Goal  Goal: *Incorporating nutritional management into lifestyle  Description  Describe effect of type, amount and timing of food on blood glucose; list 3 methods for planning meals. Outcome: Progressing Towards Goal  Goal: *Incorporating physical activity into lifestyle  Description  State effect of exercise on blood glucose levels. Outcome: Progressing Towards Goal  Goal: *Developing strategies to promote health/change behavior  Description  Define the ABC's of diabetes; identify appropriate screenings, schedule and personal plan for screenings. Outcome: Progressing Towards Goal  Goal: *Using medications safely  Description  State effect of diabetes medications on diabetes; name diabetes medication taking, action and side effects. Outcome: Progressing Towards Goal  Goal: *Monitoring blood glucose, interpreting and using results  Description  Identify recommended blood glucose targets  and personal targets. Outcome: Progressing Towards Goal  Goal: *Prevention, detection, treatment of acute complications  Description  List symptoms of hyper- and hypoglycemia; describe how to treat low blood sugar and actions for lowering  high blood glucose level. Outcome: Progressing Towards Goal  Goal: *Prevention, detection and treatment of chronic complications  Description  Define the natural course of diabetes and describe the relationship of blood glucose levels to long term complications of diabetes.   Outcome: Progressing Towards Goal  Goal: *Developing strategies to address psychosocial issues  Description  Describe feelings about living with diabetes; identify support needed and support network  Outcome: Progressing Towards Goal     Problem: Patient Education: Go to Patient Education Activity  Goal: Patient/Family Education  Outcome: Progressing Towards Goal     Problem: Pressure Injury - Risk of  Goal: *Prevention of pressure injury  Description  Document Stone Scale and appropriate interventions in the flowsheet. Outcome: Progressing Towards Goal  Note:   Pressure Injury Interventions:  Sensory Interventions: Check visual cues for pain, Keep linens dry and wrinkle-free, Pressure redistribution bed/mattress (bed type), Use 30-degree side-lying position    Moisture Interventions: Absorbent underpads, Minimize layers    Activity Interventions: Pressure redistribution bed/mattress(bed type), Increase time out of bed    Mobility Interventions: Float heels, HOB 30 degrees or less, Pressure redistribution bed/mattress (bed type)    Nutrition Interventions: Document food/fluid/supplement intake    Friction and Shear Interventions: HOB 30 degrees or less, Minimize layers                Problem: Patient Education: Go to Patient Education Activity  Goal: Patient/Family Education  Outcome: Progressing Towards Goal     Problem: Falls - Risk of  Goal: *Absence of Falls  Description  Document Zuleyma Fall Risk and appropriate interventions in the flowsheet.   Outcome: Progressing Towards Goal  Note:   Fall Risk Interventions:  Mobility Interventions: Communicate number of staff needed for ambulation/transfer, Patient to call before getting OOB, Utilize walker, cane, or other assistive device         Medication Interventions: Patient to call before getting OOB    Elimination Interventions: Call light in reach, Patient to call for help with toileting needs, Urinal in reach              Problem: Pain  Goal: *Control of Pain  Outcome: Progressing Towards Goal     Problem: Patient Education: Go to Patient Education Activity  Goal: Patient/Family Education  Outcome: Progressing Towards Goal     Problem: Cellulitis Care Plan (Adult)  Goal: *Control of acute pain  Outcome: Progressing Towards Goal  Goal: *Skin integrity maintained  Outcome: Progressing Towards Goal  Goal: *Absence of infection signs and symptoms  Outcome: Progressing Towards Goal     Problem: Patient Education: Go to Patient Education Activity  Goal: Patient/Family Education  Outcome: Progressing Towards Goal

## 2019-08-28 NOTE — ROUTINE PROCESS
1943: Assumed care. Quietly resting in bed. HOB elevated. No SOB on RA. Call light within reach. 2226: Due meds given. . Coverage provided per protocol. 2345: Rt leg dressing done. Procedure tolerated well. 0019: No change from previous assessment. 0100: Incontinence care provided. 0154: No change from previous assessment. 1177: Slept good thru night. Needs attended. Due meds given. Medicated for pain per patient request.     6107: Bedside and Verbal shift change report given to Ag[address]ies[address] (oncoming nurse) by me (offgoing nurse). Report included the following information SBAR, Kardex, Intake/Output, MAR and Recent Results.

## 2019-08-28 NOTE — PROGRESS NOTES
Patient complaining of symptoms suggestive of orthostatic hypotension on standing - this would be expected after the week of bedrest due to the gout attack - discussed with PT who will help with his frequent ambulation efforts to be able to to send him home safely as soon as possible - home health to do the dressing changes.

## 2019-08-29 LAB
GLUCOSE BLD STRIP.AUTO-MCNC: 106 MG/DL (ref 70–110)
GLUCOSE BLD STRIP.AUTO-MCNC: 132 MG/DL (ref 70–110)
GLUCOSE BLD STRIP.AUTO-MCNC: 158 MG/DL (ref 70–110)
GLUCOSE BLD STRIP.AUTO-MCNC: 174 MG/DL (ref 70–110)

## 2019-08-29 PROCEDURE — 74011636637 HC RX REV CODE- 636/637: Performed by: INTERNAL MEDICINE

## 2019-08-29 PROCEDURE — 74011250636 HC RX REV CODE- 250/636: Performed by: PHYSICIAN ASSISTANT

## 2019-08-29 PROCEDURE — 74011250637 HC RX REV CODE- 250/637: Performed by: INTERNAL MEDICINE

## 2019-08-29 PROCEDURE — 74011250637 HC RX REV CODE- 250/637: Performed by: PLASTIC SURGERY

## 2019-08-29 PROCEDURE — 74011250637 HC RX REV CODE- 250/637: Performed by: PHYSICIAN ASSISTANT

## 2019-08-29 PROCEDURE — 65270000029 HC RM PRIVATE

## 2019-08-29 PROCEDURE — 97530 THERAPEUTIC ACTIVITIES: CPT

## 2019-08-29 PROCEDURE — 82962 GLUCOSE BLOOD TEST: CPT

## 2019-08-29 RX ADMIN — HEPARIN SODIUM 5000 UNITS: 5000 INJECTION INTRAVENOUS; SUBCUTANEOUS at 06:40

## 2019-08-29 RX ADMIN — METOPROLOL TARTRATE 50 MG: 50 TABLET ORAL at 09:36

## 2019-08-29 RX ADMIN — DICLOFENAC SODIUM AND MISOPROSTOL 1 TABLET: 50; 200 TABLET, FILM COATED ORAL at 17:00

## 2019-08-29 RX ADMIN — INSULIN LISPRO 3 UNITS: 100 INJECTION, SOLUTION INTRAVENOUS; SUBCUTANEOUS at 12:43

## 2019-08-29 RX ADMIN — SODIUM HYPOCHLORITE: 2.5 SOLUTION TOPICAL at 00:02

## 2019-08-29 RX ADMIN — INSULIN LISPRO 2 UNITS: 100 INJECTION, SOLUTION INTRAVENOUS; SUBCUTANEOUS at 09:36

## 2019-08-29 RX ADMIN — HEPARIN SODIUM 5000 UNITS: 5000 INJECTION INTRAVENOUS; SUBCUTANEOUS at 23:39

## 2019-08-29 RX ADMIN — HYDROCODONE BITARTRATE AND ACETAMINOPHEN 1 TABLET: 5; 325 TABLET ORAL at 23:39

## 2019-08-29 RX ADMIN — FUROSEMIDE 80 MG: 40 TABLET ORAL at 09:36

## 2019-08-29 RX ADMIN — Medication 10 ML: at 06:40

## 2019-08-29 RX ADMIN — DICLOFENAC SODIUM AND MISOPROSTOL 1 TABLET: 50; 200 TABLET, FILM COATED ORAL at 08:00

## 2019-08-29 RX ADMIN — ROSUVASTATIN CALCIUM 40 MG: 10 TABLET, COATED ORAL at 23:39

## 2019-08-29 RX ADMIN — GABAPENTIN 300 MG: 300 CAPSULE ORAL at 23:39

## 2019-08-29 RX ADMIN — HYDROCODONE BITARTRATE AND ACETAMINOPHEN 1 TABLET: 5; 325 TABLET ORAL at 10:40

## 2019-08-29 RX ADMIN — COLCHICINE 0.6 MG: 0.6 TABLET, FILM COATED ORAL at 06:39

## 2019-08-29 RX ADMIN — Medication 10 ML: at 13:59

## 2019-08-29 RX ADMIN — LEVOTHYROXINE SODIUM 50 MCG: 50 TABLET ORAL at 06:39

## 2019-08-29 RX ADMIN — Medication 10 ML: at 23:40

## 2019-08-29 RX ADMIN — SODIUM HYPOCHLORITE: 2.5 SOLUTION TOPICAL at 08:19

## 2019-08-29 RX ADMIN — VALSARTAN 320 MG: 80 TABLET, FILM COATED ORAL at 09:36

## 2019-08-29 NOTE — PROGRESS NOTES
OT attempted, pt refusing despite max encouragement. Pt stating that he has already been up with PT and NSG. Will continue to follow.

## 2019-08-29 NOTE — MANAGEMENT PLAN
Discharge/Transition Planning    Have not received result of discharge appeal from [facility] as of yet. Plan is Home and [facility]. Pt going 200 feet with therapy and mastering most therapy thus far.  If pt does not want to wait for result of appeal can discharge at any time      Conception Darvin VICTORIA BSN  Outcomes Manager  Pager # [phone]

## 2019-08-29 NOTE — ROUTINE PROCESS
1933: Assumed care. HOB elevated. No sob on RA. Quietly resting in bed. Daughter at bedside. Call light within reach. 2124: Patient refused orthostatic BP. Afraid might get dizzy. 2356: Due  meds given. Medicated for pain per patient request. . No coverage provided per protocol. 0045: Wound dressing done. Procedure tolerated well. 0430: No change from previous assessment. 1814: Slept good thru night. Needs attended. Due meds given. 0730: Bedside and Verbal shift change report given to N[address]. [address] (oncoming nurse) by me (offgoing nurse). Report included the following information SBAR, Kardex, Intake/Output, MAR and Recent Results.

## 2019-08-29 NOTE — ROUTINE PROCESS
0815 wound care completed    0822 orthostatic blood pressure:  Documented on flow sheet    Left arm Lying supine- 109/66    Left arm Sitting on side of bed- 121/51    Left arm Standing- 109/77

## 2019-08-29 NOTE — PROGRESS NOTES
0700  -- Bedside, Verbal and Written shift change report given to [address] (oncoming nurse) by [employer] nurse). Report included the following information SBAR, Kardex, Intake/Output, MAR and Recent Results.       0935 -- AM medications administered, pt tolerated with ease, will continue to monitor. 1040 -- PRN pain medications administered, pt tolerated with ease, will continue to monitor.     1215 -- Shift reassessment, pt condition unchanged, will continue to monitor.      1600 --  Shift reassessment, pt condition unchanged, will continue to monitor.          -- Bedside, Verbal and Written shift change report given to   (oncoming nurse) by NASIR (offgoing nurse). Report included the following information SBAR, Kardex, Intake/Output, MAR and Recent Results. Skin assessment completed.

## 2019-08-29 NOTE — PROGRESS NOTES
Patient very happy to be independent again  - he was able to walk the length of the passage way today and will therefore be able to go home - wounds in good order and home fidel to continue the gentle daily packing with 1/4 strength Dakin's solution using Kerlix bandage only on a daily basis - all other follow up and home instructions given to the patient and his wife - discussed with Dr Charity Ramirez and agreed that D/C home at this juncture would be safe

## 2019-08-29 NOTE — PROGRESS NOTES
Problem: Diabetes Self-Management  Goal: *Disease process and treatment process  Description  Define diabetes and identify own type of diabetes; list 3 options for treating diabetes. Outcome: Progressing Towards Goal  Goal: *Incorporating nutritional management into lifestyle  Description  Describe effect of type, amount and timing of food on blood glucose; list 3 methods for planning meals. Outcome: Progressing Towards Goal  Goal: *Incorporating physical activity into lifestyle  Description  State effect of exercise on blood glucose levels. Outcome: Progressing Towards Goal  Goal: *Developing strategies to promote health/change behavior  Description  Define the ABC's of diabetes; identify appropriate screenings, schedule and personal plan for screenings. Outcome: Progressing Towards Goal  Goal: *Using medications safely  Description  State effect of diabetes medications on diabetes; name diabetes medication taking, action and side effects. Outcome: Progressing Towards Goal  Goal: *Monitoring blood glucose, interpreting and using results  Description  Identify recommended blood glucose targets  and personal targets. Outcome: Progressing Towards Goal  Goal: *Prevention, detection, treatment of acute complications  Description  List symptoms of hyper- and hypoglycemia; describe how to treat low blood sugar and actions for lowering  high blood glucose level. Outcome: Progressing Towards Goal  Goal: *Prevention, detection and treatment of chronic complications  Description  Define the natural course of diabetes and describe the relationship of blood glucose levels to long term complications of diabetes.   Outcome: Progressing Towards Goal  Goal: *Developing strategies to address psychosocial issues  Description  Describe feelings about living with diabetes; identify support needed and support network  Outcome: Progressing Towards Goal  Goal: *Insulin pump training  Outcome: Progressing Towards Goal  Goal: *Sick day guidelines  Outcome: Progressing Towards Goal  Goal: *Patient Specific Goal (EDIT GOAL, INSERT TEXT)  Outcome: Progressing Towards Goal     Problem: Patient Education: Go to Patient Education Activity  Goal: Patient/Family Education  Outcome: Progressing Towards Goal     Problem: Pressure Injury - Risk of  Goal: *Prevention of pressure injury  Description  Document Stone Scale and appropriate interventions in the flowsheet. Outcome: Progressing Towards Goal  Note:   Pressure Injury Interventions:  Sensory Interventions: Assess changes in LOC, Check visual cues for pain, Keep linens dry and wrinkle-free, Sit a 90-degree angle/use footstool if needed    Moisture Interventions: Absorbent underpads    Activity Interventions: Increase time out of bed, Pressure redistribution bed/mattress(bed type), PT/OT evaluation    Mobility Interventions: Assess need for specialty bed, Pressure redistribution bed/mattress (bed type), PT/OT evaluation    Nutrition Interventions: Document food/fluid/supplement intake    Friction and Shear Interventions: Sit at 90-degree angle                Problem: Patient Education: Go to Patient Education Activity  Goal: Patient/Family Education  Outcome: Progressing Towards Goal     Problem: Falls - Risk of  Goal: *Absence of Falls  Description  Document Jun Hale Fall Risk and appropriate interventions in the flowsheet.   Outcome: Progressing Towards Goal  Note:   Fall Risk Interventions:  Mobility Interventions: Communicate number of staff needed for ambulation/transfer         Medication Interventions: Evaluate medications/consider consulting pharmacy    Elimination Interventions: Call light in reach, Urinal in reach              Problem: Patient Education: Go to Patient Education Activity  Goal: Patient/Family Education  Outcome: Progressing Towards Goal     Problem: Pain  Goal: *Control of Pain  Outcome: Progressing Towards Goal  Goal: *PALLIATIVE CARE:  Alleviation of Pain  Outcome: Progressing Towards Goal     Problem: Patient Education: Go to Patient Education Activity  Goal: Patient/Family Education  Outcome: Progressing Towards Goal     Problem: Patient Education: Go to Patient Education Activity  Goal: Patient/Family Education  Outcome: Progressing Towards Goal     Problem: Patient Education: Go to Patient Education Activity  Goal: Patient/Family Education  Outcome: Progressing Towards Goal     Problem: Risk for Spread of Infection  Goal: Prevent transmission of infectious organism to others  Description  Prevent the transmission of infectious organisms to other patients, staff members, and visitors.   Outcome: Progressing Towards Goal     Problem: Patient Education:  Go to Education Activity  Goal: Patient/Family Education  Outcome: Progressing Towards Goal     Problem: Altered nutrition  Goal: *Optimize nutritional status  Outcome: Progressing Towards Goal     Problem: Cellulitis Care Plan (Adult)  Goal: *Control of acute pain  Outcome: Progressing Towards Goal  Goal: *Skin integrity maintained  Outcome: Progressing Towards Goal  Goal: *Absence of infection signs and symptoms  Outcome: Progressing Towards Goal     Problem: Patient Education: Go to Patient Education Activity  Goal: Patient/Family Education  Outcome: Progressing Towards Goal     Problem: Heart Failure: Discharge Outcomes  Goal: *Demonstrates ability to perform prescribed activity without shortness of breath or discomfort  Outcome: Progressing Towards Goal  Goal: *Left ventricular function assessment completed prior to or during stay, or planned for post-discharge  Outcome: Progressing Towards Goal  Goal: *ACEI prescribed if LVEF less than 40% and no contraindications or ARB prescribed  Outcome: Progressing Towards Goal  Goal: *Verbalizes understanding and describes prescribed diet  Outcome: Progressing Towards Goal  Goal: *Verbalizes understanding/describes prescribed medications  Outcome: Progressing Towards Goal  Goal: *Describes available resources and support systems  Description  (eg: Home Health, Palliative Care, Advanced Medical Directive)  Outcome: Progressing Towards Goal  Goal: *Describes smoking cessation resources  Outcome: Progressing Towards Goal  Goal: *Understands and describes signs and symptoms to report to providers(Stroke Metric)  Outcome: Progressing Towards Goal  Goal: *Describes/verbalizes understanding of follow-up/return appt  Description  (eg: to physicians, diabetes treatment coordinator, and other resources  Outcome: Progressing Towards Goal  Goal: *Describes importance of continuing daily weights and changes to report to physician  Outcome: Progressing Towards Goal

## 2019-08-29 NOTE — DISCHARGE SUMMARY
Discharge Summary    Patient: Irwin Romeo               Sex: male          DOA: 8/16/2019       YOB: 1950      Age:  81 y.o.        LOS:  LOS: 13 days                Admit Date: 8/16/2019    Discharge Date: 8/29/2019    Admission Diagnoses: Cellulitis [L03.90]    Discharge Diagnoses:    Hospital Problems  Date Reviewed: 8/15/2019          Codes Class Noted POA    * (Principal) Cellulitis of right lower extremity ICD-10-CM: L03.115  ICD-9-CM: 682.6  8/16/2019         Chronic diastolic HF (heart failure) ([facility]) ICD-10-CM: I50.32  ICD-9-CM: 428.32  8/16/2019 Unknown        Type 2 diabetes mellitus with diabetic neuropathy [facility] ICD-10-CM: E11.40  ICD-9-CM: 250.60, 357.2  8/15/2018 Yes        Hypothyroidism, adult ICD-10-CM: E03.9  ICD-9-CM: 244.9  9/29/2015 Yes        Lymphedema of both lower extremities ICD-10-CM: I89.0  ICD-9-CM: 457.1  7/14/2015 Yes        CKD (chronic kidney disease) stage 3, GFR 30-59 ml/min ([facility]) ICD-10-CM: N18.3  ICD-9-CM: 585.3  7/14/2015 Yes              Discharge Disposition: Home Health Care [facility]     Discharge Condition:  Improved    Hospital Course:  71M w/ h/o severe chronic lymphedema of both lower extremities, A7QW, chronic diastolic CHF, CKD stage 3 who presented with chronic right leg wound with active right leg ulceration. ID, Vascular Surgery, Podiatry, and Plastic Surgery were consulted. Dr. Venkatesh Novak and Dr. Xenia Santiago in [facility] and [address] [place] [address] performed I&D of right lower leg calf and lateral ankle sinus ulcers on 8/23. Wound care was provided. Per ID recommendation pt was treated with antibiotics to cover wound cultures and completed course while admitted. He was also treated for acute on chronic gout with short course of colchicine and steroids. PT recommended home with home health with bariatric worker. Home health arrangements were made include home PT. Patient was discharged in stable condition.  Prior to discharge pt complained of orthostatic symptoms; orthostatic vital signs were checked and were normal, his symptoms are likely due to debility from prolonged hospitalization, for which home PT was arranged. Consults:    Infectious Disease, Vascular Surgery and Plastic Surgery, Podiatry    Labs:  Labs: Results:       Chemistry No results for input(s): GLU, NA, K, CL, CO2, BUN, CREA, CA, AGAP, BUCR, TBIL, GPT, AP, TP, ALB, GLOB, AGRAT in the last 72 hours. CBC w/Diff No results for input(s): WBC, RBC, HGB, HCT, PLT, GRANS, LYMPH, EOS, HGBEXT, HCTEXT, PLTEXT, HGBEXT, HCTEXT, PLTEXT in the last 72 hours. Cardiac Enzymes No results for input(s): CPK, CKND1, EARLE in the last 72 hours. No lab exists for component: CKRMB, TROIP   Coagulation No results for input(s): PTP, INR, APTT in the last 72 hours. No lab exists for component: INREXT, INREXT    Lipid Panel Lab Results   Component Value Date/Time    Cholesterol, total 122 05/24/2019 11:59 AM    HDL Cholesterol 51 05/24/2019 11:59 AM    LDL, calculated 46.6 05/24/2019 11:59 AM    VLDL, calculated 24.4 05/24/2019 11:59 AM    Triglyceride 122 05/24/2019 11:59 AM    CHOL/HDL Ratio 2.4 05/24/2019 11:59 AM      BNP No results for input(s): BNPP in the last 72 hours. Liver Enzymes No results for input(s): TP, ALB, TBIL, AP, SGOT, GPT in the last 72 hours. No lab exists for component: DBIL   Thyroid Studies Lab Results   Component Value Date/Time    TSH 1.790 04/19/2018 04:10 PM            Significant Diagnostic Studies:   Xr Ankle Rt Min 3 V    Result Date: 8/9/2019  Indication: Injury, edema, pain 3 views right ankle show extensive soft tissue large plantar calcaneal spur. No fracture or dislocation. Vascular calcifications seen in soft tissues. IMPRESSION: Calcaneal spur. Peripheral vascular disease with extensive soft tissue swelling. No acute fracture.      Duplex Lower Ext Venous Right    Result Date: 8/13/2019                                                                Study ID: [id number] [facility],                                         [address] [place] [address] [address]                          Lower Extremity Venous Report Name: Richard Garcia Date: 2019 01:57 PM MRN: [id number]                      Patient Location: [id number] : 1950                  Age: 81 yrs Gender: Male                     Account #: [de-identified] Reason For Study: Pain in leg Ordering Physician: Waylon Fragoso Performed By: Gretchen Folwer, MAYCO Interpretation Summary No evidence of acute deep vein thrombosis noted in the right common femoral, femoral and popliteal veins. Unable to visualize the right posterior tibial, peroneal veins and the saphenofemoral junction. Unable to visualize the contralateral/left common femoral vein due to patient's suboptimal positioning with his left leg off the bed. Severely limited study due to lymphedema, skin condition and suboptimal positioning. _____________________________________________________________________________ __ QUALITY/PROCEDURE Limited unilateral venous duplex performed of the right leg. HISTORY/SYMPTOMS Lymphedema, Pain in right leg, Morbid obesity ICD10 - M79.605. RIGHT LEG The common femoral, femoral, popliteal,veins were examined with duplex ultrasound. The common femoral, proximal to mid femoral and popliteal veins were patent and compressible with no evidence of intraluminal thrombus. The distal femoral vein was grossly patent by Color and Spectral Doppler only, therefore unable to rule out a non-occlusive deep venous thrombosis in this segment.  The posterior tibial and peroneal veins were not visualized due to patient's skin condition, lymphedema and suboptimal positioning. Spontaneous, phasic venous flow noted throughout the right leg. RIGHT SAPHENOUS VEINS The right saphenofemoral junction was not visualized. LEFT LEG Unable to assess the contralateral/left common femoral vein due to body habitus and suboptimal positioning. Electronically signed byDr. Rosy Mays M.D   08/13/2019 07:42 AM      Discharge Medications:     Current Discharge Medication List      CONTINUE these medications which have NOT CHANGED    Details   methocarbamol (ROBAXIN-750) 750 mg tablet Take 1 Tab by mouth four (4) times daily. As needed for pain or muscle spasm  Qty: 20 Tab, Refills: 0      furosemide (LASIX) 80 mg tablet TAKE 1 TABLET BY MOUTH EVERY DAY  Qty: 90 Tab, Refills: 3      testosterone (ANDRODERM) 4 mg/24 hr pt24 1 Patch by TransDERmal route daily. Max Daily Amount: 1 Patch. Qty: 60 Patch, Refills: 0    Associated Diagnoses: Hypogonadism in male      colchicine 0.6 mg tablet TAKE 1 TAB BY MOUTH DAILY AS NEEDED. Qty: 90 Tab, Refills: 3      vit b comp & c-fa-copper-zinc (FOLBEE PLUS) 5-1.5-25 mg tab Take 1 Tab by mouth daily. Qty: 90 Tab, Refills: 3      KLOR-CON M20 20 mEq tablet TAKE 1 TABLET BY MOUTH ONCE DAILY  Qty: 90 Tab, Refills: 3    Associated Diagnoses: Essential hypertension with goal blood pressure less than 130/80      valsartan (DIOVAN) 320 mg tablet Take 1 Tab by mouth daily. Qty: 90 Tab, Refills: 3      levothyroxine (SYNTHROID) 50 mcg tablet TAKE 1 TAB BY MOUTH DAILY (BEFORE BREAKFAST) FOR 90 DAYS. Qty: 90 Tab, Refills: 0      dulaglutide (TRULICITY) 1.5 TN/7.5 mL sub-q pen 0.5 ML BY SUBCUTANEOUS ROUTE EVERY SEVEN (7) DAYS. Qty: 7.5 Syringe, Refills: 0    Associated Diagnoses: Type 2 diabetes mellitus with diabetic nephropathy (HCC)      gabapentin (NEURONTIN) 300 mg capsule Take 300 mg by mouth nightly.       diclofenac-miSOPROStol (ARTHROTEC 50)  mg-mcg per tablet TAKE 1 TABLET BY MOUTH TWICE A DAY  Qty: 180 Tab, Refills: 0      rosuvastatin (CRESTOR) 40 mg tablet TAKE 1 TABLET BY MOUTH NIGHTLY FOR 90 DAYS. Qty: 90 Tab, Refills: 1      tadalafil (CIALIS) 20 mg tablet Take 1 Tab by mouth every thirty-six (36) hours. Indications: Erectile Dysfunction  Qty: 12 Tab, Refills: 5    Associated Diagnoses: Erectile dysfunction, unspecified erectile dysfunction type      metoprolol (LOPRESSOR) 50 mg tablet Take 1 Tab by mouth every twelve (12) hours. Qty: 60 Tab, Refills: 0      Fenofibrate 150 mg cap Take 145 mg by mouth daily. STOP taking these medications       doxycycline (MONODOX) 100 mg capsule Comments:   Reason for Stopping:         ibuprofen (MOTRIN) 600 mg tablet Comments:   Reason for Stopping:               Activity: Activity as tolerated    Diet: Cardiac Diet     Wound Care: On pt right lower leg wash out wound with \"dakins\" (from pharmacy) then wet to dry with curlex. Lightly pack wound with curlex soaked in \"dakins\" then wrap in gauze. perform this every 8 hours. Follow-up: PCP in 1 week.          Total time spent including time spent on final examination and discharge discussion, discharge documentation and records reviewed and medication reconciliation: > 30 minutes    Arlette Worley MD  [facility]

## 2019-08-29 NOTE — PROGRESS NOTES
Problem: Mobility Impaired (Adult and Pediatric)  Goal: *Acute Goals and Plan of Care (Insert Text)  Description  Physical Therapy Goals:   Goals met 8/26/2019; updated to reflect prior level of function  1. Patient will move from supine to sit and sit to supine  in bed with modified independence. 2.  Patient will transfer from bed to chair and chair to bed with modified independence using the least restrictive device. 3.  Patient will perform sit to stand with modified independence. 4.  Patient will ambulate with modified independence for 150 feet with the least restrictive device. 5.  Patient will ascend/descend 4 stairs with handrail(s) with modified independence. Will continue x 7 days (8/31)  Initiated 8/20/2019 and to be accomplished within 7 day(s)  1. Patient will move from supine to sit and sit to supine  in bed with supervision/set-up. 2.  Patient will transfer from bed to chair and chair to bed with supervision/set-up using the least restrictive device. 3.  Patient will perform sit to stand with supervision/set-up. 4.  Patient will ambulate with supervision/set-up for 50 feet with the least restrictive device. Outcome: Progressing Towards Goal   PHYSICAL THERAPY TREATMENT    Patient: Amilcar Gardner (11 y.o. male)  Date: 8/29/2019  Diagnosis: Cellulitis [L03.90] Cellulitis of right lower extremity  Procedure(s) (LRB):  INCISION AND DRAINAGE RIGHT LOWER LEG - CALF AND LATERAL ANKLE SINUS ULCERS  (Right) 6 Days Post-Op  Precautions: Fall, Skin  PLOF: mod I    ASSESSMENT:  Pt demonstrating much improved activity tolerance with mod I bed mobility, transfer, transfers on and off commode, gait training X 200 ft with bariatric walker. Pt requires min a with LE's for return to sup. Progression toward goals:   ?      Improving appropriately and progressing toward goals  ? Improving slowly and progressing toward goals  ?       Not making progress toward goals and plan of care will be adjusted     PLAN:  Patient continues to benefit from skilled intervention to address the above impairments. Continue treatment per established plan of care. Discharge Recommendations:  Home Health  Further Equipment Recommendations for Discharge:  rolling walker     SUBJECTIVE:   Patient stated I want to work with you.     OBJECTIVE DATA SUMMARY:   Critical Behavior:  Neurologic State: Alert  Orientation Level: Oriented X4  Cognition: Follows commands, Appropriate decision making, Appropriate for age attention/concentration, Appropriate safety awareness  Safety/Judgement: Awareness of environment, Fall prevention, Insight into deficits  Functional Mobility Training:  Bed Mobility:  Supine to Sit: Modified independent  Sit to Supine: Minimum assistance  Transfers:  Sit to Stand: Modified independent  Stand to Sit: Modified independent  Balance:  Sitting: Intact  Standing: Impaired  Standing - Static: Good  Standing - Dynamic : Good     Ambulation/Gait Training:  Distance (ft): 200 Feet (ft)  Assistive Device: Walker, rolling  Ambulation - Level of Assistance: Modified independent  Base of Support: Widened  Pain:  Pain level pre-treatment: 5/10  Pain level post-treatment: 5/10   Pain Intervention(s): nurse notified      Activity Tolerance:   Good   Please refer to the flowsheet for vital signs taken during this treatment. After treatment:   ? Patient left in no apparent distress sitting up in chair  ? Patient left in no apparent distress in bed  ? Call bell left within reach  ? Nursing notified  ? Caregiver present  ? Bed alarm activated  ? SCDs applied      COMMUNICATION/EDUCATION:   ?           ? Fall prevention education was provided and the patient/caregiver indicated understanding. ? Patient/family have participated as able in working toward goals and plan of care. ?         Patient/family agree to work toward stated goals and plan of care.   ?         Patient understands intent and goals of therapy, but is neutral about his/her participation. ? Patient is unable to participate in stated goals/plan of care: ongoing with therapy staff. ?         Role of Physical Therapy in the acute care setting.         Zayra Lomeli, STEVIE   Time Calculation: 35 mins

## 2019-08-30 VITALS
HEIGHT: 77 IN | RESPIRATION RATE: 20 BRPM | SYSTOLIC BLOOD PRESSURE: 104 MMHG | HEART RATE: 73 BPM | OXYGEN SATURATION: 95 % | DIASTOLIC BLOOD PRESSURE: 67 MMHG | WEIGHT: 315 LBS | TEMPERATURE: 97.5 F | BODY MASS INDEX: 37.19 KG/M2

## 2019-08-30 LAB
GLUCOSE BLD STRIP.AUTO-MCNC: 146 MG/DL (ref 70–110)
GLUCOSE BLD STRIP.AUTO-MCNC: 188 MG/DL (ref 70–110)

## 2019-08-30 PROCEDURE — 74011250637 HC RX REV CODE- 250/637: Performed by: PLASTIC SURGERY

## 2019-08-30 PROCEDURE — 82962 GLUCOSE BLOOD TEST: CPT

## 2019-08-30 PROCEDURE — 74011636637 HC RX REV CODE- 636/637: Performed by: INTERNAL MEDICINE

## 2019-08-30 PROCEDURE — 74011250637 HC RX REV CODE- 250/637: Performed by: INTERNAL MEDICINE

## 2019-08-30 PROCEDURE — 74011250636 HC RX REV CODE- 250/636: Performed by: PHYSICIAN ASSISTANT

## 2019-08-30 PROCEDURE — 74011250637 HC RX REV CODE- 250/637: Performed by: PHYSICIAN ASSISTANT

## 2019-08-30 RX ADMIN — HEPARIN SODIUM 5000 UNITS: 5000 INJECTION INTRAVENOUS; SUBCUTANEOUS at 05:44

## 2019-08-30 RX ADMIN — LEVOTHYROXINE SODIUM 50 MCG: 50 TABLET ORAL at 05:44

## 2019-08-30 RX ADMIN — Medication 10 ML: at 05:44

## 2019-08-30 RX ADMIN — COLCHICINE 0.6 MG: 0.6 TABLET, FILM COATED ORAL at 06:22

## 2019-08-30 RX ADMIN — METOPROLOL TARTRATE 50 MG: 50 TABLET ORAL at 09:40

## 2019-08-30 RX ADMIN — VALSARTAN 320 MG: 80 TABLET, FILM COATED ORAL at 09:40

## 2019-08-30 RX ADMIN — FUROSEMIDE 80 MG: 40 TABLET ORAL at 09:40

## 2019-08-30 RX ADMIN — SODIUM HYPOCHLORITE: 2.5 SOLUTION TOPICAL at 05:00

## 2019-08-30 RX ADMIN — DICLOFENAC SODIUM AND MISOPROSTOL 1 TABLET: 50; 200 TABLET, FILM COATED ORAL at 08:00

## 2019-08-30 RX ADMIN — SODIUM HYPOCHLORITE: 2.5 SOLUTION TOPICAL at 05:44

## 2019-08-30 RX ADMIN — INSULIN LISPRO 3 UNITS: 100 INJECTION, SOLUTION INTRAVENOUS; SUBCUTANEOUS at 12:29

## 2019-08-30 RX ADMIN — HYDROCODONE BITARTRATE AND ACETAMINOPHEN 1 TABLET: 5; 325 TABLET ORAL at 06:22

## 2019-08-30 NOTE — PROGRESS NOTES
Called [facility] and inquired about status of appeal decision. Fely Jim states they left a message on [phone](F.Daquan number) today that MD agrees with discharge on 8/27/19. Patient's Liability begins 8/31/19 @ noon. Pt indicating that he is ready for discharge today. Updated Director of Care-management and Care-manager.     Leanna Smith RN    Outcomes Manager  [phone]  [phone]

## 2019-08-30 NOTE — PROGRESS NOTES
Discharge/Transition Planning    Problem: Discharge Planning  Goal: *Discharge to safe environment  Outcome: Resolved/Met    Care Management Interventions  PCP Verified by CM: Yes(Dr. Cole Pttrini, last seen about 1 week ago)  Palliative Care Criteria Met (RRAT>21 & CHF Dx)?: No  Mode of Transport at Discharge:  Other (see comment)(family)  Transition of Care Consult (CM Consult): Home Health([facility])  [address] Yes  Discharge Durable Medical Equipment: No  Physical Therapy Consult: No  Occupational Therapy Consult: No  Speech Therapy Consult: No  Current Support Network: Lives with Spouse  Confirm Follow Up Transport: Family(son)  Plan discussed with Pt/Family/Caregiver: Yes  Discharge Location  Discharge Placement: Home with home health([facility])     Pt ready to go home

## 2019-08-30 NOTE — ROUTINE PROCESS
Bedside and Verbal shift change report given to [facility] RN (oncoming nurse) by Bethany Bernal (offgoing nurse). Report included the following information SBAR, Intake/Output and MAR.     2335 Norco given for pain. 5743 Norco given for pain, dressing changed per orders. Bedside and Verbal shift change report given to Bethany Bernal (oncoming nurse) by [facility] RN (offgoing nurse). Report included the following information SBAR, Intake/Output, MAR and Recent Results.

## 2019-08-30 NOTE — DISCHARGE SUMMARY
Discharge Summary    Patient: Yolande Diaz               Sex: male          DOA: 8/16/2019       YOB: 1950      Age:  81 y.o.        LOS:  LOS: 14 days                Admit Date: 8/16/2019    Discharge Date: 8/30/2019    Admission Diagnoses: Cellulitis [L03.90]    Discharge Diagnoses:    Hospital Problems  Date Reviewed: 8/15/2019          Codes Class Noted POA    * (Principal) Cellulitis of right lower extremity ICD-10-CM: L03.115  ICD-9-CM: 682.6  8/16/2019         Chronic diastolic HF (heart failure) ([facility]) ICD-10-CM: I50.32  ICD-9-CM: 428.32  8/16/2019 Unknown        Type 2 diabetes mellitus with diabetic neuropathy ([facility] ICD-10-CM: E11.40  ICD-9-CM: 250.60, 357.2  8/15/2018 Yes        Hypothyroidism, adult ICD-10-CM: E03.9  ICD-9-CM: 244.9  9/29/2015 Yes        Lymphedema of both lower extremities ICD-10-CM: I89.0  ICD-9-CM: 457.1  7/14/2015 Yes        CKD (chronic kidney disease) stage 3, GFR 30-59 ml/min ([facility]) ICD-10-CM: N18.3  ICD-9-CM: 585.3  7/14/2015 Yes              Discharge Disposition: Home Health Care [facility]     Discharge Condition:  Improved    Hospital Course:  71M w/ h/o severe chronic lymphedema of both lower extremities, Y1PX, chronic diastolic CHF, CKD stage 3 who presented with chronic right leg wound with active right leg ulceration. ID, Vascular Surgery, Podiatry, and Plastic Surgery were consulted. Dr. Lisa Oconnell and Dr. Romelia Fontanez in [facility] and [address] [place] [address] performed I&D of right lower leg calf and lateral ankle sinus ulcers on 8/23. Wound care was provided. Per ID recommendation pt was treated with antibiotics to cover wound cultures and completed course while admitted. He was also treated for acute on chronic gout with short course of colchicine and steroids. PT recommended home with home health with bariatric worker. Home health arrangements were made include home PT. Patient was discharged in stable condition.  Prior to discharge pt complained of orthostatic symptoms; orthostatic vital signs were checked and were normal, his symptoms are likely due to debility from prolonged hospitalization, for which home PT was arranged. Consults:    Infectious Disease, Vascular Surgery and Plastic Surgery, Podiatry    Labs:  Labs: Results:       Chemistry No results for input(s): GLU, NA, K, CL, CO2, BUN, CREA, CA, AGAP, BUCR, TBIL, GPT, AP, TP, ALB, GLOB, AGRAT in the last 72 hours. CBC w/Diff No results for input(s): WBC, RBC, HGB, HCT, PLT, GRANS, LYMPH, EOS, HGBEXT, HCTEXT, PLTEXT, HGBEXT, HCTEXT, PLTEXT in the last 72 hours. Cardiac Enzymes No results for input(s): CPK, CKND1, EARLE in the last 72 hours. No lab exists for component: CKRMB, TROIP   Coagulation No results for input(s): PTP, INR, APTT in the last 72 hours. No lab exists for component: INREXT, INREXT    Lipid Panel Lab Results   Component Value Date/Time    Cholesterol, total 122 05/24/2019 11:59 AM    HDL Cholesterol 51 05/24/2019 11:59 AM    LDL, calculated 46.6 05/24/2019 11:59 AM    VLDL, calculated 24.4 05/24/2019 11:59 AM    Triglyceride 122 05/24/2019 11:59 AM    CHOL/HDL Ratio 2.4 05/24/2019 11:59 AM      BNP No results for input(s): BNPP in the last 72 hours. Liver Enzymes No results for input(s): TP, ALB, TBIL, AP, SGOT, GPT in the last 72 hours. No lab exists for component: DBIL   Thyroid Studies Lab Results   Component Value Date/Time    TSH 1.790 04/19/2018 04:10 PM            Significant Diagnostic Studies:   Xr Ankle Rt Min 3 V    Result Date: 8/9/2019  Indication: Injury, edema, pain 3 views right ankle show extensive soft tissue large plantar calcaneal spur. No fracture or dislocation. Vascular calcifications seen in soft tissues. IMPRESSION: Calcaneal spur. Peripheral vascular disease with extensive soft tissue swelling. No acute fracture.      Duplex Lower Ext Venous Right    Result Date: 8/13/2019                                                                Study ID: [id number] [facility]. [facility],                                         [address] [facility] [address]                          Lower Extremity Venous Report Name: Annette Zheng Date: 2019 01:57 PM MRN: [id number]                      Patient Location: NE^ST41^LN81^UMIU : 1950                  Age: 81 yrs Gender: Male                     Account #: [de-identified] Reason For Study: Pain in leg Ordering Physician: Yo Sarkar Performed By: Jazz Horton RVT Interpretation Summary No evidence of acute deep vein thrombosis noted in the right common femoral, femoral and popliteal veins. Unable to visualize the right posterior tibial, peroneal veins and the saphenofemoral junction. Unable to visualize the contralateral/left common femoral vein due to patient's suboptimal positioning with his left leg off the bed. Severely limited study due to lymphedema, skin condition and suboptimal positioning. _____________________________________________________________________________ __ QUALITY/PROCEDURE Limited unilateral venous duplex performed of the right leg. HISTORY/SYMPTOMS Lymphedema, Pain in right leg, Morbid obesity ICD10 - M79.605. RIGHT LEG The common femoral, femoral, popliteal,veins were examined with duplex ultrasound. The common femoral, proximal to mid femoral and popliteal veins were patent and compressible with no evidence of intraluminal thrombus. The distal femoral vein was grossly patent by Color and Spectral Doppler only, therefore unable to rule out a non-occlusive deep venous thrombosis in this segment.  The posterior tibial and peroneal veins were not visualized due to patient's skin condition, lymphedema and suboptimal positioning. Spontaneous, phasic venous flow noted throughout the right leg. RIGHT SAPHENOUS VEINS The right saphenofemoral junction was not visualized. LEFT LEG Unable to assess the contralateral/left common femoral vein due to body habitus and suboptimal positioning. Electronically signed byDr. Ute Agosto M.D   08/13/2019 07:42 AM      Discharge Medications:     Discharge Medication List as of 8/30/2019  1:29 PM      CONTINUE these medications which have NOT CHANGED    Details   methocarbamol (ROBAXIN-750) 750 mg tablet Take 1 Tab by mouth four (4) times daily. As needed for pain or muscle spasm, Print, Disp-20 Tab, R-0      diclofenac-miSOPROStol (ARTHROTEC 50)  mg-mcg per tablet TAKE 1 TABLET BY MOUTH TWICE A DAY, Normal, Disp-180 Tab, R-0      rosuvastatin (CRESTOR) 40 mg tablet TAKE 1 TABLET BY MOUTH NIGHTLY FOR 90 DAYS., Normal, Disp-90 Tab, R-1      furosemide (LASIX) 80 mg tablet TAKE 1 TABLET BY MOUTH EVERY DAY, Normal, Disp-90 Tab, R-3      testosterone (ANDRODERM) 4 mg/24 hr pt24 1 Patch by TransDERmal route daily. Max Daily Amount: 1 Patch., Print, Disp-60 Patch, R-0      colchicine 0.6 mg tablet TAKE 1 TAB BY MOUTH DAILY AS NEEDED., Normal, Disp-90 Tab, R-3      vit b comp & c-fa-copper-zinc (FOLBEE PLUS) 5-1.5-25 mg tab Take 1 Tab by mouth daily. , Normal, Disp-90 Tab, R-3      KLOR-CON M20 20 mEq tablet TAKE 1 TABLET BY MOUTH ONCE DAILY, Normal, Disp-90 Tab, R-3      valsartan (DIOVAN) 320 mg tablet Take 1 Tab by mouth daily. , Normal, Disp-90 Tab, R-3      levothyroxine (SYNTHROID) 50 mcg tablet TAKE 1 TAB BY MOUTH DAILY (BEFORE BREAKFAST) FOR 90 DAYS., Normal, Disp-90 Tab, R-0      tadalafil (CIALIS) 20 mg tablet Take 1 Tab by mouth every thirty-six (36) hours.  Indications: Erectile Dysfunction, Normal, Disp-12 Tab, R-5      dulaglutide (TRULICITY) 1.5 QT/0.1 mL sub-q pen 0.5 ML BY SUBCUTANEOUS ROUTE EVERY SEVEN (7) DAYS., Normal, Disp-7.5 Syringe, R-0      gabapentin (NEURONTIN) 300 mg capsule Take 300 mg by mouth nightly., Historical Med      metoprolol (LOPRESSOR) 50 mg tablet Take 1 Tab by mouth every twelve (12) hours. , Print, Disp-60 Tab, R-0      Fenofibrate 150 mg cap Take 145 mg by mouth daily. , Historical Med         STOP taking these medications       doxycycline (MONODOX) 100 mg capsule Comments:   Reason for Stopping:         ibuprofen (MOTRIN) 600 mg tablet Comments:   Reason for Stopping:               Activity: Activity as tolerated    Diet: Cardiac Diet     Wound Care: On pt right lower leg wash out wound with \"dakins\" (from pharmacy) then wet to dry with curlex. Lightly pack wound with curlex soaked in \"dakins\" then wrap in gauze. perform this every 8 hours. Follow-up: PCP in 1 week.          Total time spent including time spent on final examination and discharge discussion, discharge documentation and records reviewed and medication reconciliation: > 30 minutes    Lawyer Almas MD  [facility]

## 2019-08-30 NOTE — PROGRESS NOTES
Attempted PT, pt declined stated he is confident in his ability to move around and is going home today.   Priscila Chavarria, PTA

## 2019-08-30 NOTE — PROGRESS NOTES
0700  -- Bedside, Verbal and Written shift change report given to 2309 Sheila Lopez (oncoming nurse) by HEYDI (offgoing nurse). Report included the following information SBAR, Kardex, Intake/Output, MAR and Recent Results.       0940 -- AM medications administered, pt tolerated with ease, will continue to monitor.     1230 -- Shift reassessment, pt condition unchanged, will continue to monitor.      1345 -- Pt to be discharged.

## 2019-08-31 ENCOUNTER — HOME CARE VISIT (OUTPATIENT)
Dept: SCHEDULING | Facility: HOME HEALTH | Age: 69
End: 2019-08-31
Payer: MEDICARE

## 2019-08-31 PROCEDURE — G0299 HHS/HOSPICE OF RN EA 15 MIN: HCPCS

## 2019-08-31 PROCEDURE — 3331090001 HH PPS REVENUE CREDIT

## 2019-08-31 PROCEDURE — 400013 HH SOC

## 2019-08-31 PROCEDURE — 3331090002 HH PPS REVENUE DEBIT

## 2019-09-01 ENCOUNTER — HOME CARE VISIT (OUTPATIENT)
Dept: SCHEDULING | Facility: HOME HEALTH | Age: 69
End: 2019-09-01
Payer: MEDICARE

## 2019-09-01 ENCOUNTER — HOME CARE VISIT (OUTPATIENT)
Dept: HOME HEALTH SERVICES | Facility: HOME HEALTH | Age: 69
End: 2019-09-01
Payer: MEDICARE

## 2019-09-01 VITALS
HEART RATE: 72 BPM | OXYGEN SATURATION: 98 % | SYSTOLIC BLOOD PRESSURE: 104 MMHG | TEMPERATURE: 97.9 F | RESPIRATION RATE: 20 BRPM | DIASTOLIC BLOOD PRESSURE: 60 MMHG

## 2019-09-01 PROCEDURE — G0300 HHS/HOSPICE OF LPN EA 15 MIN: HCPCS

## 2019-09-01 PROCEDURE — 3331090002 HH PPS REVENUE DEBIT

## 2019-09-01 PROCEDURE — 3331090001 HH PPS REVENUE CREDIT

## 2019-09-02 ENCOUNTER — HOME CARE VISIT (OUTPATIENT)
Dept: SCHEDULING | Facility: HOME HEALTH | Age: 69
End: 2019-09-02
Payer: MEDICARE

## 2019-09-02 VITALS
TEMPERATURE: 97.9 F | RESPIRATION RATE: 16 BRPM | DIASTOLIC BLOOD PRESSURE: 60 MMHG | HEART RATE: 93 BPM | OXYGEN SATURATION: 97 % | SYSTOLIC BLOOD PRESSURE: 104 MMHG

## 2019-09-02 VITALS
DIASTOLIC BLOOD PRESSURE: 70 MMHG | OXYGEN SATURATION: 98 % | TEMPERATURE: 96.6 F | SYSTOLIC BLOOD PRESSURE: 120 MMHG | HEART RATE: 79 BPM | RESPIRATION RATE: 20 BRPM

## 2019-09-02 PROCEDURE — 3331090001 HH PPS REVENUE CREDIT

## 2019-09-02 PROCEDURE — 3331090002 HH PPS REVENUE DEBIT

## 2019-09-02 PROCEDURE — G0299 HHS/HOSPICE OF RN EA 15 MIN: HCPCS

## 2019-09-03 ENCOUNTER — HOME CARE VISIT (OUTPATIENT)
Dept: SCHEDULING | Facility: HOME HEALTH | Age: 69
End: 2019-09-03
Payer: MEDICARE

## 2019-09-03 VITALS
HEART RATE: 70 BPM | RESPIRATION RATE: 18 BRPM | SYSTOLIC BLOOD PRESSURE: 128 MMHG | TEMPERATURE: 97.9 F | DIASTOLIC BLOOD PRESSURE: 60 MMHG | OXYGEN SATURATION: 96 %

## 2019-09-03 VITALS
OXYGEN SATURATION: 96 % | SYSTOLIC BLOOD PRESSURE: 116 MMHG | TEMPERATURE: 97.6 F | HEART RATE: 73 BPM | DIASTOLIC BLOOD PRESSURE: 70 MMHG

## 2019-09-03 PROCEDURE — G0299 HHS/HOSPICE OF RN EA 15 MIN: HCPCS

## 2019-09-03 PROCEDURE — G0152 HHCP-SERV OF OT,EA 15 MIN: HCPCS

## 2019-09-03 PROCEDURE — A4452 WATERPROOF TAPE: HCPCS

## 2019-09-03 PROCEDURE — 3331090002 HH PPS REVENUE DEBIT

## 2019-09-03 PROCEDURE — A6252 ABSORPT DRG >16 <=48 W/O BDR: HCPCS

## 2019-09-03 PROCEDURE — A6446 CONFORM BAND S W>=3" <5"/YD: HCPCS

## 2019-09-03 PROCEDURE — 3331090001 HH PPS REVENUE CREDIT

## 2019-09-03 PROCEDURE — A9270 NON-COVERED ITEM OR SERVICE: HCPCS

## 2019-09-04 ENCOUNTER — HOME CARE VISIT (OUTPATIENT)
Dept: SCHEDULING | Facility: HOME HEALTH | Age: 69
End: 2019-09-04
Payer: MEDICARE

## 2019-09-04 VITALS
OXYGEN SATURATION: 97 % | TEMPERATURE: 97.5 F | DIASTOLIC BLOOD PRESSURE: 82 MMHG | HEART RATE: 92 BPM | SYSTOLIC BLOOD PRESSURE: 133 MMHG

## 2019-09-04 VITALS
TEMPERATURE: 97.6 F | DIASTOLIC BLOOD PRESSURE: 70 MMHG | HEART RATE: 75 BPM | OXYGEN SATURATION: 96 % | SYSTOLIC BLOOD PRESSURE: 120 MMHG

## 2019-09-04 VITALS
OXYGEN SATURATION: 98 % | TEMPERATURE: 98 F | DIASTOLIC BLOOD PRESSURE: 72 MMHG | SYSTOLIC BLOOD PRESSURE: 114 MMHG | HEART RATE: 90 BPM

## 2019-09-04 PROCEDURE — G0299 HHS/HOSPICE OF RN EA 15 MIN: HCPCS

## 2019-09-04 PROCEDURE — G0151 HHCP-SERV OF PT,EA 15 MIN: HCPCS

## 2019-09-04 PROCEDURE — 3331090002 HH PPS REVENUE DEBIT

## 2019-09-04 PROCEDURE — G0158 HHC OT ASSISTANT EA 15: HCPCS

## 2019-09-04 PROCEDURE — 3331090001 HH PPS REVENUE CREDIT

## 2019-09-04 NOTE — OP NOTES
[facility]  OPERATIVE REPORT    Name:  Nannette Hughes  MR#:   [id number]  :  1950  ACCOUNT #:  [de-identified]  DATE OF SERVICE:  2019    PREOPERATIVE DIAGNOSIS:  Cellulitis of the right leg. POSTOPERATIVE DIAGNOSIS:  Right ankle and calf infected necrotic sinuses adequately debrided. PROCEDURE PERFORMED:  Incision and drainage of right lower leg lateral ankle and posterior calf wounds with appropriate debridement. SURGEON:  Stefani Gordon MD, Plastic Surgery; and Faviola Jasmine with Podiatric Surgery. ASSISTANT:  SA Basilio.    ANESTHESIA:  General.    COMPLICATIONS:  None. SPECIMENS REMOVED:  Necrotic tissue from both wounds. ESTIMATED BLOOD LOSS:  Minimal.    FINDINGS:  Necrotic subcutaneous fat, just taken. PROCEDURE:  The patient was placed on the operating table in the left lateral position on a bean bag after intubation. Pankaj stocking was inflated on the left leg prior to surgery. We initiated as well as Anesthesia. The right leg was prepped and draped. The posterior calf lesion was excised by 2.5 cm diameter, so as to create adequate visibility and drainage. The necrotic fat beneath was debrided, there was no involvement of muscle, and the wound was subsequently lavaged and irrigated and repacked with Dakin's solution and a Kerlix bandage. Right lateral ankle wound was debrided to its edge and deep surface where good granulation tissue was encountered. Cultures were taken from both wounds for both aerobic and anaerobic organisms. There was good control of bleedng, and there was no deep involvement of bone in the lateral ankle. The wound was packed in similar fashion and leg was dressed. The patient was left with a Kerlix bandage loosely and an Ace bandage loosely.   The patient was returned to recovery room in stable condition to be returned to his room and to continue intravenous antibiotics, though the ID Department considered this to be sufficient.       Modesto Gross MD      [id number]  D:  09/03/2019 8:38  T:  09/03/2019 18:01  JOB #:  [id number]  CC:  Jasen Rosas MD

## 2019-09-05 ENCOUNTER — HOSPITAL ENCOUNTER (OUTPATIENT)
Dept: WOUND CARE | Age: 69
Discharge: HOME | End: 2019-09-05
Payer: MEDICARE

## 2019-09-05 ENCOUNTER — HOME CARE VISIT (OUTPATIENT)
Dept: HOME HEALTH SERVICES | Facility: HOME HEALTH | Age: 69
End: 2019-09-05
Payer: MEDICARE

## 2019-09-05 VITALS
OXYGEN SATURATION: 98 % | HEART RATE: 98 BPM | RESPIRATION RATE: 18 BRPM | SYSTOLIC BLOOD PRESSURE: 91 MMHG | TEMPERATURE: 97.3 F | DIASTOLIC BLOOD PRESSURE: 52 MMHG

## 2019-09-05 DIAGNOSIS — E29.1 HYPOGONADISM IN MALE: ICD-10-CM

## 2019-09-05 LAB — GLUCOSE BLD STRIP.AUTO-MCNC: 134 MG/DL (ref 70–110)

## 2019-09-05 PROCEDURE — 3331090002 HH PPS REVENUE DEBIT

## 2019-09-05 PROCEDURE — 3331090001 HH PPS REVENUE CREDIT

## 2019-09-05 PROCEDURE — 99213 OFFICE O/P EST LOW 20 MIN: CPT

## 2019-09-05 PROCEDURE — 82962 GLUCOSE BLOOD TEST: CPT

## 2019-09-05 NOTE — WOUND CARE
09/05/19 1252   Wound Leg lower Right;Posterior   Date First Assessed: 08/15/19   Present on Hospital Admission: Yes  Primary Wound Type: Vascular  Location: Leg lower  Wound Location Orientation: Right;Posterior   Dressing Status Removed; Saturated   Dressing Type Moist to dry;Packing   Non-staged Wound Description Full thickness   Wound Length (cm) 1.7 cm   Wound Width (cm) 3.9 cm   Wound Depth (cm) 5.5 cm   Wound Volume (cm^3) 36.47 cm^3   Condition of Base Granulation   Tissue Type Percent Red 100   Drainage Amount Large   Drainage Color Serosanguinous   Stacie-wound Assessment Dry; Induration   Cleansing and Cleansing Agents  Dermal wound cleanser   Dressing Changed Changed/New   Dressing Type Applied Other (Comment)  (Saline moistened gauze, ABD, Tape)   Wound Leg Lower Right;Lateral   Date First Assessed/Time First Assessed: 02/01/18 0947   POA: Yes  Location: Leg Lower  Orientation: Right;Lateral   Dressing Status  Removed; Saturated   Dressing Type  Moist to dry;Packing   Non-Pressure Injury Full thickness (subcut/muscle)   Wound Length (cm) 1.9 cm   Wound Width (cm) 13 cm   Wound Depth (cm) 5.2   Wound Surface area (cm^2) 24.7 cm^2   Change in Wound Size % -436.96   Condition of Base Fascia exposed;Granulation;Slough   Tissue Type Percent Red 70   Tissue Type Percent Yellow 30   Drainage Amount  Large   Drainage Color Serosanguinous   Wound Odor Mild   Periwound Skin Condition Edematous; Indurated   Cleansing and Cleansing Agents  Dermal wound cleanser   Dressing Type Applied Other (Comment)  (Saline moistend gauze, ABD, Tape)

## 2019-09-05 NOTE — PROGRESS NOTES
Podiatry Surgery Progress Note      Patient: Hanna Madrid MRN: [id number]  SSN: [id number]    YOB: 1950  Age: 81 y.o. Sex: male      Assessment:     Patient Active Problem List   Diagnosis Code    Diabetic foot ulcer ([facility] E11.621, L28.502    Diastolic dysfunction I76.45    Dyslipidemia E78.5    Cellulitis and abscess of foot, except toes L03.119, L02.619    Lymphedema of both lower extremities I89.0    CKD (chronic kidney disease) stage 3, GFR 30-59 ml/min ([facility]) N18.3    Morbid obesity with BMI of 70 and over, adult ([facility] E66.01, Z68.45    Erectile dysfunction N52.9    Hypothyroidism, adult E03.9    Hypogonadism in male E29.1    Borderline anemia D64.9    Idiopathic gout of multiple sites M10.09    Skin ulcer of ankle with fat layer exposed, right ([facility] L97.312    Non-pressure chronic ulcer of right calf with fat layer exposed ([facility] L97.212    Type 2 diabetes mellitus with diabetic neuropathy ([facility]) E11.40    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 Patient may benefit from wound vac application to help decrease depth on the lesions. Patient has an appointment with Dr Anselmo Whalen tomorrow. Will pack the ulcers with normal saline soaked kerlix to allow him to evaluate the lesion and continue all [address] orders per Dr Anselmo Whalen. Total time spent with patient: [address] discussed with: Patient, Family and Nursing Staff    Discussed:  Care Plan    Disposition:  Stable      Mr. Christina Reeves is a 81 y.o. male who was admitted for seen s/p I&D RLE and calf. Patient has no new complaints today. His wife and sister in law are present at this visit .         Subjective: Past Medical History  Past Medical History:   Diagnosis Date    Anemia of chronic disease     Arthritis     Chronic renal insufficiency     Diabetes ([facility]     Dyslipidemia     Edema     Gout     Gout     Hypertension     Lymphedema     Morbid obesity ([facility]      Social History     Socioeconomic History    Marital status:      Spouse name: Not on file    Number of children: Not on file    Years of education: Not on file    Highest education level: Not on file   Occupational History    Not on file   Social Needs    Financial resource strain: Not on file    Food insecurity:     Worry: Not on file     Inability: Not on file    Transportation needs:     Medical: Not on file     Non-medical: Not on file   Tobacco Use    Smoking status: Never Smoker    Smokeless tobacco: Never Used   Substance and Sexual Activity    Alcohol use: No    Drug use: No    Sexual activity: Not on file   Lifestyle    Physical activity:     Days per week: Not on file     Minutes per session: Not on file    Stress: Not on file   Relationships    Social connections:     Talks on phone: Not on file     Gets together: Not on file     Attends Restorationism service: Not on file     Active member of club or organization: Not on file     Attends meetings of clubs or organizations: Not on file     Relationship status: Not on file    Intimate partner violence:     Fear of current or ex partner: Not on file     Emotionally abused: Not on file     Physically abused: Not on file     Forced sexual activity: Not on file   Other Topics Concern    Not on file   Social History Narrative    Not on file       Current Medications  Current Outpatient Medications   Medication Sig Dispense Refill    traMADol (ULTRAM) 50 mg tablet Take 50 mg by mouth every six (6) hours as needed for Pain.  traMADol (ULTRAM) 50 mg tablet Take 50 mg by mouth every six (6) hours as needed for Pain.       cholecalciferol (D3-50 CHOLECALCIFEROL) 50,000 unit capsule Take 50,000 Units by mouth every seven (7) days.  ferrous sulfate 325 mg (65 mg iron) tablet Take 325 mg by mouth daily.  methocarbamol (ROBAXIN-750) 750 mg tablet Take 1 Tab by mouth four (4) times daily. As needed for pain or muscle spasm 20 Tab 0    diclofenac-miSOPROStol (ARTHROTEC 50)  mg-mcg per tablet TAKE 1 TABLET BY MOUTH TWICE A  Tab 0    rosuvastatin (CRESTOR) 40 mg tablet TAKE 1 TABLET BY MOUTH NIGHTLY FOR 90 DAYS. 90 Tab 1    furosemide (LASIX) 80 mg tablet TAKE 1 TABLET BY MOUTH EVERY DAY 90 Tab 3    testosterone (ANDRODERM) 4 mg/24 hr pt24 1 Patch by TransDERmal route daily. Max Daily Amount: 1 Patch. 60 Patch 0    colchicine 0.6 mg tablet TAKE 1 TAB BY MOUTH DAILY AS NEEDED. 90 Tab 3    vit b comp & c-fa-copper-zinc (FOLBEE PLUS) 5-1.5-25 mg tab Take 1 Tab by mouth daily. 90 Tab 3    KLOR-CON M20 20 mEq tablet TAKE 1 TABLET BY MOUTH ONCE DAILY 90 Tab 3    valsartan (DIOVAN) 320 mg tablet Take 1 Tab by mouth daily. 90 Tab 3    levothyroxine (SYNTHROID) 50 mcg tablet TAKE 1 TAB BY MOUTH DAILY (BEFORE BREAKFAST) FOR 90 DAYS. 90 Tab 0    tadalafil (CIALIS) 20 mg tablet Take 1 Tab by mouth every thirty-six (36) hours. Indications: Erectile Dysfunction 12 Tab 5    dulaglutide (TRULICITY) 1.5 CX/6.7 mL sub-q pen 0.5 ML BY SUBCUTANEOUS ROUTE EVERY SEVEN (7) DAYS. 7.5 Syringe 0    gabapentin (NEURONTIN) 300 mg capsule Take 300 mg by mouth nightly.  metoprolol (LOPRESSOR) 50 mg tablet Take 1 Tab by mouth every twelve (12) hours. 60 Tab 0    Fenofibrate 150 mg cap Take 145 mg by mouth daily. Patient Allergies  No Known Allergies       Objective:   General Exam  alert, cooperative, no distress, appears stated age    Vitals  There were no vitals taken for this visit.     REVIEW OF SYSTEMS:  General: denies chronic fatigue, weight loss, fever, anemia, bruising, depression, nervousness, panic attacks  HEENT: denies ringing in ears, ear infections, dizzy spells, poor vision, glaucoma, sinus trouble, hoarseness, eye infections  GI: denies diarrhea, gas, bloating, heartburn, regurgitation, difficulty swallowing, painful swallowing, nausea, vomiting, constipation, abdominal pain, decreased appetite, blood in stools, black stools, jaundice, dark urine  Lungs: denies pneumonia, asthma, cough, SOB, hemoptysis  Heart: denies chest pain, irregular heart beat, ankle swelling, HTN  Skin: denies rashes, hives, allergic reaction  Urinary: denies UTI, kidney stones, decreased urine force and flow, urination at night, blood in urine, painful urination  Bones and Joints: denies arthritis, rheumatism, back pain, gout, osteoporosis  Neurologic: denies stroke, seizures, headaches, numbness, tingling    Foot Exam  Vascular Exam:   Dorsalis Pedis and Posterior Tibial intact but diminished bilaterally. Skin temp warm to warm. Pedal hair is absent. There are not varicosities present. There is severe lymphedema bilat.       Neurological Exam:   Light touch protective sensation is intact to both feet but decreased. There is some noted Loss of protective sensation. There are non reproducible paresthesias to bilateral lower extremities. There are no Tinel's or 's signs present to the nerves crossing the ankle joint.      Musculoskeletal Exam:   Muscle tone is normal for age. There is equinus of the left limb. The muscle strength is 5/5 for the flexors, extensors, inverters, and everter's.     Dermatological Exam:   Skin is of abnormal texture and turgor with some atrophic skin changes noting absent hair growth, nail changes (thickening), pigmentary changes, skin texture (thin,shiney), skin color (rubor, red) . R/L Bilateral. There is diffuse xerosis. There is noted subungual debris.    Ulcer  Ulcer Location: R lower leg lateral, R lower leg posterior, Ulcer measurements: see below, Ulcer base: Granular/Healthy and Ulcer exudate: Large amount Serosanguinous exudate      Wound Leg Lower Right;Lateral (Active)   Dressing Status  Removed; Saturated 9/5/2019 12:52 PM   Dressing Type  Moist to dry;Packing 9/5/2019 12:52 PM   Non-Pressure Injury Full thickness (subcut/muscle) 9/5/2019 12:52 PM   Wound Length (cm) 1.9 cm 9/5/2019 12:52 PM   Wound Width (cm) 13 cm 9/5/2019 12:52 PM   Wound Depth (cm) 5.2 9/5/2019 12:52 PM   Wound Surface area (cm^2) 24.7 cm^2 9/5/2019 12:52 PM   Change in Wound Size % -436.96 9/5/2019 12:52 PM   Condition of Base Fascia exposed;Granulation;Slough 9/5/2019 12:52 PM   Condition of Edges Open 1/3/2019 10:56 AM   Tissue Type Red 10/25/2018 12:08 PM   Tissue Type Percent Pink 100 1/3/2019 10:56 AM   Tissue Type Percent Red 70 9/5/2019 12:52 PM   Tissue Type Percent Yellow 30 9/5/2019 12:52 PM   Tissue Type Percent Other (comment) 100 10/25/2018 12:08 PM   Drainage Amount  Large 9/5/2019 12:52 PM   Drainage Color Serosanguinous 9/5/2019 12:52 PM   Wound Odor Mild 9/5/2019 12:52 PM   Periwound Skin Condition Edematous; Indurated 9/5/2019 12:52 PM   Cleansing and Cleansing Agents  Dermal wound cleanser 9/5/2019 12:52 PM   Dressing Type Applied Other (Comment) 9/5/2019 12:52 PM   Number of Skin Staples Removed 5 10/18/2018  9:31 AM   Procedure Tolerated Well 10/11/2018  9:42 AM   Number of days: 581       Wound Leg lower Right;Posterior (Active)   Dressing Status Removed; Saturated 9/5/2019 12:52 PM   Dressing Type Moist to dry;Packing 9/5/2019 12:52 PM   Non-staged Wound Description Full thickness 9/5/2019 12:52 PM   Wound Length (cm) 1.7 cm 9/5/2019 12:52 PM   Wound Width (cm) 3.9 cm 9/5/2019 12:52 PM   Wound Depth (cm) 5.5 cm 9/5/2019 12:52 PM   Wound Volume (cm^3) 36.47 cm^3 9/5/2019 12:52 PM   Condition of Base Granulation 9/5/2019 12:52 PM   Tissue Type Percent Red 100 9/5/2019 12:52 PM   Tissue Type Percent Yellow 100 8/15/2019  3:42 PM   Drainage Amount Large 9/5/2019 12:52 PM   Drainage Color Serosanguinous 9/5/2019 12:52 PM   Stacie-wound Assessment Dry; Induration 9/5/2019 12:52 PM   Cleansing and Cleansing Agents  Dermal wound cleanser 9/5/2019 12:52 PM   Dressing Changed Changed/New 9/5/2019 12:52 PM   Dressing Type Applied Other (Comment) 9/5/2019 12:52 PM   Number of days: 21       Wound Leg Lateral;Right (Active)   Number of days: 13       [REMOVED] Vacuum Assisted Closure Left Foot (Removed)   Number of days: 1011       [REMOVED] Wound Foot Left;Dorsal (Removed)   Dressing Status  Removed 4/9/2015  3:26 PM   Dressing Type  Other (Comment) 4/9/2015  3:26 PM   Incision Site Well Approximated No 4/9/2015  3:26 PM   Condition of Base Pink;Granulation 4/9/2015  3:26 PM   Condition of Edges Open 4/9/2015  3:26 PM   Tissue Type Red;Pink 4/9/2015  3:26 PM   Tissue Type Percent Pink 50 4/9/2015  3:26 PM   Tissue Type Percent Red 50 4/9/2015  3:26 PM   Drainage Amount  Small  4/9/2015  3:26 PM   Drainage Color Serosanguinous 4/9/2015  3:26 PM   Wound Odor None 4/9/2015  3:26 PM   Periwound Skin Condition Edematous; Other (comment) 4/9/2015  3:26 PM   Cleansing and Cleansing Agents  Dermal wound cleanser 4/9/2015  3:26 PM   Dressing Type Applied Other (Comment) 4/9/2015  3:26 PM   Procedure Tolerated Well 4/9/2015  3:26 PM   Number of days: 4509       [REMOVED] Wound Leg Lower Right; Anterior (Removed)   Dressing Status  Removed 7/19/2018  9:36 AM   Dressing Type  Other (Comment) 7/19/2018  9:36 AM   Non-Pressure Injury Partial thickness (epider/derm) 7/12/2018 11:40 AM   Wound Length (cm) 0 cm 8/2/2018  9:45 AM   Wound Width (cm) 0 cm 8/2/2018  9:45 AM   Wound Depth (cm) 0 8/2/2018  9:45 AM   Wound Surface area (cm^2) 0 cm^2 8/2/2018  9:45 AM   Condition of Base [place] 7/5/2018 10:50 AM   Condition of Edges Closed 8/2/2018  9:45 AM   Tissue Type Other (comment) 7/12/2018 11:40 AM   Tissue Type Percent Pink 100 7/5/2018 10:50 AM   Tissue Type Percent Red 100 5/23/2018 11:26 AM   Tissue Type Percent Yellow 20 6/7/2018 10:09 AM   Drainage Amount  None 7/19/2018  9:36 AM   Drainage Color Serous 7/12/2018 11:40 AM   Wound Odor None 7/19/2018  9:36 AM   Periwound Skin Condition Intact 8/2/2018  9:45 AM   Cleansing and Cleansing Agents  Dermal wound cleanser 7/19/2018  9:36 AM   Dressing Type Applied Other (Comment) 7/19/2018  9:36 AM   Number of Skin Staples Removed 6 6/28/2018  9:42 AM   Procedure Tolerated Well 7/19/2018  9:36 AM   Number of days: 189        Labs  Recent Results (from the past 24 hour(s))   GLUCOSE, POC    Collection Time: 09/05/19  1:18 PM   Result Value Ref Range    Glucose (POC) 134 (H) 70 - 110 mg/dL     Component Value Flag Ref Range Units Status   Special Requests:     Final   SOFT TISSUE FROM RIGHT CALF    GRAM STAIN MODERATE WBC'S      Final   GRAM STAIN NO ORGANISMS SEEN      Final   Culture result: Abnormal       Final   FEW CITROBACTER KOSERI    Culture result: Abnormal       Final   RARE STAPHYLOCOCCUS EPIDERMIDIS    Culture result: RARE DIPHTHEROIDS  Abnormal      Final   Intra op cx from 08/23/19       Surgical Pathology 08/23/19  SKIN AND SOFT TISSUE, RIGHT ANKLE AND CALF, LATERAL ASPECT, RESECTION:   SEVERE ACUTE AND CHRONIC INFLAMMATION WITH EXTENSIVE SOFT TISSUE NECROSIS. X-Ray:  reviewed    Procedures:   Incision and drainage of right lower leg lateral ankle and posterior calf wounds with appropriate debridement. POD #14               Leticia De La Garza DPM  September 5, 2019

## 2019-09-05 NOTE — WOUND CARE
Wound/Ostomy Nurse Progress Note          Patient: Charito Orellana                                                                                      YDC:[birth year]    MRN: [id number]        /    Situation: Follow up right leg ulcers. Background: Right lower leg necrotic, ulcers s/p hospitalization for IV abx and debridement in the OR by Dr. Mart Ronquillo. Assessment:The patient stood up and leaned over the bed to allow visualization and treatment of his wounds, as his legs are extremely heavy, it makes a wound assessment very difficult when he is supine. Packing was removed from both wounds, and an Unna boot was removed from his left leg. [facility] is seeing the patient 3x  /week, and teaching his family to do the dressings. The wounds are being packed with saline gauze and Dakin's 1/4 strength, as ordered by Dr. Mart Ronquillo. There was a large amount of breakthrough drainage on the outer dressing. His leg ulcers are still quite deep, but relatively clean. There is a small amount of yellow/black slough in the lateral ulcer. The posterior ulcer is red on the base. Today, his wounds were packed with saline moistened gauze (no Dakin's available). While standing for his wound dressing, the patient became faint and requested to lay down on the bed. He was assisted into the bed. His vitals were checked: BP 91/52, O2 100%, HR 98, Glucose: 134. The patient was pale and diaphoretic, stated \"I feel weak\". He was given cold water to drink, which made him feel a little better. He was evaluated by Dr. Maia Taylor. Patient was given Cola to drink, as he stated he had not eaten today. After drinking soda, patient stated \"I feel much better\", and stated \"I'm good, I'm ready to go home\". He was transported down to his car via wheelchair. His sister and wife were with him, and his sister was driving. Recommendation: Follow up with Dr. Mart Ronquillo tomorrow 9/6/19. He will give wound care orders for now, as the patient is under his care following surgery. A wound vac is recommended by Dr. Demetra Burgos. He may follow up in the wound clinic, once he is discharged from Dr. Suzan Graham' care. Clinic Level of Care Assessment    NAME:  Saniya Brandon  YOB: 1950 GENDER: male  MEDICAL RECORD NUMBER:  [id number]   DATE:  9/5/2019      Wound Count Document in [address] [place] [address]  Number of Wounds Assessed Points   No Wounds/Ulcers []   0   Less than Three Wounds/Ulcers [x]   1   3-6 Wounds/Ulcers []   2   Greater than 6 Wounds/Ulcers []   3     Ambulation Status Document in Coord/[facility]AL[facility]/Mobility tab  Status Definition Points   Independent Independently able to ambulate. Fully able (without any assistance) to get on/off exam table/chair. []   0   Minimal Physical Assistance Requires physical assistance of one person to ambulate and/or position patient to be examined. Includes necessary physical assistance to position lower extremities on/off stool. [x]   1   Moderate Physical Assistance Requires at least one staff member to physically assist patient in ambulating into treatment room, and on/off exam table. []   2   Full Assistance Requires assistance of at least two staff members to transfer patient into treatment room and/or on/off exam table/chair. \"Total Transfer\". []   3     Dressing Complexity Document in LDA and Write Appropriate Order  Complexity Definition Points   No Dressing  []   0   Simple Minimal, simple dressing. i.e. Band-aid, gauze, simple wrap. []   1   Intermediate Moderately complicated requiring licensed personnel to apply i.e. collagen matrix, ointments, gels, alginates. [x]   2   Complex Complicated requiring licensed personnel to apply dressings 6 or more wounds. []   3     Teaching Effort Document in Education Tab   Effort Definition Points   No Teaching  []   0   Simple Reinforce two or less topics. Document in Education navigator.   [x]   1   Intermediate Reinforce three to five topics and/or one additional   new topic. Document in Education navigator. []   2   Complex Teach more than one new topic. New patient information   packet reviewed and/or reinforce more than three topics. Document in Education navigator. HBO initial instruction. []   3       Patient Assessment and Planning  Planning Definition Points   Simple Multiple System Simple: Simple follow-up with routine assessment and planning. If Discharged, instructions and long term/follow-up care given to patient/caregiver. Discharged, instructions and/or After Visit Summary given to patient/caregiver and instructions completed. []   1   Intermediate Multiple System Intermediate: Contact with outside resources; i.e. Telephone calls to home health, [facility]. May include filling out forms and writing letters, arranging transportation, communication with insurance , vendors, etc.  Discharged, instructions and/or After Visit Summary given to patient/caregiver and instructions completed. [x]   2   Complex Multiple System Complex: Full, comprehensive assessment and planning. Follow the entire navigator under Wound Visit charting filling out each tab which includes OP Adm Database Screening, Education and CarePlan  HBO risk assessment completed. Discharged, instructions and/or After Visit Summary given to patient/caregiver and instructions completed.    []   3           Is this the Patient's First Visit to the [address]  NO      Is this Patient Established @ [facility]  YES             Clinical Level of Care      Points  0-2  Level 1 []     Points  3-5  Level 2 []     Points  6-9  Level 3 [x]     Points  10-12  Level 4 []     Points  13-15  Level 5 []       Electronically signed by Mignon Carranza RN on 9/5/2019 at 2:46 PM

## 2019-09-06 PROCEDURE — 3331090002 HH PPS REVENUE DEBIT

## 2019-09-06 PROCEDURE — 3331090001 HH PPS REVENUE CREDIT

## 2019-09-06 RX ORDER — TESTOSTERONE 4 MG/D
PATCH TRANSDERMAL
Qty: 30 PATCH | Refills: 1 | Status: SHIPPED | OUTPATIENT
Start: 2019-09-06 | End: 2019-09-22 | Stop reason: SDUPTHER

## 2019-09-07 ENCOUNTER — HOME CARE VISIT (OUTPATIENT)
Dept: SCHEDULING | Facility: HOME HEALTH | Age: 69
End: 2019-09-07
Payer: MEDICARE

## 2019-09-07 PROCEDURE — 3331090001 HH PPS REVENUE CREDIT

## 2019-09-07 PROCEDURE — 3331090002 HH PPS REVENUE DEBIT

## 2019-09-07 PROCEDURE — G0157 HHC PT ASSISTANT EA 15: HCPCS

## 2019-09-08 PROCEDURE — 3331090002 HH PPS REVENUE DEBIT

## 2019-09-08 PROCEDURE — 3331090001 HH PPS REVENUE CREDIT

## 2019-09-09 ENCOUNTER — HOME CARE VISIT (OUTPATIENT)
Dept: SCHEDULING | Facility: HOME HEALTH | Age: 69
End: 2019-09-09
Payer: MEDICARE

## 2019-09-09 VITALS
HEART RATE: 78 BPM | SYSTOLIC BLOOD PRESSURE: 129 MMHG | DIASTOLIC BLOOD PRESSURE: 76 MMHG | OXYGEN SATURATION: 97 % | TEMPERATURE: 98.3 F

## 2019-09-09 PROCEDURE — G0299 HHS/HOSPICE OF RN EA 15 MIN: HCPCS

## 2019-09-09 PROCEDURE — G0158 HHC OT ASSISTANT EA 15: HCPCS

## 2019-09-09 PROCEDURE — 3331090001 HH PPS REVENUE CREDIT

## 2019-09-09 PROCEDURE — 3331090002 HH PPS REVENUE DEBIT

## 2019-09-09 PROCEDURE — G0157 HHC PT ASSISTANT EA 15: HCPCS

## 2019-09-10 VITALS
TEMPERATURE: 97 F | DIASTOLIC BLOOD PRESSURE: 70 MMHG | SYSTOLIC BLOOD PRESSURE: 120 MMHG | HEART RATE: 79 BPM | RESPIRATION RATE: 20 BRPM | OXYGEN SATURATION: 96 %

## 2019-09-10 VITALS
OXYGEN SATURATION: 95 % | DIASTOLIC BLOOD PRESSURE: 60 MMHG | OXYGEN SATURATION: 96 % | SYSTOLIC BLOOD PRESSURE: 120 MMHG | HEART RATE: 75 BPM | TEMPERATURE: 97.4 F | DIASTOLIC BLOOD PRESSURE: 70 MMHG | SYSTOLIC BLOOD PRESSURE: 108 MMHG | HEART RATE: 89 BPM | TEMPERATURE: 98 F

## 2019-09-10 PROCEDURE — 3331090002 HH PPS REVENUE DEBIT

## 2019-09-10 PROCEDURE — A6446 CONFORM BAND S W>=3" <5"/YD: HCPCS

## 2019-09-10 PROCEDURE — A4452 WATERPROOF TAPE: HCPCS

## 2019-09-10 PROCEDURE — A6216 NON-STERILE GAUZE<=16 SQ IN: HCPCS

## 2019-09-10 PROCEDURE — 3331090001 HH PPS REVENUE CREDIT

## 2019-09-10 PROCEDURE — A6260 WOUND CLEANSER ANY TYPE/SIZE: HCPCS

## 2019-09-10 PROCEDURE — A6252 ABSORPT DRG >16 <=48 W/O BDR: HCPCS

## 2019-09-11 ENCOUNTER — HOME CARE VISIT (OUTPATIENT)
Dept: SCHEDULING | Facility: HOME HEALTH | Age: 69
End: 2019-09-11
Payer: MEDICARE

## 2019-09-11 VITALS
SYSTOLIC BLOOD PRESSURE: 126 MMHG | DIASTOLIC BLOOD PRESSURE: 68 MMHG | OXYGEN SATURATION: 96 % | TEMPERATURE: 97.8 F | HEART RATE: 82 BPM

## 2019-09-11 VITALS
SYSTOLIC BLOOD PRESSURE: 102 MMHG | HEART RATE: 88 BPM | TEMPERATURE: 97.4 F | DIASTOLIC BLOOD PRESSURE: 56 MMHG | RESPIRATION RATE: 20 BRPM | OXYGEN SATURATION: 98 %

## 2019-09-11 PROCEDURE — 3331090001 HH PPS REVENUE CREDIT

## 2019-09-11 PROCEDURE — 3331090002 HH PPS REVENUE DEBIT

## 2019-09-11 PROCEDURE — G0300 HHS/HOSPICE OF LPN EA 15 MIN: HCPCS

## 2019-09-11 PROCEDURE — G0157 HHC PT ASSISTANT EA 15: HCPCS

## 2019-09-11 PROCEDURE — A6446 CONFORM BAND S W>=3" <5"/YD: HCPCS

## 2019-09-11 PROCEDURE — A6252 ABSORPT DRG >16 <=48 W/O BDR: HCPCS

## 2019-09-11 PROCEDURE — G0158 HHC OT ASSISTANT EA 15: HCPCS

## 2019-09-12 ENCOUNTER — HOME CARE VISIT (OUTPATIENT)
Dept: HOME HEALTH SERVICES | Facility: HOME HEALTH | Age: 69
End: 2019-09-12
Payer: MEDICARE

## 2019-09-12 ENCOUNTER — HOME CARE VISIT (OUTPATIENT)
Dept: SCHEDULING | Facility: HOME HEALTH | Age: 69
End: 2019-09-12
Payer: MEDICARE

## 2019-09-12 PROCEDURE — 3331090001 HH PPS REVENUE CREDIT

## 2019-09-12 PROCEDURE — G0157 HHC PT ASSISTANT EA 15: HCPCS

## 2019-09-12 PROCEDURE — 3331090002 HH PPS REVENUE DEBIT

## 2019-09-13 PROCEDURE — 3331090002 HH PPS REVENUE DEBIT

## 2019-09-13 PROCEDURE — 3331090001 HH PPS REVENUE CREDIT

## 2019-09-14 PROCEDURE — 3331090002 HH PPS REVENUE DEBIT

## 2019-09-14 PROCEDURE — 3331090001 HH PPS REVENUE CREDIT

## 2019-09-15 PROCEDURE — 3331090002 HH PPS REVENUE DEBIT

## 2019-09-15 PROCEDURE — 3331090001 HH PPS REVENUE CREDIT

## 2019-09-16 ENCOUNTER — HOME CARE VISIT (OUTPATIENT)
Dept: SCHEDULING | Facility: HOME HEALTH | Age: 69
End: 2019-09-16
Payer: MEDICARE

## 2019-09-16 ENCOUNTER — TELEPHONE (OUTPATIENT)
Dept: FAMILY MEDICINE CLINIC | Age: 69
End: 2019-09-16

## 2019-09-16 VITALS
HEART RATE: 64 BPM | TEMPERATURE: 97.2 F | SYSTOLIC BLOOD PRESSURE: 118 MMHG | SYSTOLIC BLOOD PRESSURE: 140 MMHG | TEMPERATURE: 97.8 F | OXYGEN SATURATION: 95 % | OXYGEN SATURATION: 98 % | DIASTOLIC BLOOD PRESSURE: 60 MMHG | HEART RATE: 50 BPM | DIASTOLIC BLOOD PRESSURE: 80 MMHG

## 2019-09-16 VITALS
OXYGEN SATURATION: 95 % | DIASTOLIC BLOOD PRESSURE: 58 MMHG | SYSTOLIC BLOOD PRESSURE: 118 MMHG | HEART RATE: 58 BPM | TEMPERATURE: 97.6 F

## 2019-09-16 PROCEDURE — 3331090002 HH PPS REVENUE DEBIT

## 2019-09-16 PROCEDURE — A6252 ABSORPT DRG >16 <=48 W/O BDR: HCPCS

## 2019-09-16 PROCEDURE — G0299 HHS/HOSPICE OF RN EA 15 MIN: HCPCS

## 2019-09-16 PROCEDURE — 3331090001 HH PPS REVENUE CREDIT

## 2019-09-16 PROCEDURE — A6260 WOUND CLEANSER ANY TYPE/SIZE: HCPCS

## 2019-09-16 PROCEDURE — G0152 HHCP-SERV OF OT,EA 15 MIN: HCPCS

## 2019-09-16 NOTE — TELEPHONE ENCOUNTER
\"Rosaline PERKINS\" from [address] [place] [address] called to let provider know pt's BP was 118/58, before BP meds. Dr Rachelle Ritchie instructed that he was not to  take BP meds today and she wants another BP Check tomorrow and phone call. Lesia Cobos stated she would put order in for tomorrow.

## 2019-09-17 ENCOUNTER — TELEPHONE (OUTPATIENT)
Dept: FAMILY MEDICINE CLINIC | Age: 69
End: 2019-09-17

## 2019-09-17 ENCOUNTER — HOME CARE VISIT (OUTPATIENT)
Dept: SCHEDULING | Facility: HOME HEALTH | Age: 69
End: 2019-09-17
Payer: MEDICARE

## 2019-09-17 VITALS
DIASTOLIC BLOOD PRESSURE: 58 MMHG | OXYGEN SATURATION: 95 % | TEMPERATURE: 97.5 F | HEART RATE: 60 BPM | SYSTOLIC BLOOD PRESSURE: 118 MMHG | RESPIRATION RATE: 16 BRPM

## 2019-09-17 PROCEDURE — 3331090001 HH PPS REVENUE CREDIT

## 2019-09-17 PROCEDURE — 3331090002 HH PPS REVENUE DEBIT

## 2019-09-17 PROCEDURE — G0157 HHC PT ASSISTANT EA 15: HCPCS

## 2019-09-17 NOTE — TELEPHONE ENCOUNTER
Yin Tao w/ [facility] stated Dr Cassidy Giron asked her to call back today w/ Pt's BP. He forgot to hold his medication this morning & his BP was 140/80 in his rt arm while sitting. O2 was 97%, pulse was 62, temp was 97. However his legs were swollen than yesterday & he did not elevate them yesterday.

## 2019-09-18 ENCOUNTER — HOME CARE VISIT (OUTPATIENT)
Dept: SCHEDULING | Facility: HOME HEALTH | Age: 69
End: 2019-09-18
Payer: MEDICARE

## 2019-09-18 VITALS
HEART RATE: 62 BPM | TEMPERATURE: 97.9 F | DIASTOLIC BLOOD PRESSURE: 80 MMHG | OXYGEN SATURATION: 97 % | SYSTOLIC BLOOD PRESSURE: 140 MMHG

## 2019-09-18 PROCEDURE — A6446 CONFORM BAND S W>=3" <5"/YD: HCPCS

## 2019-09-18 PROCEDURE — G0299 HHS/HOSPICE OF RN EA 15 MIN: HCPCS

## 2019-09-18 PROCEDURE — A9270 NON-COVERED ITEM OR SERVICE: HCPCS

## 2019-09-18 PROCEDURE — 3331090001 HH PPS REVENUE CREDIT

## 2019-09-18 PROCEDURE — 3331090002 HH PPS REVENUE DEBIT

## 2019-09-18 PROCEDURE — A6260 WOUND CLEANSER ANY TYPE/SIZE: HCPCS

## 2019-09-19 ENCOUNTER — HOME CARE VISIT (OUTPATIENT)
Dept: SCHEDULING | Facility: HOME HEALTH | Age: 69
End: 2019-09-19
Payer: MEDICARE

## 2019-09-19 VITALS
RESPIRATION RATE: 16 BRPM | TEMPERATURE: 97.7 F | DIASTOLIC BLOOD PRESSURE: 72 MMHG | HEART RATE: 92 BPM | SYSTOLIC BLOOD PRESSURE: 140 MMHG | OXYGEN SATURATION: 98 %

## 2019-09-19 DIAGNOSIS — I10 ESSENTIAL HYPERTENSION WITH GOAL BLOOD PRESSURE LESS THAN 130/80: ICD-10-CM

## 2019-09-19 PROCEDURE — G0157 HHC PT ASSISTANT EA 15: HCPCS

## 2019-09-19 PROCEDURE — 3331090002 HH PPS REVENUE DEBIT

## 2019-09-19 PROCEDURE — 3331090001 HH PPS REVENUE CREDIT

## 2019-09-19 RX ORDER — POTASSIUM CHLORIDE 1500 MG/1
TABLET, EXTENDED RELEASE ORAL
Qty: 90 TAB | Refills: 3 | Status: SHIPPED | OUTPATIENT
Start: 2019-09-19

## 2019-09-20 ENCOUNTER — HOME CARE VISIT (OUTPATIENT)
Dept: SCHEDULING | Facility: HOME HEALTH | Age: 69
End: 2019-09-20
Payer: MEDICARE

## 2019-09-20 VITALS
HEART RATE: 72 BPM | RESPIRATION RATE: 20 BRPM | SYSTOLIC BLOOD PRESSURE: 130 MMHG | TEMPERATURE: 96.4 F | DIASTOLIC BLOOD PRESSURE: 60 MMHG | OXYGEN SATURATION: 96 %

## 2019-09-20 PROCEDURE — 3331090002 HH PPS REVENUE DEBIT

## 2019-09-20 PROCEDURE — 3331090001 HH PPS REVENUE CREDIT

## 2019-09-20 PROCEDURE — G0299 HHS/HOSPICE OF RN EA 15 MIN: HCPCS

## 2019-09-21 PROCEDURE — 3331090001 HH PPS REVENUE CREDIT

## 2019-09-21 PROCEDURE — 3331090002 HH PPS REVENUE DEBIT

## 2019-09-22 VITALS
OXYGEN SATURATION: 97 % | DIASTOLIC BLOOD PRESSURE: 60 MMHG | SYSTOLIC BLOOD PRESSURE: 160 MMHG | HEART RATE: 112 BPM | TEMPERATURE: 97.3 F

## 2019-09-22 DIAGNOSIS — E29.1 HYPOGONADISM IN MALE: ICD-10-CM

## 2019-09-22 PROCEDURE — 3331090002 HH PPS REVENUE DEBIT

## 2019-09-22 PROCEDURE — 3331090001 HH PPS REVENUE CREDIT

## 2019-09-22 RX ORDER — TESTOSTERONE 4 MG/D
PATCH TRANSDERMAL
Qty: 30 PATCH | Refills: 1 | Status: SHIPPED | OUTPATIENT
Start: 2019-09-22

## 2019-09-23 ENCOUNTER — HOME CARE VISIT (OUTPATIENT)
Dept: SCHEDULING | Facility: HOME HEALTH | Age: 69
End: 2019-09-23
Payer: MEDICARE

## 2019-09-23 VITALS
DIASTOLIC BLOOD PRESSURE: 60 MMHG | SYSTOLIC BLOOD PRESSURE: 100 MMHG | HEART RATE: 94 BPM | OXYGEN SATURATION: 97 % | TEMPERATURE: 96.6 F | RESPIRATION RATE: 20 BRPM

## 2019-09-23 PROCEDURE — G0151 HHCP-SERV OF PT,EA 15 MIN: HCPCS

## 2019-09-23 PROCEDURE — G0299 HHS/HOSPICE OF RN EA 15 MIN: HCPCS

## 2019-09-23 PROCEDURE — 3331090001 HH PPS REVENUE CREDIT

## 2019-09-23 PROCEDURE — 3331090002 HH PPS REVENUE DEBIT

## 2019-09-24 VITALS
DIASTOLIC BLOOD PRESSURE: 76 MMHG | HEART RATE: 72 BPM | TEMPERATURE: 98 F | OXYGEN SATURATION: 97 % | SYSTOLIC BLOOD PRESSURE: 150 MMHG

## 2019-09-24 PROCEDURE — 3331090001 HH PPS REVENUE CREDIT

## 2019-09-24 PROCEDURE — 3331090002 HH PPS REVENUE DEBIT

## 2019-09-25 ENCOUNTER — HOME CARE VISIT (OUTPATIENT)
Dept: SCHEDULING | Facility: HOME HEALTH | Age: 69
End: 2019-09-25
Payer: MEDICARE

## 2019-09-25 VITALS
RESPIRATION RATE: 20 BRPM | SYSTOLIC BLOOD PRESSURE: 115 MMHG | DIASTOLIC BLOOD PRESSURE: 56 MMHG | HEART RATE: 92 BPM | TEMPERATURE: 97.6 F

## 2019-09-25 PROCEDURE — G0300 HHS/HOSPICE OF LPN EA 15 MIN: HCPCS

## 2019-09-25 PROCEDURE — A4450 NON-WATERPROOF TAPE: HCPCS

## 2019-09-25 PROCEDURE — A6252 ABSORPT DRG >16 <=48 W/O BDR: HCPCS

## 2019-09-25 PROCEDURE — 3331090002 HH PPS REVENUE DEBIT

## 2019-09-25 PROCEDURE — 3331090001 HH PPS REVENUE CREDIT

## 2019-09-26 ENCOUNTER — TELEPHONE (OUTPATIENT)
Dept: FAMILY MEDICINE CLINIC | Age: 69
End: 2019-09-26

## 2019-09-26 PROCEDURE — 3331090001 HH PPS REVENUE CREDIT

## 2019-09-26 PROCEDURE — 3331090002 HH PPS REVENUE DEBIT

## 2019-09-27 ENCOUNTER — HOME CARE VISIT (OUTPATIENT)
Dept: SCHEDULING | Facility: HOME HEALTH | Age: 69
End: 2019-09-27
Payer: MEDICARE

## 2019-09-27 VITALS
SYSTOLIC BLOOD PRESSURE: 140 MMHG | OXYGEN SATURATION: 98 % | TEMPERATURE: 96.4 F | RESPIRATION RATE: 20 BRPM | DIASTOLIC BLOOD PRESSURE: 70 MMHG | HEART RATE: 95 BPM

## 2019-09-27 PROCEDURE — 3331090002 HH PPS REVENUE DEBIT

## 2019-09-27 PROCEDURE — G0299 HHS/HOSPICE OF RN EA 15 MIN: HCPCS

## 2019-09-27 PROCEDURE — 3331090001 HH PPS REVENUE CREDIT

## 2019-09-28 PROCEDURE — 3331090001 HH PPS REVENUE CREDIT

## 2019-09-28 PROCEDURE — 3331090002 HH PPS REVENUE DEBIT

## 2019-09-29 PROCEDURE — 3331090002 HH PPS REVENUE DEBIT

## 2019-09-29 PROCEDURE — 3331090001 HH PPS REVENUE CREDIT

## 2019-09-30 ENCOUNTER — HOME CARE VISIT (OUTPATIENT)
Dept: SCHEDULING | Facility: HOME HEALTH | Age: 69
End: 2019-09-30
Payer: MEDICARE

## 2019-09-30 VITALS
HEART RATE: 98 BPM | DIASTOLIC BLOOD PRESSURE: 76 MMHG | SYSTOLIC BLOOD PRESSURE: 118 MMHG | RESPIRATION RATE: 16 BRPM | OXYGEN SATURATION: 98 % | TEMPERATURE: 97.9 F

## 2019-09-30 PROCEDURE — A4452 WATERPROOF TAPE: HCPCS

## 2019-09-30 PROCEDURE — 3331090001 HH PPS REVENUE CREDIT

## 2019-09-30 PROCEDURE — 3331090002 HH PPS REVENUE DEBIT

## 2019-09-30 PROCEDURE — G0299 HHS/HOSPICE OF RN EA 15 MIN: HCPCS

## 2019-09-30 PROCEDURE — A6446 CONFORM BAND S W>=3" <5"/YD: HCPCS

## 2019-09-30 RX ORDER — LEVOTHYROXINE SODIUM 50 UG/1
TABLET ORAL
Qty: 90 TAB | Refills: 0 | Status: SHIPPED | OUTPATIENT
Start: 2019-09-30

## 2019-10-01 PROCEDURE — 3331090001 HH PPS REVENUE CREDIT

## 2019-10-01 PROCEDURE — 3331090002 HH PPS REVENUE DEBIT

## 2019-10-02 PROCEDURE — 3331090002 HH PPS REVENUE DEBIT

## 2019-10-02 PROCEDURE — 3331090001 HH PPS REVENUE CREDIT

## 2019-10-03 ENCOUNTER — HOSPITAL ENCOUNTER (OUTPATIENT)
Dept: WOUND CARE | Age: 69
Discharge: HOME | End: 2019-10-03
Payer: MEDICARE

## 2019-10-03 VITALS
HEART RATE: 56 BPM | TEMPERATURE: 97.1 F | SYSTOLIC BLOOD PRESSURE: 113 MMHG | OXYGEN SATURATION: 96 % | RESPIRATION RATE: 18 BRPM | DIASTOLIC BLOOD PRESSURE: 70 MMHG

## 2019-10-03 PROCEDURE — 3331090001 HH PPS REVENUE CREDIT

## 2019-10-03 PROCEDURE — 77030011256 HC DRSG MEPILEX <16IN NO BORD MOLN -A: Performed by: PODIATRIST

## 2019-10-03 PROCEDURE — 99213 OFFICE O/P EST LOW 20 MIN: CPT

## 2019-10-03 PROCEDURE — 3331090002 HH PPS REVENUE DEBIT

## 2019-10-03 RX ORDER — VALSARTAN 320 MG/1
TABLET ORAL
Qty: 90 TAB | Refills: 3 | Status: SHIPPED | OUTPATIENT
Start: 2019-10-03

## 2019-10-03 RX ORDER — AMOXICILLIN AND CLAVULANATE POTASSIUM 500; 125 MG/1; MG/1
1 TABLET, FILM COATED ORAL 2 TIMES DAILY
Qty: 20 TAB | Refills: 0 | Status: SHIPPED | OUTPATIENT
Start: 2019-10-03

## 2019-10-03 NOTE — PROGRESS NOTES
Podiatry Surgery Progress Note      Patient: Rodri Larson MRN: [id number]  SSN: [id number]    YOB: 1950  Age: 81 y.o. Sex: male      Assessment:     Patient Active Problem List   Diagnosis Code    Diabetic foot ulcer ([facility] E11.621, U73.690    Diastolic dysfunction N36.82    Dyslipidemia E78.5    Cellulitis and abscess of foot, except toes L03.119, L02.619    Lymphedema of both lower extremities I89.0    CKD (chronic kidney disease) stage 3, GFR 30-59 ml/min ([facility]) N18.3    Morbid obesity with BMI of 70 and over, adult ([facility] E66.01, Z68.45    Erectile dysfunction N52.9    Hypothyroidism, adult E03.9    Hypogonadism in male E29.1    Borderline anemia D64.9    Idiopathic gout of multiple sites M10.09    Skin ulcer of ankle with fat layer exposed, right ([facility] L97.312    Non-pressure chronic ulcer of right calf with fat layer exposed ([facility] L97.212    Type 2 diabetes mellitus with diabetic neuropathy ([facility]) E11.40    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 Will continue current [address] orders until vac is available. eRx Augmentin 500 mg BID x 10 days. Today dress with NS 4x4 until [facility] changes the dressing. Total time spent with patient: 30 [address] discussed with: Patient and Nursing Staff    Discussed:  Care Plan and home health    Disposition:  Stable      Mr. Cruz Friend is a 81 y.o. male who was seen s/p I&D RLE and calf. Patient states Dr Mauyr Drummond advised him to return to the wound clinic. He states  has been changing his dressing with 1/4 strength Dakins. He is has not been taking any antibx.           Subjective:   Past Medical History  Past Medical History:   Diagnosis Date    Anemia of chronic disease     Arthritis     Chronic renal insufficiency     Diabetes ([facility]     Dyslipidemia     Edema     Gout     Gout     Hypertension     Lymphedema     Morbid obesity ([facility]      Social History     Socioeconomic History    Marital status:      Spouse name: Not on file    Number of children: Not on file    Years of education: Not on file    Highest education level: Not on file   Occupational History    Not on file   Social Needs    Financial resource strain: Not on file    Food insecurity:     Worry: Not on file     Inability: Not on file    Transportation needs:     Medical: Not on file     Non-medical: Not on file   Tobacco Use    Smoking status: Never Smoker    Smokeless tobacco: Never Used   Substance and Sexual Activity    Alcohol use: No    Drug use: No    Sexual activity: Not on file   Lifestyle    Physical activity:     Days per week: Not on file     Minutes per session: Not on file    Stress: Not on file   Relationships    Social connections:     Talks on phone: Not on file     Gets together: Not on file     Attends Faith service: Not on file     Active member of club or organization: Not on file     Attends meetings of clubs or organizations: Not on file     Relationship status: Not on file    Intimate partner violence:     Fear of current or ex partner: Not on file     Emotionally abused: Not on file     Physically abused: Not on file     Forced sexual activity: Not on file   Other Topics Concern    Not on file   Social History Narrative    Not on file       Current Medications  Current Outpatient Medications   Medication Sig Dispense Refill    valsartan (DIOVAN) 320 mg tablet TAKE 1 TABLET BY MOUTH EVERY DAY 90 Tab 3    levothyroxine (SYNTHROID) 50 mcg tablet TAKE 1 TABLET BY MOUTH EVERY DAY BEFORE BREAKFAST 90 Tab 0    ANDRODERM 4 mg/24 hr pt24 APPLY 1 PATCH TO SKIN DAILY 30 Patch 1    KLOR-CON M20 20 mEq tablet TAKE 1 TABLET BY MOUTH EVERY DAY 90 Tab 3    traMADol (ULTRAM) 50 mg tablet Take 50 mg by mouth every six (6) hours as needed for Pain.  traMADol (ULTRAM) 50 mg tablet Take 50 mg by mouth every six (6) hours as needed for Pain.  cholecalciferol (D3-50 CHOLECALCIFEROL) 50,000 unit capsule Take 50,000 Units by mouth every seven (7) days.  ferrous sulfate 325 mg (65 mg iron) tablet Take 325 mg by mouth daily.  methocarbamol (ROBAXIN-750) 750 mg tablet Take 1 Tab by mouth four (4) times daily. As needed for pain or muscle spasm 20 Tab 0    diclofenac-miSOPROStol (ARTHROTEC 50)  mg-mcg per tablet TAKE 1 TABLET BY MOUTH TWICE A  Tab 0    rosuvastatin (CRESTOR) 40 mg tablet TAKE 1 TABLET BY MOUTH NIGHTLY FOR 90 DAYS. 90 Tab 1    furosemide (LASIX) 80 mg tablet TAKE 1 TABLET BY MOUTH EVERY DAY 90 Tab 3    colchicine 0.6 mg tablet TAKE 1 TAB BY MOUTH DAILY AS NEEDED. (Patient taking differently: TAKE 1 TAB BY MOUTH DAILY AS NEEDED. Indications: acute inflammation of the joints due to gout attack, treatment to prevent acute gout attack) 90 Tab 3    vit b comp & c-fa-copper-zinc (FOLBEE PLUS) 5-1.5-25 mg tab Take 1 Tab by mouth daily. 90 Tab 3    levothyroxine (SYNTHROID) 50 mcg tablet TAKE 1 TAB BY MOUTH DAILY (BEFORE BREAKFAST) FOR 90 DAYS. 90 Tab 0    tadalafil (CIALIS) 20 mg tablet Take 1 Tab by mouth every thirty-six (36) hours. Indications: Erectile Dysfunction 12 Tab 5    dulaglutide (TRULICITY) 1.5 TK/7.0 mL sub-q pen 0.5 ML BY SUBCUTANEOUS ROUTE EVERY SEVEN (7) DAYS. 7.5 Syringe 0    gabapentin (NEURONTIN) 300 mg capsule Take 300 mg by mouth nightly.  metoprolol (LOPRESSOR) 50 mg tablet Take 1 Tab by mouth every twelve (12) hours. 60 Tab 0    Fenofibrate 150 mg cap Take 145 mg by mouth daily.          Patient Allergies  No Known Allergies       Objective:   General Exam  alert, cooperative, no distress, appears stated age    [de-identified]  Visit Vitals  /70 Pulse (!) 56   Temp 97.1 °F (36.2 °C)   Resp 18   SpO2 96%       REVIEW OF SYSTEMS:  General: denies chronic fatigue, weight loss, fever, anemia, bruising, depression, nervousness, panic attacks  HEENT: denies ringing in ears, ear infections, dizzy spells, poor vision, glaucoma, sinus trouble, hoarseness, eye infections  GI: denies diarrhea, gas, bloating, heartburn, regurgitation, difficulty swallowing, painful swallowing, nausea, vomiting, constipation, abdominal pain, decreased appetite, blood in stools, black stools, jaundice, dark urine  Lungs: denies pneumonia, asthma, cough, SOB, hemoptysis  Heart: denies chest pain, irregular heart beat, ankle swelling, HTN  Skin: denies rashes, hives, allergic reaction  Urinary: denies UTI, kidney stones, decreased urine force and flow, urination at night, blood in urine, painful urination  Bones and Joints: denies arthritis, rheumatism, back pain, gout, osteoporosis  Neurologic: denies stroke, seizures, headaches, numbness, tingling    Foot Exam  Vascular Exam:   Dorsalis Pedis and Posterior Tibial intact but diminished bilaterally. Skin temp warm to warm. Pedal hair is absent.  There are not varicosities present. There is severe lymphedema bilat.       Neurological Exam:   Light touch protective sensation is intact to both feet but decreased. There is some noted Loss of protective sensation. There are non reproducible paresthesias to bilateral lower extremities. There are no Tinel's or 's signs present to the nerves crossing the ankle joint.      Musculoskeletal Exam:   Muscle tone is normal for age. There is equinus of the left limb. The muscle strength is 5/5 for the flexors, extensors, inverters, and everter's.     Dermatological Exam:   Skin is of abnormal texture and turgor with some atrophic skin changes noting absent hair growth, nail changes (thickening), pigmentary changes, skin texture (thin,shiney), skin color (rubor, red).   Bilateral. There is decrease xerosis. There is noted subungual debris. Ulcer  Ulcer Location: R lower leg lateral, R lower leg posterior, Ulcer measurements: see below, Ulcer base: Granular/Healthy and Ulcer exudate: Large amount Serosanguinous exudate noted today with mild odor    Wound Leg Lower Right;Lateral (Active)   Dressing Status  Removed; Saturated 10/3/2019  1:43 PM   Dressing Type  Wet to dry 10/3/2019  1:43 PM   Non-Pressure Injury Full thickness (subcut/muscle) 10/3/2019  1:43 PM   Wound Length (cm) 2.6 cm 10/3/2019  1:43 PM   Wound Width (cm) 14 cm 10/3/2019  1:43 PM   Wound Depth (cm) 5.2 10/3/2019  1:43 PM   Wound Surface area (cm^2) 36.4 cm^2 10/3/2019  1:43 PM   Change in Wound Size % -691.3 10/3/2019  1:43 PM   Condition of Base Fascia exposed;Granulation;Slough 9/5/2019 12:52 PM   Condition of Edges Open 1/3/2019 10:56 AM   Tissue Type Red 10/25/2018 12:08 PM   Tissue Type Percent Pink 100 1/3/2019 10:56 AM   Tissue Type Percent Red 70 9/5/2019 12:52 PM   Tissue Type Percent Yellow 30 9/5/2019 12:52 PM   Tissue Type Percent Other (comment) 100 10/25/2018 12:08 PM   Drainage Amount  Large 10/3/2019  1:43 PM   Drainage Color Serous 10/3/2019  1:43 PM   Wound Odor Foul 10/3/2019  1:43 PM   Periwound Skin Condition Edematous; Indurated 10/3/2019  1:43 PM   Cleansing and Cleansing Agents  Dermal wound cleanser 10/3/2019  1:43 PM   Dressing Type Applied Other (Comment) 9/5/2019 12:52 PM   Number of Skin Staples Removed 5 10/18/2018  9:31 AM   Procedure Tolerated Well 10/11/2018  9:42 AM   Number of days: 609       Wound Leg lower Right;Posterior (Active)   Dressing Status Removed; Saturated 10/3/2019  1:43 PM   Dressing Type Moist to dry;Packing 9/5/2019 12:52 PM   Non-staged Wound Description Full thickness 10/3/2019  1:43 PM   Wound Length (cm) 1.4 cm 10/3/2019  1:43 PM   Wound Width (cm) 2.3 cm 10/3/2019  1:43 PM   Wound Depth (cm) 5.5 cm 9/5/2019 12:52 PM   Wound Volume (cm^3) 36.47 cm^3 9/5/2019 12:52 PM   Condition of Base Granulation 10/3/2019  1:43 PM   Tissue Type Percent Red 100 10/3/2019  1:43 PM   Tissue Type Percent Yellow 100 8/15/2019  3:42 PM   Drainage Amount Large 10/3/2019  1:43 PM   Drainage Color Serosanguinous 10/3/2019  1:43 PM   Stacie-wound Assessment Dry; Induration 9/5/2019 12:52 PM   Cleansing and Cleansing Agents  Dermal wound cleanser 10/3/2019  1:43 PM   Dressing Changed Changed/New 9/5/2019 12:52 PM   Dressing Type Applied Other (Comment) 9/5/2019 12:52 PM   Number of days: 49       Wound Leg Lateral;Right (Active)   Number of days: 41       [REMOVED] Vacuum Assisted Closure Left Foot (Removed)   Number of days: 5289       [REMOVED] Wound Foot Left;Dorsal (Removed)   Dressing Status  Removed 4/9/2015  3:26 PM   Dressing Type  Other (Comment) 4/9/2015  3:26 PM   Incision Site Well Approximated No 4/9/2015  3:26 PM   Condition of Base Pink;Granulation 4/9/2015  3:26 PM   Condition of Edges Open 4/9/2015  3:26 PM   Tissue Type Red;Pink 4/9/2015  3:26 PM   Tissue Type Percent Pink 50 4/9/2015  3:26 PM   Tissue Type Percent Red 50 4/9/2015  3:26 PM   Drainage Amount  Small  4/9/2015  3:26 PM   Drainage Color Serosanguinous 4/9/2015  3:26 PM   Wound Odor None 4/9/2015  3:26 PM   Periwound Skin Condition Edematous; Other (comment) 4/9/2015  3:26 PM   Cleansing and Cleansing Agents  Dermal wound cleanser 4/9/2015  3:26 PM   Dressing Type Applied Other (Comment) 4/9/2015  3:26 PM   Procedure Tolerated Well 4/9/2015  3:26 PM   Number of days: 7854       [REMOVED] Wound Leg Lower Right; Anterior (Removed)   Dressing Status  Removed 7/19/2018  9:36 AM   Dressing Type  Other (Comment) 7/19/2018  9:36 AM   Non-Pressure Injury Partial thickness (epider/derm) 7/12/2018 11:40 AM   Wound Length (cm) 0 cm 8/2/2018  9:45 AM   Wound Width (cm) 0 cm 8/2/2018  9:45 AM   Wound Depth (cm) 0 8/2/2018  9:45 AM   Wound Surface area (cm^2) 0 cm^2 8/2/2018  9:45 AM   Condition of Base [place] 7/5/2018 10:50 AM   Condition of Edges Closed 8/2/2018 9:45 AM   Tissue Type Other (comment) 7/12/2018 11:40 AM   Tissue Type Percent Pink 100 7/5/2018 10:50 AM   Tissue Type Percent Red 100 5/23/2018 11:26 AM   Tissue Type Percent Yellow 20 6/7/2018 10:09 AM   Drainage Amount  None 7/19/2018  9:36 AM   Drainage Color Serous 7/12/2018 11:40 AM   Wound Odor None 7/19/2018  9:36 AM   Periwound Skin Condition Intact 8/2/2018  9:45 AM   Cleansing and Cleansing Agents  Dermal wound cleanser 7/19/2018  9:36 AM   Dressing Type Applied Other (Comment) 7/19/2018  9:36 AM   Number of Skin Staples Removed 6 6/28/2018  9:42 AM   Procedure Tolerated Well 7/19/2018  9:36 AM   Number of days: 189        Labs  No results found for this or any previous visit (from the past 24 hour(s)). X-Ray:  reviewed    Procedures:     Incision and drainage of right lower leg lateral ankle and posterior calf wounds with appropriate debridement. POD #42               Faviola Gayle  October 3, 2019

## 2019-10-03 NOTE — TELEPHONE ENCOUNTER
Pt has 2 pills remaining.     Requested Prescriptions     Pending Prescriptions Disp Refills    valsartan (DIOVAN) 320 mg tablet [Pharmacy Med Name: VALSARTAN 320 MG TABLET] 90 Tab 3     Sig: TAKE 1 TABLET BY MOUTH EVERY DAY

## 2019-10-03 NOTE — WOUND CARE
Wound/Ostomy Nurse Progress Note Patient: Cornelio Correia PWB:4/4/1971 MRN: [id number] Situation: Wound care follow up with Dr. Silvia Vale. Background: s/p hospitalization for abscess and cellulitis of the right lower leg. I&D by Dr. Xi Hill. Receiving home health care 2x/weekly from [facility]. Assessment:The patient arrived today with dressings on his right lateral and posterior leg wounds. The lateral leg dressing was saturated with foul-smelling serous drainage. The posterior wound dressing was wet but without odor. The dressings were removed and the wounds were cleansed and irrigated. He has not been seen in the wound clinic since his hospitalization. He states his granddaughter has been changing his dressings twice daily when his home care nurse is not available. New dressings of Mesalt, saline moistened gauze, roll gauze and Coban were applied. Recommendation: Wound vac has been ordered. Rx for Augmentin has been sent to patient's pharmacy. Orders sent to [facility] for wound vac placement on 10/7/19. Clinic Level of Care Assessment NAME:  Cornelio Correia YOB: 1950 GENDER: male MEDICAL RECORD NUMBER:  [id number] DATE:  10/3/2019 Wound Count Document in [facility] Number of Wounds Assessed Points No Wounds/Ulcers []   0 Less than Three Wounds/Ulcers [x]   1  
3-6 Wounds/Ulcers []   2 Greater than 6 Wounds/Ulcers []   3 Ambulation Status Document in Coord/[facility]/Mobility tab Status Definition Points Independent Independently able to ambulate. Fully able (without any assistance) to get on/off exam table/chair. [x]   0 Minimal Physical Assistance Requires physical assistance of one person to ambulate and/or position patient to be examined.  Includes necessary physical assistance to position lower extremities on/off stool. []   1 Moderate Physical Assistance Requires at least one staff member to physically assist patient in ambulating into treatment room, and on/off exam table. []   2 Full Assistance Requires assistance of at least two staff members to transfer patient into treatment room and/or on/off exam table/chair. \"Total Transfer\". []   3 Dressing Complexity Document in [facility] and Write Appropriate Order Complexity Definition Points No Dressing  []   0 Simple Minimal, simple dressing. i.e. Band-aid, gauze, simple wrap. []   1 Intermediate Moderately complicated requiring licensed personnel to apply i.e. collagen matrix, ointments, gels, alginates. [x]   2 Complex Complicated requiring licensed personnel to apply dressings 6 or more wounds. []   3 Teaching Effort Document in Education Tab Effort Definition Points No Teaching  []   0 Simple Reinforce two or less topics. Document in Education navigator. [x]   1 Intermediate Reinforce three to five topics and/or one additional  
new topic. Document in Education navigator. []   2 Complex Teach more than one new topic. New patient information  
packet reviewed and/or reinforce more than three topics. Document in Education navigator. HBO initial instruction. []   3 Patient Assessment and Planning Planning Definition Points Simple Multiple System Simple: Simple follow-up with routine assessment and planning. If Discharged, instructions and long term/follow-up care given to patient/caregiver. Discharged, instructions and/or After Visit Summary given to patient/caregiver and instructions completed. []   1 Intermediate Multiple System Intermediate: Contact with outside resources; i.e. [place]phone calls to [place] health, [facility]  May include filling out forms and writing letters, arranging transportation, communication with insurance , vendors, etc.  Discharged, instructions and/or After Visit Summary given to patient/caregiver and instructions completed. [x]   2 Complex Multiple System Complex: Full, comprehensive assessment and planning. Follow the entire navigator under Wound Visit charting filling out each tab which includes OP Adm Database Screening, Education and CarePlan  HBO risk assessment completed. Discharged, instructions and/or After Visit Summary given to patient/caregiver and instructions completed. []   3 Is this the Patient's First Visit to the [address] [place] [address] Road No 
 
 
Is this Patient Established @ [facility] 
yes Clinical Level of Care Points  0-2  Level 1 [] Points  3-5  Level 2 [] Points  6-9  Level 3 [x] Points  10-12  Level 4 [] Points  13-15  Level 5 [] Electronically signed by Arianna Zarate RN on 10/3/2019 at 2:46 PM

## 2019-10-03 NOTE — DISCHARGE INSTRUCTIONS
Wound Care Instructions    Patient: Shalonda Villalpando MRN: [id number]  SSN: [id number]     YOB: 1950  Age:69 y.o. Sex: male       Mr. Pastrana, Dr. Grazyna Manning,  recommends the following discharge instruction:    Offloading    []   Felt/Foam Offloading   [] Removable Cast Adrienne Ore       []   Wedge Shoe   []  Wheelchair     []   Total Contact Cast   []  Surgical Shoe     []   Crutches        Other   Mattress:   Other:     Edema Control    []   Elevated legs as much as possible. Recommend above level of heart.     []   Layered Wraps             []   Left      []   Right      []  BILATERAL          - Type:            []   Unna Boot       []  Multi-Layer                                   []   Two Layers     []  Three Layers   []  Four Layers     []   Tubular Bandages        []   Left      []   Right     SIZE:      []   Stockings                      []   Left      []   Right     []   Compression Pump     []   Left      []   Right    ___ millimeters of mercury  ___ millimeters of mercury for ___ minutes for ___ day(s)        Other:       Nutritional Supplements    [x]  Multi-Vitamin     []  Other:       [employer]     [x]  Home Health: [facility]     []  Long Term Care:      []  DME:     Consult    []   Nutrition     []   Vascular     []   Orthotist/Pedorthotist     []   Infectious Disease     []   Lymphedema Therapist   Other:     Dressings:  Another brand of generically equivalent product may be dispensed unless specifically ordered otherwise.     Home Ulcer/wound Hygiene (cleanse with)      []   Distilled Water     []   Normal Saline     [x]   Wound Cleanser     []   Mild antimicrobial soap and water     []   Chlorhexidine liquid soap and water     []   Other:     Apply    []   Hydrogel   []  Hypergel   []   Aquacel Ag     []   Cadexomer Iodine                     (Iodosorb)   []  Silver Alginate    []   Medihoney:     []   Collagenase:Santyl   []  Calcium Alginate   []   Collagen:     []   Plain Foam   [] Non-Adherent Contact                Layer   []   Xeroform     [x]   Mesalt- Right Posterior Leg   []   Hydrocolloid        []   Acticoat   []   Adaptic:      Other/Specific Instructions: Wound VAC ordered. Apply VAC to Right Lateral Leg on 10/719. Wound VAC Black foam to be used and vac to be set at 150mmHg. Cover With    []   Dry Gauze and Roll Gauze     []   Foam and Roll Gauze     []   Dry Gauze     []   Bordered gauze:     [x]   Foam      [x]    Bordered         []   Nonbordered     []   Secure with Tape     []   Other       Stacie-Ulcer Care    []   Cream     []   Lotion     []   Ointment     []   Barrier     []   Other:     Change Dressing    []   Once Daily     []   Every Other Day     []   Every 3 Days       []   Every 5 Days     []   Every 7 Days     []   Do Not Change       []   2 x per week (Mon and Thurs. )     [x]   3 x per week (Mon, Wed, Fri. )     []   Other          Follow-Up:   [x]  Follow up                              []  As Needed (PRN)     []   Merlin Fergusson MD    []  Alona Gooden DPM    [x]  Cresencio Garland DPM   []   Adelaide Dubon DPM      []   Nurse Visit            Please call the wound clinic ([phone]) regarding any questions or concerns.

## 2019-10-04 ENCOUNTER — HOME CARE VISIT (OUTPATIENT)
Dept: SCHEDULING | Facility: HOME HEALTH | Age: 69
End: 2019-10-04
Payer: MEDICARE

## 2019-10-04 PROCEDURE — 3331090002 HH PPS REVENUE DEBIT

## 2019-10-04 PROCEDURE — 3331090001 HH PPS REVENUE CREDIT

## 2019-10-04 PROCEDURE — G0299 HHS/HOSPICE OF RN EA 15 MIN: HCPCS

## 2019-10-05 PROCEDURE — 3331090002 HH PPS REVENUE DEBIT

## 2019-10-05 PROCEDURE — 3331090001 HH PPS REVENUE CREDIT

## 2019-10-06 VITALS
SYSTOLIC BLOOD PRESSURE: 130 MMHG | RESPIRATION RATE: 18 BRPM | DIASTOLIC BLOOD PRESSURE: 74 MMHG | TEMPERATURE: 98.5 F | HEART RATE: 96 BPM | OXYGEN SATURATION: 97 %

## 2019-10-06 PROCEDURE — 3331090002 HH PPS REVENUE DEBIT

## 2019-10-06 PROCEDURE — 3331090001 HH PPS REVENUE CREDIT

## 2019-10-07 ENCOUNTER — HOME CARE VISIT (OUTPATIENT)
Dept: SCHEDULING | Facility: HOME HEALTH | Age: 69
End: 2019-10-07
Payer: MEDICARE

## 2019-10-07 VITALS
SYSTOLIC BLOOD PRESSURE: 130 MMHG | RESPIRATION RATE: 20 BRPM | OXYGEN SATURATION: 98 % | TEMPERATURE: 96.4 F | DIASTOLIC BLOOD PRESSURE: 80 MMHG | HEART RATE: 70 BPM

## 2019-10-07 PROCEDURE — A6446 CONFORM BAND S W>=3" <5"/YD: HCPCS

## 2019-10-07 PROCEDURE — 3331090001 HH PPS REVENUE CREDIT

## 2019-10-07 PROCEDURE — A4452 WATERPROOF TAPE: HCPCS

## 2019-10-07 PROCEDURE — G0299 HHS/HOSPICE OF RN EA 15 MIN: HCPCS

## 2019-10-07 PROCEDURE — 3331090002 HH PPS REVENUE DEBIT

## 2019-10-08 PROCEDURE — 3331090001 HH PPS REVENUE CREDIT

## 2019-10-08 PROCEDURE — 3331090002 HH PPS REVENUE DEBIT

## 2019-10-09 ENCOUNTER — HOME CARE VISIT (OUTPATIENT)
Dept: SCHEDULING | Facility: HOME HEALTH | Age: 69
End: 2019-10-09
Payer: MEDICARE

## 2019-10-09 VITALS
RESPIRATION RATE: 20 BRPM | SYSTOLIC BLOOD PRESSURE: 128 MMHG | HEART RATE: 80 BPM | TEMPERATURE: 97.7 F | DIASTOLIC BLOOD PRESSURE: 72 MMHG | OXYGEN SATURATION: 97 %

## 2019-10-09 PROCEDURE — A4927 NON-STERILE GLOVES: HCPCS

## 2019-10-09 PROCEDURE — A5120 SKIN BARRIER, WIPE OR SWAB: HCPCS

## 2019-10-09 PROCEDURE — G0300 HHS/HOSPICE OF LPN EA 15 MIN: HCPCS

## 2019-10-09 PROCEDURE — A4452 WATERPROOF TAPE: HCPCS

## 2019-10-09 PROCEDURE — 3331090002 HH PPS REVENUE DEBIT

## 2019-10-09 PROCEDURE — A6266 IMPREG GAUZE NO H20/SAL/YARD: HCPCS

## 2019-10-09 PROCEDURE — A4649 SURGICAL SUPPLIES: HCPCS

## 2019-10-09 PROCEDURE — 3331090001 HH PPS REVENUE CREDIT

## 2019-10-09 PROCEDURE — A6216 NON-STERILE GAUZE<=16 SQ IN: HCPCS

## 2019-10-10 PROCEDURE — 3331090002 HH PPS REVENUE DEBIT

## 2019-10-10 PROCEDURE — 3331090001 HH PPS REVENUE CREDIT

## 2019-10-11 ENCOUNTER — HOME CARE VISIT (OUTPATIENT)
Dept: SCHEDULING | Facility: HOME HEALTH | Age: 69
End: 2019-10-11
Payer: MEDICARE

## 2019-10-11 PROCEDURE — 3331090002 HH PPS REVENUE DEBIT

## 2019-10-11 PROCEDURE — 3331090001 HH PPS REVENUE CREDIT

## 2019-10-11 PROCEDURE — G0299 HHS/HOSPICE OF RN EA 15 MIN: HCPCS

## 2019-10-12 ENCOUNTER — HOME CARE VISIT (OUTPATIENT)
Dept: SCHEDULING | Facility: HOME HEALTH | Age: 69
End: 2019-10-12
Payer: MEDICARE

## 2019-10-12 VITALS
TEMPERATURE: 96.6 F | HEART RATE: 69 BPM | DIASTOLIC BLOOD PRESSURE: 80 MMHG | RESPIRATION RATE: 20 BRPM | SYSTOLIC BLOOD PRESSURE: 140 MMHG | OXYGEN SATURATION: 98 %

## 2019-10-12 PROCEDURE — 3331090002 HH PPS REVENUE DEBIT

## 2019-10-12 PROCEDURE — 3331090001 HH PPS REVENUE CREDIT

## 2019-10-12 PROCEDURE — G0299 HHS/HOSPICE OF RN EA 15 MIN: HCPCS

## 2019-10-13 PROCEDURE — 3331090002 HH PPS REVENUE DEBIT

## 2019-10-13 PROCEDURE — 3331090001 HH PPS REVENUE CREDIT

## 2019-10-14 PROCEDURE — 3331090001 HH PPS REVENUE CREDIT

## 2019-10-14 PROCEDURE — 3331090002 HH PPS REVENUE DEBIT

## 2019-10-15 ENCOUNTER — HOME CARE VISIT (OUTPATIENT)
Dept: SCHEDULING | Facility: HOME HEALTH | Age: 69
End: 2019-10-15
Payer: MEDICARE

## 2019-10-15 PROCEDURE — 3331090002 HH PPS REVENUE DEBIT

## 2019-10-15 PROCEDURE — 3331090001 HH PPS REVENUE CREDIT

## 2019-10-15 PROCEDURE — G0299 HHS/HOSPICE OF RN EA 15 MIN: HCPCS

## 2019-10-16 ENCOUNTER — HOME CARE VISIT (OUTPATIENT)
Dept: SCHEDULING | Facility: HOME HEALTH | Age: 69
End: 2019-10-16
Payer: MEDICARE

## 2019-10-16 VITALS
TEMPERATURE: 97.3 F | HEART RATE: 94 BPM | DIASTOLIC BLOOD PRESSURE: 76 MMHG | RESPIRATION RATE: 18 BRPM | SYSTOLIC BLOOD PRESSURE: 123 MMHG | OXYGEN SATURATION: 96 %

## 2019-10-16 PROCEDURE — A4452 WATERPROOF TAPE: HCPCS

## 2019-10-16 PROCEDURE — A6446 CONFORM BAND S W>=3" <5"/YD: HCPCS

## 2019-10-16 PROCEDURE — 3331090001 HH PPS REVENUE CREDIT

## 2019-10-16 PROCEDURE — 3331090002 HH PPS REVENUE DEBIT

## 2019-10-16 PROCEDURE — G0299 HHS/HOSPICE OF RN EA 15 MIN: HCPCS

## 2019-10-16 PROCEDURE — A6252 ABSORPT DRG >16 <=48 W/O BDR: HCPCS

## 2019-10-17 ENCOUNTER — HOSPITAL ENCOUNTER (OUTPATIENT)
Dept: WOUND CARE | Age: 69
Discharge: HOME | End: 2019-10-17
Payer: MEDICARE

## 2019-10-17 VITALS
TEMPERATURE: 96.8 F | OXYGEN SATURATION: 94 % | DIASTOLIC BLOOD PRESSURE: 74 MMHG | HEART RATE: 102 BPM | SYSTOLIC BLOOD PRESSURE: 120 MMHG | RESPIRATION RATE: 18 BRPM

## 2019-10-17 VITALS
HEART RATE: 86 BPM | TEMPERATURE: 97.8 F | SYSTOLIC BLOOD PRESSURE: 140 MMHG | RESPIRATION RATE: 16 BRPM | DIASTOLIC BLOOD PRESSURE: 82 MMHG | OXYGEN SATURATION: 94 %

## 2019-10-17 VITALS
TEMPERATURE: 97.8 F | DIASTOLIC BLOOD PRESSURE: 75 MMHG | SYSTOLIC BLOOD PRESSURE: 125 MMHG | OXYGEN SATURATION: 96 % | HEART RATE: 98 BPM

## 2019-10-17 PROCEDURE — 77030011256 HC DRSG MEPILEX <16IN NO BORD MOLN -A: Performed by: PODIATRIST

## 2019-10-17 PROCEDURE — 77030011255 HC DSG AQUACEL AG BMS -A: Performed by: PODIATRIST

## 2019-10-17 PROCEDURE — 3331090002 HH PPS REVENUE DEBIT

## 2019-10-17 PROCEDURE — 97606 NEG PRS WND THER DME>50 SQCM: CPT

## 2019-10-17 PROCEDURE — 3331090001 HH PPS REVENUE CREDIT

## 2019-10-17 NOTE — WOUND CARE
Wound/Ostomy Nurse Progress Note Patient: Letitia Arauz MSC:[birth year] MRN: [id number] Situation: Wound care follow up with Dr. Jayme Figueroa. 
  
Background: s/p hospitalization for abscess and cellulitis of the right lower leg. I&D by Dr. Sherley Gold. Receiving home health care for wound vac dressing changes. 
  
Assessment:The patient arrived today with wet to dry dressings on his right lateral and posterior leg wounds. The lateral leg dressing was saturated with foul-smelling serous drainage. The posterior wound dressing was wet but without odor. The dressings were removed and the wounds were cleansed and irrigated. Pt has had difficulty with wound vac keeping a change and received a new replacement one today.  
  
Recommendation: Wound vac dressing was placed today on lateral wound using 4 pieces of black foam. Wound vac set at 125mmHG continuous suction. Posterior wound was dressed with Aquacel AG and Mepilex bordered foam. Dressing to be changed three times a week. Pt will follow up w/ Dr Jayme Figueroa on 10/31/19. Pt agrees to bring black foam and cannister with him to the appointment. [facility] health [facility]ed to [facility]. Wound vac dressing is to be changed to silver foam dressing when supplied arrive from [facility], per [employer] rep supplies will be delivered on 10/22/19 
 
 
 
 
 
 
 
 10/17/19 1345 Wound Leg lower Right;Posterior Date First Assessed: 08/15/19   Present on Hospital Admission: Yes  Primary Wound Type: Vascular  Location: Leg lower  Wound Location Orientation: Right;Posterior Dressing Status Removed; Saturated Dressing Type Moist to dry;Packing Non-staged Wound Description Full thickness Wound Length (cm) 1 cm Wound Width (cm) 3.5 cm Wound Depth (cm) 2.2 cm Wound Volume (cm^3) 7.7 cm^3 Condition of Base Granulation Condition of Edges Open Tissue Type Percent Pink 100 Drainage Amount Large Drainage Color Serous Wound Odor Pungent Stacie-wound Assessment Induration Cleansing and Cleansing Agents  Dermal wound cleanser Dressing Changed Changed/New Dressing Type Applied Vacuum dressing Wound Leg Lower Right;Lateral  
Date First Assessed/Time First Assessed: 02/01/18 0947   POA: Yes  Location: Leg Lower  Orientation: Right;Lateral  
Dressing Status  Removed; Saturated Dressing Type  Packing; Wet to dry Non-Pressure Injury Full thickness (subcut/muscle) Wound Length (cm) 2.9 cm Wound Width (cm) 13.5 cm Wound Depth (cm) 6 Wound Surface area (cm^2) 39.15 cm^2 Change in Wound Size % -751.09 Condition of Base Granulation;Slough Tissue Type Percent Red 90 Tissue Type Percent Yellow 10 Drainage Amount  Large Drainage Color Serous Wound Odor Pungent Periwound Skin Condition Edematous; Indurated Cleansing and Cleansing Agents  Dermal wound cleanser Dressing Type Applied Vacuum dressing

## 2019-10-17 NOTE — PROGRESS NOTES
Podiatry Surgery Progress Note      Patient: Darryn Eng MRN: [id number]  SSN: [id number]    YOB: 1950  Age: 81 y.o. Sex: male      Assessment:     Patient Active Problem List   Diagnosis Code    Diabetic foot ulcer ([facility] E11.621, O47.257    Diastolic dysfunction G78.67    Dyslipidemia E78.5    Cellulitis and abscess of foot, except toes L03.119, L02.619    Lymphedema of both lower extremities I89.0    CKD (chronic kidney disease) stage 3, GFR 30-59 ml/min ([facility]) N18.3    Morbid obesity with BMI of 70 and over, adult ([facility] E66.01, Z68.45    Erectile dysfunction N52.9    Hypothyroidism, adult E03.9    Hypogonadism in male E29.1    Borderline anemia D64.9    Idiopathic gout of multiple sites M10.09    Skin ulcer of ankle with fat layer exposed, right ([facility] L97.312    Non-pressure chronic ulcer of right calf with fat layer exposed ([facility] L97.212    Type 2 diabetes mellitus with diabetic neuropathy ([facility]) E11.40    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 Monitor. RTC in 2 weeks    Total time spent with patient: [address] [place] [address] discussed with: Patient and Nursing Staff    Discussed:  Care Plan and home health    Disposition:  Stable      Mr. Jennifer Witt is a 81 y.o. male who was admitted  s/p I&D RLE and calf. He states the vac has not been working properly since the vac has been in place. Today he received a new vac machine. He further relates he complete the antibx yesterday.        Subjective:   Past Medical History  Past Medical History:   Diagnosis Date    Anemia of chronic disease     Arthritis     Chronic renal insufficiency     Diabetes ([facility]     Dyslipidemia     Edema     Gout     Gout     Hypertension     Lymphedema     Morbid obesity ([facility]      Social History     Socioeconomic History    Marital status:      Spouse name: Not on file    Number of children: Not on file    Years of education: Not on file    Highest education level: Not on file   Occupational History    Not on file   Social Needs    Financial resource strain: Not on file    Food insecurity:     Worry: Not on file     Inability: Not on file    Transportation needs:     Medical: Not on file     Non-medical: Not on file   Tobacco Use    Smoking status: Never Smoker    Smokeless tobacco: Never Used   Substance and Sexual Activity    Alcohol use: No    Drug use: No    Sexual activity: Not on file   Lifestyle    Physical activity:     Days per week: Not on file     Minutes per session: Not on file    Stress: Not on file   Relationships    Social connections:     Talks on phone: Not on file     Gets together: Not on file     Attends Muslim service: Not on file     Active member of club or organization: Not on file     Attends meetings of clubs or organizations: Not on file     Relationship status: Not on file    Intimate partner violence:     Fear of current or ex partner: Not on file     Emotionally abused: Not on file     Physically abused: Not on file     Forced sexual activity: Not on file   Other Topics Concern    Not on file   Social History Narrative    Not on file       Current Medications  Current Outpatient Medications   Medication Sig Dispense Refill    valsartan (DIOVAN) 320 mg tablet TAKE 1 TABLET BY MOUTH EVERY DAY 90 Tab 3    amoxicillin-clavulanate (AUGMENTIN) 500-125 mg per tablet Take 1 Tab by mouth two (2) times a day.  Indications: skin infection due to Klebsiella bacteria 20 Tab 0    levothyroxine (SYNTHROID) 50 mcg tablet TAKE 1 TABLET BY MOUTH EVERY DAY BEFORE BREAKFAST 90 Tab 0    ANDRODERM 4 mg/24 hr pt24 APPLY 1 PATCH TO SKIN DAILY 30 Patch 1    KLOR-CON M20 20 mEq tablet TAKE 1 TABLET BY MOUTH EVERY DAY 90 Tab 3    traMADol (ULTRAM) 50 mg tablet Take 50 mg by mouth every six (6) hours as needed for Pain.  traMADol (ULTRAM) 50 mg tablet Take 50 mg by mouth every six (6) hours as needed for Pain.  cholecalciferol (D3-50 CHOLECALCIFEROL) 50,000 unit capsule Take 50,000 Units by mouth every seven (7) days.  ferrous sulfate 325 mg (65 mg iron) tablet Take 325 mg by mouth daily.  methocarbamol (ROBAXIN-750) 750 mg tablet Take 1 Tab by mouth four (4) times daily. As needed for pain or muscle spasm 20 Tab 0    diclofenac-miSOPROStol (ARTHROTEC 50)  mg-mcg per tablet TAKE 1 TABLET BY MOUTH TWICE A  Tab 0    rosuvastatin (CRESTOR) 40 mg tablet TAKE 1 TABLET BY MOUTH NIGHTLY FOR 90 DAYS. 90 Tab 1    furosemide (LASIX) 80 mg tablet TAKE 1 TABLET BY MOUTH EVERY DAY 90 Tab 3    colchicine 0.6 mg tablet TAKE 1 TAB BY MOUTH DAILY AS NEEDED. (Patient taking differently: TAKE 1 TAB BY MOUTH DAILY AS NEEDED. Indications: acute inflammation of the joints due to gout attack, treatment to prevent acute gout attack) 90 Tab 3    vit b comp & c-fa-copper-zinc (FOLBEE PLUS) 5-1.5-25 mg tab Take 1 Tab by mouth daily. 90 Tab 3    levothyroxine (SYNTHROID) 50 mcg tablet TAKE 1 TAB BY MOUTH DAILY (BEFORE BREAKFAST) FOR 90 DAYS. 90 Tab 0    tadalafil (CIALIS) 20 mg tablet Take 1 Tab by mouth every thirty-six (36) hours. Indications: Erectile Dysfunction 12 Tab 5    dulaglutide (TRULICITY) 1.5 BW/8.0 mL sub-q pen 0.5 ML BY SUBCUTANEOUS ROUTE EVERY SEVEN (7) DAYS. 7.5 Syringe 0    gabapentin (NEURONTIN) 300 mg capsule Take 300 mg by mouth nightly.  metoprolol (LOPRESSOR) 50 mg tablet Take 1 Tab by mouth every twelve (12) hours. 60 Tab 0    Fenofibrate 150 mg cap Take 145 mg by mouth daily.          Patient Allergies  No Known Allergies       Objective:   General Exam  alert, cooperative, no distress, appears stated age    [de-identified]  Visit Vitals  /74   Pulse (!) 102   Temp 96.8 °F (36 °C)   Resp 18   SpO2 94%       REVIEW OF SYSTEMS:  General: denies chronic fatigue, weight loss, fever, anemia, bruising, depression, nervousness, panic attacks  HEENT: denies ringing in ears, ear infections, dizzy spells, poor vision, glaucoma, sinus trouble, hoarseness, eye infections  GI: denies diarrhea, gas, bloating, heartburn, regurgitation, difficulty swallowing, painful swallowing, nausea, vomiting, constipation, abdominal pain, decreased appetite, blood in stools, black stools, jaundice, dark urine  Lungs: denies pneumonia, asthma, cough, SOB, hemoptysis  Heart: denies chest pain, irregular heart beat, ankle swelling, HTN  Skin: denies rashes, hives, allergic reaction  Urinary: denies UTI, kidney stones, decreased urine force and flow, urination at night, blood in urine, painful urination  Bones and Joints: denies arthritis, rheumatism, back pain, gout, osteoporosis  Neurologic: denies stroke, seizures, headaches, numbness, tingling    Foot Exam  Vascular Exam:   Dorsalis Pedis and Posterior Tibial intact but diminished bilaterally. Skin temp warm to warm. Pedal hair is absent.  There are not varicosities present. There is severe lymphedema bilat.       Neurological Exam:   Light touch protective sensation is intact to both feet but decreased. There is some noted Loss of protective sensation. There are non reproducible paresthesias to bilateral lower extremities. There are no Tinel's or 's signs present to the nerves crossing the ankle joint.      Musculoskeletal Exam:   Muscle tone is normal for age. There is equinus of the left limb.  The muscle strength is 5/5 for the flexors, extensors, inverters, and everter's.     Dermatological Exam:   Skin is of abnormal texture and turgor with some atrophic skin changes noting absent hair growth, nail changes (thickening), pigmentary changes, skin texture (thin,shiney), skin color (rubor, red). Bilateral. There is decrease xerosis. There is noted subungual debris.     Ulcer  Ulcer Location: R lower leg lateral, R lower leg posterior, Ulcer measurements: see below, Ulcer base: Mixed Granular/Fibrotic and Ulcer exudate: Large amount Serous exudate  Azul Scale: Stage 2    Wound Leg Lower Right;Lateral (Active)   Dressing Status  Removed; Saturated 10/17/2019  1:45 PM   Dressing Type  Packing; Wet to dry 10/17/2019  1:45 PM   Non-Pressure Injury Full thickness (subcut/muscle) 10/17/2019  1:45 PM   Wound Length (cm) 2.9 cm 10/17/2019  1:45 PM   Wound Width (cm) 13.5 cm 10/17/2019  1:45 PM   Wound Depth (cm) 6 10/17/2019  1:45 PM   Wound Surface area (cm^2) 39.15 cm^2 10/17/2019  1:45 PM   Change in Wound Size % -751.09 10/17/2019  1:45 PM   Condition of Base Granulation;Slough 10/17/2019  1:45 PM   Condition of Edges Open 1/3/2019 10:56 AM   Tissue Type Red 10/25/2018 12:08 PM   Tissue Type Percent Pink 100 1/3/2019 10:56 AM   Tissue Type Percent Red 90 10/17/2019  1:45 PM   Tissue Type Percent Yellow 10 10/17/2019  1:45 PM   Tissue Type Percent Other (comment) 100 10/25/2018 12:08 PM   Drainage Amount  Large 10/17/2019  1:45 PM   Drainage Color Serous 10/17/2019  1:45 PM   Wound Odor Pungent 10/17/2019  1:45 PM   Periwound Skin Condition Edematous; Indurated 10/17/2019  1:45 PM   Cleansing and Cleansing Agents  Dermal wound cleanser 10/17/2019  1:45 PM   Dressing Type Applied Vacuum dressing 10/17/2019  1:45 PM   Number of Skin Staples Removed 5 10/18/2018  9:31 AM   Procedure Tolerated Well 10/11/2018  9:42 AM   Number of days: 623       Wound Leg lower Right;Posterior (Active)   Dressing Status Removed; Saturated 10/17/2019  1:45 PM   Dressing Type Moist to dry;Packing 10/17/2019  1:45 PM   Non-staged Wound Description Full thickness 10/17/2019  1:45 PM   Wound Length (cm) 1 cm 10/17/2019  1:45 PM   Wound Width (cm) 3.5 cm 10/17/2019  1:45 PM Wound Depth (cm) 2.2 cm 10/17/2019  1:45 PM   Wound Volume (cm^3) 7.7 cm^3 10/17/2019  1:45 PM   Condition of Base Granulation 10/17/2019  1:45 PM   Condition of Edges Open 10/17/2019  1:45 PM   Tissue Type Percent Pink 100 10/17/2019  1:45 PM   Tissue Type Percent Red 100 10/3/2019  1:43 PM   Tissue Type Percent Yellow 100 8/15/2019  3:42 PM   Drainage Amount Large 10/17/2019  1:45 PM   Drainage Color Serous 10/17/2019  1:45 PM   Wound Odor Pungent 10/17/2019  1:45 PM   Stacie-wound Assessment Induration 10/17/2019  1:45 PM   Cleansing and Cleansing Agents  Dermal wound cleanser 10/17/2019  1:45 PM   Dressing Changed Changed/New 10/17/2019  1:45 PM   Dressing Type Applied Vacuum dressing 10/17/2019  1:45 PM   Number of days: 63       Wound Leg Lateral;Right (Active)   Number of days: 55       [REMOVED] Vacuum Assisted Closure Left Foot (Removed)   Number of days: 9381       [REMOVED] Wound Foot Left;Dorsal (Removed)   Dressing Status  Removed 4/9/2015  3:26 PM   Dressing Type  Other (Comment) 4/9/2015  3:26 PM   Incision Site Well Approximated No 4/9/2015  3:26 PM   Condition of Base Pink;Granulation 4/9/2015  3:26 PM   Condition of Edges Open 4/9/2015  3:26 PM   Tissue Type Red;Pink 4/9/2015  3:26 PM   Tissue Type Percent Pink 50 4/9/2015  3:26 PM   Tissue Type Percent Red 50 4/9/2015  3:26 PM   Drainage Amount  Small  4/9/2015  3:26 PM   Drainage Color Serosanguinous 4/9/2015  3:26 PM   Wound Odor None 4/9/2015  3:26 PM   Periwound Skin Condition Edematous; Other (comment) 4/9/2015  3:26 PM   Cleansing and Cleansing Agents  Dermal wound cleanser 4/9/2015  3:26 PM   Dressing Type Applied Other (Comment) 4/9/2015  3:26 PM   Procedure Tolerated Well 4/9/2015  3:26 PM   Number of days: 9139       [REMOVED] Wound Leg Lower Right; Anterior (Removed)   Dressing Status  Removed 7/19/2018  9:36 AM   Dressing Type  Other (Comment) 7/19/2018  9:36 AM   Non-Pressure Injury Partial thickness (epider/derm) 7/12/2018 11:40 AM Wound Length (cm) 0 cm 8/2/2018  9:45 AM   Wound Width (cm) 0 cm 8/2/2018  9:45 AM   Wound Depth (cm) 0 8/2/2018  9:45 AM   Wound Surface area (cm^2) 0 cm^2 8/2/2018  9:45 AM   Condition of Base [place] 7/5/2018 10:50 AM   Condition of Edges Closed 8/2/2018  9:45 AM   Tissue Type Other (comment) 7/12/2018 11:40 AM   Tissue Type Percent Pink 100 7/5/2018 10:50 AM   Tissue Type Percent Red 100 5/23/2018 11:26 AM   Tissue Type Percent Yellow 20 6/7/2018 10:09 AM   Drainage Amount  None 7/19/2018  9:36 AM   Drainage Color Serous 7/12/2018 11:40 AM   Wound Odor None 7/19/2018  9:36 AM   Periwound Skin Condition Intact 8/2/2018  9:45 AM   Cleansing and Cleansing Agents  Dermal wound cleanser 7/19/2018  9:36 AM   Dressing Type Applied Other (Comment) 7/19/2018  9:36 AM   Number of Skin Staples Removed 6 6/28/2018  9:42 AM   Procedure Tolerated Well 7/19/2018  9:36 AM   Number of days: 189        Labs  No results found for this or any previous visit (from the past 24 hour(s)). X-Ray:  Reviewed from [facility]    Procedures:   Incision and drainage of right lower leg lateral ankle and posterior calf wounds with appropriate debridement. POD #42                Faviola Siegel  October 17, 2019

## 2019-10-17 NOTE — DISCHARGE INSTRUCTIONS
Patient Education        Wound Care Instructions    Patient: Troy Hopkins MRN: [id number]  SSN: [id number]     YOB: 1950  Age:69 y.o. Sex: male       Mr. Pastrana,Dr Kun Daniel recommends the following discharge instruction:    Offloading    []   Felt/Foam Offloading   [] Removable Cast Waker       []   Wedge Shoe   []  Wheelchair     []   Total Contact Cast   []  Surgical Shoe     []   Crutches        Other   Mattress:   Other:     Edema Control    [x]   Elevated legs as much as possible. Recommend above level of heart.     []   Layered Wraps             []   Left      []   Right      []  BILATERAL          - Type:            []   Unna Boot       []  Multi-Layer                                   []   Two Layers     []  Three Layers   []  Four Layers     []   Tubular Bandages        []   Left      []   Right     SIZE:      []   Stockings                      []   Left      []   Right     []   Compression Pump     []   Left      []   Right    ___ millimeters of mercury  ___ millimeters of mercury for ___ minutes for ___ day(s)        Other:  Cleanse right lower leg with mild soap and water. Cleanse lateral and posterior wounds with Dermal Wound Cleanser. Apply skin prep to periwound and wound vac dressing w/ silver foam to right lateral lower leg set at 125mmHG continuous suction. Change M/W/F. Apply Aquacel AG to posterior wound and cover w/ bordered Mepilex. Change M/W/F. Silver foam for wound vac should be delivered 10/22/19 per [employer]. Nutritional Supplements    []  Multi-Vitamin     []  Other:       [employer]     [x]  Home Health: [employer]     []  Long Term Care:      []  DME:     Consult    []   Nutrition     []   Vascular     []   Orthotist/Pedorthotist     []   Infectious Disease     []   Lymphedema Therapist   Other:     Dressings:  Another brand of generically equivalent product may be dispensed unless specifically ordered otherwise.     Home Ulcer/wound Hygiene (cleanse with) []   Distilled Water     []   Normal Saline     [x]   Wound Cleanser     []   Mild antimicrobial soap and water     []   Chlorhexidine liquid soap and water     []   Other:     Apply    []   Hydrogel   []  Hypergel   []   Aquacel Ag     []   Cadexomer Iodine                     (Iodosorb)   []  Silver Alginate    []   Medihoney:     []   Collagenase:Santyl   []  Calcium Alginate   []   Collagen:     []   Plain Foam   []  Non-Adherent Contact                Layer   []   Xeroform     []   Silver foam   []   Hydrocolloid        []   Acticoat   []   Adaptic:      Other/Specific Instructions:    Cover With    []   Dry Gauze and Roll Gauze     []   Foam and Roll Gauze     []   Dry Gauze     []   Bordered gauze:     []   Foam      []    Bordered         []   Nonbordered     []   Secure with Tape     []   Other       Stacie-Ulcer Care    []   Cream     []   Lotion     []   Ointment     []   Barrier     []   Other:     Change Dressing    []   Once Daily     []   Every Other Day     []   Every 3 Days       []   Every 5 Days     []   Every 7 Days     []   Do Not Change       []   2 x per week (Mon and Thurs. )     []   3 x per week (Mon, Wed, Fri. )     []   Other          Follow-Up:   [x]  Follow up                              []  As Needed (PRN)     []   Irwin Mace MD    []  Johnson Aleman DPM    [x]  Jeff Villanueva DPM   []   Blanca Escobar DPM      []   Nurse Visit            Please call the wound clinic ([phone]) regarding any questions or concerns.

## 2019-10-17 NOTE — WOUND CARE
10/17/19 1345 Wound Leg lower Right;Posterior Date First Assessed: 08/15/19   Present on Hospital Admission: Yes  Primary Wound Type: Vascular  Location: Leg lower  Wound Location Orientation: Right;Posterior Dressing Status Removed; Saturated Dressing Type Moist to dry;Packing Non-staged Wound Description Full thickness Wound Length (cm) 1 cm Wound Width (cm) 3.5 cm Wound Depth (cm) 2.2 cm Wound Volume (cm^3) 7.7 cm^3 Condition of Base Granulation Condition of Edges Open Tissue Type Percent Pink 100 Drainage Amount Large Drainage Color Serous Wound Odor Pungent Stacie-wound Assessment Induration Cleansing and Cleansing Agents  Dermal wound cleanser Dressing Changed Changed/New Dressing Type Applied Vacuum dressing Wound Leg Lower Right;Lateral  
Date First Assessed/Time First Assessed: 02/01/18 0947   POA: Yes  Location: Leg Lower  Orientation: Right;Lateral  
Dressing Status  Removed; Saturated Dressing Type  Packing; Wet to dry Non-Pressure Injury Full thickness (subcut/muscle) Wound Length (cm) 2.9 cm Wound Width (cm) 13.5 cm Wound Depth (cm) 6 Wound Surface area (cm^2) 39.15 cm^2 Change in Wound Size % -751.09 Condition of Base Granulation;Slough Tissue Type Percent Red 90 Tissue Type Percent Yellow 10 Drainage Amount  Large Drainage Color Serous Wound Odor Pungent Periwound Skin Condition Edematous; Indurated Cleansing and Cleansing Agents  Dermal wound cleanser Dressing Type Applied Vacuum dressing

## 2019-10-18 ENCOUNTER — HOME CARE VISIT (OUTPATIENT)
Dept: SCHEDULING | Facility: HOME HEALTH | Age: 69
End: 2019-10-18
Payer: MEDICARE

## 2019-10-18 PROCEDURE — G0299 HHS/HOSPICE OF RN EA 15 MIN: HCPCS

## 2019-10-18 PROCEDURE — 3331090002 HH PPS REVENUE DEBIT

## 2019-10-18 PROCEDURE — 3331090001 HH PPS REVENUE CREDIT

## 2019-10-19 VITALS
SYSTOLIC BLOOD PRESSURE: 120 MMHG | DIASTOLIC BLOOD PRESSURE: 68 MMHG | HEART RATE: 88 BPM | OXYGEN SATURATION: 98 % | TEMPERATURE: 98 F

## 2019-10-19 PROCEDURE — 3331090002 HH PPS REVENUE DEBIT

## 2019-10-19 PROCEDURE — 3331090001 HH PPS REVENUE CREDIT

## 2019-10-20 PROCEDURE — 3331090002 HH PPS REVENUE DEBIT

## 2019-10-20 PROCEDURE — 3331090001 HH PPS REVENUE CREDIT

## 2019-10-21 ENCOUNTER — HOME CARE VISIT (OUTPATIENT)
Dept: SCHEDULING | Facility: HOME HEALTH | Age: 69
End: 2019-10-21
Payer: MEDICARE

## 2019-10-21 PROCEDURE — 3331090001 HH PPS REVENUE CREDIT

## 2019-10-21 PROCEDURE — 3331090002 HH PPS REVENUE DEBIT

## 2019-10-22 PROCEDURE — 3331090002 HH PPS REVENUE DEBIT

## 2019-10-22 PROCEDURE — 3331090001 HH PPS REVENUE CREDIT

## 2019-10-23 ENCOUNTER — HOME CARE VISIT (OUTPATIENT)
Dept: SCHEDULING | Facility: HOME HEALTH | Age: 69
End: 2019-10-23
Payer: MEDICARE

## 2019-10-23 PROCEDURE — 3331090001 HH PPS REVENUE CREDIT

## 2019-10-23 PROCEDURE — A6212 FOAM DRG <=16 SQ IN W/BORDER: HCPCS

## 2019-10-23 PROCEDURE — A4452 WATERPROOF TAPE: HCPCS

## 2019-10-23 PROCEDURE — G0299 HHS/HOSPICE OF RN EA 15 MIN: HCPCS

## 2019-10-23 PROCEDURE — A6252 ABSORPT DRG >16 <=48 W/O BDR: HCPCS

## 2019-10-23 PROCEDURE — A6446 CONFORM BAND S W>=3" <5"/YD: HCPCS

## 2019-10-23 PROCEDURE — 3331090002 HH PPS REVENUE DEBIT

## 2019-10-24 VITALS
OXYGEN SATURATION: 94 % | SYSTOLIC BLOOD PRESSURE: 142 MMHG | DIASTOLIC BLOOD PRESSURE: 82 MMHG | HEART RATE: 79 BPM | TEMPERATURE: 98.2 F

## 2019-10-24 PROCEDURE — 3331090002 HH PPS REVENUE DEBIT

## 2019-10-24 PROCEDURE — 3331090001 HH PPS REVENUE CREDIT

## 2019-10-25 ENCOUNTER — HOME CARE VISIT (OUTPATIENT)
Dept: SCHEDULING | Facility: HOME HEALTH | Age: 69
End: 2019-10-25
Payer: MEDICARE

## 2019-10-25 PROCEDURE — 3331090001 HH PPS REVENUE CREDIT

## 2019-10-25 PROCEDURE — G0299 HHS/HOSPICE OF RN EA 15 MIN: HCPCS

## 2019-10-25 PROCEDURE — 3331090002 HH PPS REVENUE DEBIT

## 2019-10-26 VITALS
OXYGEN SATURATION: 95 % | SYSTOLIC BLOOD PRESSURE: 150 MMHG | TEMPERATURE: 97.3 F | DIASTOLIC BLOOD PRESSURE: 70 MMHG | HEART RATE: 111 BPM

## 2019-10-26 PROCEDURE — 3331090001 HH PPS REVENUE CREDIT

## 2019-10-26 PROCEDURE — 3331090002 HH PPS REVENUE DEBIT

## 2019-10-27 PROCEDURE — 3331090002 HH PPS REVENUE DEBIT

## 2019-10-27 PROCEDURE — 3331090001 HH PPS REVENUE CREDIT

## 2019-10-28 ENCOUNTER — HOME CARE VISIT (OUTPATIENT)
Dept: SCHEDULING | Facility: HOME HEALTH | Age: 69
End: 2019-10-28
Payer: MEDICARE

## 2019-10-28 VITALS
OXYGEN SATURATION: 96 % | TEMPERATURE: 97.3 F | HEART RATE: 67 BPM | SYSTOLIC BLOOD PRESSURE: 128 MMHG | DIASTOLIC BLOOD PRESSURE: 80 MMHG

## 2019-10-28 PROCEDURE — 3331090001 HH PPS REVENUE CREDIT

## 2019-10-28 PROCEDURE — G0299 HHS/HOSPICE OF RN EA 15 MIN: HCPCS

## 2019-10-28 PROCEDURE — 3331090002 HH PPS REVENUE DEBIT

## 2019-10-29 PROCEDURE — 3331090001 HH PPS REVENUE CREDIT

## 2019-10-29 PROCEDURE — A6216 NON-STERILE GAUZE<=16 SQ IN: HCPCS

## 2019-10-29 PROCEDURE — 3331090002 HH PPS REVENUE DEBIT

## 2019-10-29 PROCEDURE — A4649 SURGICAL SUPPLIES: HCPCS

## 2019-10-29 PROCEDURE — A6260 WOUND CLEANSER ANY TYPE/SIZE: HCPCS

## 2019-10-29 PROCEDURE — 3331090003 HH PPS REVENUE ADJ

## 2019-10-29 PROCEDURE — A4452 WATERPROOF TAPE: HCPCS

## 2019-10-30 ENCOUNTER — HOME CARE VISIT (OUTPATIENT)
Dept: SCHEDULING | Facility: HOME HEALTH | Age: 69
End: 2019-10-30
Payer: MEDICARE

## 2019-10-30 PROCEDURE — 400014 HH F/U

## 2019-10-30 PROCEDURE — 3331090002 HH PPS REVENUE DEBIT

## 2019-10-30 PROCEDURE — G0299 HHS/HOSPICE OF RN EA 15 MIN: HCPCS

## 2019-10-30 PROCEDURE — 3331090001 HH PPS REVENUE CREDIT

## 2019-10-30 RX ORDER — DICLOFENAC SODIUM AND MISOPROSTOL 50; 200 MG/1; UG/1
TABLET, DELAYED RELEASE ORAL
Qty: 180 TAB | Refills: 0 | Status: SHIPPED | OUTPATIENT
Start: 2019-10-30 | End: 2019-12-08

## 2019-10-31 ENCOUNTER — HOSPITAL ENCOUNTER (OUTPATIENT)
Dept: WOUND CARE | Age: 69
Discharge: HOME | End: 2019-10-31
Payer: MEDICARE

## 2019-10-31 PROCEDURE — 3331090001 HH PPS REVENUE CREDIT

## 2019-10-31 PROCEDURE — 3331090002 HH PPS REVENUE DEBIT

## 2019-10-31 PROCEDURE — 99213 OFFICE O/P EST LOW 20 MIN: CPT

## 2019-10-31 PROCEDURE — 77030011256 HC DRSG MEPILEX <16IN NO BORD MOLN -A: Performed by: PODIATRIST

## 2019-10-31 NOTE — WOUND CARE
Wound/Ostomy Nurse Progress Note Patient: Shalonda Villalpando NCR:7/6/[birth year]168 MRN: [id number] Situation: Wound care follow with Dr. Karlos Rodriguez. Background: Patient has chronic lymphedema. He recently developed cellulitis with abscesses and, subsequently, sepsis. He was admitted to [facility], and underwent surgical debridement of the wounds by Dr. Dora Sepulveda. He was referred back to the wound clinic by Dr. Princess Dorantes for follow up care. The patient has had copious drainage from the lateral leg wound. A wound vac was ordered by Dr. Karlos Rodriguez, to contain the drainage and promote granulation. However, the wound vac was discontinued on 10/29/19 by order of Dr. Princess Dorantes, when patient was seen by him for a follow up appointment. The patient arrived today with a saturated dressing on his leg and wound. The drainage had also soaked into his shoe. Assessment: His wounds are still open and draining. It is unclear why the wound vac was discontinued. The measurements have not changed since his last visit. The lateral leg wound base is pink with yellow slough. The posterior wound base is pink. Both wounds are draining heavily. A wet-to-dry dressing was applied. Recommendation: At this point Dr. Karlos Rodriguez will defer to Dr. Princess Dorantes for patient's care and orders for dressings. After the patient is discharged by Dr. Princess Dorantes, he may return to the wound clinic, if needed. No follow up appointment scheduled. 10/31/19 1129 Wound Leg lower Right;Posterior back of calf Date First Assessed: 08/15/19   Present on Hospital Admission: Yes  Primary Wound Type: Vascular  Location: Leg lower  Wound Location Orientation: Right;Posterior  Wound Description: back of calf Dressing Status Removed; Saturated Dressing Type Foam;Gauze Non-staged Wound Description Full thickness Wound Length (cm) 0.8 cm Wound Width (cm) 3.1 cm Wound Depth (cm) 0.1 cm Wound Surface Area (cm^2) 2.48 cm^2 Wound Volume (cm^3) 0.25 cm^3 Condition of Base Pink Tissue Type Percent Pink 50 Tissue Type Percent Red 50 Drainage Amount Large Drainage Color Serous Wound Odor Strong Stacie-wound Assessment Induration Cleansing and Cleansing Agents  Dermal wound cleanser Dressing Changed Changed/New Dressing Type Applied Other (Comment) (Polymem foam with border) Wound Leg Lower Right;Lateral  
Date First Assessed/Time First Assessed: 02/01/18 0947   POA: Yes  Location: Leg Lower  Orientation: Right;Lateral  
Dressing Status  Removed Dressing Type  Packing; Wet to dry Non-Pressure Injury Full thickness (subcut/muscle) Wound Length (cm) 3.2 cm Wound Width (cm) 13 cm Wound Depth (cm) 5.8 Wound Surface area (cm^2) 41.6 cm^2 Change in Wound Size % -804.35 Condition of [facility] Tissue Type Percent Pink 50 Tissue Type Percent Yellow 50 Drainage Amount  Large Drainage Color Serous Wound Odor Strong Periwound Skin Condition Edematous; Indurated Cleansing and Cleansing Agents  Dermal wound cleanser Dressing Type Applied Other (Comment) (wet-to-dry, bordered foam.)  
 
 
 10/31/19 1129 Wound Leg lower Right;Posterior back of calf Date First Assessed: 08/15/19   Present on Hospital Admission: Yes  Primary Wound Type: Vascular  Location: Leg lower  Wound Location Orientation: Right;Posterior  Wound Description: back of calf Dressing Status Removed; Saturated Dressing Type Foam;Gauze Non-staged Wound Description Full thickness Wound Length (cm) 0.8 cm Wound Width (cm) 3.1 cm Wound Depth (cm) 0.1 cm Wound Surface Area (cm^2) 2.48 cm^2 Wound Volume (cm^3) 0.25 cm^3 Condition of Base Pink Tissue Type Percent Pink 50 Tissue Type Percent Red 50 Drainage Amount Large Drainage Color Serous Wound Odor Strong Stacie-wound Assessment Induration Cleansing and Cleansing Agents  Dermal wound cleanser Dressing Changed Changed/New Dressing Type Applied Other (Comment) (Polymem foam with border) Wound Leg Lower Right;Lateral  
Date First Assessed/Time First Assessed: 02/01/18 0947   POA: Yes  Location: Leg Lower  Orientation: Right;Lateral  
Dressing Status  Removed Dressing Type  Packing; Wet to dry Non-Pressure Injury Full thickness (subcut/muscle) Wound Length (cm) 3.2 cm Wound Width (cm) 13 cm Wound Depth (cm) 5.8 Wound Surface area (cm^2) 41.6 cm^2 Change in Wound Size % -804.35 Condition of [facility] Tissue Type Percent Pink 50 Tissue Type Percent Yellow 50 Drainage Amount  Large Drainage Color Serous Wound Odor Strong Periwound Skin Condition Edematous; Indurated Cleansing and Cleansing Agents  Dermal wound cleanser Dressing Type Applied Other (Comment) (wet-to-dry, bordered foam.) Clinic Level of Care Assessment NAME:  Rigoberto Rai YOB: 1950 GENDER: male MEDICAL RECORD NUMBER:  [id number] DATE:  10/31/2019 Wound Count Document in [facility] Number of Wounds Assessed Points No Wounds/Ulcers []   0 Less than Three Wounds/Ulcers [x]   1  
3-6 Wounds/Ulcers []   2 Greater than 6 Wounds/Ulcers []   3 Ambulation Status Document in Coord/SALBADOR/Mobility tab Status Definition Points Independent Independently able to ambulate. Fully able (without any assistance) to get on/off exam table/chair. []   0 Minimal Physical Assistance Requires physical assistance of one person to ambulate and/or position patient to be examined. Includes necessary physical assistance to position lower extremities on/off stool. [x]   1 Moderate Physical Assistance Requires at least one staff member to physically assist patient in ambulating into treatment room, and on/off exam table.  []   2  
 Full Assistance Requires assistance of at least two staff members to transfer patient into treatment room and/or on/off exam table/chair. \"Total Transfer\". []   3 Dressing Complexity Document in [address] [place] [address] and Write Appropriate Order Complexity Definition Points No Dressing  []   0 Simple Minimal, simple dressing. i.e. Band-aid, gauze, simple wrap. []   1 Intermediate Moderately complicated requiring licensed personnel to apply i.e. collagen matrix, ointments, gels, alginates. [x]   2 Complex Complicated requiring licensed personnel to apply dressings 6 or more wounds. []   3 Teaching Effort Document in Education Tab Effort Definition Points No Teaching  []   0 Simple Reinforce two or less topics. Document in Education navigator. [x]   1 Intermediate Reinforce three to five topics and/or one additional  
new topic. Document in Education navigator. []   2 Complex Teach more than one new topic. New patient information  
packet reviewed and/or reinforce more than three topics. Document in Education navigator. HBO initial instruction. []   3 Patient Assessment and Planning Planning Definition Points Simple Multiple System Simple: Simple follow-up with routine assessment and planning. If Discharged, instructions and long term/follow-up care given to patient/caregiver. Discharged, instructions and/or After Visit Summary given to patient/caregiver and instructions completed. []   1 Intermediate Multiple System Intermediate: Contact with outside resources; i.e. Telephone calls to home health, [facility]. May include filling out forms and writing letters, arranging transportation, communication with insurance , vendors, etc.  Discharged, instructions and/or After Visit Summary given to patient/caregiver and instructions completed. [x]   2 Complex Multiple System Complex: Full, comprehensive assessment and planning. Follow the entire navigator under Wound Visit charting filling out each tab which includes OP Adm Database Screening, Education and CarePlan  HBO risk assessment completed. Discharged, instructions and/or After Visit Summary given to patient/caregiver and instructions completed. []   3 Is this the Patient's First Visit to the [address] [place] [address] Road 
yes Is this Patient Established @ [facility] 
yes Clinical Level of Care Points  0-2  Level 1 [] Points  3-5  Level 2 [] Points  6-9  Level 3 [x] Points  10-12  Level 4 [] Points  13-15  Level 5 [] Electronically signed by Michell Pham RN on 10/31/2019 at 3:05 PM

## 2019-10-31 NOTE — PROGRESS NOTES
Podiatry Surgery Progress Note      Patient: Gerald Bashir MRN: [id number]  SSN: [id number]    YOB: 1950  Age: 81 y.o. Sex: male      Assessment:     Patient Active Problem List   Diagnosis Code    Diabetic foot ulcer ([facility] E11.621, P26.366    Diastolic dysfunction F94.69    Dyslipidemia E78.5    Cellulitis and abscess of foot, except toes L03.119, L02.619    Lymphedema of both lower extremities I89.0    CKD (chronic kidney disease) stage 3, GFR 30-59 ml/min ([facility]) N18.3    Morbid obesity with BMI of 70 and over, adult ([facility] E66.01, Z68.45    Erectile dysfunction N52.9    Hypothyroidism, adult E03.9    Hypogonadism in male E29.1    Borderline anemia D64.9    Idiopathic gout of multiple sites M10.09    Skin ulcer of ankle with fat layer exposed, right ([facility] L97.312    Non-pressure chronic ulcer of right calf with fat layer exposed ([facility] L97.212    Type 2 diabetes mellitus with diabetic neuropathy ([facility]) E11.40    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 ([facility]) L97.213    Puncture wound of multiple sites of right leg S81.831A    Cellulitis of leg without foot, right L03.115    Cellulitis of right lower extremity L03.115    Chronic diastolic HF (heart failure) ([facility]) I50.32          Plan:   Explained to patient the patient I will sign off on his wound care and have he follow Dr Nichole Myers' wound care orders. Received a fax from Dr Nichole Myers' office which states the dressing as wet to dry until next visit, which was placed today. Advised patient to keep all appointments with Dr Nichole Myers as scheduled. Will see the patient in the office for routine diabetic foot care as scheduled    Total time spent with patient: 30 [address] discussed with: Patient and Nursing Staff    Discussed:  Care Plan and D/C Planning    Disposition:  Stable      Mr. Kirt Burden is a 81 y.o. male who was admitted foradmitted  s/p I&D RLE and calf.  He states he saw Dr Nichole Myers who removed the vac and advised the patient to not use the device for at least a week. Patient states he is unaware of why Dr Que Fowler discontinued the vac.         Subjective:   Past Medical History  Past Medical History:   Diagnosis Date    Anemia of chronic disease     Arthritis     Chronic renal insufficiency     Diabetes ([facility]     Dyslipidemia     Edema     Gout     Gout     Hypertension     Lymphedema     [facility]bid obesity ([facility]      Social History     Socioeconomic History    Marital status:      Spouse name: Not on file    Number of children: Not on file    Years of education: Not on file    Highest education level: Not on file   Occupational History    Not on file   Social Needs    Financial resource strain: Not on file    Food insecurity:     Worry: Not on file     Inability: Not on file    Transportation needs:     Medical: Not on file     Non-medical: Not on file   Tobacco Use    Smoking status: Never Smoker    Smokeless tobacco: Never Used   Substance and Sexual Activity    Alcohol use: No    Drug use: No    Sexual activity: Not on file   Lifestyle    Physical activity:     Days per week: Not on file     Minutes per session: Not on file    Stress: Not on file   Relationships    Social connections:     Talks on phone: Not on file     Gets together: Not on file     Attends Islam service: Not on file     Active member of club or organization: Not on file     Attends meetings of clubs or organizations: Not on file     Relationship status: Not on file    Intimate partner violence:     Fear of current or ex partner: Not on file     Emotionally abused: Not on file     Physically abused: Not on file     Forced sexual activity: Not on file   Other Topics Concern    Not on file   Social History Narrative    Not on file       Current Medications  Current Outpatient Medications   Medication Sig Dispense Refill    diclofenac-miSOPROStol (ARTHROTEC 50)  mg-mcg per tablet TAKE 1 TABLET BY MOUTH TWICE A  Tab 0    valsartan (DIOVAN) 320 mg tablet TAKE 1 TABLET BY MOUTH EVERY DAY 90 Tab 3    amoxicillin-clavulanate (AUGMENTIN) 500-125 mg per tablet Take 1 Tab by mouth two (2) times a day. Indications: skin infection due to Klebsiella bacteria 20 Tab 0    levothyroxine (SYNTHROID) 50 mcg tablet TAKE 1 TABLET BY MOUTH EVERY DAY BEFORE BREAKFAST 90 Tab 0    ANDRODERM 4 mg/24 hr pt24 APPLY 1 PATCH TO SKIN DAILY 30 Patch 1    KLOR-CON M20 20 mEq tablet TAKE 1 TABLET BY MOUTH EVERY DAY 90 Tab 3    traMADol (ULTRAM) 50 mg tablet Take 50 mg by mouth every six (6) hours as needed for Pain.  traMADol (ULTRAM) 50 mg tablet Take 50 mg by mouth every six (6) hours as needed for Pain.  cholecalciferol (D3-50 CHOLECALCIFEROL) 50,000 unit capsule Take 50,000 Units by mouth every seven (7) days.  ferrous sulfate 325 mg (65 mg iron) tablet Take 325 mg by mouth daily.  methocarbamol (ROBAXIN-750) 750 mg tablet Take 1 Tab by mouth four (4) times daily. As needed for pain or muscle spasm 20 Tab 0    rosuvastatin (CRESTOR) 40 mg tablet TAKE 1 TABLET BY MOUTH NIGHTLY FOR 90 DAYS. 90 Tab 1    furosemide (LASIX) 80 mg tablet TAKE 1 TABLET BY MOUTH EVERY DAY 90 Tab 3    colchicine 0.6 mg tablet TAKE 1 TAB BY MOUTH DAILY AS NEEDED. (Patient taking differently: TAKE 1 TAB BY MOUTH DAILY AS NEEDED. Indications: acute inflammation of the joints due to gout attack, treatment to prevent acute gout attack) 90 Tab 3    vit b comp & c-fa-copper-zinc (FOLBEE PLUS) 5-1.5-25 mg tab Take 1 Tab by mouth daily. 90 Tab 3    levothyroxine (SYNTHROID) 50 mcg tablet TAKE 1 TAB BY MOUTH DAILY (BEFORE BREAKFAST) FOR 90 DAYS. 90 Tab 0    tadalafil (CIALIS) 20 mg tablet Take 1 Tab by mouth every thirty-six (36) hours. Indications: Erectile Dysfunction 12 Tab 5    dulaglutide (TRULICITY) 1.5 BQ/7.2 mL sub-q pen 0.5 ML BY SUBCUTANEOUS ROUTE EVERY SEVEN (7) DAYS.  7.5 Syringe 0    gabapentin (NEURONTIN) 300 mg capsule Take 300 mg by mouth nightly.  metoprolol (LOPRESSOR) 50 mg tablet Take 1 Tab by mouth every twelve (12) hours. 60 Tab 0    Fenofibrate 150 mg cap Take 145 mg by mouth daily. Patient Allergies  No Known Allergies       Objective:   General Exam  alert, cooperative, no distress, appears stated age    Vitals  There were no vitals taken for this visit. REVIEW OF SYSTEMS:  General: denies chronic fatigue, weight loss, fever, anemia, bruising, depression, nervousness, panic attacks  HEENT: denies ringing in ears, ear infections, dizzy spells, poor vision, glaucoma, sinus trouble, hoarseness, eye infections  GI: denies diarrhea, gas, bloating, heartburn, regurgitation, difficulty swallowing, painful swallowing, nausea, vomiting, constipation, abdominal pain, decreased appetite, blood in stools, black stools, jaundice, dark urine  Lungs: denies pneumonia, asthma, cough, SOB, hemoptysis  Heart: denies chest pain, irregular heart beat, ankle swelling, HTN  Skin: denies rashes, hives, allergic reaction  Urinary: denies UTI, kidney stones, decreased urine force and flow, urination at night, blood in urine, painful urination  Bones and Joints: denies arthritis, rheumatism, back pain, gout, osteoporosis  Neurologic: denies stroke, seizures, headaches, numbness, tingling    Foot Exam    Large amount of serous exudate noted lateral aspect of the RLE and posterior right calf. Vascular Exam:   Dorsalis Pedis and Posterior Tibial intact but diminished bilaterally. Skin temp warm to warm. Pedal hair is absent.  There are not varicosities present. There is severe lymphedema bilat.       Neurological Exam:   Light touch protective sensation is intact to both feet but decreased. There is some noted Loss of protective sensation. There are non reproducible paresthesias to bilateral lower extremities.  There are no Tinel's or 's signs present to the nerves crossing the ankle joint.      Musculoskeletal Exam:   Muscle tone is normal for age. There is equinus of the left limb. The muscle strength is 5/5 for the flexors, extensors, inverters, and everter's.     Dermatological Exam:   Skin is of abnormal texture and turgor with some atrophic skin changes noting absent hair growth, nail changes (thickening), pigmentary changes, skin texture (thin,shiney), skin color (rubor, red).   Bilateral. There is decrease xerosis. There is noted subungual debris.      Ulcer  Ulcer Location: R lower leg lateral, R lower leg posterior, Ulcer measurements: see below, Ulcer base: Mixed Granular/Fibrotic and Ulcer exudate:   Azul Scale: Stage 2    Wound Leg Lower Right;Lateral (Active)   Dressing Status  Removed 10/31/2019 11:29 AM   Dressing Type  Packing; Wet to dry 10/31/2019 11:29 AM   Non-Pressure Injury Full thickness (subcut/muscle) 10/31/2019 11:29 AM   Wound Length (cm) 3.2 cm 10/31/2019 11:29 AM   Wound Width (cm) 13 cm 10/31/2019 11:29 AM   Wound Depth (cm) 5.8 10/31/2019 11:29 AM   Wound Surface area (cm^2) 41.6 cm^2 10/31/2019 11:29 AM   Change in Wound Size % -804.35 10/31/2019 11:29 AM   Condition of Base Slough 10/31/2019 11:29 AM   Condition of Edges Open 1/3/2019 10:56 AM   Tissue Type Red 10/25/2018 12:08 PM   Tissue Type Percent Pink 50 10/31/2019 11:29 AM   Tissue Type Percent Red 90 10/17/2019  1:45 PM   Tissue Type Percent Yellow 50 10/31/2019 11:29 AM   Tissue Type Percent Other (comment) 100 10/25/2018 12:08 PM   Drainage Amount  Large 10/31/2019 11:29 AM   Drainage Color Serous 10/31/2019 11:29 AM   Wound Odor Strong 10/31/2019 11:29 AM   Periwound Skin Condition Edematous; Indurated 10/31/2019 11:29 AM   Cleansing and Cleansing Agents  Dermal wound cleanser 10/31/2019 11:29 AM   Dressing Type Applied Vacuum dressing 10/17/2019  1:45 PM   Number of Skin Staples Removed 5 10/18/2018  9:31 AM   Procedure Tolerated Well 10/11/2018  9:42 AM   Number of days: 637       Wound Leg lower Right;Posterior back of calf (Active)   Dressing Status Removed; Saturated 10/31/2019 11:29 AM   Dressing Type Foam;Gauze 10/31/2019 11:29 AM   Non-staged Wound Description Full thickness 10/31/2019 11:29 AM   Wound Length (cm) 0.8 cm 10/31/2019 11:29 AM   Wound Width (cm) 3.1 cm 10/31/2019 11:29 AM   Wound Depth (cm) 0.1 cm 10/31/2019 11:29 AM   Wound Surface Area (cm^2) 2.48 cm^2 10/31/2019 11:29 AM   Wound Volume (cm^3) 0.25 cm^3 10/31/2019 11:29 AM   Condition of Base [place] 10/31/2019 11:29 AM   Condition of Edges Open 10/17/2019  1:45 PM   Tissue Type Percent Pink 50 10/31/2019 11:29 AM   Tissue Type Percent Red 50 10/31/2019 11:29 AM   Tissue Type Percent Yellow 100 8/15/2019  3:42 PM   Drainage Amount Large 10/31/2019 11:29 AM   Drainage Color Serous 10/31/2019 11:29 AM   Wound Odor Strong 10/31/2019 11:29 AM   Stacie-wound Assessment Induration 10/31/2019 11:29 AM   Cleansing and Cleansing Agents  Dermal wound cleanser 10/31/2019 11:29 AM   Dressing Changed Changed/New 10/31/2019 11:29 AM   Dressing Type Applied Vacuum dressing 10/17/2019  1:45 PM   Number of days: 77       Wound Leg Medial;Right CALF (Active)   Number of days: 69       [REMOVED] Vacuum Assisted Closure Left Foot (Removed)   Number of days: 1011       [REMOVED] Wound Foot Left;Dorsal (Removed)   Dressing Status  Removed 4/9/2015  3:26 PM   Dressing Type  Other (Comment) 4/9/2015  3:26 PM   Incision Site Well Approximated No 4/9/2015  3:26 PM   Condition of Base Pink;Granulation 4/9/2015  3:26 PM   Condition of Edges Open 4/9/2015  3:26 PM   Tissue Type Red;Pink 4/9/2015  3:26 PM   Tissue Type Percent Pink 50 4/9/2015  3:26 PM   Tissue Type Percent Red 50 4/9/2015  3:26 PM   Drainage Amount  Small  4/9/2015  3:26 PM   Drainage Color Serosanguinous 4/9/2015  3:26 PM   Wound Odor None 4/9/2015  3:26 PM   Periwound Skin Condition Edematous; Other (comment) 4/9/2015  3:26 PM   Cleansing and Cleansing Agents  Dermal wound cleanser 4/9/2015  3:26 PM   Dressing Type Applied Other (Comment) 4/9/2015  3:26 PM   Procedure Tolerated Well 4/9/2015  3:26 PM   Number of days: 2694       [REMOVED] Wound Leg Lower Right; Anterior (Removed)   Dressing Status  Removed 7/19/2018  9:36 AM   Dressing Type  Other (Comment) 7/19/2018  9:36 AM   Non-Pressure Injury Partial thickness (epider/derm) 7/12/2018 11:40 AM   Wound Length (cm) 0 cm 8/2/2018  9:45 AM   Wound Width (cm) 0 cm 8/2/2018  9:45 AM   Wound Depth (cm) 0 8/2/2018  9:45 AM   Wound Surface area (cm^2) 0 cm^2 8/2/2018  9:45 AM   Condition of Base [place] 7/5/2018 10:50 AM   Condition of Edges Closed 8/2/2018  9:45 AM   Tissue Type Other (comment) 7/12/2018 11:40 AM   Tissue Type Percent Pink 100 7/5/2018 10:50 AM   Tissue Type Percent Red 100 5/23/2018 11:26 AM   Tissue Type Percent Yellow 20 6/7/2018 10:09 AM   Drainage Amount  None 7/19/2018  9:36 AM   Drainage Color Serous 7/12/2018 11:40 AM   Wound Odor None 7/19/2018  9:36 AM   Periwound Skin Condition Intact 8/2/2018  9:45 AM   Cleansing and Cleansing Agents  Dermal wound cleanser 7/19/2018  9:36 AM   Dressing Type Applied Other (Comment) 7/19/2018  9:36 AM   Number of Skin Staples Removed 6 6/28/2018  9:42 AM   Procedure Tolerated Well 7/19/2018  9:36 AM   Number of days: 189        Labs  No results found for this or any previous visit (from the past 24 hour(s)). X-Ray:  reviewed    Procedures:   Incision and drainage of right lower leg lateral ankle and posterior calf wounds with appropriate debridement. POD #[id number]               Faviola Stringer  October 31, 2019

## 2019-11-01 ENCOUNTER — HOME CARE VISIT (OUTPATIENT)
Dept: SCHEDULING | Facility: HOME HEALTH | Age: 69
End: 2019-11-01
Payer: MEDICARE

## 2019-11-01 VITALS
HEART RATE: 99 BPM | OXYGEN SATURATION: 94 % | TEMPERATURE: 97.8 F | SYSTOLIC BLOOD PRESSURE: 138 MMHG | DIASTOLIC BLOOD PRESSURE: 70 MMHG

## 2019-11-01 PROCEDURE — 3331090001 HH PPS REVENUE CREDIT

## 2019-11-01 PROCEDURE — 3331090002 HH PPS REVENUE DEBIT

## 2019-11-02 ENCOUNTER — HOME CARE VISIT (OUTPATIENT)
Dept: SCHEDULING | Facility: HOME HEALTH | Age: 69
End: 2019-11-02
Payer: MEDICARE

## 2019-11-02 PROCEDURE — 3331090001 HH PPS REVENUE CREDIT

## 2019-11-02 PROCEDURE — 3331090002 HH PPS REVENUE DEBIT

## 2019-11-02 PROCEDURE — G0299 HHS/HOSPICE OF RN EA 15 MIN: HCPCS

## 2019-11-03 PROCEDURE — 3331090002 HH PPS REVENUE DEBIT

## 2019-11-03 PROCEDURE — 3331090001 HH PPS REVENUE CREDIT

## 2019-11-04 VITALS
HEART RATE: 84 BPM | OXYGEN SATURATION: 97 % | DIASTOLIC BLOOD PRESSURE: 80 MMHG | SYSTOLIC BLOOD PRESSURE: 138 MMHG | TEMPERATURE: 97.8 F | RESPIRATION RATE: 16 BRPM

## 2019-11-04 PROCEDURE — 3331090001 HH PPS REVENUE CREDIT

## 2019-11-04 PROCEDURE — 3331090002 HH PPS REVENUE DEBIT

## 2019-11-05 ENCOUNTER — HOME CARE VISIT (OUTPATIENT)
Dept: SCHEDULING | Facility: HOME HEALTH | Age: 69
End: 2019-11-05
Payer: MEDICARE

## 2019-11-05 PROCEDURE — 3331090001 HH PPS REVENUE CREDIT

## 2019-11-05 PROCEDURE — G0299 HHS/HOSPICE OF RN EA 15 MIN: HCPCS

## 2019-11-05 PROCEDURE — 3331090002 HH PPS REVENUE DEBIT

## 2019-11-06 ENCOUNTER — HOME CARE VISIT (OUTPATIENT)
Dept: SCHEDULING | Facility: HOME HEALTH | Age: 69
End: 2019-11-06
Payer: MEDICARE

## 2019-11-06 VITALS
DIASTOLIC BLOOD PRESSURE: 86 MMHG | TEMPERATURE: 97.7 F | TEMPERATURE: 97.8 F | SYSTOLIC BLOOD PRESSURE: 130 MMHG | OXYGEN SATURATION: 94 % | HEART RATE: 74 BPM | DIASTOLIC BLOOD PRESSURE: 85 MMHG | HEART RATE: 120 BPM | SYSTOLIC BLOOD PRESSURE: 130 MMHG | OXYGEN SATURATION: 97 %

## 2019-11-06 PROCEDURE — A4452 WATERPROOF TAPE: HCPCS

## 2019-11-06 PROCEDURE — A6252 ABSORPT DRG >16 <=48 W/O BDR: HCPCS

## 2019-11-06 PROCEDURE — 3331090002 HH PPS REVENUE DEBIT

## 2019-11-06 PROCEDURE — A6446 CONFORM BAND S W>=3" <5"/YD: HCPCS

## 2019-11-06 PROCEDURE — 3331090001 HH PPS REVENUE CREDIT

## 2019-11-06 PROCEDURE — G0299 HHS/HOSPICE OF RN EA 15 MIN: HCPCS

## 2019-11-06 PROCEDURE — A6253 ABSORPT DRG > 48 SQ IN W/O B: HCPCS

## 2019-11-06 PROCEDURE — A6216 NON-STERILE GAUZE<=16 SQ IN: HCPCS

## 2019-11-07 PROCEDURE — 3331090001 HH PPS REVENUE CREDIT

## 2019-11-07 PROCEDURE — 3331090002 HH PPS REVENUE DEBIT

## 2019-11-08 ENCOUNTER — HOME CARE VISIT (OUTPATIENT)
Dept: SCHEDULING | Facility: HOME HEALTH | Age: 69
End: 2019-11-08
Payer: MEDICARE

## 2019-11-08 VITALS
TEMPERATURE: 97.8 F | SYSTOLIC BLOOD PRESSURE: 140 MMHG | DIASTOLIC BLOOD PRESSURE: 80 MMHG | OXYGEN SATURATION: 94 % | HEART RATE: 105 BPM

## 2019-11-08 PROCEDURE — 3331090001 HH PPS REVENUE CREDIT

## 2019-11-08 PROCEDURE — G0299 HHS/HOSPICE OF RN EA 15 MIN: HCPCS

## 2019-11-08 PROCEDURE — 3331090002 HH PPS REVENUE DEBIT

## 2019-11-09 PROCEDURE — 3331090002 HH PPS REVENUE DEBIT

## 2019-11-09 PROCEDURE — 3331090001 HH PPS REVENUE CREDIT

## 2019-11-10 PROCEDURE — 3331090001 HH PPS REVENUE CREDIT

## 2019-11-10 PROCEDURE — 3331090002 HH PPS REVENUE DEBIT

## 2019-11-11 ENCOUNTER — HOME CARE VISIT (OUTPATIENT)
Dept: SCHEDULING | Facility: HOME HEALTH | Age: 69
End: 2019-11-11
Payer: MEDICARE

## 2019-11-11 VITALS
DIASTOLIC BLOOD PRESSURE: 82 MMHG | HEART RATE: 62 BPM | SYSTOLIC BLOOD PRESSURE: 142 MMHG | OXYGEN SATURATION: 97 % | TEMPERATURE: 97.5 F

## 2019-11-11 PROCEDURE — G0299 HHS/HOSPICE OF RN EA 15 MIN: HCPCS

## 2019-11-11 PROCEDURE — 3331090001 HH PPS REVENUE CREDIT

## 2019-11-11 PROCEDURE — 3331090002 HH PPS REVENUE DEBIT

## 2019-11-12 PROCEDURE — 3331090002 HH PPS REVENUE DEBIT

## 2019-11-12 PROCEDURE — 3331090001 HH PPS REVENUE CREDIT

## 2019-11-13 ENCOUNTER — HOME CARE VISIT (OUTPATIENT)
Dept: SCHEDULING | Facility: HOME HEALTH | Age: 69
End: 2019-11-13
Payer: MEDICARE

## 2019-11-13 VITALS
OXYGEN SATURATION: 96 % | TEMPERATURE: 97.6 F | HEART RATE: 95 BPM | SYSTOLIC BLOOD PRESSURE: 150 MMHG | DIASTOLIC BLOOD PRESSURE: 78 MMHG

## 2019-11-13 PROCEDURE — G0299 HHS/HOSPICE OF RN EA 15 MIN: HCPCS

## 2019-11-13 PROCEDURE — 3331090002 HH PPS REVENUE DEBIT

## 2019-11-13 PROCEDURE — 3331090001 HH PPS REVENUE CREDIT

## 2019-11-14 PROCEDURE — A4649 SURGICAL SUPPLIES: HCPCS

## 2019-11-14 PROCEDURE — A6253 ABSORPT DRG > 48 SQ IN W/O B: HCPCS

## 2019-11-14 PROCEDURE — A4452 WATERPROOF TAPE: HCPCS

## 2019-11-14 PROCEDURE — A6216 NON-STERILE GAUZE<=16 SQ IN: HCPCS

## 2019-11-14 PROCEDURE — 3331090001 HH PPS REVENUE CREDIT

## 2019-11-14 PROCEDURE — 3331090002 HH PPS REVENUE DEBIT

## 2019-11-14 PROCEDURE — A6260 WOUND CLEANSER ANY TYPE/SIZE: HCPCS

## 2019-11-14 PROCEDURE — A6446 CONFORM BAND S W>=3" <5"/YD: HCPCS

## 2019-11-15 ENCOUNTER — HOME CARE VISIT (OUTPATIENT)
Dept: SCHEDULING | Facility: HOME HEALTH | Age: 69
End: 2019-11-15
Payer: MEDICARE

## 2019-11-15 VITALS
TEMPERATURE: 96.9 F | SYSTOLIC BLOOD PRESSURE: 140 MMHG | HEART RATE: 118 BPM | DIASTOLIC BLOOD PRESSURE: 70 MMHG | OXYGEN SATURATION: 95 %

## 2019-11-15 PROCEDURE — 3331090002 HH PPS REVENUE DEBIT

## 2019-11-15 PROCEDURE — G0299 HHS/HOSPICE OF RN EA 15 MIN: HCPCS

## 2019-11-15 PROCEDURE — 3331090001 HH PPS REVENUE CREDIT

## 2019-11-16 PROCEDURE — 3331090002 HH PPS REVENUE DEBIT

## 2019-11-16 PROCEDURE — 3331090001 HH PPS REVENUE CREDIT

## 2019-11-17 PROCEDURE — 3331090002 HH PPS REVENUE DEBIT

## 2019-11-17 PROCEDURE — 3331090001 HH PPS REVENUE CREDIT

## 2019-11-18 ENCOUNTER — HOME CARE VISIT (OUTPATIENT)
Dept: SCHEDULING | Facility: HOME HEALTH | Age: 69
End: 2019-11-18
Payer: MEDICARE

## 2019-11-18 VITALS
HEART RATE: 78 BPM | OXYGEN SATURATION: 97 % | SYSTOLIC BLOOD PRESSURE: 123 MMHG | RESPIRATION RATE: 16 BRPM | TEMPERATURE: 97.4 F | DIASTOLIC BLOOD PRESSURE: 63 MMHG

## 2019-11-18 PROCEDURE — G0299 HHS/HOSPICE OF RN EA 15 MIN: HCPCS

## 2019-11-18 PROCEDURE — 3331090002 HH PPS REVENUE DEBIT

## 2019-11-18 PROCEDURE — 3331090001 HH PPS REVENUE CREDIT

## 2019-11-19 PROCEDURE — 3331090001 HH PPS REVENUE CREDIT

## 2019-11-19 PROCEDURE — 3331090002 HH PPS REVENUE DEBIT

## 2019-11-20 ENCOUNTER — HOME CARE VISIT (OUTPATIENT)
Dept: SCHEDULING | Facility: HOME HEALTH | Age: 69
End: 2019-11-20
Payer: MEDICARE

## 2019-11-20 VITALS
TEMPERATURE: 97.9 F | RESPIRATION RATE: 14 BRPM | SYSTOLIC BLOOD PRESSURE: 130 MMHG | HEART RATE: 67 BPM | OXYGEN SATURATION: 98 % | DIASTOLIC BLOOD PRESSURE: 68 MMHG

## 2019-11-20 PROCEDURE — A4452 WATERPROOF TAPE: HCPCS

## 2019-11-20 PROCEDURE — A6253 ABSORPT DRG > 48 SQ IN W/O B: HCPCS

## 2019-11-20 PROCEDURE — 3331090002 HH PPS REVENUE DEBIT

## 2019-11-20 PROCEDURE — G0299 HHS/HOSPICE OF RN EA 15 MIN: HCPCS

## 2019-11-20 PROCEDURE — A6197 ALGINATE DRSG >16 <=48 SQ IN: HCPCS

## 2019-11-20 PROCEDURE — A6446 CONFORM BAND S W>=3" <5"/YD: HCPCS

## 2019-11-20 PROCEDURE — 3331090001 HH PPS REVENUE CREDIT

## 2019-11-20 PROCEDURE — A6260 WOUND CLEANSER ANY TYPE/SIZE: HCPCS

## 2019-11-20 PROCEDURE — A6212 FOAM DRG <=16 SQ IN W/BORDER: HCPCS

## 2019-11-20 PROCEDURE — A9270 NON-COVERED ITEM OR SERVICE: HCPCS

## 2019-11-21 PROCEDURE — 3331090002 HH PPS REVENUE DEBIT

## 2019-11-21 PROCEDURE — 3331090001 HH PPS REVENUE CREDIT

## 2019-11-22 ENCOUNTER — HOME CARE VISIT (OUTPATIENT)
Dept: SCHEDULING | Facility: HOME HEALTH | Age: 69
End: 2019-11-22
Payer: MEDICARE

## 2019-11-22 ENCOUNTER — OFFICE VISIT (OUTPATIENT)
Dept: FAMILY MEDICINE CLINIC | Age: 69
End: 2019-11-22

## 2019-11-22 VITALS
HEART RATE: 113 BPM | HEIGHT: 77 IN | OXYGEN SATURATION: 95 % | WEIGHT: 315 LBS | TEMPERATURE: 97.7 F | BODY MASS INDEX: 37.19 KG/M2 | SYSTOLIC BLOOD PRESSURE: 135 MMHG | DIASTOLIC BLOOD PRESSURE: 64 MMHG | RESPIRATION RATE: 18 BRPM

## 2019-11-22 DIAGNOSIS — E29.1 HYPOGONADISM IN MALE: ICD-10-CM

## 2019-11-22 DIAGNOSIS — L03.115 CELLULITIS OF RIGHT LOWER LEG: ICD-10-CM

## 2019-11-22 DIAGNOSIS — Z00.00 MEDICARE ANNUAL WELLNESS VISIT, SUBSEQUENT: ICD-10-CM

## 2019-11-22 DIAGNOSIS — I50.32 CHRONIC DIASTOLIC HF (HEART FAILURE) (HCC): ICD-10-CM

## 2019-11-22 DIAGNOSIS — E66.01 MORBIDLY OBESE (HCC): ICD-10-CM

## 2019-11-22 DIAGNOSIS — N18.30 CKD (CHRONIC KIDNEY DISEASE) STAGE 3, GFR 30-59 ML/MIN (HCC): ICD-10-CM

## 2019-11-22 DIAGNOSIS — I89.0 LYMPHEDEMA OF BOTH LOWER EXTREMITIES: ICD-10-CM

## 2019-11-22 DIAGNOSIS — Z09 HOSPITAL DISCHARGE FOLLOW-UP: Primary | ICD-10-CM

## 2019-11-22 PROCEDURE — G0299 HHS/HOSPICE OF RN EA 15 MIN: HCPCS

## 2019-11-22 PROCEDURE — 3331090001 HH PPS REVENUE CREDIT

## 2019-11-22 PROCEDURE — 3331090002 HH PPS REVENUE DEBIT

## 2019-11-22 NOTE — PROGRESS NOTES
Yael Fragoso presents today for   Chief Complaint   Patient presents with   [facility] Follow Up      Cellulitis - Judy Adames       Is someone accompanying this pt? no    Is the patient using any DME equipment during OV? no    Depression Screening:  3 most recent PHQ Screens 11/22/2019   Little interest or pleasure in doing things Not at all   Feeling down, depressed, irritable, or hopeless Not at all   Total Score PHQ 2 0       Learning Assessment:  Learning Assessment 7/14/2015   PRIMARY LEARNER Patient   HIGHEST LEVEL OF EDUCATION - PRIMARY LEARNER  GRADUATED HIGH SCHOOL OR GED   BARRIERS PRIMARY LEARNER NONE   CO-LEARNER CAREGIVER No   PRIMARY LANGUAGE ENGLISH   LEARNER PREFERENCE PRIMARY DEMONSTRATION   ANSWERED BY patient        Abuse Screening:  Abuse Screening Questionnaire 8/15/2019   Do you ever feel afraid of your partner? N   Are you in a relationship with someone who physically or mentally threatens you? N   Is it safe for you to go home? Y       Fall Risk  Fall Risk Assessment, last 12 mths 11/22/2019   Able to walk? Yes   Fall in past 12 months? No       Health Maintenance reviewed     Health Maintenance Due   Topic Date Due    Shingrix Vaccine Age 49> (1 of 2) 02/02/2002    EYE EXAM RETINAL OR DILATED  10/27/2017    Influenza Age 5 to Adult  08/01/2019    FOOT EXAM Q1  08/15/2019    MEDICARE YEARLY EXAM  08/16/2019    COLONOSCOPY  01/28/2020   . Coordination of Care:  1. Have you been to the ER, urgent care clinic since your last visit? Hospitalized since your last visit? Yes [facility]    2. Have you seen or consulted any other health care providers outside of the [address] [place] [address] since your last visit? Include any pap smears or colon screening.    Cellulitis    Last  Checked Pain Management  Last UDS Checked Pain Management  Last Pain contract signed: Pain Management

## 2019-11-22 NOTE — PATIENT INSTRUCTIONS
Medicare Wellness Visit, Male The best way to live healthy is to have a lifestyle where you eat a well-balanced diet, exercise regularly, limit alcohol use, and quit all forms of tobacco/nicotine, if applicable. Regular preventive services are another way to keep healthy. Preventive services (vaccines, screening tests, monitoring & exams) can help personalize your care plan, which helps you manage your own care. Screening tests can find health problems at the earliest stages, when they are easiest to treat. [facility] follows the current, evidence-based guidelines published by the [facility] Irvin Ricci ([facility]) when recommending preventive services for our patients. Because we follow these guidelines, sometimes recommendations change over time as research supports it. (For example, a prostate screening blood test is no longer routinely recommended for men with no symptoms). Of course, you and your doctor may decide to screen more often for some diseases, based on your risk and co-morbidities (chronic disease you are already diagnosed with). Preventive services for you include: - Medicare offers their members a free annual wellness visit, which is time for you and your primary care provider to discuss and plan for your preventive service needs. Take advantage of this benefit every year! 
-All adults over age 72 should receive the recommended pneumonia vaccines. Current USPSTF guidelines recommend a series of two vaccines for the best pneumonia protection.  
-All adults should have a flu vaccine yearly and tetanus vaccine every 10 years. 
-All adults age 48 and older should receive the shingles vaccines (series of two vaccines).       
-All adults age 38-68 who are overweight should have a diabetes screening test once every three years.  
-Other screening tests & preventive services for persons with diabetes include: an eye exam to screen for diabetic retinopathy, a kidney function test, a foot exam, and stricter control over your cholesterol.  
-Cardiovascular screening for adults with routine risk involves an electrocardiogram (ECG) at intervals determined by the provider.  
-Colorectal cancer screening should be done for adults age 54-65 with no increased risk factors for colorectal cancer. There are a number of acceptable methods of screening for this type of cancer. Each test has its own benefits and drawbacks. Discuss with your provider what is most appropriate for you during your annual wellness visit. The different tests include: colonoscopy (considered the best screening method), a fecal occult blood test, a fecal DNA test, and sigmoidoscopy. 
-All adults born between [facility] should be screened once for Hepatitis C. 
-An Abdominal Aortic Aneurysm (AAA) Screening is recommended for men age 73-68 who has ever smoked in their lifetime. Here is a list of your current Health Maintenance items (your personalized list of preventive services) with a due date: 
Health Maintenance Due Topic Date Due [facility] Eye Exam  10/27/2017 [facility] Diabetic Foot Care  08/15/2019  Colonoscopy  01/28/2020

## 2019-11-23 VITALS
TEMPERATURE: 97.4 F | OXYGEN SATURATION: 98 % | HEART RATE: 71 BPM | SYSTOLIC BLOOD PRESSURE: 138 MMHG | DIASTOLIC BLOOD PRESSURE: 80 MMHG

## 2019-11-23 PROCEDURE — 3331090001 HH PPS REVENUE CREDIT

## 2019-11-23 PROCEDURE — 3331090002 HH PPS REVENUE DEBIT

## 2019-11-24 PROCEDURE — 3331090001 HH PPS REVENUE CREDIT

## 2019-11-24 PROCEDURE — 3331090002 HH PPS REVENUE DEBIT

## 2019-11-25 ENCOUNTER — HOME CARE VISIT (OUTPATIENT)
Dept: SCHEDULING | Facility: HOME HEALTH | Age: 69
End: 2019-11-25
Payer: MEDICARE

## 2019-11-25 VITALS
OXYGEN SATURATION: 95 % | SYSTOLIC BLOOD PRESSURE: 140 MMHG | TEMPERATURE: 97.3 F | HEART RATE: 93 BPM | DIASTOLIC BLOOD PRESSURE: 82 MMHG

## 2019-11-25 DIAGNOSIS — E29.1 HYPOGONADISM IN MALE: ICD-10-CM

## 2019-11-25 LAB
TESTOST FREE SERPL-MCNC: 4.3 PG/ML (ref 6.6–18.1)
TESTOST SERPL-MCNC: 83 NG/DL (ref 264–916)

## 2019-11-25 PROCEDURE — 3331090001 HH PPS REVENUE CREDIT

## 2019-11-25 PROCEDURE — 3331090002 HH PPS REVENUE DEBIT

## 2019-11-25 PROCEDURE — G0299 HHS/HOSPICE OF RN EA 15 MIN: HCPCS

## 2019-11-26 PROCEDURE — 3331090001 HH PPS REVENUE CREDIT

## 2019-11-26 PROCEDURE — 3331090002 HH PPS REVENUE DEBIT

## 2019-11-26 NOTE — TELEPHONE ENCOUNTER
Called patient and spoke to him in regards to his blood work and he is aware. At this point I have advised him to stop androderm and he is in agreement with this. He is aware that for the last 4 years his levels are not in range. He also wanted me to speak to his wife in regards to a question which he could not answer to her. His wife asked me why his finger stick blood sugar was not checked regularly each time he comes to the visit and I have advised last visit was a hospital follow up and it is the job of the patient to check his blood sugar at least 2 to 3 times at home which he does not do. I have advised as per his last hemoglobin a1c that is how I determine his medical treatement. She  was upset that his finger stick blood sugar was not checked at the last visit and I have explained to  Both wife and patient that that is the job of the patient to check blood sugars at home. I can not change treatment based on one reading which is the reason why I emphasized at the last visit he check his blood sugars at home. He himself is not compliant with this. His wife got upset and said to Mr. Pastrana \"you need a new doctor\" After this she said thank you and so did patient. I also mentioned to them have a good day and they hung up the phone.

## 2019-11-27 ENCOUNTER — HOME CARE VISIT (OUTPATIENT)
Dept: SCHEDULING | Facility: HOME HEALTH | Age: 69
End: 2019-11-27
Payer: MEDICARE

## 2019-11-27 PROCEDURE — A6216 NON-STERILE GAUZE<=16 SQ IN: HCPCS

## 2019-11-27 PROCEDURE — A4452 WATERPROOF TAPE: HCPCS

## 2019-11-27 PROCEDURE — 3331090002 HH PPS REVENUE DEBIT

## 2019-11-27 PROCEDURE — G0299 HHS/HOSPICE OF RN EA 15 MIN: HCPCS

## 2019-11-27 PROCEDURE — 3331090001 HH PPS REVENUE CREDIT

## 2019-11-27 PROCEDURE — A4927 NON-STERILE GLOVES: HCPCS

## 2019-11-28 VITALS
HEART RATE: 76 BPM | DIASTOLIC BLOOD PRESSURE: 70 MMHG | OXYGEN SATURATION: 95 % | SYSTOLIC BLOOD PRESSURE: 130 MMHG | TEMPERATURE: 98.7 F

## 2019-11-28 PROCEDURE — 3331090002 HH PPS REVENUE DEBIT

## 2019-11-28 PROCEDURE — 3331090001 HH PPS REVENUE CREDIT

## 2019-11-29 ENCOUNTER — HOME CARE VISIT (OUTPATIENT)
Dept: SCHEDULING | Facility: HOME HEALTH | Age: 69
End: 2019-11-29
Payer: MEDICARE

## 2019-11-29 VITALS
OXYGEN SATURATION: 95 % | TEMPERATURE: 99 F | SYSTOLIC BLOOD PRESSURE: 140 MMHG | DIASTOLIC BLOOD PRESSURE: 82 MMHG | HEART RATE: 103 BPM

## 2019-11-29 PROCEDURE — G0299 HHS/HOSPICE OF RN EA 15 MIN: HCPCS

## 2019-11-29 PROCEDURE — 3331090001 HH PPS REVENUE CREDIT

## 2019-11-29 PROCEDURE — 3331090002 HH PPS REVENUE DEBIT

## 2019-11-30 PROCEDURE — 3331090002 HH PPS REVENUE DEBIT

## 2019-11-30 PROCEDURE — 3331090001 HH PPS REVENUE CREDIT

## 2019-12-01 PROCEDURE — 3331090001 HH PPS REVENUE CREDIT

## 2019-12-01 PROCEDURE — 3331090002 HH PPS REVENUE DEBIT

## 2019-12-02 ENCOUNTER — HOME CARE VISIT (OUTPATIENT)
Dept: SCHEDULING | Facility: HOME HEALTH | Age: 69
End: 2019-12-02
Payer: MEDICARE

## 2019-12-02 VITALS
HEART RATE: 85 BPM | DIASTOLIC BLOOD PRESSURE: 80 MMHG | SYSTOLIC BLOOD PRESSURE: 132 MMHG | TEMPERATURE: 97.8 F | OXYGEN SATURATION: 95 %

## 2019-12-02 PROCEDURE — A6253 ABSORPT DRG > 48 SQ IN W/O B: HCPCS

## 2019-12-02 PROCEDURE — G0299 HHS/HOSPICE OF RN EA 15 MIN: HCPCS

## 2019-12-02 PROCEDURE — 3331090001 HH PPS REVENUE CREDIT

## 2019-12-02 PROCEDURE — 3331090002 HH PPS REVENUE DEBIT

## 2019-12-03 PROCEDURE — 3331090002 HH PPS REVENUE DEBIT

## 2019-12-03 PROCEDURE — 3331090001 HH PPS REVENUE CREDIT

## 2019-12-03 RX ORDER — FOLIC ACID/VIT BCOMP,C/CU/ZINC 5-1.5-25MG
TABLET ORAL
Qty: 90 TAB | Refills: 3 | Status: SHIPPED | OUTPATIENT
Start: 2019-12-03

## 2019-12-04 ENCOUNTER — HOME CARE VISIT (OUTPATIENT)
Dept: SCHEDULING | Facility: HOME HEALTH | Age: 69
End: 2019-12-04
Payer: MEDICARE

## 2019-12-04 VITALS
TEMPERATURE: 97.2 F | DIASTOLIC BLOOD PRESSURE: 80 MMHG | SYSTOLIC BLOOD PRESSURE: 136 MMHG | OXYGEN SATURATION: 94 % | HEART RATE: 66 BPM

## 2019-12-04 PROCEDURE — G0299 HHS/HOSPICE OF RN EA 15 MIN: HCPCS

## 2019-12-04 PROCEDURE — 3331090002 HH PPS REVENUE DEBIT

## 2019-12-04 PROCEDURE — A6253 ABSORPT DRG > 48 SQ IN W/O B: HCPCS

## 2019-12-04 PROCEDURE — 3331090001 HH PPS REVENUE CREDIT

## 2019-12-04 PROCEDURE — A4649 SURGICAL SUPPLIES: HCPCS

## 2019-12-04 PROCEDURE — A6446 CONFORM BAND S W>=3" <5"/YD: HCPCS

## 2019-12-05 PROCEDURE — 3331090002 HH PPS REVENUE DEBIT

## 2019-12-05 PROCEDURE — 3331090001 HH PPS REVENUE CREDIT

## 2019-12-06 ENCOUNTER — HOME CARE VISIT (OUTPATIENT)
Dept: SCHEDULING | Facility: HOME HEALTH | Age: 69
End: 2019-12-06
Payer: MEDICARE

## 2019-12-06 VITALS
OXYGEN SATURATION: 94 % | SYSTOLIC BLOOD PRESSURE: 135 MMHG | RESPIRATION RATE: 14 BRPM | HEART RATE: 68 BPM | DIASTOLIC BLOOD PRESSURE: 72 MMHG | TEMPERATURE: 98 F

## 2019-12-06 PROCEDURE — G0299 HHS/HOSPICE OF RN EA 15 MIN: HCPCS

## 2019-12-06 PROCEDURE — 3331090001 HH PPS REVENUE CREDIT

## 2019-12-06 PROCEDURE — 3331090002 HH PPS REVENUE DEBIT

## 2019-12-07 PROCEDURE — 3331090002 HH PPS REVENUE DEBIT

## 2019-12-07 PROCEDURE — 3331090001 HH PPS REVENUE CREDIT

## 2019-12-08 PROCEDURE — 3331090002 HH PPS REVENUE DEBIT

## 2019-12-08 PROCEDURE — A4452 WATERPROOF TAPE: HCPCS

## 2019-12-08 PROCEDURE — A5120 SKIN BARRIER, WIPE OR SWAB: HCPCS

## 2019-12-08 PROCEDURE — 3331090001 HH PPS REVENUE CREDIT

## 2019-12-08 RX ORDER — DICLOFENAC SODIUM AND MISOPROSTOL 50; 200 MG/1; UG/1
TABLET, DELAYED RELEASE ORAL
Qty: 180 TAB | Refills: 0 | Status: SHIPPED | OUTPATIENT
Start: 2019-12-08

## 2019-12-09 ENCOUNTER — HOME CARE VISIT (OUTPATIENT)
Dept: SCHEDULING | Facility: HOME HEALTH | Age: 69
End: 2019-12-09
Payer: MEDICARE

## 2019-12-09 PROCEDURE — 3331090002 HH PPS REVENUE DEBIT

## 2019-12-09 PROCEDURE — G0299 HHS/HOSPICE OF RN EA 15 MIN: HCPCS

## 2019-12-09 PROCEDURE — 3331090001 HH PPS REVENUE CREDIT

## 2019-12-10 VITALS
SYSTOLIC BLOOD PRESSURE: 142 MMHG | DIASTOLIC BLOOD PRESSURE: 88 MMHG | TEMPERATURE: 95.8 F | OXYGEN SATURATION: 95 % | HEART RATE: 71 BPM

## 2019-12-10 PROCEDURE — 3331090001 HH PPS REVENUE CREDIT

## 2019-12-10 PROCEDURE — 3331090002 HH PPS REVENUE DEBIT

## 2019-12-11 ENCOUNTER — HOME CARE VISIT (OUTPATIENT)
Dept: SCHEDULING | Facility: HOME HEALTH | Age: 69
End: 2019-12-11
Payer: MEDICARE

## 2019-12-11 PROCEDURE — 3331090002 HH PPS REVENUE DEBIT

## 2019-12-11 PROCEDURE — G0299 HHS/HOSPICE OF RN EA 15 MIN: HCPCS

## 2019-12-11 PROCEDURE — 3331090001 HH PPS REVENUE CREDIT

## 2019-12-12 VITALS
HEART RATE: 69 BPM | SYSTOLIC BLOOD PRESSURE: 142 MMHG | OXYGEN SATURATION: 94 % | TEMPERATURE: 97.5 F | DIASTOLIC BLOOD PRESSURE: 80 MMHG

## 2019-12-12 PROCEDURE — 3331090002 HH PPS REVENUE DEBIT

## 2019-12-12 PROCEDURE — 3331090001 HH PPS REVENUE CREDIT

## 2019-12-12 RX ORDER — DULAGLUTIDE 1.5 MG/.5ML
INJECTION, SOLUTION SUBCUTANEOUS
Qty: 6 SYRINGE | Refills: 3 | Status: SHIPPED | OUTPATIENT
Start: 2019-12-12

## 2019-12-13 ENCOUNTER — HOME CARE VISIT (OUTPATIENT)
Dept: SCHEDULING | Facility: HOME HEALTH | Age: 69
End: 2019-12-13
Payer: MEDICARE

## 2019-12-13 PROCEDURE — 3331090001 HH PPS REVENUE CREDIT

## 2019-12-13 PROCEDURE — G0299 HHS/HOSPICE OF RN EA 15 MIN: HCPCS

## 2019-12-13 PROCEDURE — 3331090002 HH PPS REVENUE DEBIT

## 2019-12-14 PROCEDURE — 3331090002 HH PPS REVENUE DEBIT

## 2019-12-14 PROCEDURE — 3331090001 HH PPS REVENUE CREDIT

## 2019-12-15 VITALS
DIASTOLIC BLOOD PRESSURE: 70 MMHG | TEMPERATURE: 98.9 F | HEART RATE: 73 BPM | RESPIRATION RATE: 14 BRPM | OXYGEN SATURATION: 94 % | SYSTOLIC BLOOD PRESSURE: 138 MMHG

## 2019-12-15 PROCEDURE — 3331090002 HH PPS REVENUE DEBIT

## 2019-12-15 PROCEDURE — 3331090001 HH PPS REVENUE CREDIT

## 2019-12-16 ENCOUNTER — TELEPHONE (OUTPATIENT)
Dept: FAMILY MEDICINE CLINIC | Age: 69
End: 2019-12-16

## 2019-12-16 ENCOUNTER — HOME CARE VISIT (OUTPATIENT)
Dept: SCHEDULING | Facility: HOME HEALTH | Age: 69
End: 2019-12-16
Payer: MEDICARE

## 2019-12-16 PROCEDURE — G0299 HHS/HOSPICE OF RN EA 15 MIN: HCPCS

## 2019-12-16 PROCEDURE — 3331090002 HH PPS REVENUE DEBIT

## 2019-12-16 PROCEDURE — 3331090001 HH PPS REVENUE CREDIT

## 2019-12-17 VITALS
DIASTOLIC BLOOD PRESSURE: 90 MMHG | TEMPERATURE: 97 F | OXYGEN SATURATION: 96 % | SYSTOLIC BLOOD PRESSURE: 150 MMHG | HEART RATE: 68 BPM

## 2019-12-17 PROCEDURE — 3331090002 HH PPS REVENUE DEBIT

## 2019-12-17 PROCEDURE — 3331090001 HH PPS REVENUE CREDIT

## 2019-12-18 ENCOUNTER — HOME CARE VISIT (OUTPATIENT)
Dept: SCHEDULING | Facility: HOME HEALTH | Age: 69
End: 2019-12-18
Payer: MEDICARE

## 2019-12-18 PROCEDURE — A4452 WATERPROOF TAPE: HCPCS

## 2019-12-18 PROCEDURE — G0299 HHS/HOSPICE OF RN EA 15 MIN: HCPCS

## 2019-12-18 PROCEDURE — A6253 ABSORPT DRG > 48 SQ IN W/O B: HCPCS

## 2019-12-18 PROCEDURE — A6449 LT COMPRES BAND >=3" <5"/YD: HCPCS

## 2019-12-18 PROCEDURE — A6260 WOUND CLEANSER ANY TYPE/SIZE: HCPCS

## 2019-12-18 PROCEDURE — A6446 CONFORM BAND S W>=3" <5"/YD: HCPCS

## 2019-12-18 PROCEDURE — 3331090001 HH PPS REVENUE CREDIT

## 2019-12-18 PROCEDURE — A6216 NON-STERILE GAUZE<=16 SQ IN: HCPCS

## 2019-12-18 PROCEDURE — 3331090002 HH PPS REVENUE DEBIT

## 2019-12-19 VITALS
OXYGEN SATURATION: 96 % | HEART RATE: 75 BPM | TEMPERATURE: 98 F | RESPIRATION RATE: 14 BRPM | SYSTOLIC BLOOD PRESSURE: 130 MMHG | DIASTOLIC BLOOD PRESSURE: 82 MMHG

## 2019-12-19 PROCEDURE — 3331090002 HH PPS REVENUE DEBIT

## 2019-12-19 PROCEDURE — 3331090001 HH PPS REVENUE CREDIT

## 2019-12-20 ENCOUNTER — HOME CARE VISIT (OUTPATIENT)
Dept: SCHEDULING | Facility: HOME HEALTH | Age: 69
End: 2019-12-20
Payer: MEDICARE

## 2019-12-20 PROCEDURE — 3331090002 HH PPS REVENUE DEBIT

## 2019-12-20 PROCEDURE — G0299 HHS/HOSPICE OF RN EA 15 MIN: HCPCS

## 2019-12-20 PROCEDURE — 3331090001 HH PPS REVENUE CREDIT

## 2019-12-21 PROCEDURE — 3331090002 HH PPS REVENUE DEBIT

## 2019-12-21 PROCEDURE — 3331090001 HH PPS REVENUE CREDIT

## 2019-12-22 PROCEDURE — 3331090002 HH PPS REVENUE DEBIT

## 2019-12-22 PROCEDURE — 3331090001 HH PPS REVENUE CREDIT

## 2019-12-23 ENCOUNTER — HOME CARE VISIT (OUTPATIENT)
Dept: SCHEDULING | Facility: HOME HEALTH | Age: 69
End: 2019-12-23
Payer: MEDICARE

## 2019-12-23 VITALS
RESPIRATION RATE: 14 BRPM | TEMPERATURE: 98 F | OXYGEN SATURATION: 97 % | SYSTOLIC BLOOD PRESSURE: 138 MMHG | HEART RATE: 100 BPM | DIASTOLIC BLOOD PRESSURE: 82 MMHG

## 2019-12-23 PROCEDURE — G0299 HHS/HOSPICE OF RN EA 15 MIN: HCPCS

## 2019-12-23 PROCEDURE — 3331090002 HH PPS REVENUE DEBIT

## 2019-12-23 PROCEDURE — 3331090001 HH PPS REVENUE CREDIT

## 2019-12-24 PROCEDURE — 3331090001 HH PPS REVENUE CREDIT

## 2019-12-24 PROCEDURE — 3331090002 HH PPS REVENUE DEBIT

## 2019-12-25 PROCEDURE — 3331090002 HH PPS REVENUE DEBIT

## 2019-12-25 PROCEDURE — 3331090001 HH PPS REVENUE CREDIT

## 2019-12-26 ENCOUNTER — HOME CARE VISIT (OUTPATIENT)
Dept: SCHEDULING | Facility: HOME HEALTH | Age: 69
End: 2019-12-26
Payer: MEDICARE

## 2019-12-26 PROCEDURE — 3331090002 HH PPS REVENUE DEBIT

## 2019-12-26 PROCEDURE — G0299 HHS/HOSPICE OF RN EA 15 MIN: HCPCS

## 2019-12-26 PROCEDURE — 3331090001 HH PPS REVENUE CREDIT

## 2019-12-27 ENCOUNTER — HOME CARE VISIT (OUTPATIENT)
Dept: SCHEDULING | Facility: HOME HEALTH | Age: 69
End: 2019-12-27
Payer: MEDICARE

## 2019-12-27 VITALS
OXYGEN SATURATION: 97 % | SYSTOLIC BLOOD PRESSURE: 132 MMHG | DIASTOLIC BLOOD PRESSURE: 80 MMHG | HEART RATE: 77 BPM | TEMPERATURE: 98 F | RESPIRATION RATE: 14 BRPM

## 2019-12-27 PROCEDURE — 3331090002 HH PPS REVENUE DEBIT

## 2019-12-27 PROCEDURE — A6197 ALGINATE DRSG >16 <=48 SQ IN: HCPCS

## 2019-12-27 PROCEDURE — 3331090001 HH PPS REVENUE CREDIT

## 2019-12-27 PROCEDURE — A4452 WATERPROOF TAPE: HCPCS

## 2019-12-27 PROCEDURE — A4927 NON-STERILE GLOVES: HCPCS

## 2019-12-28 PROCEDURE — 3331090001 HH PPS REVENUE CREDIT

## 2019-12-28 PROCEDURE — 3331090002 HH PPS REVENUE DEBIT

## 2019-12-29 PROCEDURE — 3331090002 HH PPS REVENUE DEBIT

## 2019-12-29 PROCEDURE — 3331090001 HH PPS REVENUE CREDIT

## 2019-12-30 ENCOUNTER — HOME CARE VISIT (OUTPATIENT)
Dept: SCHEDULING | Facility: HOME HEALTH | Age: 69
End: 2019-12-30
Payer: MEDICARE

## 2019-12-30 VITALS
OXYGEN SATURATION: 95 % | SYSTOLIC BLOOD PRESSURE: 138 MMHG | DIASTOLIC BLOOD PRESSURE: 82 MMHG | TEMPERATURE: 98.2 F | HEART RATE: 88 BPM

## 2019-12-30 PROCEDURE — 3331090002 HH PPS REVENUE DEBIT

## 2019-12-30 PROCEDURE — 3331090001 HH PPS REVENUE CREDIT

## 2019-12-30 PROCEDURE — G0299 HHS/HOSPICE OF RN EA 15 MIN: HCPCS

## 2019-12-30 PROCEDURE — 400014 HH F/U

## 2019-12-31 PROCEDURE — 3331090002 HH PPS REVENUE DEBIT

## 2019-12-31 PROCEDURE — 3331090001 HH PPS REVENUE CREDIT

## 2020-01-01 ENCOUNTER — HOME CARE VISIT (OUTPATIENT)
Dept: SCHEDULING | Facility: HOME HEALTH | Age: 70
End: 2020-01-01
Payer: MEDICARE

## 2020-01-01 VITALS
RESPIRATION RATE: 16 BRPM | SYSTOLIC BLOOD PRESSURE: 142 MMHG | DIASTOLIC BLOOD PRESSURE: 80 MMHG | TEMPERATURE: 96.9 F | OXYGEN SATURATION: 96 % | HEART RATE: 58 BPM

## 2020-01-01 VITALS
HEART RATE: 58 BPM | TEMPERATURE: 96.9 F | RESPIRATION RATE: 14 BRPM | SYSTOLIC BLOOD PRESSURE: 142 MMHG | OXYGEN SATURATION: 96 % | DIASTOLIC BLOOD PRESSURE: 80 MMHG

## 2020-01-01 PROCEDURE — G0299 HHS/HOSPICE OF RN EA 15 MIN: HCPCS

## 2020-01-01 PROCEDURE — 3331090001 HH PPS REVENUE CREDIT

## 2020-01-01 PROCEDURE — 3331090002 HH PPS REVENUE DEBIT

## 2020-01-02 PROCEDURE — A6197 ALGINATE DRSG >16 <=48 SQ IN: HCPCS

## 2020-01-02 PROCEDURE — 3331090002 HH PPS REVENUE DEBIT

## 2020-01-02 PROCEDURE — A4452 WATERPROOF TAPE: HCPCS

## 2020-01-02 PROCEDURE — 3331090001 HH PPS REVENUE CREDIT

## 2020-01-02 PROCEDURE — A6446 CONFORM BAND S W>=3" <5"/YD: HCPCS

## 2020-01-03 ENCOUNTER — HOME CARE VISIT (OUTPATIENT)
Dept: SCHEDULING | Facility: HOME HEALTH | Age: 70
End: 2020-01-03
Payer: MEDICARE

## 2020-01-03 VITALS
TEMPERATURE: 98 F | RESPIRATION RATE: 14 BRPM | OXYGEN SATURATION: 96 % | HEART RATE: 67 BPM | SYSTOLIC BLOOD PRESSURE: 130 MMHG | DIASTOLIC BLOOD PRESSURE: 80 MMHG

## 2020-01-03 PROCEDURE — G0299 HHS/HOSPICE OF RN EA 15 MIN: HCPCS

## 2020-01-03 PROCEDURE — 3331090002 HH PPS REVENUE DEBIT

## 2020-01-03 PROCEDURE — 3331090001 HH PPS REVENUE CREDIT

## 2020-01-04 PROCEDURE — 3331090002 HH PPS REVENUE DEBIT

## 2020-01-04 PROCEDURE — 3331090001 HH PPS REVENUE CREDIT

## 2020-01-05 PROCEDURE — 3331090001 HH PPS REVENUE CREDIT

## 2020-01-05 PROCEDURE — 3331090002 HH PPS REVENUE DEBIT

## 2020-01-06 ENCOUNTER — HOME CARE VISIT (OUTPATIENT)
Dept: SCHEDULING | Facility: HOME HEALTH | Age: 70
End: 2020-01-06
Payer: MEDICARE

## 2020-01-06 VITALS
HEART RATE: 72 BPM | TEMPERATURE: 97.6 F | OXYGEN SATURATION: 95 % | SYSTOLIC BLOOD PRESSURE: 138 MMHG | DIASTOLIC BLOOD PRESSURE: 82 MMHG | RESPIRATION RATE: 14 BRPM

## 2020-01-06 PROCEDURE — 3331090001 HH PPS REVENUE CREDIT

## 2020-01-06 PROCEDURE — G0299 HHS/HOSPICE OF RN EA 15 MIN: HCPCS

## 2020-01-06 PROCEDURE — 3331090002 HH PPS REVENUE DEBIT

## 2020-01-07 PROCEDURE — 3331090001 HH PPS REVENUE CREDIT

## 2020-01-07 PROCEDURE — 3331090002 HH PPS REVENUE DEBIT

## 2020-01-08 ENCOUNTER — HOME CARE VISIT (OUTPATIENT)
Dept: SCHEDULING | Facility: HOME HEALTH | Age: 70
End: 2020-01-08
Payer: MEDICARE

## 2020-01-08 VITALS
RESPIRATION RATE: 14 BRPM | OXYGEN SATURATION: 94 % | SYSTOLIC BLOOD PRESSURE: 132 MMHG | DIASTOLIC BLOOD PRESSURE: 84 MMHG | HEART RATE: 90 BPM | TEMPERATURE: 97.3 F

## 2020-01-08 PROCEDURE — G0299 HHS/HOSPICE OF RN EA 15 MIN: HCPCS

## 2020-01-08 PROCEDURE — 3331090002 HH PPS REVENUE DEBIT

## 2020-01-08 PROCEDURE — 3331090001 HH PPS REVENUE CREDIT

## 2020-01-09 PROCEDURE — 3331090001 HH PPS REVENUE CREDIT

## 2020-01-09 PROCEDURE — 3331090002 HH PPS REVENUE DEBIT

## 2020-01-10 ENCOUNTER — HOME CARE VISIT (OUTPATIENT)
Dept: SCHEDULING | Facility: HOME HEALTH | Age: 70
End: 2020-01-10
Payer: MEDICARE

## 2020-01-10 PROCEDURE — 3331090001 HH PPS REVENUE CREDIT

## 2020-01-10 PROCEDURE — 3331090002 HH PPS REVENUE DEBIT

## 2020-01-11 PROCEDURE — 3331090001 HH PPS REVENUE CREDIT

## 2020-01-11 PROCEDURE — 3331090002 HH PPS REVENUE DEBIT

## 2020-01-12 PROCEDURE — 3331090001 HH PPS REVENUE CREDIT

## 2020-01-12 PROCEDURE — 3331090002 HH PPS REVENUE DEBIT

## 2020-01-13 ENCOUNTER — HOME CARE VISIT (OUTPATIENT)
Dept: SCHEDULING | Facility: HOME HEALTH | Age: 70
End: 2020-01-13
Payer: MEDICARE

## 2020-01-13 VITALS
TEMPERATURE: 96.9 F | SYSTOLIC BLOOD PRESSURE: 128 MMHG | OXYGEN SATURATION: 96 % | HEART RATE: 76 BPM | DIASTOLIC BLOOD PRESSURE: 70 MMHG | RESPIRATION RATE: 14 BRPM

## 2020-01-13 PROCEDURE — A4452 WATERPROOF TAPE: HCPCS

## 2020-01-13 PROCEDURE — 3331090001 HH PPS REVENUE CREDIT

## 2020-01-13 PROCEDURE — A6216 NON-STERILE GAUZE<=16 SQ IN: HCPCS

## 2020-01-13 PROCEDURE — A6197 ALGINATE DRSG >16 <=48 SQ IN: HCPCS

## 2020-01-13 PROCEDURE — 3331090002 HH PPS REVENUE DEBIT

## 2020-01-13 PROCEDURE — G0299 HHS/HOSPICE OF RN EA 15 MIN: HCPCS

## 2020-01-14 PROCEDURE — 3331090002 HH PPS REVENUE DEBIT

## 2020-01-14 PROCEDURE — 3331090001 HH PPS REVENUE CREDIT

## 2020-01-15 ENCOUNTER — HOME CARE VISIT (OUTPATIENT)
Dept: SCHEDULING | Facility: HOME HEALTH | Age: 70
End: 2020-01-15
Payer: MEDICARE

## 2020-01-15 VITALS
SYSTOLIC BLOOD PRESSURE: 128 MMHG | OXYGEN SATURATION: 97 % | HEART RATE: 68 BPM | DIASTOLIC BLOOD PRESSURE: 78 MMHG | RESPIRATION RATE: 14 BRPM | TEMPERATURE: 97.4 F

## 2020-01-15 PROCEDURE — A6446 CONFORM BAND S W>=3" <5"/YD: HCPCS

## 2020-01-15 PROCEDURE — A4452 WATERPROOF TAPE: HCPCS

## 2020-01-15 PROCEDURE — G0299 HHS/HOSPICE OF RN EA 15 MIN: HCPCS

## 2020-01-15 PROCEDURE — 3331090002 HH PPS REVENUE DEBIT

## 2020-01-15 PROCEDURE — A6253 ABSORPT DRG > 48 SQ IN W/O B: HCPCS

## 2020-01-15 PROCEDURE — 3331090001 HH PPS REVENUE CREDIT

## 2020-01-16 PROCEDURE — 3331090001 HH PPS REVENUE CREDIT

## 2020-01-16 PROCEDURE — 3331090002 HH PPS REVENUE DEBIT

## 2020-01-17 ENCOUNTER — HOME CARE VISIT (OUTPATIENT)
Dept: SCHEDULING | Facility: HOME HEALTH | Age: 70
End: 2020-01-17
Payer: MEDICARE

## 2020-01-17 PROCEDURE — 3331090002 HH PPS REVENUE DEBIT

## 2020-01-17 PROCEDURE — G0299 HHS/HOSPICE OF RN EA 15 MIN: HCPCS

## 2020-01-17 PROCEDURE — 3331090001 HH PPS REVENUE CREDIT

## 2020-01-18 VITALS
TEMPERATURE: 97.8 F | RESPIRATION RATE: 14 BRPM | SYSTOLIC BLOOD PRESSURE: 138 MMHG | DIASTOLIC BLOOD PRESSURE: 78 MMHG | OXYGEN SATURATION: 93 % | HEART RATE: 96 BPM

## 2020-01-18 PROCEDURE — 3331090002 HH PPS REVENUE DEBIT

## 2020-01-18 PROCEDURE — 3331090001 HH PPS REVENUE CREDIT

## 2020-01-19 PROCEDURE — 3331090001 HH PPS REVENUE CREDIT

## 2020-01-19 PROCEDURE — 3331090002 HH PPS REVENUE DEBIT

## 2020-01-20 ENCOUNTER — HOME CARE VISIT (OUTPATIENT)
Dept: SCHEDULING | Facility: HOME HEALTH | Age: 70
End: 2020-01-20
Payer: MEDICARE

## 2020-01-20 PROCEDURE — 3331090002 HH PPS REVENUE DEBIT

## 2020-01-20 PROCEDURE — 3331090001 HH PPS REVENUE CREDIT

## 2020-01-20 PROCEDURE — G0299 HHS/HOSPICE OF RN EA 15 MIN: HCPCS

## 2020-01-21 VITALS
OXYGEN SATURATION: 94 % | DIASTOLIC BLOOD PRESSURE: 78 MMHG | SYSTOLIC BLOOD PRESSURE: 128 MMHG | TEMPERATURE: 97.4 F | RESPIRATION RATE: 14 BRPM | HEART RATE: 92 BPM

## 2020-01-21 PROCEDURE — 3331090002 HH PPS REVENUE DEBIT

## 2020-01-21 PROCEDURE — 3331090001 HH PPS REVENUE CREDIT

## 2020-01-22 ENCOUNTER — HOME CARE VISIT (OUTPATIENT)
Dept: SCHEDULING | Facility: HOME HEALTH | Age: 70
End: 2020-01-22
Payer: MEDICARE

## 2020-01-22 PROCEDURE — G0299 HHS/HOSPICE OF RN EA 15 MIN: HCPCS

## 2020-01-22 PROCEDURE — 3331090001 HH PPS REVENUE CREDIT

## 2020-01-22 PROCEDURE — 3331090002 HH PPS REVENUE DEBIT

## 2020-01-23 VITALS
RESPIRATION RATE: 14 BRPM | HEART RATE: 61 BPM | TEMPERATURE: 96.9 F | DIASTOLIC BLOOD PRESSURE: 78 MMHG | OXYGEN SATURATION: 94 % | SYSTOLIC BLOOD PRESSURE: 138 MMHG

## 2020-01-23 PROCEDURE — 3331090001 HH PPS REVENUE CREDIT

## 2020-01-23 PROCEDURE — 3331090002 HH PPS REVENUE DEBIT

## 2020-01-24 ENCOUNTER — HOME CARE VISIT (OUTPATIENT)
Dept: HOME HEALTH SERVICES | Facility: HOME HEALTH | Age: 70
End: 2020-01-24
Payer: MEDICARE

## 2020-01-24 PROCEDURE — 3331090001 HH PPS REVENUE CREDIT

## 2020-01-24 PROCEDURE — 3331090002 HH PPS REVENUE DEBIT

## 2020-01-25 PROCEDURE — 3331090001 HH PPS REVENUE CREDIT

## 2020-01-25 PROCEDURE — 3331090002 HH PPS REVENUE DEBIT

## 2020-01-26 PROCEDURE — 3331090001 HH PPS REVENUE CREDIT

## 2020-01-26 PROCEDURE — 3331090002 HH PPS REVENUE DEBIT

## 2020-01-27 ENCOUNTER — HOME CARE VISIT (OUTPATIENT)
Dept: SCHEDULING | Facility: HOME HEALTH | Age: 70
End: 2020-01-27
Payer: MEDICARE

## 2020-01-27 VITALS
SYSTOLIC BLOOD PRESSURE: 135 MMHG | DIASTOLIC BLOOD PRESSURE: 78 MMHG | HEART RATE: 93 BPM | TEMPERATURE: 97.7 F | OXYGEN SATURATION: 97 % | RESPIRATION RATE: 14 BRPM

## 2020-01-27 PROCEDURE — 3331090001 HH PPS REVENUE CREDIT

## 2020-01-27 PROCEDURE — G0299 HHS/HOSPICE OF RN EA 15 MIN: HCPCS

## 2020-01-27 PROCEDURE — 3331090002 HH PPS REVENUE DEBIT

## 2020-01-28 PROCEDURE — 3331090002 HH PPS REVENUE DEBIT

## 2020-01-28 PROCEDURE — A6197 ALGINATE DRSG >16 <=48 SQ IN: HCPCS

## 2020-01-28 PROCEDURE — A6446 CONFORM BAND S W>=3" <5"/YD: HCPCS

## 2020-01-28 PROCEDURE — A4452 WATERPROOF TAPE: HCPCS

## 2020-01-28 PROCEDURE — 3331090001 HH PPS REVENUE CREDIT

## 2020-01-28 PROCEDURE — A6253 ABSORPT DRG > 48 SQ IN W/O B: HCPCS

## 2020-01-29 ENCOUNTER — HOME CARE VISIT (OUTPATIENT)
Dept: SCHEDULING | Facility: HOME HEALTH | Age: 70
End: 2020-01-29
Payer: MEDICARE

## 2020-01-29 PROCEDURE — 3331090002 HH PPS REVENUE DEBIT

## 2020-01-29 PROCEDURE — 3331090001 HH PPS REVENUE CREDIT

## 2020-01-29 PROCEDURE — G0299 HHS/HOSPICE OF RN EA 15 MIN: HCPCS

## 2020-01-30 ENCOUNTER — TELEPHONE (OUTPATIENT)
Dept: WOUND CARE | Age: 70
End: 2020-01-30

## 2020-01-30 VITALS
HEART RATE: 88 BPM | OXYGEN SATURATION: 96 % | DIASTOLIC BLOOD PRESSURE: 78 MMHG | TEMPERATURE: 98 F | SYSTOLIC BLOOD PRESSURE: 134 MMHG

## 2020-01-30 PROCEDURE — 3331090001 HH PPS REVENUE CREDIT

## 2020-01-30 PROCEDURE — 3331090002 HH PPS REVENUE DEBIT

## 2020-01-30 RX ORDER — AMMONIUM LACTATE 12 G/100G
LOTION TOPICAL
Qty: 385 G | Refills: 5 | Status: SHIPPED | OUTPATIENT
Start: 2020-01-30

## 2020-01-31 ENCOUNTER — HOME CARE VISIT (OUTPATIENT)
Dept: SCHEDULING | Facility: HOME HEALTH | Age: 70
End: 2020-01-31
Payer: MEDICARE

## 2020-01-31 PROCEDURE — A6253 ABSORPT DRG > 48 SQ IN W/O B: HCPCS

## 2020-01-31 PROCEDURE — G0299 HHS/HOSPICE OF RN EA 15 MIN: HCPCS

## 2020-01-31 PROCEDURE — 3331090001 HH PPS REVENUE CREDIT

## 2020-01-31 PROCEDURE — 3331090002 HH PPS REVENUE DEBIT

## 2020-02-01 VITALS
SYSTOLIC BLOOD PRESSURE: 142 MMHG | TEMPERATURE: 98 F | RESPIRATION RATE: 14 BRPM | OXYGEN SATURATION: 96 % | HEART RATE: 77 BPM | DIASTOLIC BLOOD PRESSURE: 80 MMHG

## 2020-02-01 PROCEDURE — 3331090002 HH PPS REVENUE DEBIT

## 2020-02-01 PROCEDURE — 3331090001 HH PPS REVENUE CREDIT

## 2020-02-02 PROCEDURE — 3331090002 HH PPS REVENUE DEBIT

## 2020-02-02 PROCEDURE — 3331090001 HH PPS REVENUE CREDIT

## 2020-02-03 ENCOUNTER — HOME CARE VISIT (OUTPATIENT)
Dept: SCHEDULING | Facility: HOME HEALTH | Age: 70
End: 2020-02-03
Payer: MEDICARE

## 2020-02-03 PROCEDURE — 3331090002 HH PPS REVENUE DEBIT

## 2020-02-03 PROCEDURE — 3331090001 HH PPS REVENUE CREDIT

## 2020-02-04 PROCEDURE — 3331090002 HH PPS REVENUE DEBIT

## 2020-02-04 PROCEDURE — 3331090001 HH PPS REVENUE CREDIT

## 2020-02-05 ENCOUNTER — HOME CARE VISIT (OUTPATIENT)
Dept: SCHEDULING | Facility: HOME HEALTH | Age: 70
End: 2020-02-05
Payer: MEDICARE

## 2020-02-05 PROCEDURE — 3331090002 HH PPS REVENUE DEBIT

## 2020-02-05 PROCEDURE — G0299 HHS/HOSPICE OF RN EA 15 MIN: HCPCS

## 2020-02-05 PROCEDURE — 3331090001 HH PPS REVENUE CREDIT

## 2020-02-06 VITALS
HEART RATE: 77 BPM | SYSTOLIC BLOOD PRESSURE: 138 MMHG | DIASTOLIC BLOOD PRESSURE: 78 MMHG | OXYGEN SATURATION: 96 % | RESPIRATION RATE: 16 BRPM | TEMPERATURE: 98.2 F

## 2020-02-06 PROCEDURE — 3331090001 HH PPS REVENUE CREDIT

## 2020-02-06 PROCEDURE — 3331090002 HH PPS REVENUE DEBIT

## 2020-02-07 ENCOUNTER — HOME CARE VISIT (OUTPATIENT)
Dept: SCHEDULING | Facility: HOME HEALTH | Age: 70
End: 2020-02-07
Payer: MEDICARE

## 2020-02-07 VITALS
SYSTOLIC BLOOD PRESSURE: 138 MMHG | TEMPERATURE: 97.8 F | RESPIRATION RATE: 16 BRPM | HEART RATE: 107 BPM | OXYGEN SATURATION: 95 % | DIASTOLIC BLOOD PRESSURE: 78 MMHG

## 2020-02-07 PROCEDURE — 3331090001 HH PPS REVENUE CREDIT

## 2020-02-07 PROCEDURE — G0299 HHS/HOSPICE OF RN EA 15 MIN: HCPCS

## 2020-02-07 PROCEDURE — 3331090002 HH PPS REVENUE DEBIT

## 2020-02-08 PROCEDURE — 3331090001 HH PPS REVENUE CREDIT

## 2020-02-08 PROCEDURE — 3331090002 HH PPS REVENUE DEBIT

## 2020-02-09 PROCEDURE — 3331090001 HH PPS REVENUE CREDIT

## 2020-02-09 PROCEDURE — 3331090002 HH PPS REVENUE DEBIT

## 2020-02-10 ENCOUNTER — HOME CARE VISIT (OUTPATIENT)
Dept: SCHEDULING | Facility: HOME HEALTH | Age: 70
End: 2020-02-10
Payer: MEDICARE

## 2020-02-10 PROCEDURE — G0299 HHS/HOSPICE OF RN EA 15 MIN: HCPCS

## 2020-02-10 PROCEDURE — 3331090002 HH PPS REVENUE DEBIT

## 2020-02-10 PROCEDURE — 3331090001 HH PPS REVENUE CREDIT

## 2020-02-11 VITALS
HEART RATE: 97 BPM | OXYGEN SATURATION: 96 % | TEMPERATURE: 97.7 F | DIASTOLIC BLOOD PRESSURE: 80 MMHG | SYSTOLIC BLOOD PRESSURE: 140 MMHG | RESPIRATION RATE: 14 BRPM

## 2020-02-11 PROCEDURE — A4452 WATERPROOF TAPE: HCPCS

## 2020-02-11 PROCEDURE — A6253 ABSORPT DRG > 48 SQ IN W/O B: HCPCS

## 2020-02-11 PROCEDURE — A6216 NON-STERILE GAUZE<=16 SQ IN: HCPCS

## 2020-02-11 PROCEDURE — 3331090002 HH PPS REVENUE DEBIT

## 2020-02-11 PROCEDURE — 3331090001 HH PPS REVENUE CREDIT

## 2020-02-12 ENCOUNTER — HOME CARE VISIT (OUTPATIENT)
Dept: SCHEDULING | Facility: HOME HEALTH | Age: 70
End: 2020-02-12
Payer: MEDICARE

## 2020-02-12 VITALS
TEMPERATURE: 97.3 F | RESPIRATION RATE: 14 BRPM | OXYGEN SATURATION: 93 % | DIASTOLIC BLOOD PRESSURE: 80 MMHG | SYSTOLIC BLOOD PRESSURE: 140 MMHG | HEART RATE: 121 BPM

## 2020-02-12 PROCEDURE — 3331090001 HH PPS REVENUE CREDIT

## 2020-02-12 PROCEDURE — G0299 HHS/HOSPICE OF RN EA 15 MIN: HCPCS

## 2020-02-12 PROCEDURE — 3331090002 HH PPS REVENUE DEBIT

## 2020-02-13 PROCEDURE — 3331090001 HH PPS REVENUE CREDIT

## 2020-02-13 PROCEDURE — 3331090002 HH PPS REVENUE DEBIT

## 2020-02-14 ENCOUNTER — HOME CARE VISIT (OUTPATIENT)
Dept: SCHEDULING | Facility: HOME HEALTH | Age: 70
End: 2020-02-14
Payer: MEDICARE

## 2020-02-14 PROCEDURE — 3331090001 HH PPS REVENUE CREDIT

## 2020-02-14 PROCEDURE — 3331090002 HH PPS REVENUE DEBIT

## 2020-02-14 NOTE — DISCHARGE INSTRUCTIONS
Your A1C  was   Lab Results   Component Value Date/Time    Hemoglobin A1c 7.3 (H) 08/16/2019 02:51 PM   .      This lab test reflects that your blood sugar was slightly elevated over the past 3 months and should be evaluated by your primary care provider. Your average blood glucose for the past three months has been 163 mg/dl. DISCHARGE SUMMARY from Nurse    PATIENT INSTRUCTIONS:    After general anesthesia or intravenous sedation, for 24 hours or while taking prescription Narcotics:  · Limit your activities  · Do not drive and operate hazardous machinery  · Do not make important personal or business decisions  · Do  not drink alcoholic beverages  · If you have not urinated within 8 hours after discharge, please contact your surgeon on call. Report the following to your surgeon:  · Excessive pain, swelling, redness or odor of or around the surgical area  · Temperature over 100.5  · Nausea and vomiting lasting longer than 4 hours or if unable to take medications  · Any signs of decreased circulation or nerve impairment to extremity: change in color, persistent  numbness, tingling, coldness or increase pain  · Any questions    What to do at Home:  Recommended activity: Activity as tolerated and PT/OT per Home Health    If you experience any of the following symptoms fever, chills, or change in right leg color/odor/drainage, please follow up with wound care nurse/podiatrist/primary care physician. *  Please give a list of your current medications to your Primary Care Provider. *  Please update this list whenever your medications are discontinued, doses are      changed, or new medications (including over-the-counter products) are added. *  Please carry medication information at all times in case of emergency situations.     These are general instructions for a healthy lifestyle:    No smoking/ No tobacco products/ Avoid exposure to second hand smoke  Surgeon General's Warning:  Quitting smoking now greatly reduces serious risk to your health. Obesity, smoking, and sedentary lifestyle greatly increases your risk for illness    A healthy diet, regular physical exercise & weight monitoring are important for maintaining a healthy lifestyle    You may be retaining fluid if you have a history of heart failure or if you experience any of the following symptoms:  Weight gain of 3 pounds or more overnight or 5 pounds in a week, increased swelling in our hands or feet or shortness of breath while lying flat in bed. Please call your doctor as soon as you notice any of these symptoms; do not wait until your next office visit. The discharge information has been reviewed with the patient. The patient verbalized understanding. Discharge medications reviewed with the patient and appropriate educational materials and side effects teaching were provided. Patient armband removed and shredded. DISCHARGE SUMMARY from Nurse    PATIENT INSTRUCTIONS:    After general anesthesia or intravenous sedation, for 24 hours or while taking prescription Narcotics:  · Limit your activities  · Do not drive and operate hazardous machinery  · Do not make important personal or business decisions  · Do  not drink alcoholic beverages  · If you have not urinated within 8 hours after discharge, please contact your surgeon on call. Report the following to your surgeon:  · Excessive pain, swelling, redness or odor of or around the surgical area  · Temperature over 100.5  · Nausea and vomiting lasting longer than 4 hours or if unable to take medications  · Any signs of decreased circulation or nerve impairment to extremity: change in color, persistent  numbness, tingling, coldness or increase pain  · Any questions    What to do at Home:  Recommended activity: Activity as tolerated.     If you experience any of the following symptoms increase in pain or swelling, fever or drainage from area, please follow up with Primary Care Provider. *  Please give a list of your current medications to your Primary Care Provider. *  Please update this list whenever your medications are discontinued, doses are      changed, or new medications (including over-the-counter products) are added. *  Please carry medication information at all times in case of emergency situations. These are general instructions for a healthy lifestyle:    No smoking/ No tobacco products/ Avoid exposure to second hand smoke  Surgeon General's Warning:  Quitting smoking now greatly reduces serious risk to your health. Obesity, smoking, and sedentary lifestyle greatly increases your risk for illness    A healthy diet, regular physical exercise & weight monitoring are important for maintaining a healthy lifestyle    You may be retaining fluid if you have a history of heart failure or if you experience any of the following symptoms:  Weight gain of 3 pounds or more overnight or 5 pounds in a week, increased swelling in our hands or feet or shortness of breath while lying flat in bed. Please call your doctor as soon as you notice any of these symptoms; do not wait until your next office visit. The discharge information has been reviewed with the patient. The patient verbalized understanding. Discharge medications reviewed with the patient and appropriate educational materials and side effects teaching were provided.   ___________________________________________________________________________________________________________________________________ None

## 2020-02-15 PROCEDURE — 3331090002 HH PPS REVENUE DEBIT

## 2020-02-15 PROCEDURE — 3331090001 HH PPS REVENUE CREDIT

## 2020-02-16 PROCEDURE — 3331090002 HH PPS REVENUE DEBIT

## 2020-02-16 PROCEDURE — 3331090001 HH PPS REVENUE CREDIT

## 2020-02-17 ENCOUNTER — HOME CARE VISIT (OUTPATIENT)
Dept: SCHEDULING | Facility: HOME HEALTH | Age: 70
End: 2020-02-17
Payer: MEDICARE

## 2020-02-17 VITALS
OXYGEN SATURATION: 96 % | SYSTOLIC BLOOD PRESSURE: 136 MMHG | HEART RATE: 66 BPM | RESPIRATION RATE: 14 BRPM | DIASTOLIC BLOOD PRESSURE: 78 MMHG | TEMPERATURE: 97.3 F

## 2020-02-17 PROCEDURE — A4452 WATERPROOF TAPE: HCPCS

## 2020-02-17 PROCEDURE — A6197 ALGINATE DRSG >16 <=48 SQ IN: HCPCS

## 2020-02-17 PROCEDURE — 3331090001 HH PPS REVENUE CREDIT

## 2020-02-17 PROCEDURE — 3331090002 HH PPS REVENUE DEBIT

## 2020-02-17 PROCEDURE — G0299 HHS/HOSPICE OF RN EA 15 MIN: HCPCS

## 2020-02-17 PROCEDURE — A6445 CONFORM BAND S W <3"/YD: HCPCS

## 2020-02-18 PROCEDURE — 3331090002 HH PPS REVENUE DEBIT

## 2020-02-18 PROCEDURE — 3331090001 HH PPS REVENUE CREDIT

## 2020-02-19 ENCOUNTER — HOME CARE VISIT (OUTPATIENT)
Dept: SCHEDULING | Facility: HOME HEALTH | Age: 70
End: 2020-02-19
Payer: MEDICARE

## 2020-02-19 PROCEDURE — G0299 HHS/HOSPICE OF RN EA 15 MIN: HCPCS

## 2020-02-19 PROCEDURE — 3331090001 HH PPS REVENUE CREDIT

## 2020-02-19 PROCEDURE — 3331090002 HH PPS REVENUE DEBIT

## 2020-02-20 VITALS
SYSTOLIC BLOOD PRESSURE: 138 MMHG | RESPIRATION RATE: 14 BRPM | OXYGEN SATURATION: 96 % | DIASTOLIC BLOOD PRESSURE: 80 MMHG | HEART RATE: 110 BPM | TEMPERATURE: 97.3 F

## 2020-02-20 PROCEDURE — A6446 CONFORM BAND S W>=3" <5"/YD: HCPCS

## 2020-02-20 PROCEDURE — 3331090002 HH PPS REVENUE DEBIT

## 2020-02-20 PROCEDURE — 3331090001 HH PPS REVENUE CREDIT

## 2020-02-21 ENCOUNTER — HOME CARE VISIT (OUTPATIENT)
Dept: SCHEDULING | Facility: HOME HEALTH | Age: 70
End: 2020-02-21
Payer: MEDICARE

## 2020-02-21 PROCEDURE — 3331090002 HH PPS REVENUE DEBIT

## 2020-02-21 PROCEDURE — 3331090001 HH PPS REVENUE CREDIT

## 2020-02-22 PROCEDURE — 3331090002 HH PPS REVENUE DEBIT

## 2020-02-22 PROCEDURE — 3331090001 HH PPS REVENUE CREDIT

## 2020-02-23 PROCEDURE — 3331090001 HH PPS REVENUE CREDIT

## 2020-02-23 PROCEDURE — 3331090002 HH PPS REVENUE DEBIT

## 2020-02-24 ENCOUNTER — HOME CARE VISIT (OUTPATIENT)
Dept: SCHEDULING | Facility: HOME HEALTH | Age: 70
End: 2020-02-24
Payer: MEDICARE

## 2020-02-24 PROCEDURE — 3331090001 HH PPS REVENUE CREDIT

## 2020-02-24 PROCEDURE — 3331090002 HH PPS REVENUE DEBIT

## 2020-02-25 PROCEDURE — 3331090002 HH PPS REVENUE DEBIT

## 2020-02-25 PROCEDURE — 3331090001 HH PPS REVENUE CREDIT

## 2020-02-26 ENCOUNTER — HOME CARE VISIT (OUTPATIENT)
Dept: SCHEDULING | Facility: HOME HEALTH | Age: 70
End: 2020-02-26
Payer: MEDICARE

## 2020-02-26 ENCOUNTER — HOME CARE VISIT (OUTPATIENT)
Dept: HOME HEALTH SERVICES | Facility: HOME HEALTH | Age: 70
End: 2020-02-26
Payer: MEDICARE

## 2020-02-26 VITALS
OXYGEN SATURATION: 97 % | RESPIRATION RATE: 14 BRPM | TEMPERATURE: 97.7 F | HEART RATE: 110 BPM | SYSTOLIC BLOOD PRESSURE: 138 MMHG | DIASTOLIC BLOOD PRESSURE: 70 MMHG

## 2020-02-26 PROCEDURE — 3331090002 HH PPS REVENUE DEBIT

## 2020-02-26 PROCEDURE — 3331090001 HH PPS REVENUE CREDIT

## 2020-02-26 PROCEDURE — 3331090003 HH PPS REVENUE ADJ

## 2020-02-26 PROCEDURE — G0299 HHS/HOSPICE OF RN EA 15 MIN: HCPCS

## 2020-02-27 PROCEDURE — 3331090002 HH PPS REVENUE DEBIT

## 2020-02-27 PROCEDURE — 3331090001 HH PPS REVENUE CREDIT

## 2020-02-28 ENCOUNTER — HOME CARE VISIT (OUTPATIENT)
Dept: SCHEDULING | Facility: HOME HEALTH | Age: 70
End: 2020-02-28
Payer: MEDICARE

## 2020-02-28 PROCEDURE — 3331090001 HH PPS REVENUE CREDIT

## 2020-02-28 PROCEDURE — 3331090002 HH PPS REVENUE DEBIT

## 2020-02-29 PROCEDURE — 3331090001 HH PPS REVENUE CREDIT

## 2020-02-29 PROCEDURE — 3331090002 HH PPS REVENUE DEBIT

## 2020-03-01 PROCEDURE — 3331090001 HH PPS REVENUE CREDIT

## 2020-03-01 PROCEDURE — 3331090002 HH PPS REVENUE DEBIT

## 2020-03-02 ENCOUNTER — HOME CARE VISIT (OUTPATIENT)
Dept: SCHEDULING | Facility: HOME HEALTH | Age: 70
End: 2020-03-02
Payer: MEDICARE

## 2020-03-02 PROCEDURE — 3331090002 HH PPS REVENUE DEBIT

## 2020-03-02 PROCEDURE — 3331090001 HH PPS REVENUE CREDIT

## 2020-03-02 PROCEDURE — G0300 HHS/HOSPICE OF LPN EA 15 MIN: HCPCS

## 2020-03-02 PROCEDURE — 400014 HH F/U

## 2020-03-03 PROCEDURE — 3331090001 HH PPS REVENUE CREDIT

## 2020-03-03 PROCEDURE — 3331090002 HH PPS REVENUE DEBIT

## 2020-03-04 ENCOUNTER — HOME CARE VISIT (OUTPATIENT)
Dept: SCHEDULING | Facility: HOME HEALTH | Age: 70
End: 2020-03-04
Payer: MEDICARE

## 2020-03-04 PROCEDURE — 3331090001 HH PPS REVENUE CREDIT

## 2020-03-04 PROCEDURE — G0299 HHS/HOSPICE OF RN EA 15 MIN: HCPCS

## 2020-03-04 PROCEDURE — 3331090002 HH PPS REVENUE DEBIT

## 2020-03-05 PROCEDURE — 3331090001 HH PPS REVENUE CREDIT

## 2020-03-05 PROCEDURE — 3331090002 HH PPS REVENUE DEBIT

## 2020-03-06 ENCOUNTER — HOME CARE VISIT (OUTPATIENT)
Dept: SCHEDULING | Facility: HOME HEALTH | Age: 70
End: 2020-03-06
Payer: MEDICARE

## 2020-03-06 PROCEDURE — 3331090001 HH PPS REVENUE CREDIT

## 2020-03-06 PROCEDURE — 3331090002 HH PPS REVENUE DEBIT

## 2020-03-06 PROCEDURE — G0299 HHS/HOSPICE OF RN EA 15 MIN: HCPCS

## 2020-03-07 PROCEDURE — 3331090001 HH PPS REVENUE CREDIT

## 2020-03-07 PROCEDURE — 3331090002 HH PPS REVENUE DEBIT

## 2020-03-08 VITALS
SYSTOLIC BLOOD PRESSURE: 128 MMHG | TEMPERATURE: 98.3 F | DIASTOLIC BLOOD PRESSURE: 68 MMHG | OXYGEN SATURATION: 97 % | RESPIRATION RATE: 18 BRPM | HEART RATE: 66 BPM

## 2020-03-08 PROCEDURE — 3331090001 HH PPS REVENUE CREDIT

## 2020-03-08 PROCEDURE — 3331090002 HH PPS REVENUE DEBIT

## 2020-03-09 ENCOUNTER — HOME CARE VISIT (OUTPATIENT)
Dept: SCHEDULING | Facility: HOME HEALTH | Age: 70
End: 2020-03-09
Payer: MEDICARE

## 2020-03-09 VITALS
OXYGEN SATURATION: 97 % | TEMPERATURE: 97.4 F | RESPIRATION RATE: 15 BRPM | SYSTOLIC BLOOD PRESSURE: 134 MMHG | HEART RATE: 102 BPM | DIASTOLIC BLOOD PRESSURE: 78 MMHG

## 2020-03-09 VITALS
HEART RATE: 58 BPM | TEMPERATURE: 96.2 F | DIASTOLIC BLOOD PRESSURE: 80 MMHG | OXYGEN SATURATION: 96 % | RESPIRATION RATE: 14 BRPM | SYSTOLIC BLOOD PRESSURE: 120 MMHG

## 2020-03-09 PROCEDURE — A4452 WATERPROOF TAPE: HCPCS

## 2020-03-09 PROCEDURE — 3331090001 HH PPS REVENUE CREDIT

## 2020-03-09 PROCEDURE — G0299 HHS/HOSPICE OF RN EA 15 MIN: HCPCS

## 2020-03-09 PROCEDURE — A6216 NON-STERILE GAUZE<=16 SQ IN: HCPCS

## 2020-03-09 PROCEDURE — A6197 ALGINATE DRSG >16 <=48 SQ IN: HCPCS

## 2020-03-09 PROCEDURE — 3331090002 HH PPS REVENUE DEBIT

## 2020-03-09 PROCEDURE — A6446 CONFORM BAND S W>=3" <5"/YD: HCPCS

## 2020-03-10 PROCEDURE — 3331090002 HH PPS REVENUE DEBIT

## 2020-03-10 PROCEDURE — 3331090001 HH PPS REVENUE CREDIT

## 2020-03-11 ENCOUNTER — HOME CARE VISIT (OUTPATIENT)
Dept: SCHEDULING | Facility: HOME HEALTH | Age: 70
End: 2020-03-11
Payer: MEDICARE

## 2020-03-11 PROCEDURE — 3331090001 HH PPS REVENUE CREDIT

## 2020-03-11 PROCEDURE — 3331090002 HH PPS REVENUE DEBIT

## 2020-03-12 PROCEDURE — 3331090001 HH PPS REVENUE CREDIT

## 2020-03-12 PROCEDURE — 3331090002 HH PPS REVENUE DEBIT

## 2020-03-13 ENCOUNTER — HOME CARE VISIT (OUTPATIENT)
Dept: SCHEDULING | Facility: HOME HEALTH | Age: 70
End: 2020-03-13
Payer: MEDICARE

## 2020-03-13 VITALS
HEART RATE: 65 BPM | OXYGEN SATURATION: 98 % | SYSTOLIC BLOOD PRESSURE: 127 MMHG | DIASTOLIC BLOOD PRESSURE: 69 MMHG | RESPIRATION RATE: 16 BRPM

## 2020-03-13 PROCEDURE — 3331090002 HH PPS REVENUE DEBIT

## 2020-03-13 PROCEDURE — 3331090001 HH PPS REVENUE CREDIT

## 2020-03-14 PROCEDURE — 3331090002 HH PPS REVENUE DEBIT

## 2020-03-14 PROCEDURE — 3331090001 HH PPS REVENUE CREDIT

## 2020-03-15 PROCEDURE — 3331090002 HH PPS REVENUE DEBIT

## 2020-03-15 PROCEDURE — 3331090001 HH PPS REVENUE CREDIT

## 2020-03-16 ENCOUNTER — HOME CARE VISIT (OUTPATIENT)
Dept: SCHEDULING | Facility: HOME HEALTH | Age: 70
End: 2020-03-16
Payer: MEDICARE

## 2020-03-16 VITALS
HEART RATE: 65 BPM | SYSTOLIC BLOOD PRESSURE: 138 MMHG | DIASTOLIC BLOOD PRESSURE: 80 MMHG | RESPIRATION RATE: 14 BRPM | TEMPERATURE: 97.3 F | OXYGEN SATURATION: 97 %

## 2020-03-16 PROCEDURE — G0299 HHS/HOSPICE OF RN EA 15 MIN: HCPCS

## 2020-03-16 PROCEDURE — 3331090002 HH PPS REVENUE DEBIT

## 2020-03-16 PROCEDURE — A4927 NON-STERILE GLOVES: HCPCS

## 2020-03-16 PROCEDURE — 3331090001 HH PPS REVENUE CREDIT

## 2020-03-16 PROCEDURE — A6252 ABSORPT DRG >16 <=48 W/O BDR: HCPCS

## 2020-03-17 PROCEDURE — 3331090002 HH PPS REVENUE DEBIT

## 2020-03-17 PROCEDURE — 3331090001 HH PPS REVENUE CREDIT

## 2020-03-18 ENCOUNTER — HOME CARE VISIT (OUTPATIENT)
Dept: SCHEDULING | Facility: HOME HEALTH | Age: 70
End: 2020-03-18
Payer: MEDICARE

## 2020-03-18 PROCEDURE — G0299 HHS/HOSPICE OF RN EA 15 MIN: HCPCS

## 2020-03-18 PROCEDURE — 3331090001 HH PPS REVENUE CREDIT

## 2020-03-18 PROCEDURE — 3331090002 HH PPS REVENUE DEBIT

## 2020-03-19 VITALS
SYSTOLIC BLOOD PRESSURE: 138 MMHG | DIASTOLIC BLOOD PRESSURE: 78 MMHG | OXYGEN SATURATION: 96 % | HEART RATE: 99 BPM | TEMPERATURE: 98.6 F | RESPIRATION RATE: 14 BRPM

## 2020-03-19 PROCEDURE — 3331090002 HH PPS REVENUE DEBIT

## 2020-03-19 PROCEDURE — 3331090001 HH PPS REVENUE CREDIT

## 2020-03-20 ENCOUNTER — HOME CARE VISIT (OUTPATIENT)
Dept: SCHEDULING | Facility: HOME HEALTH | Age: 70
End: 2020-03-20
Payer: MEDICARE

## 2020-03-20 PROCEDURE — G0299 HHS/HOSPICE OF RN EA 15 MIN: HCPCS

## 2020-03-20 PROCEDURE — 3331090002 HH PPS REVENUE DEBIT

## 2020-03-20 PROCEDURE — 3331090001 HH PPS REVENUE CREDIT

## 2020-03-21 VITALS
HEART RATE: 78 BPM | DIASTOLIC BLOOD PRESSURE: 78 MMHG | SYSTOLIC BLOOD PRESSURE: 138 MMHG | TEMPERATURE: 97.2 F | OXYGEN SATURATION: 96 % | RESPIRATION RATE: 14 BRPM

## 2020-03-21 PROCEDURE — 3331090001 HH PPS REVENUE CREDIT

## 2020-03-21 PROCEDURE — 3331090002 HH PPS REVENUE DEBIT

## 2020-03-22 PROCEDURE — 3331090002 HH PPS REVENUE DEBIT

## 2020-03-22 PROCEDURE — 3331090001 HH PPS REVENUE CREDIT

## 2020-03-23 ENCOUNTER — HOME CARE VISIT (OUTPATIENT)
Dept: SCHEDULING | Facility: HOME HEALTH | Age: 70
End: 2020-03-23
Payer: MEDICARE

## 2020-03-23 VITALS
SYSTOLIC BLOOD PRESSURE: 138 MMHG | TEMPERATURE: 98 F | RESPIRATION RATE: 14 BRPM | OXYGEN SATURATION: 98 % | HEART RATE: 103 BPM | DIASTOLIC BLOOD PRESSURE: 78 MMHG

## 2020-03-23 PROCEDURE — 3331090001 HH PPS REVENUE CREDIT

## 2020-03-23 PROCEDURE — 3331090002 HH PPS REVENUE DEBIT

## 2020-03-23 PROCEDURE — A6260 WOUND CLEANSER ANY TYPE/SIZE: HCPCS

## 2020-03-23 PROCEDURE — A6197 ALGINATE DRSG >16 <=48 SQ IN: HCPCS

## 2020-03-23 PROCEDURE — A6252 ABSORPT DRG >16 <=48 W/O BDR: HCPCS

## 2020-03-23 PROCEDURE — A4452 WATERPROOF TAPE: HCPCS

## 2020-03-23 PROCEDURE — G0299 HHS/HOSPICE OF RN EA 15 MIN: HCPCS

## 2020-03-23 PROCEDURE — A6445 CONFORM BAND S W <3"/YD: HCPCS

## 2020-03-24 PROCEDURE — 3331090001 HH PPS REVENUE CREDIT

## 2020-03-24 PROCEDURE — 3331090002 HH PPS REVENUE DEBIT

## 2020-03-25 ENCOUNTER — HOME CARE VISIT (OUTPATIENT)
Dept: SCHEDULING | Facility: HOME HEALTH | Age: 70
End: 2020-03-25
Payer: MEDICARE

## 2020-03-25 VITALS
HEART RATE: 83 BPM | SYSTOLIC BLOOD PRESSURE: 144 MMHG | DIASTOLIC BLOOD PRESSURE: 80 MMHG | OXYGEN SATURATION: 96 % | TEMPERATURE: 97.2 F

## 2020-03-25 PROCEDURE — A6197 ALGINATE DRSG >16 <=48 SQ IN: HCPCS

## 2020-03-25 PROCEDURE — 3331090002 HH PPS REVENUE DEBIT

## 2020-03-25 PROCEDURE — 3331090001 HH PPS REVENUE CREDIT

## 2020-03-25 PROCEDURE — A4452 WATERPROOF TAPE: HCPCS

## 2020-03-25 PROCEDURE — G0299 HHS/HOSPICE OF RN EA 15 MIN: HCPCS

## 2020-03-26 PROCEDURE — 3331090002 HH PPS REVENUE DEBIT

## 2020-03-26 PROCEDURE — 3331090001 HH PPS REVENUE CREDIT

## 2020-03-27 ENCOUNTER — HOME CARE VISIT (OUTPATIENT)
Dept: SCHEDULING | Facility: HOME HEALTH | Age: 70
End: 2020-03-27
Payer: MEDICARE

## 2020-03-27 PROCEDURE — G0299 HHS/HOSPICE OF RN EA 15 MIN: HCPCS

## 2020-03-27 PROCEDURE — 3331090001 HH PPS REVENUE CREDIT

## 2020-03-27 PROCEDURE — 3331090002 HH PPS REVENUE DEBIT

## 2020-03-28 VITALS
DIASTOLIC BLOOD PRESSURE: 78 MMHG | SYSTOLIC BLOOD PRESSURE: 138 MMHG | RESPIRATION RATE: 14 BRPM | OXYGEN SATURATION: 95 % | HEART RATE: 89 BPM | TEMPERATURE: 97.1 F

## 2020-03-28 PROCEDURE — 3331090002 HH PPS REVENUE DEBIT

## 2020-03-28 PROCEDURE — 3331090001 HH PPS REVENUE CREDIT

## 2020-03-29 PROCEDURE — 3331090002 HH PPS REVENUE DEBIT

## 2020-03-29 PROCEDURE — 3331090001 HH PPS REVENUE CREDIT

## 2020-03-30 ENCOUNTER — HOME CARE VISIT (OUTPATIENT)
Dept: SCHEDULING | Facility: HOME HEALTH | Age: 70
End: 2020-03-30
Payer: MEDICARE

## 2020-03-30 PROCEDURE — 3331090002 HH PPS REVENUE DEBIT

## 2020-03-30 PROCEDURE — G0299 HHS/HOSPICE OF RN EA 15 MIN: HCPCS

## 2020-03-30 PROCEDURE — 3331090001 HH PPS REVENUE CREDIT

## 2020-03-30 PROCEDURE — 400014 HH F/U

## 2020-03-31 VITALS
SYSTOLIC BLOOD PRESSURE: 138 MMHG | HEART RATE: 60 BPM | OXYGEN SATURATION: 96 % | RESPIRATION RATE: 14 BRPM | DIASTOLIC BLOOD PRESSURE: 76 MMHG | TEMPERATURE: 96.9 F

## 2020-03-31 PROCEDURE — 3331090002 HH PPS REVENUE DEBIT

## 2020-03-31 PROCEDURE — 3331090001 HH PPS REVENUE CREDIT

## 2020-04-01 ENCOUNTER — HOME CARE VISIT (OUTPATIENT)
Dept: SCHEDULING | Facility: HOME HEALTH | Age: 70
End: 2020-04-01
Payer: MEDICARE

## 2020-04-01 PROCEDURE — G0299 HHS/HOSPICE OF RN EA 15 MIN: HCPCS

## 2020-04-01 PROCEDURE — 3331090002 HH PPS REVENUE DEBIT

## 2020-04-01 PROCEDURE — 3331090001 HH PPS REVENUE CREDIT

## 2020-04-02 VITALS
OXYGEN SATURATION: 96 % | TEMPERATURE: 96.8 F | DIASTOLIC BLOOD PRESSURE: 80 MMHG | SYSTOLIC BLOOD PRESSURE: 140 MMHG | HEART RATE: 107 BPM | RESPIRATION RATE: 14 BRPM

## 2020-04-02 PROCEDURE — 3331090001 HH PPS REVENUE CREDIT

## 2020-04-02 PROCEDURE — 3331090002 HH PPS REVENUE DEBIT

## 2020-04-03 ENCOUNTER — HOME CARE VISIT (OUTPATIENT)
Dept: SCHEDULING | Facility: HOME HEALTH | Age: 70
End: 2020-04-03
Payer: MEDICARE

## 2020-04-03 PROCEDURE — 3331090002 HH PPS REVENUE DEBIT

## 2020-04-03 PROCEDURE — G0299 HHS/HOSPICE OF RN EA 15 MIN: HCPCS

## 2020-04-03 PROCEDURE — 3331090001 HH PPS REVENUE CREDIT

## 2020-04-04 VITALS
OXYGEN SATURATION: 97 % | HEART RATE: 122 BPM | DIASTOLIC BLOOD PRESSURE: 82 MMHG | TEMPERATURE: 97.5 F | SYSTOLIC BLOOD PRESSURE: 138 MMHG | RESPIRATION RATE: 14 BRPM

## 2020-04-04 PROCEDURE — A6260 WOUND CLEANSER ANY TYPE/SIZE: HCPCS

## 2020-04-04 PROCEDURE — A4927 NON-STERILE GLOVES: HCPCS

## 2020-04-04 PROCEDURE — A6197 ALGINATE DRSG >16 <=48 SQ IN: HCPCS

## 2020-04-04 PROCEDURE — A6252 ABSORPT DRG >16 <=48 W/O BDR: HCPCS

## 2020-04-04 PROCEDURE — A6223 GAUZE >16<=48 NO W/SAL W/O B: HCPCS

## 2020-04-04 PROCEDURE — A6445 CONFORM BAND S W <3"/YD: HCPCS

## 2020-04-04 PROCEDURE — A4452 WATERPROOF TAPE: HCPCS

## 2020-04-04 PROCEDURE — 3331090001 HH PPS REVENUE CREDIT

## 2020-04-04 PROCEDURE — 3331090002 HH PPS REVENUE DEBIT

## 2020-04-04 PROCEDURE — A5120 SKIN BARRIER, WIPE OR SWAB: HCPCS

## 2020-04-05 PROCEDURE — 3331090001 HH PPS REVENUE CREDIT

## 2020-04-05 PROCEDURE — 3331090002 HH PPS REVENUE DEBIT

## 2020-04-06 ENCOUNTER — HOME CARE VISIT (OUTPATIENT)
Dept: SCHEDULING | Facility: HOME HEALTH | Age: 70
End: 2020-04-06
Payer: MEDICARE

## 2020-04-06 VITALS
DIASTOLIC BLOOD PRESSURE: 82 MMHG | HEART RATE: 90 BPM | SYSTOLIC BLOOD PRESSURE: 140 MMHG | OXYGEN SATURATION: 98 % | RESPIRATION RATE: 14 BRPM | TEMPERATURE: 97.4 F

## 2020-04-06 PROCEDURE — 3331090002 HH PPS REVENUE DEBIT

## 2020-04-06 PROCEDURE — 3331090001 HH PPS REVENUE CREDIT

## 2020-04-06 PROCEDURE — G0299 HHS/HOSPICE OF RN EA 15 MIN: HCPCS

## 2020-04-07 PROCEDURE — 3331090001 HH PPS REVENUE CREDIT

## 2020-04-07 PROCEDURE — 3331090002 HH PPS REVENUE DEBIT

## 2020-04-08 ENCOUNTER — HOME CARE VISIT (OUTPATIENT)
Dept: SCHEDULING | Facility: HOME HEALTH | Age: 70
End: 2020-04-08
Payer: MEDICARE

## 2020-04-08 VITALS
OXYGEN SATURATION: 95 % | HEART RATE: 96 BPM | SYSTOLIC BLOOD PRESSURE: 138 MMHG | RESPIRATION RATE: 14 BRPM | TEMPERATURE: 97.7 F | DIASTOLIC BLOOD PRESSURE: 80 MMHG

## 2020-04-08 PROCEDURE — G0299 HHS/HOSPICE OF RN EA 15 MIN: HCPCS

## 2020-04-08 PROCEDURE — 3331090002 HH PPS REVENUE DEBIT

## 2020-04-08 PROCEDURE — 3331090001 HH PPS REVENUE CREDIT

## 2020-04-09 PROCEDURE — 3331090001 HH PPS REVENUE CREDIT

## 2020-04-09 PROCEDURE — 3331090002 HH PPS REVENUE DEBIT

## 2020-04-10 ENCOUNTER — HOME CARE VISIT (OUTPATIENT)
Dept: SCHEDULING | Facility: HOME HEALTH | Age: 70
End: 2020-04-10
Payer: MEDICARE

## 2020-04-10 PROCEDURE — 3331090002 HH PPS REVENUE DEBIT

## 2020-04-10 PROCEDURE — 3331090001 HH PPS REVENUE CREDIT

## 2020-04-11 PROCEDURE — 3331090001 HH PPS REVENUE CREDIT

## 2020-04-11 PROCEDURE — 3331090002 HH PPS REVENUE DEBIT

## 2020-04-12 PROCEDURE — 3331090002 HH PPS REVENUE DEBIT

## 2020-04-12 PROCEDURE — 3331090001 HH PPS REVENUE CREDIT

## 2020-04-13 ENCOUNTER — HOME CARE VISIT (OUTPATIENT)
Dept: SCHEDULING | Facility: HOME HEALTH | Age: 70
End: 2020-04-13
Payer: MEDICARE

## 2020-04-13 VITALS
SYSTOLIC BLOOD PRESSURE: 140 MMHG | OXYGEN SATURATION: 95 % | HEART RATE: 108 BPM | RESPIRATION RATE: 14 BRPM | DIASTOLIC BLOOD PRESSURE: 82 MMHG | TEMPERATURE: 97.3 F

## 2020-04-13 PROCEDURE — A6197 ALGINATE DRSG >16 <=48 SQ IN: HCPCS

## 2020-04-13 PROCEDURE — 3331090001 HH PPS REVENUE CREDIT

## 2020-04-13 PROCEDURE — A6445 CONFORM BAND S W <3"/YD: HCPCS

## 2020-04-13 PROCEDURE — A4452 WATERPROOF TAPE: HCPCS

## 2020-04-13 PROCEDURE — A6252 ABSORPT DRG >16 <=48 W/O BDR: HCPCS

## 2020-04-13 PROCEDURE — 3331090002 HH PPS REVENUE DEBIT

## 2020-04-13 PROCEDURE — G0299 HHS/HOSPICE OF RN EA 15 MIN: HCPCS

## 2020-04-14 PROCEDURE — 3331090001 HH PPS REVENUE CREDIT

## 2020-04-14 PROCEDURE — 3331090002 HH PPS REVENUE DEBIT

## 2020-04-15 ENCOUNTER — HOME CARE VISIT (OUTPATIENT)
Dept: SCHEDULING | Facility: HOME HEALTH | Age: 70
End: 2020-04-15
Payer: MEDICARE

## 2020-04-15 VITALS
HEART RATE: 105 BPM | OXYGEN SATURATION: 96 % | RESPIRATION RATE: 14 BRPM | DIASTOLIC BLOOD PRESSURE: 80 MMHG | TEMPERATURE: 98.6 F | SYSTOLIC BLOOD PRESSURE: 140 MMHG

## 2020-04-15 PROCEDURE — A4452 WATERPROOF TAPE: HCPCS

## 2020-04-15 PROCEDURE — 3331090002 HH PPS REVENUE DEBIT

## 2020-04-15 PROCEDURE — A6252 ABSORPT DRG >16 <=48 W/O BDR: HCPCS

## 2020-04-15 PROCEDURE — A6260 WOUND CLEANSER ANY TYPE/SIZE: HCPCS

## 2020-04-15 PROCEDURE — G0299 HHS/HOSPICE OF RN EA 15 MIN: HCPCS

## 2020-04-15 PROCEDURE — A6216 NON-STERILE GAUZE<=16 SQ IN: HCPCS

## 2020-04-15 PROCEDURE — 3331090001 HH PPS REVENUE CREDIT

## 2020-04-15 PROCEDURE — A6197 ALGINATE DRSG >16 <=48 SQ IN: HCPCS

## 2020-04-16 PROCEDURE — 3331090001 HH PPS REVENUE CREDIT

## 2020-04-16 PROCEDURE — 3331090002 HH PPS REVENUE DEBIT

## 2020-04-17 ENCOUNTER — HOME CARE VISIT (OUTPATIENT)
Dept: HOME HEALTH SERVICES | Facility: HOME HEALTH | Age: 70
End: 2020-04-17
Payer: MEDICARE

## 2020-04-17 PROCEDURE — 3331090001 HH PPS REVENUE CREDIT

## 2020-04-17 PROCEDURE — 3331090002 HH PPS REVENUE DEBIT

## 2020-04-17 PROCEDURE — A6446 CONFORM BAND S W>=3" <5"/YD: HCPCS

## 2020-04-18 PROCEDURE — 3331090002 HH PPS REVENUE DEBIT

## 2020-04-18 PROCEDURE — 3331090001 HH PPS REVENUE CREDIT

## 2020-04-19 PROCEDURE — 3331090002 HH PPS REVENUE DEBIT

## 2020-04-19 PROCEDURE — 3331090001 HH PPS REVENUE CREDIT

## 2020-04-20 ENCOUNTER — HOME CARE VISIT (OUTPATIENT)
Dept: SCHEDULING | Facility: HOME HEALTH | Age: 70
End: 2020-04-20
Payer: MEDICARE

## 2020-04-20 VITALS
DIASTOLIC BLOOD PRESSURE: 82 MMHG | OXYGEN SATURATION: 98 % | TEMPERATURE: 97.5 F | HEART RATE: 97 BPM | SYSTOLIC BLOOD PRESSURE: 138 MMHG | RESPIRATION RATE: 14 BRPM

## 2020-04-20 PROCEDURE — G0299 HHS/HOSPICE OF RN EA 15 MIN: HCPCS

## 2020-04-20 PROCEDURE — 3331090001 HH PPS REVENUE CREDIT

## 2020-04-20 PROCEDURE — 3331090002 HH PPS REVENUE DEBIT

## 2020-04-20 PROCEDURE — A6197 ALGINATE DRSG >16 <=48 SQ IN: HCPCS

## 2020-04-20 PROCEDURE — A6446 CONFORM BAND S W>=3" <5"/YD: HCPCS

## 2020-04-21 PROCEDURE — 3331090001 HH PPS REVENUE CREDIT

## 2020-04-21 PROCEDURE — 3331090002 HH PPS REVENUE DEBIT

## 2020-04-22 ENCOUNTER — HOME CARE VISIT (OUTPATIENT)
Dept: SCHEDULING | Facility: HOME HEALTH | Age: 70
End: 2020-04-22
Payer: MEDICARE

## 2020-04-22 VITALS
HEART RATE: 105 BPM | DIASTOLIC BLOOD PRESSURE: 82 MMHG | TEMPERATURE: 97.7 F | OXYGEN SATURATION: 96 % | SYSTOLIC BLOOD PRESSURE: 138 MMHG | RESPIRATION RATE: 14 BRPM

## 2020-04-22 PROCEDURE — G0299 HHS/HOSPICE OF RN EA 15 MIN: HCPCS

## 2020-04-22 PROCEDURE — 3331090001 HH PPS REVENUE CREDIT

## 2020-04-22 PROCEDURE — 3331090002 HH PPS REVENUE DEBIT

## 2020-04-23 PROCEDURE — 3331090001 HH PPS REVENUE CREDIT

## 2020-04-23 PROCEDURE — 3331090002 HH PPS REVENUE DEBIT

## 2020-04-24 ENCOUNTER — HOME CARE VISIT (OUTPATIENT)
Dept: SCHEDULING | Facility: HOME HEALTH | Age: 70
End: 2020-04-24
Payer: MEDICARE

## 2020-04-24 VITALS
DIASTOLIC BLOOD PRESSURE: 74 MMHG | TEMPERATURE: 98.6 F | SYSTOLIC BLOOD PRESSURE: 138 MMHG | RESPIRATION RATE: 18 BRPM | HEART RATE: 74 BPM | OXYGEN SATURATION: 96 %

## 2020-04-24 PROCEDURE — 3331090002 HH PPS REVENUE DEBIT

## 2020-04-24 PROCEDURE — G0299 HHS/HOSPICE OF RN EA 15 MIN: HCPCS

## 2020-04-24 PROCEDURE — 3331090001 HH PPS REVENUE CREDIT

## 2020-04-25 PROCEDURE — 3331090002 HH PPS REVENUE DEBIT

## 2020-04-25 PROCEDURE — 3331090001 HH PPS REVENUE CREDIT

## 2020-04-26 PROCEDURE — 3331090001 HH PPS REVENUE CREDIT

## 2020-04-26 PROCEDURE — 3331090002 HH PPS REVENUE DEBIT

## 2020-04-27 ENCOUNTER — HOME CARE VISIT (OUTPATIENT)
Dept: SCHEDULING | Facility: HOME HEALTH | Age: 70
End: 2020-04-27
Payer: MEDICARE

## 2020-04-27 VITALS
DIASTOLIC BLOOD PRESSURE: 82 MMHG | RESPIRATION RATE: 14 BRPM | HEART RATE: 93 BPM | OXYGEN SATURATION: 97 % | SYSTOLIC BLOOD PRESSURE: 138 MMHG | TEMPERATURE: 97.3 F

## 2020-04-27 PROCEDURE — 400014 HH F/U

## 2020-04-27 PROCEDURE — G0299 HHS/HOSPICE OF RN EA 15 MIN: HCPCS

## 2020-04-27 PROCEDURE — 3331090001 HH PPS REVENUE CREDIT

## 2020-04-27 PROCEDURE — 3331090002 HH PPS REVENUE DEBIT

## 2020-04-28 PROCEDURE — A6446 CONFORM BAND S W>=3" <5"/YD: HCPCS

## 2020-04-28 PROCEDURE — A6445 CONFORM BAND S W <3"/YD: HCPCS

## 2020-04-28 PROCEDURE — 3331090002 HH PPS REVENUE DEBIT

## 2020-04-28 PROCEDURE — A6253 ABSORPT DRG > 48 SQ IN W/O B: HCPCS

## 2020-04-28 PROCEDURE — 3331090001 HH PPS REVENUE CREDIT

## 2020-04-28 PROCEDURE — A4452 WATERPROOF TAPE: HCPCS

## 2020-04-29 ENCOUNTER — HOME CARE VISIT (OUTPATIENT)
Dept: SCHEDULING | Facility: HOME HEALTH | Age: 70
End: 2020-04-29
Payer: MEDICARE

## 2020-04-29 PROCEDURE — 3331090001 HH PPS REVENUE CREDIT

## 2020-04-29 PROCEDURE — A6446 CONFORM BAND S W>=3" <5"/YD: HCPCS

## 2020-04-29 PROCEDURE — G0299 HHS/HOSPICE OF RN EA 15 MIN: HCPCS

## 2020-04-29 PROCEDURE — 3331090002 HH PPS REVENUE DEBIT

## 2020-04-30 PROCEDURE — 3331090002 HH PPS REVENUE DEBIT

## 2020-04-30 PROCEDURE — 3331090001 HH PPS REVENUE CREDIT

## 2020-05-01 ENCOUNTER — HOME CARE VISIT (OUTPATIENT)
Dept: SCHEDULING | Facility: HOME HEALTH | Age: 70
End: 2020-05-01
Payer: MEDICARE

## 2020-05-01 PROCEDURE — 3331090002 HH PPS REVENUE DEBIT

## 2020-05-01 PROCEDURE — G0299 HHS/HOSPICE OF RN EA 15 MIN: HCPCS

## 2020-05-01 PROCEDURE — 3331090001 HH PPS REVENUE CREDIT

## 2020-05-02 VITALS
OXYGEN SATURATION: 96 % | SYSTOLIC BLOOD PRESSURE: 140 MMHG | RESPIRATION RATE: 14 BRPM | SYSTOLIC BLOOD PRESSURE: 150 MMHG | HEART RATE: 76 BPM | OXYGEN SATURATION: 96 % | DIASTOLIC BLOOD PRESSURE: 80 MMHG | TEMPERATURE: 97.7 F | DIASTOLIC BLOOD PRESSURE: 80 MMHG | HEART RATE: 90 BPM | RESPIRATION RATE: 14 BRPM | TEMPERATURE: 97.4 F

## 2020-05-02 PROCEDURE — 3331090002 HH PPS REVENUE DEBIT

## 2020-05-02 PROCEDURE — 3331090001 HH PPS REVENUE CREDIT

## 2020-05-03 PROCEDURE — 3331090002 HH PPS REVENUE DEBIT

## 2020-05-03 PROCEDURE — 3331090001 HH PPS REVENUE CREDIT

## 2020-05-04 ENCOUNTER — HOME CARE VISIT (OUTPATIENT)
Dept: SCHEDULING | Facility: HOME HEALTH | Age: 70
End: 2020-05-04
Payer: MEDICARE

## 2020-05-04 VITALS
SYSTOLIC BLOOD PRESSURE: 126 MMHG | RESPIRATION RATE: 16 BRPM | DIASTOLIC BLOOD PRESSURE: 80 MMHG | OXYGEN SATURATION: 96 % | HEART RATE: 99 BPM | TEMPERATURE: 97.3 F

## 2020-05-04 PROCEDURE — 3331090002 HH PPS REVENUE DEBIT

## 2020-05-04 PROCEDURE — G0299 HHS/HOSPICE OF RN EA 15 MIN: HCPCS

## 2020-05-04 PROCEDURE — 3331090001 HH PPS REVENUE CREDIT

## 2020-05-05 PROCEDURE — 3331090002 HH PPS REVENUE DEBIT

## 2020-05-05 PROCEDURE — 3331090001 HH PPS REVENUE CREDIT

## 2020-05-06 ENCOUNTER — HOME CARE VISIT (OUTPATIENT)
Dept: SCHEDULING | Facility: HOME HEALTH | Age: 70
End: 2020-05-06
Payer: MEDICARE

## 2020-05-06 VITALS
HEART RATE: 105 BPM | SYSTOLIC BLOOD PRESSURE: 126 MMHG | OXYGEN SATURATION: 94 % | TEMPERATURE: 98.1 F | DIASTOLIC BLOOD PRESSURE: 70 MMHG

## 2020-05-06 PROCEDURE — A6446 CONFORM BAND S W>=3" <5"/YD: HCPCS

## 2020-05-06 PROCEDURE — 3331090002 HH PPS REVENUE DEBIT

## 2020-05-06 PROCEDURE — G0300 HHS/HOSPICE OF LPN EA 15 MIN: HCPCS

## 2020-05-06 PROCEDURE — A6197 ALGINATE DRSG >16 <=48 SQ IN: HCPCS

## 2020-05-06 PROCEDURE — 3331090001 HH PPS REVENUE CREDIT

## 2020-05-07 PROCEDURE — 3331090002 HH PPS REVENUE DEBIT

## 2020-05-07 PROCEDURE — 3331090001 HH PPS REVENUE CREDIT

## 2020-05-08 ENCOUNTER — HOME CARE VISIT (OUTPATIENT)
Dept: SCHEDULING | Facility: HOME HEALTH | Age: 70
End: 2020-05-08
Payer: MEDICARE

## 2020-05-08 PROCEDURE — G0300 HHS/HOSPICE OF LPN EA 15 MIN: HCPCS

## 2020-05-08 PROCEDURE — 3331090001 HH PPS REVENUE CREDIT

## 2020-05-08 PROCEDURE — 3331090002 HH PPS REVENUE DEBIT

## 2020-05-09 PROCEDURE — 3331090002 HH PPS REVENUE DEBIT

## 2020-05-09 PROCEDURE — 3331090001 HH PPS REVENUE CREDIT

## 2020-05-10 PROCEDURE — 3331090002 HH PPS REVENUE DEBIT

## 2020-05-10 PROCEDURE — 3331090001 HH PPS REVENUE CREDIT

## 2020-05-11 ENCOUNTER — HOME CARE VISIT (OUTPATIENT)
Dept: SCHEDULING | Facility: HOME HEALTH | Age: 70
End: 2020-05-11
Payer: MEDICARE

## 2020-05-11 VITALS
DIASTOLIC BLOOD PRESSURE: 78 MMHG | TEMPERATURE: 97.2 F | HEART RATE: 98 BPM | RESPIRATION RATE: 14 BRPM | SYSTOLIC BLOOD PRESSURE: 142 MMHG | OXYGEN SATURATION: 96 %

## 2020-05-11 PROCEDURE — A4927 NON-STERILE GLOVES: HCPCS

## 2020-05-11 PROCEDURE — A6445 CONFORM BAND S W <3"/YD: HCPCS

## 2020-05-11 PROCEDURE — 3331090001 HH PPS REVENUE CREDIT

## 2020-05-11 PROCEDURE — 3331090002 HH PPS REVENUE DEBIT

## 2020-05-11 PROCEDURE — A4452 WATERPROOF TAPE: HCPCS

## 2020-05-11 PROCEDURE — A6216 NON-STERILE GAUZE<=16 SQ IN: HCPCS

## 2020-05-11 PROCEDURE — G0299 HHS/HOSPICE OF RN EA 15 MIN: HCPCS

## 2020-05-11 PROCEDURE — A6252 ABSORPT DRG >16 <=48 W/O BDR: HCPCS

## 2020-05-12 PROCEDURE — 3331090001 HH PPS REVENUE CREDIT

## 2020-05-12 PROCEDURE — 3331090002 HH PPS REVENUE DEBIT

## 2020-05-13 ENCOUNTER — HOME CARE VISIT (OUTPATIENT)
Dept: SCHEDULING | Facility: HOME HEALTH | Age: 70
End: 2020-05-13
Payer: MEDICARE

## 2020-05-13 VITALS
DIASTOLIC BLOOD PRESSURE: 68 MMHG | HEART RATE: 78 BPM | RESPIRATION RATE: 20 BRPM | SYSTOLIC BLOOD PRESSURE: 124 MMHG | OXYGEN SATURATION: 96 % | TEMPERATURE: 97.9 F

## 2020-05-13 VITALS
RESPIRATION RATE: 14 BRPM | OXYGEN SATURATION: 95 % | DIASTOLIC BLOOD PRESSURE: 80 MMHG | SYSTOLIC BLOOD PRESSURE: 136 MMHG | HEART RATE: 115 BPM | TEMPERATURE: 97.3 F

## 2020-05-13 PROCEDURE — 3331090002 HH PPS REVENUE DEBIT

## 2020-05-13 PROCEDURE — 3331090001 HH PPS REVENUE CREDIT

## 2020-05-13 PROCEDURE — G0299 HHS/HOSPICE OF RN EA 15 MIN: HCPCS

## 2020-05-14 PROCEDURE — 3331090001 HH PPS REVENUE CREDIT

## 2020-05-14 PROCEDURE — 3331090002 HH PPS REVENUE DEBIT

## 2020-05-15 ENCOUNTER — HOME CARE VISIT (OUTPATIENT)
Dept: SCHEDULING | Facility: HOME HEALTH | Age: 70
End: 2020-05-15
Payer: MEDICARE

## 2020-05-15 PROCEDURE — G0299 HHS/HOSPICE OF RN EA 15 MIN: HCPCS

## 2020-05-15 PROCEDURE — 3331090002 HH PPS REVENUE DEBIT

## 2020-05-15 PROCEDURE — 3331090001 HH PPS REVENUE CREDIT

## 2020-05-16 VITALS
HEART RATE: 90 BPM | OXYGEN SATURATION: 97 % | TEMPERATURE: 97.2 F | SYSTOLIC BLOOD PRESSURE: 140 MMHG | RESPIRATION RATE: 16 BRPM | DIASTOLIC BLOOD PRESSURE: 80 MMHG

## 2020-05-16 PROCEDURE — 3331090002 HH PPS REVENUE DEBIT

## 2020-05-16 PROCEDURE — 3331090001 HH PPS REVENUE CREDIT

## 2020-05-17 PROCEDURE — 3331090002 HH PPS REVENUE DEBIT

## 2020-05-17 PROCEDURE — 3331090001 HH PPS REVENUE CREDIT

## 2020-05-18 ENCOUNTER — HOME CARE VISIT (OUTPATIENT)
Dept: SCHEDULING | Facility: HOME HEALTH | Age: 70
End: 2020-05-18
Payer: MEDICARE

## 2020-05-18 VITALS
HEART RATE: 118 BPM | DIASTOLIC BLOOD PRESSURE: 78 MMHG | OXYGEN SATURATION: 97 % | RESPIRATION RATE: 16 BRPM | SYSTOLIC BLOOD PRESSURE: 128 MMHG | TEMPERATURE: 98.1 F

## 2020-05-18 PROCEDURE — 3331090001 HH PPS REVENUE CREDIT

## 2020-05-18 PROCEDURE — G0299 HHS/HOSPICE OF RN EA 15 MIN: HCPCS

## 2020-05-18 PROCEDURE — 3331090002 HH PPS REVENUE DEBIT

## 2020-05-19 PROCEDURE — 3331090002 HH PPS REVENUE DEBIT

## 2020-05-19 PROCEDURE — 3331090001 HH PPS REVENUE CREDIT

## 2020-05-20 ENCOUNTER — HOME CARE VISIT (OUTPATIENT)
Dept: SCHEDULING | Facility: HOME HEALTH | Age: 70
End: 2020-05-20
Payer: MEDICARE

## 2020-05-20 VITALS
DIASTOLIC BLOOD PRESSURE: 86 MMHG | RESPIRATION RATE: 14 BRPM | TEMPERATURE: 97.4 F | OXYGEN SATURATION: 97 % | HEART RATE: 102 BPM | SYSTOLIC BLOOD PRESSURE: 140 MMHG

## 2020-05-20 PROCEDURE — 3331090002 HH PPS REVENUE DEBIT

## 2020-05-20 PROCEDURE — A4452 WATERPROOF TAPE: HCPCS

## 2020-05-20 PROCEDURE — A6446 CONFORM BAND S W>=3" <5"/YD: HCPCS

## 2020-05-20 PROCEDURE — A6197 ALGINATE DRSG >16 <=48 SQ IN: HCPCS

## 2020-05-20 PROCEDURE — G0299 HHS/HOSPICE OF RN EA 15 MIN: HCPCS

## 2020-05-20 PROCEDURE — A6252 ABSORPT DRG >16 <=48 W/O BDR: HCPCS

## 2020-05-20 PROCEDURE — 3331090001 HH PPS REVENUE CREDIT

## 2020-05-21 PROCEDURE — 3331090001 HH PPS REVENUE CREDIT

## 2020-05-21 PROCEDURE — 3331090002 HH PPS REVENUE DEBIT

## 2020-05-22 ENCOUNTER — HOME CARE VISIT (OUTPATIENT)
Dept: SCHEDULING | Facility: HOME HEALTH | Age: 70
End: 2020-05-22
Payer: MEDICARE

## 2020-05-22 PROCEDURE — 3331090001 HH PPS REVENUE CREDIT

## 2020-05-22 PROCEDURE — 3331090002 HH PPS REVENUE DEBIT

## 2020-05-22 PROCEDURE — G0299 HHS/HOSPICE OF RN EA 15 MIN: HCPCS

## 2020-05-23 VITALS
TEMPERATURE: 97.1 F | DIASTOLIC BLOOD PRESSURE: 89 MMHG | HEART RATE: 90 BPM | RESPIRATION RATE: 14 BRPM | OXYGEN SATURATION: 95 % | SYSTOLIC BLOOD PRESSURE: 150 MMHG

## 2020-05-23 PROCEDURE — 3331090002 HH PPS REVENUE DEBIT

## 2020-05-23 PROCEDURE — 3331090001 HH PPS REVENUE CREDIT

## 2020-05-24 PROCEDURE — 3331090002 HH PPS REVENUE DEBIT

## 2020-05-24 PROCEDURE — 3331090001 HH PPS REVENUE CREDIT

## 2020-05-25 ENCOUNTER — HOME CARE VISIT (OUTPATIENT)
Dept: SCHEDULING | Facility: HOME HEALTH | Age: 70
End: 2020-05-25
Payer: MEDICARE

## 2020-05-25 VITALS
RESPIRATION RATE: 14 BRPM | OXYGEN SATURATION: 95 % | HEART RATE: 53 BPM | TEMPERATURE: 97.8 F | DIASTOLIC BLOOD PRESSURE: 80 MMHG | SYSTOLIC BLOOD PRESSURE: 130 MMHG

## 2020-05-25 PROCEDURE — 3331090001 HH PPS REVENUE CREDIT

## 2020-05-25 PROCEDURE — G0299 HHS/HOSPICE OF RN EA 15 MIN: HCPCS

## 2020-05-25 PROCEDURE — 3331090002 HH PPS REVENUE DEBIT

## 2020-05-26 PROCEDURE — 3331090002 HH PPS REVENUE DEBIT

## 2020-05-26 PROCEDURE — 3331090001 HH PPS REVENUE CREDIT

## 2020-05-27 ENCOUNTER — HOME CARE VISIT (OUTPATIENT)
Dept: SCHEDULING | Facility: HOME HEALTH | Age: 70
End: 2020-05-27
Payer: MEDICARE

## 2020-05-27 VITALS
SYSTOLIC BLOOD PRESSURE: 150 MMHG | DIASTOLIC BLOOD PRESSURE: 90 MMHG | RESPIRATION RATE: 14 BRPM | OXYGEN SATURATION: 97 % | TEMPERATURE: 97.4 F | HEART RATE: 105 BPM

## 2020-05-27 PROCEDURE — 3331090001 HH PPS REVENUE CREDIT

## 2020-05-27 PROCEDURE — G0299 HHS/HOSPICE OF RN EA 15 MIN: HCPCS

## 2020-05-27 PROCEDURE — 3331090002 HH PPS REVENUE DEBIT

## 2020-05-27 PROCEDURE — 400014 HH F/U

## 2020-05-28 PROCEDURE — 3331090001 HH PPS REVENUE CREDIT

## 2020-05-28 PROCEDURE — 3331090002 HH PPS REVENUE DEBIT

## 2020-05-29 ENCOUNTER — HOME CARE VISIT (OUTPATIENT)
Dept: SCHEDULING | Facility: HOME HEALTH | Age: 70
End: 2020-05-29
Payer: MEDICARE

## 2020-05-29 PROCEDURE — 3331090001 HH PPS REVENUE CREDIT

## 2020-05-29 PROCEDURE — 3331090002 HH PPS REVENUE DEBIT

## 2020-05-30 PROCEDURE — 3331090002 HH PPS REVENUE DEBIT

## 2020-05-30 PROCEDURE — 3331090001 HH PPS REVENUE CREDIT

## 2020-05-31 PROCEDURE — 3331090002 HH PPS REVENUE DEBIT

## 2020-05-31 PROCEDURE — 3331090001 HH PPS REVENUE CREDIT

## 2020-06-01 ENCOUNTER — HOME CARE VISIT (OUTPATIENT)
Dept: SCHEDULING | Facility: HOME HEALTH | Age: 70
End: 2020-06-01
Payer: MEDICARE

## 2020-06-01 VITALS
OXYGEN SATURATION: 96 % | RESPIRATION RATE: 14 BRPM | HEART RATE: 105 BPM | TEMPERATURE: 97.8 F | DIASTOLIC BLOOD PRESSURE: 70 MMHG | SYSTOLIC BLOOD PRESSURE: 135 MMHG

## 2020-06-01 PROCEDURE — A6252 ABSORPT DRG >16 <=48 W/O BDR: HCPCS

## 2020-06-01 PROCEDURE — G0299 HHS/HOSPICE OF RN EA 15 MIN: HCPCS

## 2020-06-01 PROCEDURE — A6216 NON-STERILE GAUZE<=16 SQ IN: HCPCS

## 2020-06-01 PROCEDURE — A6197 ALGINATE DRSG >16 <=48 SQ IN: HCPCS

## 2020-06-01 PROCEDURE — 3331090002 HH PPS REVENUE DEBIT

## 2020-06-01 PROCEDURE — 3331090001 HH PPS REVENUE CREDIT

## 2020-06-02 PROCEDURE — 3331090001 HH PPS REVENUE CREDIT

## 2020-06-02 PROCEDURE — 3331090002 HH PPS REVENUE DEBIT

## 2020-06-03 ENCOUNTER — HOME CARE VISIT (OUTPATIENT)
Dept: SCHEDULING | Facility: HOME HEALTH | Age: 70
End: 2020-06-03
Payer: MEDICARE

## 2020-06-03 VITALS
RESPIRATION RATE: 16 BRPM | SYSTOLIC BLOOD PRESSURE: 142 MMHG | DIASTOLIC BLOOD PRESSURE: 80 MMHG | OXYGEN SATURATION: 94 % | TEMPERATURE: 97.1 F | HEART RATE: 112 BPM

## 2020-06-03 PROCEDURE — 3331090002 HH PPS REVENUE DEBIT

## 2020-06-03 PROCEDURE — 3331090001 HH PPS REVENUE CREDIT

## 2020-06-03 PROCEDURE — G0299 HHS/HOSPICE OF RN EA 15 MIN: HCPCS

## 2020-06-04 PROCEDURE — 3331090002 HH PPS REVENUE DEBIT

## 2020-06-04 PROCEDURE — 3331090001 HH PPS REVENUE CREDIT

## 2020-06-05 ENCOUNTER — HOME CARE VISIT (OUTPATIENT)
Dept: SCHEDULING | Facility: HOME HEALTH | Age: 70
End: 2020-06-05
Payer: MEDICARE

## 2020-06-05 VITALS
HEART RATE: 83 BPM | RESPIRATION RATE: 14 BRPM | DIASTOLIC BLOOD PRESSURE: 78 MMHG | SYSTOLIC BLOOD PRESSURE: 140 MMHG | TEMPERATURE: 98.1 F | OXYGEN SATURATION: 96 %

## 2020-06-05 PROCEDURE — G0299 HHS/HOSPICE OF RN EA 15 MIN: HCPCS

## 2020-06-05 PROCEDURE — 3331090001 HH PPS REVENUE CREDIT

## 2020-06-05 PROCEDURE — 3331090002 HH PPS REVENUE DEBIT

## 2020-06-06 PROCEDURE — 3331090002 HH PPS REVENUE DEBIT

## 2020-06-06 PROCEDURE — 3331090001 HH PPS REVENUE CREDIT

## 2020-06-07 PROCEDURE — 3331090001 HH PPS REVENUE CREDIT

## 2020-06-07 PROCEDURE — 3331090002 HH PPS REVENUE DEBIT

## 2020-06-08 ENCOUNTER — HOME CARE VISIT (OUTPATIENT)
Dept: SCHEDULING | Facility: HOME HEALTH | Age: 70
End: 2020-06-08
Payer: MEDICARE

## 2020-06-08 VITALS
DIASTOLIC BLOOD PRESSURE: 78 MMHG | OXYGEN SATURATION: 96 % | TEMPERATURE: 97.3 F | SYSTOLIC BLOOD PRESSURE: 134 MMHG | HEART RATE: 120 BPM | RESPIRATION RATE: 14 BRPM

## 2020-06-08 PROCEDURE — G0299 HHS/HOSPICE OF RN EA 15 MIN: HCPCS

## 2020-06-08 PROCEDURE — 3331090002 HH PPS REVENUE DEBIT

## 2020-06-08 PROCEDURE — A6197 ALGINATE DRSG >16 <=48 SQ IN: HCPCS

## 2020-06-08 PROCEDURE — 3331090001 HH PPS REVENUE CREDIT

## 2020-06-08 PROCEDURE — A4927 NON-STERILE GLOVES: HCPCS

## 2020-06-09 PROCEDURE — 3331090001 HH PPS REVENUE CREDIT

## 2020-06-09 PROCEDURE — 3331090002 HH PPS REVENUE DEBIT

## 2020-06-10 ENCOUNTER — HOME CARE VISIT (OUTPATIENT)
Dept: SCHEDULING | Facility: HOME HEALTH | Age: 70
End: 2020-06-10
Payer: MEDICARE

## 2020-06-10 VITALS
SYSTOLIC BLOOD PRESSURE: 130 MMHG | DIASTOLIC BLOOD PRESSURE: 80 MMHG | HEART RATE: 91 BPM | RESPIRATION RATE: 14 BRPM | TEMPERATURE: 97.5 F | OXYGEN SATURATION: 97 %

## 2020-06-10 PROCEDURE — 3331090002 HH PPS REVENUE DEBIT

## 2020-06-10 PROCEDURE — G0299 HHS/HOSPICE OF RN EA 15 MIN: HCPCS

## 2020-06-10 PROCEDURE — 3331090001 HH PPS REVENUE CREDIT

## 2020-06-11 PROCEDURE — 3331090002 HH PPS REVENUE DEBIT

## 2020-06-11 PROCEDURE — 3331090001 HH PPS REVENUE CREDIT

## 2020-06-12 ENCOUNTER — HOME CARE VISIT (OUTPATIENT)
Dept: SCHEDULING | Facility: HOME HEALTH | Age: 70
End: 2020-06-12
Payer: MEDICARE

## 2020-06-12 PROCEDURE — 3331090001 HH PPS REVENUE CREDIT

## 2020-06-12 PROCEDURE — 3331090002 HH PPS REVENUE DEBIT

## 2020-06-12 PROCEDURE — G0299 HHS/HOSPICE OF RN EA 15 MIN: HCPCS

## 2020-06-13 VITALS
TEMPERATURE: 98 F | DIASTOLIC BLOOD PRESSURE: 78 MMHG | SYSTOLIC BLOOD PRESSURE: 138 MMHG | HEART RATE: 88 BPM | OXYGEN SATURATION: 95 % | RESPIRATION RATE: 14 BRPM

## 2020-06-13 PROCEDURE — 3331090002 HH PPS REVENUE DEBIT

## 2020-06-13 PROCEDURE — 3331090001 HH PPS REVENUE CREDIT

## 2020-06-14 PROCEDURE — 3331090002 HH PPS REVENUE DEBIT

## 2020-06-14 PROCEDURE — 3331090001 HH PPS REVENUE CREDIT

## 2020-06-15 ENCOUNTER — HOME CARE VISIT (OUTPATIENT)
Dept: SCHEDULING | Facility: HOME HEALTH | Age: 70
End: 2020-06-15
Payer: MEDICARE

## 2020-06-15 VITALS
OXYGEN SATURATION: 96 % | HEART RATE: 101 BPM | DIASTOLIC BLOOD PRESSURE: 78 MMHG | TEMPERATURE: 97.4 F | SYSTOLIC BLOOD PRESSURE: 138 MMHG

## 2020-06-15 PROCEDURE — 3331090001 HH PPS REVENUE CREDIT

## 2020-06-15 PROCEDURE — G0299 HHS/HOSPICE OF RN EA 15 MIN: HCPCS

## 2020-06-15 PROCEDURE — 3331090002 HH PPS REVENUE DEBIT

## 2020-06-16 PROCEDURE — 3331090001 HH PPS REVENUE CREDIT

## 2020-06-16 PROCEDURE — 3331090002 HH PPS REVENUE DEBIT

## 2020-06-17 ENCOUNTER — HOME CARE VISIT (OUTPATIENT)
Dept: SCHEDULING | Facility: HOME HEALTH | Age: 70
End: 2020-06-17
Payer: MEDICARE

## 2020-06-17 VITALS
DIASTOLIC BLOOD PRESSURE: 80 MMHG | SYSTOLIC BLOOD PRESSURE: 142 MMHG | TEMPERATURE: 96.5 F | OXYGEN SATURATION: 96 % | HEART RATE: 107 BPM | RESPIRATION RATE: 14 BRPM

## 2020-06-17 PROCEDURE — 3331090001 HH PPS REVENUE CREDIT

## 2020-06-17 PROCEDURE — G0299 HHS/HOSPICE OF RN EA 15 MIN: HCPCS

## 2020-06-17 PROCEDURE — 3331090002 HH PPS REVENUE DEBIT

## 2020-06-18 PROCEDURE — 3331090001 HH PPS REVENUE CREDIT

## 2020-06-18 PROCEDURE — 3331090002 HH PPS REVENUE DEBIT

## 2020-06-19 ENCOUNTER — HOME CARE VISIT (OUTPATIENT)
Dept: SCHEDULING | Facility: HOME HEALTH | Age: 70
End: 2020-06-19
Payer: MEDICARE

## 2020-06-19 PROCEDURE — 3331090002 HH PPS REVENUE DEBIT

## 2020-06-19 PROCEDURE — 3331090001 HH PPS REVENUE CREDIT

## 2020-06-20 PROCEDURE — 3331090002 HH PPS REVENUE DEBIT

## 2020-06-20 PROCEDURE — 3331090001 HH PPS REVENUE CREDIT

## 2020-06-21 PROCEDURE — 3331090001 HH PPS REVENUE CREDIT

## 2020-06-21 PROCEDURE — 3331090002 HH PPS REVENUE DEBIT

## 2020-06-22 ENCOUNTER — HOME CARE VISIT (OUTPATIENT)
Dept: SCHEDULING | Facility: HOME HEALTH | Age: 70
End: 2020-06-22
Payer: MEDICARE

## 2020-06-22 VITALS
TEMPERATURE: 97.3 F | OXYGEN SATURATION: 95 % | HEART RATE: 81 BPM | DIASTOLIC BLOOD PRESSURE: 58 MMHG | SYSTOLIC BLOOD PRESSURE: 142 MMHG | RESPIRATION RATE: 14 BRPM

## 2020-06-22 PROCEDURE — A6260 WOUND CLEANSER ANY TYPE/SIZE: HCPCS

## 2020-06-22 PROCEDURE — A4452 WATERPROOF TAPE: HCPCS

## 2020-06-22 PROCEDURE — 3331090001 HH PPS REVENUE CREDIT

## 2020-06-22 PROCEDURE — A6216 NON-STERILE GAUZE<=16 SQ IN: HCPCS

## 2020-06-22 PROCEDURE — G0299 HHS/HOSPICE OF RN EA 15 MIN: HCPCS

## 2020-06-22 PROCEDURE — A6197 ALGINATE DRSG >16 <=48 SQ IN: HCPCS

## 2020-06-22 PROCEDURE — 3331090002 HH PPS REVENUE DEBIT

## 2020-06-22 PROCEDURE — A6446 CONFORM BAND S W>=3" <5"/YD: HCPCS

## 2020-06-23 PROCEDURE — 3331090001 HH PPS REVENUE CREDIT

## 2020-06-23 PROCEDURE — 3331090002 HH PPS REVENUE DEBIT

## 2020-06-24 ENCOUNTER — HOME CARE VISIT (OUTPATIENT)
Dept: SCHEDULING | Facility: HOME HEALTH | Age: 70
End: 2020-06-24
Payer: MEDICARE

## 2020-06-24 VITALS
TEMPERATURE: 97.6 F | HEART RATE: 51 BPM | SYSTOLIC BLOOD PRESSURE: 140 MMHG | OXYGEN SATURATION: 94 % | RESPIRATION RATE: 14 BRPM | DIASTOLIC BLOOD PRESSURE: 70 MMHG

## 2020-06-24 PROCEDURE — 3331090001 HH PPS REVENUE CREDIT

## 2020-06-24 PROCEDURE — A6446 CONFORM BAND S W>=3" <5"/YD: HCPCS

## 2020-06-24 PROCEDURE — A4452 WATERPROOF TAPE: HCPCS

## 2020-06-24 PROCEDURE — A6197 ALGINATE DRSG >16 <=48 SQ IN: HCPCS

## 2020-06-24 PROCEDURE — 3331090002 HH PPS REVENUE DEBIT

## 2020-06-24 PROCEDURE — G0299 HHS/HOSPICE OF RN EA 15 MIN: HCPCS

## 2020-06-25 PROCEDURE — 3331090002 HH PPS REVENUE DEBIT

## 2020-06-25 PROCEDURE — 3331090001 HH PPS REVENUE CREDIT

## 2020-06-26 ENCOUNTER — HOME CARE VISIT (OUTPATIENT)
Dept: SCHEDULING | Facility: HOME HEALTH | Age: 70
End: 2020-06-26
Payer: MEDICARE

## 2020-06-26 PROCEDURE — 400014 HH F/U

## 2020-06-26 PROCEDURE — G0300 HHS/HOSPICE OF LPN EA 15 MIN: HCPCS

## 2020-06-26 PROCEDURE — 3331090001 HH PPS REVENUE CREDIT

## 2020-06-26 PROCEDURE — 3331090002 HH PPS REVENUE DEBIT

## 2020-06-27 PROCEDURE — 3331090001 HH PPS REVENUE CREDIT

## 2020-06-27 PROCEDURE — 3331090002 HH PPS REVENUE DEBIT

## 2020-06-28 VITALS
HEART RATE: 76 BPM | SYSTOLIC BLOOD PRESSURE: 117 MMHG | TEMPERATURE: 97.7 F | OXYGEN SATURATION: 97 % | RESPIRATION RATE: 20 BRPM | DIASTOLIC BLOOD PRESSURE: 70 MMHG

## 2020-06-28 PROCEDURE — 3331090002 HH PPS REVENUE DEBIT

## 2020-06-28 PROCEDURE — 3331090001 HH PPS REVENUE CREDIT

## 2020-06-29 ENCOUNTER — HOME CARE VISIT (OUTPATIENT)
Dept: SCHEDULING | Facility: HOME HEALTH | Age: 70
End: 2020-06-29
Payer: MEDICARE

## 2020-06-29 VITALS
OXYGEN SATURATION: 96 % | DIASTOLIC BLOOD PRESSURE: 80 MMHG | HEART RATE: 95 BPM | RESPIRATION RATE: 14 BRPM | TEMPERATURE: 97.1 F | SYSTOLIC BLOOD PRESSURE: 140 MMHG

## 2020-06-29 PROCEDURE — 3331090001 HH PPS REVENUE CREDIT

## 2020-06-29 PROCEDURE — A6252 ABSORPT DRG >16 <=48 W/O BDR: HCPCS

## 2020-06-29 PROCEDURE — 3331090002 HH PPS REVENUE DEBIT

## 2020-06-29 PROCEDURE — A6197 ALGINATE DRSG >16 <=48 SQ IN: HCPCS

## 2020-06-29 PROCEDURE — G0299 HHS/HOSPICE OF RN EA 15 MIN: HCPCS

## 2020-06-29 PROCEDURE — A4452 WATERPROOF TAPE: HCPCS

## 2020-06-30 PROCEDURE — 3331090001 HH PPS REVENUE CREDIT

## 2020-06-30 PROCEDURE — 3331090002 HH PPS REVENUE DEBIT

## 2020-07-01 ENCOUNTER — HOME CARE VISIT (OUTPATIENT)
Dept: SCHEDULING | Facility: HOME HEALTH | Age: 70
End: 2020-07-01
Payer: MEDICARE

## 2020-07-01 VITALS
TEMPERATURE: 97 F | SYSTOLIC BLOOD PRESSURE: 140 MMHG | HEART RATE: 77 BPM | OXYGEN SATURATION: 96 % | DIASTOLIC BLOOD PRESSURE: 82 MMHG | RESPIRATION RATE: 14 BRPM

## 2020-07-01 PROCEDURE — 3331090001 HH PPS REVENUE CREDIT

## 2020-07-01 PROCEDURE — 3331090002 HH PPS REVENUE DEBIT

## 2020-07-01 PROCEDURE — G0299 HHS/HOSPICE OF RN EA 15 MIN: HCPCS

## 2020-07-02 PROCEDURE — 3331090001 HH PPS REVENUE CREDIT

## 2020-07-02 PROCEDURE — 3331090002 HH PPS REVENUE DEBIT

## 2020-07-03 ENCOUNTER — HOME CARE VISIT (OUTPATIENT)
Dept: SCHEDULING | Facility: HOME HEALTH | Age: 70
End: 2020-07-03
Payer: MEDICARE

## 2020-07-03 VITALS
TEMPERATURE: 97.8 F | OXYGEN SATURATION: 98 % | RESPIRATION RATE: 14 BRPM | HEART RATE: 90 BPM | SYSTOLIC BLOOD PRESSURE: 138 MMHG | DIASTOLIC BLOOD PRESSURE: 78 MMHG

## 2020-07-03 PROCEDURE — 3331090002 HH PPS REVENUE DEBIT

## 2020-07-03 PROCEDURE — G0299 HHS/HOSPICE OF RN EA 15 MIN: HCPCS

## 2020-07-03 PROCEDURE — 3331090001 HH PPS REVENUE CREDIT

## 2020-07-04 PROCEDURE — 3331090002 HH PPS REVENUE DEBIT

## 2020-07-04 PROCEDURE — 3331090001 HH PPS REVENUE CREDIT

## 2020-07-05 PROCEDURE — 3331090001 HH PPS REVENUE CREDIT

## 2020-07-05 PROCEDURE — 3331090002 HH PPS REVENUE DEBIT

## 2020-07-06 ENCOUNTER — HOME CARE VISIT (OUTPATIENT)
Dept: SCHEDULING | Facility: HOME HEALTH | Age: 70
End: 2020-07-06
Payer: MEDICARE

## 2020-07-06 VITALS
HEART RATE: 81 BPM | RESPIRATION RATE: 14 BRPM | OXYGEN SATURATION: 98 % | SYSTOLIC BLOOD PRESSURE: 138 MMHG | DIASTOLIC BLOOD PRESSURE: 78 MMHG | TEMPERATURE: 97.2 F

## 2020-07-06 PROCEDURE — A6252 ABSORPT DRG >16 <=48 W/O BDR: HCPCS

## 2020-07-06 PROCEDURE — 3331090001 HH PPS REVENUE CREDIT

## 2020-07-06 PROCEDURE — G0299 HHS/HOSPICE OF RN EA 15 MIN: HCPCS

## 2020-07-06 PROCEDURE — 3331090002 HH PPS REVENUE DEBIT

## 2020-07-06 PROCEDURE — A4452 WATERPROOF TAPE: HCPCS

## 2020-07-06 PROCEDURE — A6197 ALGINATE DRSG >16 <=48 SQ IN: HCPCS

## 2020-07-06 PROCEDURE — A6446 CONFORM BAND S W>=3" <5"/YD: HCPCS

## 2020-07-07 PROCEDURE — 3331090002 HH PPS REVENUE DEBIT

## 2020-07-07 PROCEDURE — 3331090001 HH PPS REVENUE CREDIT

## 2020-07-08 ENCOUNTER — HOME CARE VISIT (OUTPATIENT)
Dept: SCHEDULING | Facility: HOME HEALTH | Age: 70
End: 2020-07-08
Payer: MEDICARE

## 2020-07-08 VITALS
OXYGEN SATURATION: 96 % | RESPIRATION RATE: 14 BRPM | SYSTOLIC BLOOD PRESSURE: 138 MMHG | TEMPERATURE: 97.3 F | DIASTOLIC BLOOD PRESSURE: 78 MMHG | HEART RATE: 87 BPM

## 2020-07-08 PROCEDURE — G0299 HHS/HOSPICE OF RN EA 15 MIN: HCPCS

## 2020-07-08 PROCEDURE — 3331090002 HH PPS REVENUE DEBIT

## 2020-07-08 PROCEDURE — 3331090001 HH PPS REVENUE CREDIT

## 2020-07-09 PROCEDURE — 3331090002 HH PPS REVENUE DEBIT

## 2020-07-09 PROCEDURE — 3331090001 HH PPS REVENUE CREDIT

## 2020-07-10 ENCOUNTER — HOME CARE VISIT (OUTPATIENT)
Dept: SCHEDULING | Facility: HOME HEALTH | Age: 70
End: 2020-07-10
Payer: MEDICARE

## 2020-07-10 PROCEDURE — 3331090002 HH PPS REVENUE DEBIT

## 2020-07-10 PROCEDURE — 3331090001 HH PPS REVENUE CREDIT

## 2020-07-11 PROCEDURE — 3331090001 HH PPS REVENUE CREDIT

## 2020-07-11 PROCEDURE — 3331090002 HH PPS REVENUE DEBIT

## 2020-07-12 PROCEDURE — 3331090002 HH PPS REVENUE DEBIT

## 2020-07-12 PROCEDURE — 3331090001 HH PPS REVENUE CREDIT

## 2020-07-13 ENCOUNTER — HOME CARE VISIT (OUTPATIENT)
Dept: SCHEDULING | Facility: HOME HEALTH | Age: 70
End: 2020-07-13
Payer: MEDICARE

## 2020-07-13 VITALS
SYSTOLIC BLOOD PRESSURE: 140 MMHG | OXYGEN SATURATION: 96 % | HEART RATE: 80 BPM | DIASTOLIC BLOOD PRESSURE: 78 MMHG | TEMPERATURE: 98.7 F

## 2020-07-13 PROCEDURE — G0299 HHS/HOSPICE OF RN EA 15 MIN: HCPCS

## 2020-07-13 PROCEDURE — 3331090002 HH PPS REVENUE DEBIT

## 2020-07-13 PROCEDURE — 3331090001 HH PPS REVENUE CREDIT

## 2020-07-14 PROCEDURE — 3331090001 HH PPS REVENUE CREDIT

## 2020-07-14 PROCEDURE — 3331090002 HH PPS REVENUE DEBIT

## 2020-07-15 ENCOUNTER — HOME CARE VISIT (OUTPATIENT)
Dept: SCHEDULING | Facility: HOME HEALTH | Age: 70
End: 2020-07-15
Payer: MEDICARE

## 2020-07-15 VITALS
OXYGEN SATURATION: 97 % | TEMPERATURE: 97.3 F | HEART RATE: 94 BPM | RESPIRATION RATE: 14 BRPM | SYSTOLIC BLOOD PRESSURE: 142 MMHG | DIASTOLIC BLOOD PRESSURE: 78 MMHG

## 2020-07-15 PROCEDURE — 3331090002 HH PPS REVENUE DEBIT

## 2020-07-15 PROCEDURE — G0299 HHS/HOSPICE OF RN EA 15 MIN: HCPCS

## 2020-07-15 PROCEDURE — 3331090001 HH PPS REVENUE CREDIT

## 2020-07-16 PROCEDURE — 3331090001 HH PPS REVENUE CREDIT

## 2020-07-16 PROCEDURE — 3331090002 HH PPS REVENUE DEBIT

## 2020-07-17 ENCOUNTER — HOME CARE VISIT (OUTPATIENT)
Dept: SCHEDULING | Facility: HOME HEALTH | Age: 70
End: 2020-07-17
Payer: MEDICARE

## 2020-07-17 VITALS
OXYGEN SATURATION: 97 % | SYSTOLIC BLOOD PRESSURE: 138 MMHG | RESPIRATION RATE: 14 BRPM | DIASTOLIC BLOOD PRESSURE: 70 MMHG | HEART RATE: 110 BPM | TEMPERATURE: 97.4 F

## 2020-07-17 PROCEDURE — 3331090001 HH PPS REVENUE CREDIT

## 2020-07-17 PROCEDURE — 3331090002 HH PPS REVENUE DEBIT

## 2020-07-17 PROCEDURE — G0299 HHS/HOSPICE OF RN EA 15 MIN: HCPCS

## 2020-07-18 PROCEDURE — 3331090001 HH PPS REVENUE CREDIT

## 2020-07-18 PROCEDURE — 3331090002 HH PPS REVENUE DEBIT

## 2020-07-19 PROCEDURE — 3331090001 HH PPS REVENUE CREDIT

## 2020-07-19 PROCEDURE — 3331090002 HH PPS REVENUE DEBIT

## 2020-07-20 ENCOUNTER — HOME CARE VISIT (OUTPATIENT)
Dept: SCHEDULING | Facility: HOME HEALTH | Age: 70
End: 2020-07-20
Payer: MEDICARE

## 2020-07-20 PROCEDURE — G0299 HHS/HOSPICE OF RN EA 15 MIN: HCPCS

## 2020-07-20 PROCEDURE — 3331090001 HH PPS REVENUE CREDIT

## 2020-07-20 PROCEDURE — 3331090002 HH PPS REVENUE DEBIT

## 2020-07-21 VITALS
DIASTOLIC BLOOD PRESSURE: 78 MMHG | HEART RATE: 77 BPM | TEMPERATURE: 97.8 F | SYSTOLIC BLOOD PRESSURE: 144 MMHG | OXYGEN SATURATION: 97 % | RESPIRATION RATE: 14 BRPM

## 2020-07-21 PROCEDURE — A6197 ALGINATE DRSG >16 <=48 SQ IN: HCPCS

## 2020-07-21 PROCEDURE — A6252 ABSORPT DRG >16 <=48 W/O BDR: HCPCS

## 2020-07-21 PROCEDURE — A6446 CONFORM BAND S W>=3" <5"/YD: HCPCS

## 2020-07-21 PROCEDURE — A6216 NON-STERILE GAUZE<=16 SQ IN: HCPCS

## 2020-07-21 PROCEDURE — A4452 WATERPROOF TAPE: HCPCS

## 2020-07-21 PROCEDURE — 3331090002 HH PPS REVENUE DEBIT

## 2020-07-21 PROCEDURE — A6260 WOUND CLEANSER ANY TYPE/SIZE: HCPCS

## 2020-07-21 PROCEDURE — 3331090001 HH PPS REVENUE CREDIT

## 2020-07-22 ENCOUNTER — HOME CARE VISIT (OUTPATIENT)
Dept: SCHEDULING | Facility: HOME HEALTH | Age: 70
End: 2020-07-22
Payer: MEDICARE

## 2020-07-22 PROCEDURE — 3331090001 HH PPS REVENUE CREDIT

## 2020-07-22 PROCEDURE — G0299 HHS/HOSPICE OF RN EA 15 MIN: HCPCS

## 2020-07-22 PROCEDURE — 3331090002 HH PPS REVENUE DEBIT

## 2020-07-23 VITALS
RESPIRATION RATE: 14 BRPM | HEART RATE: 96 BPM | DIASTOLIC BLOOD PRESSURE: 82 MMHG | SYSTOLIC BLOOD PRESSURE: 140 MMHG | TEMPERATURE: 96.4 F | OXYGEN SATURATION: 97 %

## 2020-07-23 PROCEDURE — 3331090001 HH PPS REVENUE CREDIT

## 2020-07-23 PROCEDURE — 3331090002 HH PPS REVENUE DEBIT

## 2020-07-24 ENCOUNTER — HOME CARE VISIT (OUTPATIENT)
Dept: SCHEDULING | Facility: HOME HEALTH | Age: 70
End: 2020-07-24
Payer: MEDICARE

## 2020-07-24 PROCEDURE — 3331090001 HH PPS REVENUE CREDIT

## 2020-07-24 PROCEDURE — 3331090002 HH PPS REVENUE DEBIT

## 2020-07-24 PROCEDURE — G0299 HHS/HOSPICE OF RN EA 15 MIN: HCPCS

## 2020-07-25 PROCEDURE — 3331090001 HH PPS REVENUE CREDIT

## 2020-07-25 PROCEDURE — 3331090002 HH PPS REVENUE DEBIT

## 2020-07-26 PROCEDURE — 3331090001 HH PPS REVENUE CREDIT

## 2020-07-26 PROCEDURE — 3331090002 HH PPS REVENUE DEBIT

## 2020-07-27 ENCOUNTER — HOME CARE VISIT (OUTPATIENT)
Dept: SCHEDULING | Facility: HOME HEALTH | Age: 70
End: 2020-07-27
Payer: MEDICARE

## 2020-07-27 VITALS
OXYGEN SATURATION: 96 % | HEART RATE: 115 BPM | RESPIRATION RATE: 18 BRPM | TEMPERATURE: 96.2 F | SYSTOLIC BLOOD PRESSURE: 124 MMHG | DIASTOLIC BLOOD PRESSURE: 76 MMHG

## 2020-07-27 VITALS
RESPIRATION RATE: 18 BRPM | HEART RATE: 89 BPM | SYSTOLIC BLOOD PRESSURE: 104 MMHG | TEMPERATURE: 97.5 F | DIASTOLIC BLOOD PRESSURE: 72 MMHG | OXYGEN SATURATION: 96 %

## 2020-07-27 PROCEDURE — 3331090002 HH PPS REVENUE DEBIT

## 2020-07-27 PROCEDURE — G0300 HHS/HOSPICE OF LPN EA 15 MIN: HCPCS

## 2020-07-27 PROCEDURE — 400014 HH F/U

## 2020-07-27 PROCEDURE — 3331090001 HH PPS REVENUE CREDIT

## 2020-07-27 PROCEDURE — A6197 ALGINATE DRSG >16 <=48 SQ IN: HCPCS

## 2020-07-28 PROCEDURE — 3331090001 HH PPS REVENUE CREDIT

## 2020-07-28 PROCEDURE — 3331090002 HH PPS REVENUE DEBIT

## 2020-07-29 ENCOUNTER — HOME CARE VISIT (OUTPATIENT)
Dept: SCHEDULING | Facility: HOME HEALTH | Age: 70
End: 2020-07-29
Payer: MEDICARE

## 2020-07-29 VITALS
RESPIRATION RATE: 17 BRPM | SYSTOLIC BLOOD PRESSURE: 128 MMHG | DIASTOLIC BLOOD PRESSURE: 82 MMHG | TEMPERATURE: 98.3 F | OXYGEN SATURATION: 97 % | HEART RATE: 82 BPM

## 2020-07-29 PROCEDURE — 3331090001 HH PPS REVENUE CREDIT

## 2020-07-29 PROCEDURE — 3331090002 HH PPS REVENUE DEBIT

## 2020-07-29 PROCEDURE — G0299 HHS/HOSPICE OF RN EA 15 MIN: HCPCS

## 2020-07-30 PROCEDURE — 3331090001 HH PPS REVENUE CREDIT

## 2020-07-30 PROCEDURE — 3331090002 HH PPS REVENUE DEBIT

## 2020-07-31 ENCOUNTER — HOME CARE VISIT (OUTPATIENT)
Dept: HOME HEALTH SERVICES | Facility: HOME HEALTH | Age: 70
End: 2020-07-31
Payer: MEDICARE

## 2020-07-31 PROCEDURE — 3331090002 HH PPS REVENUE DEBIT

## 2020-07-31 PROCEDURE — 3331090001 HH PPS REVENUE CREDIT

## 2020-08-01 PROCEDURE — 3331090002 HH PPS REVENUE DEBIT

## 2020-08-01 PROCEDURE — 3331090001 HH PPS REVENUE CREDIT

## 2020-08-02 PROCEDURE — 3331090002 HH PPS REVENUE DEBIT

## 2020-08-02 PROCEDURE — 3331090001 HH PPS REVENUE CREDIT

## 2020-08-03 ENCOUNTER — HOME CARE VISIT (OUTPATIENT)
Dept: SCHEDULING | Facility: HOME HEALTH | Age: 70
End: 2020-08-03
Payer: MEDICARE

## 2020-08-03 VITALS
OXYGEN SATURATION: 97 % | TEMPERATURE: 97.1 F | RESPIRATION RATE: 16 BRPM | SYSTOLIC BLOOD PRESSURE: 138 MMHG | HEART RATE: 78 BPM | DIASTOLIC BLOOD PRESSURE: 78 MMHG

## 2020-08-03 PROCEDURE — G0299 HHS/HOSPICE OF RN EA 15 MIN: HCPCS

## 2020-08-03 PROCEDURE — 3331090002 HH PPS REVENUE DEBIT

## 2020-08-03 PROCEDURE — 3331090001 HH PPS REVENUE CREDIT

## 2020-08-04 PROCEDURE — 3331090002 HH PPS REVENUE DEBIT

## 2020-08-04 PROCEDURE — 3331090001 HH PPS REVENUE CREDIT

## 2020-08-05 ENCOUNTER — HOME CARE VISIT (OUTPATIENT)
Dept: SCHEDULING | Facility: HOME HEALTH | Age: 70
End: 2020-08-05
Payer: MEDICARE

## 2020-08-05 VITALS
HEART RATE: 76 BPM | RESPIRATION RATE: 14 BRPM | TEMPERATURE: 97.3 F | OXYGEN SATURATION: 97 % | DIASTOLIC BLOOD PRESSURE: 78 MMHG | SYSTOLIC BLOOD PRESSURE: 140 MMHG

## 2020-08-05 PROCEDURE — 3331090001 HH PPS REVENUE CREDIT

## 2020-08-05 PROCEDURE — 3331090002 HH PPS REVENUE DEBIT

## 2020-08-05 PROCEDURE — G0299 HHS/HOSPICE OF RN EA 15 MIN: HCPCS

## 2020-08-06 PROCEDURE — 3331090002 HH PPS REVENUE DEBIT

## 2020-08-06 PROCEDURE — 3331090001 HH PPS REVENUE CREDIT

## 2020-08-07 ENCOUNTER — HOME CARE VISIT (OUTPATIENT)
Dept: SCHEDULING | Facility: HOME HEALTH | Age: 70
End: 2020-08-07
Payer: MEDICARE

## 2020-08-07 VITALS
DIASTOLIC BLOOD PRESSURE: 78 MMHG | OXYGEN SATURATION: 96 % | SYSTOLIC BLOOD PRESSURE: 140 MMHG | HEART RATE: 78 BPM | RESPIRATION RATE: 14 BRPM | TEMPERATURE: 96.6 F

## 2020-08-07 PROCEDURE — G0299 HHS/HOSPICE OF RN EA 15 MIN: HCPCS

## 2020-08-07 PROCEDURE — 3331090002 HH PPS REVENUE DEBIT

## 2020-08-07 PROCEDURE — 3331090001 HH PPS REVENUE CREDIT

## 2020-08-08 PROCEDURE — 3331090002 HH PPS REVENUE DEBIT

## 2020-08-08 PROCEDURE — 3331090001 HH PPS REVENUE CREDIT

## 2020-08-09 PROCEDURE — 3331090002 HH PPS REVENUE DEBIT

## 2020-08-09 PROCEDURE — 3331090001 HH PPS REVENUE CREDIT

## 2020-08-10 ENCOUNTER — HOME CARE VISIT (OUTPATIENT)
Dept: SCHEDULING | Facility: HOME HEALTH | Age: 70
End: 2020-08-10
Payer: MEDICARE

## 2020-08-10 VITALS
OXYGEN SATURATION: 96 % | TEMPERATURE: 96.7 F | HEART RATE: 101 BPM | DIASTOLIC BLOOD PRESSURE: 70 MMHG | SYSTOLIC BLOOD PRESSURE: 130 MMHG | RESPIRATION RATE: 16 BRPM

## 2020-08-10 PROCEDURE — 3331090002 HH PPS REVENUE DEBIT

## 2020-08-10 PROCEDURE — 3331090001 HH PPS REVENUE CREDIT

## 2020-08-10 PROCEDURE — G0299 HHS/HOSPICE OF RN EA 15 MIN: HCPCS

## 2020-08-11 PROCEDURE — 3331090002 HH PPS REVENUE DEBIT

## 2020-08-11 PROCEDURE — 3331090001 HH PPS REVENUE CREDIT

## 2020-08-12 ENCOUNTER — HOME CARE VISIT (OUTPATIENT)
Dept: SCHEDULING | Facility: HOME HEALTH | Age: 70
End: 2020-08-12
Payer: MEDICARE

## 2020-08-12 VITALS
RESPIRATION RATE: 14 BRPM | HEART RATE: 83 BPM | TEMPERATURE: 97.4 F | SYSTOLIC BLOOD PRESSURE: 138 MMHG | DIASTOLIC BLOOD PRESSURE: 78 MMHG | OXYGEN SATURATION: 98 %

## 2020-08-12 PROCEDURE — 3331090001 HH PPS REVENUE CREDIT

## 2020-08-12 PROCEDURE — 3331090002 HH PPS REVENUE DEBIT

## 2020-08-12 PROCEDURE — G0299 HHS/HOSPICE OF RN EA 15 MIN: HCPCS

## 2020-08-13 PROCEDURE — 3331090001 HH PPS REVENUE CREDIT

## 2020-08-13 PROCEDURE — 3331090002 HH PPS REVENUE DEBIT

## 2020-08-14 ENCOUNTER — HOME CARE VISIT (OUTPATIENT)
Dept: SCHEDULING | Facility: HOME HEALTH | Age: 70
End: 2020-08-14
Payer: MEDICARE

## 2020-08-14 VITALS
SYSTOLIC BLOOD PRESSURE: 138 MMHG | TEMPERATURE: 98.7 F | RESPIRATION RATE: 14 BRPM | DIASTOLIC BLOOD PRESSURE: 78 MMHG | OXYGEN SATURATION: 97 % | HEART RATE: 102 BPM

## 2020-08-14 PROCEDURE — 3331090002 HH PPS REVENUE DEBIT

## 2020-08-14 PROCEDURE — G0299 HHS/HOSPICE OF RN EA 15 MIN: HCPCS

## 2020-08-14 PROCEDURE — 3331090001 HH PPS REVENUE CREDIT

## 2020-08-15 PROCEDURE — A6252 ABSORPT DRG >16 <=48 W/O BDR: HCPCS

## 2020-08-15 PROCEDURE — 3331090001 HH PPS REVENUE CREDIT

## 2020-08-15 PROCEDURE — A4452 WATERPROOF TAPE: HCPCS

## 2020-08-15 PROCEDURE — A6446 CONFORM BAND S W>=3" <5"/YD: HCPCS

## 2020-08-15 PROCEDURE — 3331090002 HH PPS REVENUE DEBIT

## 2020-08-15 PROCEDURE — A6260 WOUND CLEANSER ANY TYPE/SIZE: HCPCS

## 2020-08-15 PROCEDURE — A6197 ALGINATE DRSG >16 <=48 SQ IN: HCPCS

## 2020-08-15 PROCEDURE — A4927 NON-STERILE GLOVES: HCPCS

## 2020-08-15 PROCEDURE — A6216 NON-STERILE GAUZE<=16 SQ IN: HCPCS

## 2020-08-16 PROCEDURE — 3331090002 HH PPS REVENUE DEBIT

## 2020-08-16 PROCEDURE — 3331090001 HH PPS REVENUE CREDIT

## 2020-08-17 ENCOUNTER — HOME CARE VISIT (OUTPATIENT)
Dept: SCHEDULING | Facility: HOME HEALTH | Age: 70
End: 2020-08-17
Payer: MEDICARE

## 2020-08-17 VITALS
OXYGEN SATURATION: 95 % | SYSTOLIC BLOOD PRESSURE: 140 MMHG | DIASTOLIC BLOOD PRESSURE: 78 MMHG | TEMPERATURE: 97.7 F | RESPIRATION RATE: 14 BRPM | HEART RATE: 81 BPM

## 2020-08-17 PROCEDURE — 3331090002 HH PPS REVENUE DEBIT

## 2020-08-17 PROCEDURE — G0299 HHS/HOSPICE OF RN EA 15 MIN: HCPCS

## 2020-08-17 PROCEDURE — 3331090001 HH PPS REVENUE CREDIT

## 2020-08-18 PROCEDURE — 3331090002 HH PPS REVENUE DEBIT

## 2020-08-18 PROCEDURE — 3331090001 HH PPS REVENUE CREDIT

## 2020-08-19 ENCOUNTER — HOME CARE VISIT (OUTPATIENT)
Dept: SCHEDULING | Facility: HOME HEALTH | Age: 70
End: 2020-08-19
Payer: MEDICARE

## 2020-08-19 VITALS
DIASTOLIC BLOOD PRESSURE: 78 MMHG | OXYGEN SATURATION: 96 % | HEART RATE: 87 BPM | RESPIRATION RATE: 14 BRPM | SYSTOLIC BLOOD PRESSURE: 140 MMHG | TEMPERATURE: 97.2 F

## 2020-08-19 PROCEDURE — 3331090002 HH PPS REVENUE DEBIT

## 2020-08-19 PROCEDURE — G0299 HHS/HOSPICE OF RN EA 15 MIN: HCPCS

## 2020-08-19 PROCEDURE — 3331090001 HH PPS REVENUE CREDIT

## 2020-08-19 NOTE — PROGRESS NOTES
HISTORY OF PRESENT ILLNESS  Troy Hopkins is a 81 y.o. male. Patient is here  For a hospital follow up after being admitted to [facility] on 8/16 to 8/30. He was not reached out to by nurse navigator. He was discharged on antibiotics / and set up with home health and wound care. He was advised to follow up with me in one week which he did not do and he is here today after 3 months. He was  being followed by wound care until  2 weeks back. He was seeing a surgeon and patient  had a wound vacume in place which he no longer needs. Patient mentions right now he is still under the care of a surgeon and he has a home nursing service to help change his bandage for the right lower leg wound. I took at look at wound picture from 10/31 and patient states there has been no change since then. He has been taking all his medications for chronic medical issues. He is also due to have subsequent medicare wellness exam.  He would also like to have a list of his medications and blood work to have his testosterone level checked. Hospital Follow Up   The history is provided by the patient. This is a new problem. The current episode started more than 1 week ago. The problem occurs constantly. The problem has been gradually improving. Pertinent negatives include no chest pain, no abdominal pain, no headaches and no shortness of breath. The symptoms are aggravated by stress, walking, standing and exertion. Nothing relieves the symptoms. He has tried nothing for the symptoms. Annual Exam   The history is provided by the patient. This is a new problem. The problem occurs constantly. The problem has not changed since onset. Pertinent negatives include no chest pain, no abdominal pain, no headaches and no shortness of breath. Review of Systems   Constitutional: Positive for malaise/fatigue. Negative for chills and fever. HENT: Negative for congestion, ear discharge, hearing loss, sinus pain and sore throat.     Eyes: Negative for Keyla W/ [facility] calling and [facility] states that the order needs to read a walker w/ wheels. She is also asking for OV notes that support the need for the walker with wheels.    Order created and faxed/scanned blurred vision, pain and discharge. Respiratory: Negative for sputum production, shortness of breath and wheezing. Cardiovascular: Positive for leg swelling. Negative for chest pain and palpitations. Gastrointestinal: Negative for abdominal pain, nausea and vomiting. Genitourinary: Negative for dysuria and hematuria. Musculoskeletal: Negative for joint pain and myalgias. Skin:        Right lower leg edema and  Open wound/ leg lower leg edema. Neurological: Positive for tingling and weakness. Negative for dizziness, seizures and headaches. Psychiatric/Behavioral: Negative for depression. The patient is not nervous/anxious. Physical Exam  Constitutional:       Appearance: Normal appearance. He is obese. He is not ill-appearing. HENT:      Head: Atraumatic. Right Ear: Tympanic membrane normal.      Left Ear: Tympanic membrane normal.      Nose: Nose normal. No congestion or rhinorrhea. Mouth/Throat:      Mouth: Mucous membranes are moist.   Eyes:      Extraocular Movements: Extraocular movements intact. Conjunctiva/sclera: Conjunctivae normal.      Pupils: Pupils are equal, round, and reactive to light. Neck:      Musculoskeletal: Normal range of motion. No neck rigidity. Cardiovascular:      Rate and Rhythm: Normal rate and regular rhythm. Heart sounds: No murmur. Pulmonary:      Effort: Pulmonary effort is normal. No respiratory distress. Breath sounds: Normal breath sounds. Abdominal:      General: Bowel sounds are normal. There is no distension. Palpations: Abdomen is soft. Tenderness: There is no tenderness. Musculoskeletal:         General: Swelling present. No tenderness. Right lower leg: Pitting Edema present. Left lower leg: Pitting Edema present. Skin:     Coloration: Skin is not jaundiced. Findings: Erythema present. Comments: Open wound present    Neurological:      General: No focal deficit present.       Mental Status: He is alert. Psychiatric:         Mood and Affect: Mood normal.         This is the Subsequent Medicare Annual Wellness Exam, performed 12 months or more after the Initial AWV or the last Subsequent AWV    I have reviewed the patient's medical history in detail and updated the computerized patient record.      History     Patient Active Problem List   Diagnosis Code    Diabetic foot ulcer ([facility] E11.621, X67.274    Diastolic dysfunction L92.13    Dyslipidemia E78.5    Cellulitis and abscess of foot, except toes L03.119, L02.619    Lymphedema of both lower extremities I89.0    CKD (chronic kidney disease) stage 3, GFR 30-59 ml/min ([facility]) N18.3    Morbid obesity with BMI of 70 and over, adult ([facility] E66.01, Z68.45    Erectile dysfunction N52.9    Hypothyroidism, adult E03.9    Hypogonadism in male E29.1    Borderline anemia D64.9    Idiopathic gout of multiple sites M10.09    Skin ulcer of ankle with fat layer exposed, right ([facility] L97.312    Non-pressure chronic ulcer of right calf with fat layer exposed ([facility] L97.212    Type 2 diabetes mellitus with diabetic neuropathy ([facility]) E11.40    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 ([facility]) L97.213    Puncture wound of multiple sites of right leg S81.831A    Cellulitis of leg without foot, right L03.115    Cellulitis of right lower extremity L03.115    Chronic diastolic HF (heart failure) ([facility]) I50.32     Past Medical History:   Diagnosis Date    Anemia of chronic disease     Arthritis     Chronic renal insufficiency     Diabetes ([facility]     Dyslipidemia     Edema     Gout     Gout     Hypertension     Lymphedema     Morbid obesity ([facility]       Past Surgical History:   Procedure Laterality Date    HX ORTHOPAEDIC  3/2015     Lt Foot ulcer     Current Outpatient Medications   Medication Sig Dispense Refill    diclofenac-miSOPROStol (ARTHROTEC 50)  mg-mcg per tablet TAKE 1 TABLET BY MOUTH TWICE A  Tab 0    valsartan (DIOVAN) 320 mg tablet TAKE 1 TABLET BY MOUTH EVERY DAY 90 Tab 3    levothyroxine (SYNTHROID) 50 mcg tablet TAKE 1 TABLET BY MOUTH EVERY DAY BEFORE BREAKFAST 90 Tab 0    ANDRODERM 4 mg/24 hr pt24 APPLY 1 PATCH TO SKIN DAILY 30 Patch 1    KLOR-CON M20 20 mEq tablet TAKE 1 TABLET BY MOUTH EVERY DAY 90 Tab 3    traMADol (ULTRAM) 50 mg tablet Take 50 mg by mouth every six (6) hours as needed for Pain.  traMADol (ULTRAM) 50 mg tablet Take 50 mg by mouth every six (6) hours as needed for Pain.  cholecalciferol (D3-50 CHOLECALCIFEROL) 50,000 unit capsule Take 50,000 Units by mouth every seven (7) days.  ferrous sulfate 325 mg (65 mg iron) tablet Take 325 mg by mouth daily.  methocarbamol (ROBAXIN-750) 750 mg tablet Take 1 Tab by mouth four (4) times daily. As needed for pain or muscle spasm 20 Tab 0    rosuvastatin (CRESTOR) 40 mg tablet TAKE 1 TABLET BY MOUTH NIGHTLY FOR 90 DAYS. 90 Tab 1    furosemide (LASIX) 80 mg tablet TAKE 1 TABLET BY MOUTH EVERY DAY 90 Tab 3    colchicine 0.6 mg tablet TAKE 1 TAB BY MOUTH DAILY AS NEEDED. (Patient taking differently: TAKE 1 TAB BY MOUTH DAILY AS NEEDED. Indications: acute inflammation of the joints due to gout attack, treatment to prevent acute gout attack) 90 Tab 3    vit b comp & c-fa-copper-zinc (FOLBEE PLUS) 5-1.5-25 mg tab Take 1 Tab by mouth daily. 90 Tab 3    levothyroxine (SYNTHROID) 50 mcg tablet TAKE 1 TAB BY MOUTH DAILY (BEFORE BREAKFAST) FOR 90 DAYS. 90 Tab 0    dulaglutide (TRULICITY) 1.5 OS/6.7 mL sub-q pen 0.5 ML BY SUBCUTANEOUS ROUTE EVERY SEVEN (7) DAYS. 7.5 Syringe 0    gabapentin (NEURONTIN) 300 mg capsule Take 300 mg by mouth nightly.  metoprolol (LOPRESSOR) 50 mg tablet Take 1 Tab by mouth every twelve (12) hours. 60 Tab 0    amoxicillin-clavulanate (AUGMENTIN) 500-125 mg per tablet Take 1 Tab by mouth two (2) times a day.  Indications: skin infection due to Klebsiella bacteria 20 Tab 0    tadalafil (CIALIS) 20 mg tablet Take 1 Tab by mouth every thirty-six (36) hours. Indications: Erectile Dysfunction 12 Tab 5    Fenofibrate 150 mg cap Take 145 mg by mouth daily. No Known Allergies    Family History   Problem Relation Age of Onset   Bellamy Cancer Mother     No Known Problems Father      Social History     Tobacco Use    Smoking status: Never Smoker    Smokeless tobacco: Never Used   Substance Use Topics    Alcohol use: No       Depression Risk Factor Screening:     3 most recent PHQ Screens 11/22/2019   Little interest or pleasure in doing things Not at all   Feeling down, depressed, irritable, or hopeless Not at all   Total Score PHQ 2 0       Alcohol Risk Factor Screening (MALE > 65):   Patient does not drink       Functional Ability and Level of Safety:   Hearing: Hearing is good. Activities of Daily Living: The home contains: no safety equipment. Patient does total self care    Ambulation: has difficulty but can do on his own    Fall Risk:  Fall Risk Assessment, last 12 mths 11/22/2019   Able to walk? Yes   Fall in past 12 months? No       Abuse Screen:  Patient is not abused    Cognitive Screening   Has your family/caregiver stated any concerns about your memory: no  Cognitive Screening: Normal - MMSE (Mini Mental Status Exam)    Patient Care Team   Patient Care Team:  Amy Olivas MD as PCP - General (Internal Medicine)  Amy Olivas MD as PCP - REHABILITATION [facility] Empaneled Provider  Evonnie Eisenmenger, MD (Inactive) as Physician (Ophthalmology)    Assessment/Plan   Education and counseling provided:  Are appropriate based on today's review and evaluation  End-of-Life planning (with patient's consent)  Pneumococcal Vaccine  Influenza Vaccine  Prostate cancer screening tests (PSA, covered annually)  Colorectal cancer screening tests  Cardiovascular screening blood test  Bone mass measurement (DEXA)  Screening for glaucoma  Diabetes screening test    Diagnoses and all orders for this visit:    1.  Hospital discharge follow-up    2. Cellulitis of right lower leg    3. Lymphedema of both lower extremities    4. Chronic diastolic HF (heart failure) ([facility]    5. CKD (chronic kidney disease) stage 3, GFR 30-59 ml/min ([facility])    6. Morbidly obese ([facility]    7. Medicare annual wellness visit, subsequent        Subsequent medicare wellness exam :  1) Please make sure you have a routine physical exam every 1-2 years. 2) Annual check up with eye doctor and dentist.  3) Colorectal cancer screening with colonoscopy every ten years at age 48. Depending on certain risk factors screening may need to be done earlier. 4) Rectal exam and PSA screening at age of 48, screening may be done earlier depending on certain risk factors as discussed.    ) Bone density testing starting at the age of 72.   ) Routine blood work to be ordered as part of physical exam and has been discussed with patient.  ) Screening for STD's/HIV.  ) Exercise at least 30 min 3-5 times a week for goal BMI of less than or equal to 25.    Please make sure you wear a seat belt while driving daily , helmet safety discussed.  ) Please avoid smoking , alcohol and illicit drug use.  ) Daily requirement of calcium is 1200 mg per day and 1000 IU of vitamin D.  ) Please make sure all immunizations are up to date:       - Influenza vaccine every year        - Tdap every 10 years       - Pneumococcal vaccine starting at age of 72       - Shingles at age 63     Right lower leg cellulitis / open wound / hospital follow up:  - reviewed discharge summary and note from wound care / surgical debridement   - Patient has been advised to follow up with surgeon and home dressing to be done    Hypogonadism in male:  - discussed ordering blood work      CKD:  - stable     Obesity :  - Lifestyle modification is necessary to ensure weight loss and this includes diet , exercise and behavioral modification.  - Diet is geared towards balanced low-calorie diets/portion-controlled diets, low fat diet , low carbohydrate diet and mediterranean diet. Start with 1500 kcal diet. -Exercise 30 minutes 3-5 times a week   - Behavioral modification helps in controlling eating behavior  - Consider surgery only if well-informed and motivated, BMI ? 40 kg/m2,have acceptable risk for surgery, failed previous non-surgical weight loss, adults with a BMI ? 35 kg/m2 comorbidities such as severe diabetes, sleep apnea, or joint disease may also be candidates. CHF:  - Coronary artery disease risk factors discussed. - Please maintain routine exercise regimen , 30 minutes 3-5 times a week. - Cholesterol and blood pressure control  - Ok to take aspirin 81 mg daily  - Continue current medications and f/u with cardiology.

## 2020-08-20 PROCEDURE — 3331090002 HH PPS REVENUE DEBIT

## 2020-08-20 PROCEDURE — 3331090001 HH PPS REVENUE CREDIT

## 2020-08-21 ENCOUNTER — HOME CARE VISIT (OUTPATIENT)
Dept: SCHEDULING | Facility: HOME HEALTH | Age: 70
End: 2020-08-21
Payer: MEDICARE

## 2020-08-21 PROCEDURE — 3331090002 HH PPS REVENUE DEBIT

## 2020-08-21 PROCEDURE — 3331090001 HH PPS REVENUE CREDIT

## 2020-08-22 PROCEDURE — 3331090001 HH PPS REVENUE CREDIT

## 2020-08-22 PROCEDURE — 3331090002 HH PPS REVENUE DEBIT

## 2020-08-23 PROCEDURE — 3331090001 HH PPS REVENUE CREDIT

## 2020-08-23 PROCEDURE — 3331090002 HH PPS REVENUE DEBIT

## 2020-08-24 ENCOUNTER — HOME CARE VISIT (OUTPATIENT)
Dept: SCHEDULING | Facility: HOME HEALTH | Age: 70
End: 2020-08-24
Payer: MEDICARE

## 2020-08-24 VITALS
SYSTOLIC BLOOD PRESSURE: 140 MMHG | RESPIRATION RATE: 14 BRPM | DIASTOLIC BLOOD PRESSURE: 78 MMHG | HEART RATE: 100 BPM | OXYGEN SATURATION: 97 % | TEMPERATURE: 95.3 F

## 2020-08-24 PROCEDURE — 3331090001 HH PPS REVENUE CREDIT

## 2020-08-24 PROCEDURE — 3331090002 HH PPS REVENUE DEBIT

## 2020-08-24 PROCEDURE — G0299 HHS/HOSPICE OF RN EA 15 MIN: HCPCS

## 2020-08-25 PROCEDURE — 3331090002 HH PPS REVENUE DEBIT

## 2020-08-25 PROCEDURE — 3331090001 HH PPS REVENUE CREDIT

## 2020-08-26 ENCOUNTER — HOME CARE VISIT (OUTPATIENT)
Dept: SCHEDULING | Facility: HOME HEALTH | Age: 70
End: 2020-08-26
Payer: MEDICARE

## 2020-08-26 VITALS
HEART RATE: 111 BPM | RESPIRATION RATE: 14 BRPM | OXYGEN SATURATION: 96 % | TEMPERATURE: 95.6 F | SYSTOLIC BLOOD PRESSURE: 138 MMHG | DIASTOLIC BLOOD PRESSURE: 78 MMHG

## 2020-08-26 PROCEDURE — G0299 HHS/HOSPICE OF RN EA 15 MIN: HCPCS

## 2020-08-26 PROCEDURE — 400014 HH F/U

## 2020-08-26 PROCEDURE — A4927 NON-STERILE GLOVES: HCPCS

## 2020-08-26 PROCEDURE — A4452 WATERPROOF TAPE: HCPCS

## 2020-08-26 PROCEDURE — 3331090002 HH PPS REVENUE DEBIT

## 2020-08-26 PROCEDURE — 3331090001 HH PPS REVENUE CREDIT

## 2020-08-27 PROCEDURE — 3331090001 HH PPS REVENUE CREDIT

## 2020-08-27 PROCEDURE — 3331090002 HH PPS REVENUE DEBIT

## 2020-08-28 ENCOUNTER — HOME CARE VISIT (OUTPATIENT)
Dept: SCHEDULING | Facility: HOME HEALTH | Age: 70
End: 2020-08-28
Payer: MEDICARE

## 2020-08-28 VITALS
SYSTOLIC BLOOD PRESSURE: 140 MMHG | HEART RATE: 101 BPM | DIASTOLIC BLOOD PRESSURE: 78 MMHG | RESPIRATION RATE: 14 BRPM | TEMPERATURE: 96.5 F | OXYGEN SATURATION: 96 %

## 2020-08-28 PROCEDURE — 3331090002 HH PPS REVENUE DEBIT

## 2020-08-28 PROCEDURE — 3331090001 HH PPS REVENUE CREDIT

## 2020-08-28 PROCEDURE — G0299 HHS/HOSPICE OF RN EA 15 MIN: HCPCS

## 2020-08-29 PROCEDURE — 3331090001 HH PPS REVENUE CREDIT

## 2020-08-29 PROCEDURE — 3331090002 HH PPS REVENUE DEBIT

## 2020-08-30 PROCEDURE — 3331090001 HH PPS REVENUE CREDIT

## 2020-08-30 PROCEDURE — 3331090002 HH PPS REVENUE DEBIT

## 2020-08-31 ENCOUNTER — HOME CARE VISIT (OUTPATIENT)
Dept: SCHEDULING | Facility: HOME HEALTH | Age: 70
End: 2020-08-31
Payer: MEDICARE

## 2020-08-31 VITALS
TEMPERATURE: 97.1 F | DIASTOLIC BLOOD PRESSURE: 70 MMHG | HEART RATE: 64 BPM | OXYGEN SATURATION: 96 % | SYSTOLIC BLOOD PRESSURE: 138 MMHG | RESPIRATION RATE: 14 BRPM

## 2020-08-31 PROCEDURE — G0299 HHS/HOSPICE OF RN EA 15 MIN: HCPCS

## 2020-08-31 PROCEDURE — 3331090002 HH PPS REVENUE DEBIT

## 2020-08-31 PROCEDURE — 3331090001 HH PPS REVENUE CREDIT

## 2020-09-01 PROCEDURE — 3331090002 HH PPS REVENUE DEBIT

## 2020-09-01 PROCEDURE — 3331090001 HH PPS REVENUE CREDIT

## 2020-09-01 PROCEDURE — A6253 ABSORPT DRG > 48 SQ IN W/O B: HCPCS

## 2020-09-01 PROCEDURE — A6197 ALGINATE DRSG >16 <=48 SQ IN: HCPCS

## 2020-09-01 PROCEDURE — A4452 WATERPROOF TAPE: HCPCS

## 2020-09-02 ENCOUNTER — HOME CARE VISIT (OUTPATIENT)
Dept: SCHEDULING | Facility: HOME HEALTH | Age: 70
End: 2020-09-02
Payer: MEDICARE

## 2020-09-02 VITALS
TEMPERATURE: 96.9 F | SYSTOLIC BLOOD PRESSURE: 138 MMHG | OXYGEN SATURATION: 97 % | DIASTOLIC BLOOD PRESSURE: 72 MMHG | HEART RATE: 78 BPM | RESPIRATION RATE: 14 BRPM

## 2020-09-02 PROCEDURE — G0299 HHS/HOSPICE OF RN EA 15 MIN: HCPCS

## 2020-09-02 PROCEDURE — 3331090001 HH PPS REVENUE CREDIT

## 2020-09-02 PROCEDURE — 3331090002 HH PPS REVENUE DEBIT

## 2020-09-03 PROCEDURE — 3331090002 HH PPS REVENUE DEBIT

## 2020-09-03 PROCEDURE — 3331090001 HH PPS REVENUE CREDIT

## 2020-09-04 ENCOUNTER — HOME CARE VISIT (OUTPATIENT)
Dept: SCHEDULING | Facility: HOME HEALTH | Age: 70
End: 2020-09-04
Payer: MEDICARE

## 2020-09-04 PROCEDURE — 3331090002 HH PPS REVENUE DEBIT

## 2020-09-04 PROCEDURE — 3331090001 HH PPS REVENUE CREDIT

## 2020-09-05 PROCEDURE — 3331090001 HH PPS REVENUE CREDIT

## 2020-09-05 PROCEDURE — 3331090002 HH PPS REVENUE DEBIT

## 2020-09-06 PROCEDURE — 3331090002 HH PPS REVENUE DEBIT

## 2020-09-06 PROCEDURE — 3331090001 HH PPS REVENUE CREDIT

## 2020-09-07 ENCOUNTER — HOME CARE VISIT (OUTPATIENT)
Dept: SCHEDULING | Facility: HOME HEALTH | Age: 70
End: 2020-09-07
Payer: MEDICARE

## 2020-09-07 VITALS
HEART RATE: 95 BPM | DIASTOLIC BLOOD PRESSURE: 78 MMHG | OXYGEN SATURATION: 96 % | TEMPERATURE: 96.2 F | SYSTOLIC BLOOD PRESSURE: 138 MMHG

## 2020-09-07 PROCEDURE — G0299 HHS/HOSPICE OF RN EA 15 MIN: HCPCS

## 2020-09-07 PROCEDURE — 3331090001 HH PPS REVENUE CREDIT

## 2020-09-07 PROCEDURE — 3331090002 HH PPS REVENUE DEBIT

## 2020-09-08 PROCEDURE — 3331090001 HH PPS REVENUE CREDIT

## 2020-09-08 PROCEDURE — 3331090002 HH PPS REVENUE DEBIT

## 2020-09-09 ENCOUNTER — HOME CARE VISIT (OUTPATIENT)
Dept: SCHEDULING | Facility: HOME HEALTH | Age: 70
End: 2020-09-09
Payer: MEDICARE

## 2020-09-09 VITALS
HEART RATE: 97 BPM | DIASTOLIC BLOOD PRESSURE: 60 MMHG | OXYGEN SATURATION: 96 % | SYSTOLIC BLOOD PRESSURE: 120 MMHG | TEMPERATURE: 97.4 F | RESPIRATION RATE: 18 BRPM

## 2020-09-09 PROCEDURE — A6253 ABSORPT DRG > 48 SQ IN W/O B: HCPCS

## 2020-09-09 PROCEDURE — A6446 CONFORM BAND S W>=3" <5"/YD: HCPCS

## 2020-09-09 PROCEDURE — G0300 HHS/HOSPICE OF LPN EA 15 MIN: HCPCS

## 2020-09-09 PROCEDURE — A6252 ABSORPT DRG >16 <=48 W/O BDR: HCPCS

## 2020-09-09 PROCEDURE — A4452 WATERPROOF TAPE: HCPCS

## 2020-09-09 PROCEDURE — 3331090001 HH PPS REVENUE CREDIT

## 2020-09-09 PROCEDURE — 3331090002 HH PPS REVENUE DEBIT

## 2020-09-10 PROCEDURE — 3331090002 HH PPS REVENUE DEBIT

## 2020-09-10 PROCEDURE — 3331090001 HH PPS REVENUE CREDIT

## 2020-09-11 ENCOUNTER — HOME CARE VISIT (OUTPATIENT)
Dept: SCHEDULING | Facility: HOME HEALTH | Age: 70
End: 2020-09-11
Payer: MEDICARE

## 2020-09-11 PROCEDURE — 3331090002 HH PPS REVENUE DEBIT

## 2020-09-11 PROCEDURE — 3331090001 HH PPS REVENUE CREDIT

## 2020-09-12 PROCEDURE — 3331090001 HH PPS REVENUE CREDIT

## 2020-09-12 PROCEDURE — 3331090002 HH PPS REVENUE DEBIT

## 2020-09-13 PROCEDURE — 3331090001 HH PPS REVENUE CREDIT

## 2020-09-13 PROCEDURE — 3331090002 HH PPS REVENUE DEBIT

## 2020-09-14 ENCOUNTER — HOME CARE VISIT (OUTPATIENT)
Dept: SCHEDULING | Facility: HOME HEALTH | Age: 70
End: 2020-09-14
Payer: MEDICARE

## 2020-09-14 PROCEDURE — 3331090001 HH PPS REVENUE CREDIT

## 2020-09-14 PROCEDURE — 3331090002 HH PPS REVENUE DEBIT

## 2020-09-14 PROCEDURE — G0299 HHS/HOSPICE OF RN EA 15 MIN: HCPCS

## 2020-09-15 VITALS
RESPIRATION RATE: 14 BRPM | SYSTOLIC BLOOD PRESSURE: 130 MMHG | HEART RATE: 117 BPM | DIASTOLIC BLOOD PRESSURE: 78 MMHG | TEMPERATURE: 97.1 F | OXYGEN SATURATION: 96 %

## 2020-09-15 PROCEDURE — 3331090001 HH PPS REVENUE CREDIT

## 2020-09-15 PROCEDURE — A9270 NON-COVERED ITEM OR SERVICE: HCPCS

## 2020-09-15 PROCEDURE — 3331090002 HH PPS REVENUE DEBIT

## 2020-09-15 PROCEDURE — A6446 CONFORM BAND S W>=3" <5"/YD: HCPCS

## 2020-09-15 PROCEDURE — A6260 WOUND CLEANSER ANY TYPE/SIZE: HCPCS

## 2020-09-15 PROCEDURE — A6253 ABSORPT DRG > 48 SQ IN W/O B: HCPCS

## 2020-09-15 PROCEDURE — A4452 WATERPROOF TAPE: HCPCS

## 2020-09-15 PROCEDURE — A6252 ABSORPT DRG >16 <=48 W/O BDR: HCPCS

## 2020-09-15 PROCEDURE — A6197 ALGINATE DRSG >16 <=48 SQ IN: HCPCS

## 2020-09-16 ENCOUNTER — HOME CARE VISIT (OUTPATIENT)
Dept: SCHEDULING | Facility: HOME HEALTH | Age: 70
End: 2020-09-16
Payer: MEDICARE

## 2020-09-16 PROCEDURE — 3331090002 HH PPS REVENUE DEBIT

## 2020-09-16 PROCEDURE — G0299 HHS/HOSPICE OF RN EA 15 MIN: HCPCS

## 2020-09-16 PROCEDURE — 3331090001 HH PPS REVENUE CREDIT

## 2020-09-17 VITALS
DIASTOLIC BLOOD PRESSURE: 76 MMHG | TEMPERATURE: 97.4 F | OXYGEN SATURATION: 99 % | SYSTOLIC BLOOD PRESSURE: 138 MMHG | HEART RATE: 82 BPM

## 2020-09-17 PROCEDURE — 3331090002 HH PPS REVENUE DEBIT

## 2020-09-17 PROCEDURE — 3331090001 HH PPS REVENUE CREDIT

## 2020-09-18 ENCOUNTER — HOME CARE VISIT (OUTPATIENT)
Dept: SCHEDULING | Facility: HOME HEALTH | Age: 70
End: 2020-09-18
Payer: MEDICARE

## 2020-09-18 PROCEDURE — G0299 HHS/HOSPICE OF RN EA 15 MIN: HCPCS

## 2020-09-18 PROCEDURE — 3331090001 HH PPS REVENUE CREDIT

## 2020-09-18 PROCEDURE — 3331090002 HH PPS REVENUE DEBIT

## 2020-09-19 PROCEDURE — A6260 WOUND CLEANSER ANY TYPE/SIZE: HCPCS

## 2020-09-19 PROCEDURE — A4452 WATERPROOF TAPE: HCPCS

## 2020-09-19 PROCEDURE — 3331090001 HH PPS REVENUE CREDIT

## 2020-09-19 PROCEDURE — A6446 CONFORM BAND S W>=3" <5"/YD: HCPCS

## 2020-09-19 PROCEDURE — A6252 ABSORPT DRG >16 <=48 W/O BDR: HCPCS

## 2020-09-19 PROCEDURE — A6197 ALGINATE DRSG >16 <=48 SQ IN: HCPCS

## 2020-09-19 PROCEDURE — 3331090002 HH PPS REVENUE DEBIT

## 2020-09-19 PROCEDURE — A6253 ABSORPT DRG > 48 SQ IN W/O B: HCPCS

## 2020-09-20 VITALS
OXYGEN SATURATION: 96 % | SYSTOLIC BLOOD PRESSURE: 138 MMHG | DIASTOLIC BLOOD PRESSURE: 78 MMHG | RESPIRATION RATE: 14 BRPM | TEMPERATURE: 97.2 F | HEART RATE: 66 BPM

## 2020-09-20 PROCEDURE — 3331090001 HH PPS REVENUE CREDIT

## 2020-09-20 PROCEDURE — 3331090002 HH PPS REVENUE DEBIT

## 2020-09-21 ENCOUNTER — HOME CARE VISIT (OUTPATIENT)
Dept: SCHEDULING | Facility: HOME HEALTH | Age: 70
End: 2020-09-21
Payer: MEDICARE

## 2020-09-21 VITALS
RESPIRATION RATE: 14 BRPM | TEMPERATURE: 97.4 F | OXYGEN SATURATION: 98 % | HEART RATE: 104 BPM | DIASTOLIC BLOOD PRESSURE: 78 MMHG | SYSTOLIC BLOOD PRESSURE: 138 MMHG

## 2020-09-21 PROCEDURE — A6252 ABSORPT DRG >16 <=48 W/O BDR: HCPCS

## 2020-09-21 PROCEDURE — G0299 HHS/HOSPICE OF RN EA 15 MIN: HCPCS

## 2020-09-21 PROCEDURE — 3331090001 HH PPS REVENUE CREDIT

## 2020-09-21 PROCEDURE — A6216 NON-STERILE GAUZE<=16 SQ IN: HCPCS

## 2020-09-21 PROCEDURE — A6446 CONFORM BAND S W>=3" <5"/YD: HCPCS

## 2020-09-21 PROCEDURE — 3331090002 HH PPS REVENUE DEBIT

## 2020-09-22 PROCEDURE — 3331090002 HH PPS REVENUE DEBIT

## 2020-09-22 PROCEDURE — 3331090001 HH PPS REVENUE CREDIT

## 2020-09-23 ENCOUNTER — HOME CARE VISIT (OUTPATIENT)
Dept: SCHEDULING | Facility: HOME HEALTH | Age: 70
End: 2020-09-23
Payer: MEDICARE

## 2020-09-23 VITALS
HEART RATE: 63 BPM | TEMPERATURE: 98.3 F | OXYGEN SATURATION: 95 % | RESPIRATION RATE: 16 BRPM | SYSTOLIC BLOOD PRESSURE: 123 MMHG | DIASTOLIC BLOOD PRESSURE: 73 MMHG

## 2020-09-23 PROCEDURE — 3331090002 HH PPS REVENUE DEBIT

## 2020-09-23 PROCEDURE — G0299 HHS/HOSPICE OF RN EA 15 MIN: HCPCS

## 2020-09-23 PROCEDURE — 3331090001 HH PPS REVENUE CREDIT

## 2020-09-24 PROCEDURE — 3331090002 HH PPS REVENUE DEBIT

## 2020-09-24 PROCEDURE — 3331090001 HH PPS REVENUE CREDIT

## 2020-09-25 ENCOUNTER — HOME CARE VISIT (OUTPATIENT)
Dept: SCHEDULING | Facility: HOME HEALTH | Age: 70
End: 2020-09-25
Payer: MEDICARE

## 2020-09-25 PROCEDURE — 3331090001 HH PPS REVENUE CREDIT

## 2020-09-25 PROCEDURE — 400014 HH F/U

## 2020-09-25 PROCEDURE — G0300 HHS/HOSPICE OF LPN EA 15 MIN: HCPCS

## 2020-09-25 PROCEDURE — 3331090002 HH PPS REVENUE DEBIT

## 2020-09-26 PROCEDURE — 3331090002 HH PPS REVENUE DEBIT

## 2020-09-26 PROCEDURE — 3331090001 HH PPS REVENUE CREDIT

## 2020-09-27 VITALS
DIASTOLIC BLOOD PRESSURE: 80 MMHG | TEMPERATURE: 97.4 F | RESPIRATION RATE: 18 BRPM | HEART RATE: 76 BPM | SYSTOLIC BLOOD PRESSURE: 122 MMHG | OXYGEN SATURATION: 96 %

## 2020-09-27 PROCEDURE — 3331090001 HH PPS REVENUE CREDIT

## 2020-09-27 PROCEDURE — 3331090002 HH PPS REVENUE DEBIT

## 2020-09-28 ENCOUNTER — HOME CARE VISIT (OUTPATIENT)
Dept: SCHEDULING | Facility: HOME HEALTH | Age: 70
End: 2020-09-28
Payer: MEDICARE

## 2020-09-28 VITALS
SYSTOLIC BLOOD PRESSURE: 122 MMHG | OXYGEN SATURATION: 97 % | HEART RATE: 54 BPM | DIASTOLIC BLOOD PRESSURE: 74 MMHG | TEMPERATURE: 98.3 F | RESPIRATION RATE: 18 BRPM

## 2020-09-28 PROCEDURE — 3331090001 HH PPS REVENUE CREDIT

## 2020-09-28 PROCEDURE — G0299 HHS/HOSPICE OF RN EA 15 MIN: HCPCS

## 2020-09-28 PROCEDURE — 3331090002 HH PPS REVENUE DEBIT

## 2020-09-29 PROCEDURE — 3331090001 HH PPS REVENUE CREDIT

## 2020-09-29 PROCEDURE — 3331090002 HH PPS REVENUE DEBIT

## 2020-09-30 ENCOUNTER — HOME CARE VISIT (OUTPATIENT)
Dept: SCHEDULING | Facility: HOME HEALTH | Age: 70
End: 2020-09-30
Payer: MEDICARE

## 2020-09-30 PROCEDURE — G0299 HHS/HOSPICE OF RN EA 15 MIN: HCPCS

## 2020-09-30 PROCEDURE — 3331090001 HH PPS REVENUE CREDIT

## 2020-09-30 PROCEDURE — 3331090002 HH PPS REVENUE DEBIT

## 2020-10-01 VITALS
DIASTOLIC BLOOD PRESSURE: 72 MMHG | RESPIRATION RATE: 16 BRPM | OXYGEN SATURATION: 97 % | TEMPERATURE: 97.3 F | HEART RATE: 71 BPM | SYSTOLIC BLOOD PRESSURE: 114 MMHG

## 2020-10-01 PROCEDURE — 3331090002 HH PPS REVENUE DEBIT

## 2020-10-01 PROCEDURE — 3331090001 HH PPS REVENUE CREDIT

## 2020-10-02 ENCOUNTER — HOME CARE VISIT (OUTPATIENT)
Dept: SCHEDULING | Facility: HOME HEALTH | Age: 70
End: 2020-10-02
Payer: MEDICARE

## 2020-10-02 PROCEDURE — 3331090001 HH PPS REVENUE CREDIT

## 2020-10-02 PROCEDURE — 3331090002 HH PPS REVENUE DEBIT

## 2020-10-03 PROCEDURE — 3331090002 HH PPS REVENUE DEBIT

## 2020-10-03 PROCEDURE — 3331090001 HH PPS REVENUE CREDIT

## 2020-10-04 PROCEDURE — 3331090002 HH PPS REVENUE DEBIT

## 2020-10-04 PROCEDURE — 3331090001 HH PPS REVENUE CREDIT

## 2020-10-05 ENCOUNTER — HOME CARE VISIT (OUTPATIENT)
Dept: SCHEDULING | Facility: HOME HEALTH | Age: 70
End: 2020-10-05
Payer: MEDICARE

## 2020-10-05 PROCEDURE — 3331090001 HH PPS REVENUE CREDIT

## 2020-10-05 PROCEDURE — G0299 HHS/HOSPICE OF RN EA 15 MIN: HCPCS

## 2020-10-05 PROCEDURE — 3331090002 HH PPS REVENUE DEBIT

## 2020-10-06 VITALS
SYSTOLIC BLOOD PRESSURE: 118 MMHG | TEMPERATURE: 98 F | DIASTOLIC BLOOD PRESSURE: 78 MMHG | HEART RATE: 68 BPM | OXYGEN SATURATION: 98 %

## 2020-10-06 PROCEDURE — 3331090001 HH PPS REVENUE CREDIT

## 2020-10-06 PROCEDURE — 3331090002 HH PPS REVENUE DEBIT

## 2020-10-07 ENCOUNTER — HOME CARE VISIT (OUTPATIENT)
Dept: SCHEDULING | Facility: HOME HEALTH | Age: 70
End: 2020-10-07
Payer: MEDICARE

## 2020-10-07 PROCEDURE — 3331090002 HH PPS REVENUE DEBIT

## 2020-10-07 PROCEDURE — G0299 HHS/HOSPICE OF RN EA 15 MIN: HCPCS

## 2020-10-07 PROCEDURE — 3331090001 HH PPS REVENUE CREDIT

## 2020-10-08 VITALS
OXYGEN SATURATION: 95 % | TEMPERATURE: 98.9 F | DIASTOLIC BLOOD PRESSURE: 70 MMHG | SYSTOLIC BLOOD PRESSURE: 122 MMHG | HEART RATE: 90 BPM

## 2020-10-08 PROCEDURE — 3331090002 HH PPS REVENUE DEBIT

## 2020-10-08 PROCEDURE — 3331090001 HH PPS REVENUE CREDIT

## 2020-10-09 ENCOUNTER — HOME CARE VISIT (OUTPATIENT)
Dept: SCHEDULING | Facility: HOME HEALTH | Age: 70
End: 2020-10-09
Payer: MEDICARE

## 2020-10-09 VITALS
DIASTOLIC BLOOD PRESSURE: 76 MMHG | RESPIRATION RATE: 18 BRPM | HEART RATE: 92 BPM | TEMPERATURE: 98 F | SYSTOLIC BLOOD PRESSURE: 134 MMHG | OXYGEN SATURATION: 98 %

## 2020-10-09 PROCEDURE — G0299 HHS/HOSPICE OF RN EA 15 MIN: HCPCS

## 2020-10-09 PROCEDURE — 3331090001 HH PPS REVENUE CREDIT

## 2020-10-09 PROCEDURE — 3331090002 HH PPS REVENUE DEBIT

## 2020-10-10 PROCEDURE — 3331090002 HH PPS REVENUE DEBIT

## 2020-10-10 PROCEDURE — 3331090001 HH PPS REVENUE CREDIT

## 2020-10-11 PROCEDURE — 3331090002 HH PPS REVENUE DEBIT

## 2020-10-11 PROCEDURE — 3331090001 HH PPS REVENUE CREDIT

## 2020-10-12 ENCOUNTER — HOME CARE VISIT (OUTPATIENT)
Dept: SCHEDULING | Facility: HOME HEALTH | Age: 70
End: 2020-10-12
Payer: MEDICARE

## 2020-10-12 VITALS
DIASTOLIC BLOOD PRESSURE: 82 MMHG | TEMPERATURE: 97.6 F | SYSTOLIC BLOOD PRESSURE: 138 MMHG | OXYGEN SATURATION: 96 % | HEART RATE: 77 BPM

## 2020-10-12 PROCEDURE — G0299 HHS/HOSPICE OF RN EA 15 MIN: HCPCS

## 2020-10-12 PROCEDURE — 3331090001 HH PPS REVENUE CREDIT

## 2020-10-12 PROCEDURE — 3331090002 HH PPS REVENUE DEBIT

## 2020-10-13 PROCEDURE — A6446 CONFORM BAND S W>=3" <5"/YD: HCPCS

## 2020-10-13 PROCEDURE — 3331090001 HH PPS REVENUE CREDIT

## 2020-10-13 PROCEDURE — 3331090002 HH PPS REVENUE DEBIT

## 2020-10-13 PROCEDURE — A6216 NON-STERILE GAUZE<=16 SQ IN: HCPCS

## 2020-10-13 PROCEDURE — A4452 WATERPROOF TAPE: HCPCS

## 2020-10-13 PROCEDURE — A6197 ALGINATE DRSG >16 <=48 SQ IN: HCPCS

## 2020-10-14 ENCOUNTER — HOME CARE VISIT (OUTPATIENT)
Dept: SCHEDULING | Facility: HOME HEALTH | Age: 70
End: 2020-10-14
Payer: MEDICARE

## 2020-10-14 PROCEDURE — 3331090002 HH PPS REVENUE DEBIT

## 2020-10-14 PROCEDURE — G0299 HHS/HOSPICE OF RN EA 15 MIN: HCPCS

## 2020-10-14 PROCEDURE — 3331090001 HH PPS REVENUE CREDIT

## 2020-10-15 PROCEDURE — 3331090002 HH PPS REVENUE DEBIT

## 2020-10-15 PROCEDURE — 3331090001 HH PPS REVENUE CREDIT

## 2020-10-16 ENCOUNTER — HOME CARE VISIT (OUTPATIENT)
Dept: SCHEDULING | Facility: HOME HEALTH | Age: 70
End: 2020-10-16
Payer: MEDICARE

## 2020-10-16 VITALS
HEART RATE: 87 BPM | SYSTOLIC BLOOD PRESSURE: 122 MMHG | RESPIRATION RATE: 18 BRPM | DIASTOLIC BLOOD PRESSURE: 88 MMHG | OXYGEN SATURATION: 96 % | TEMPERATURE: 97.8 F

## 2020-10-16 VITALS
SYSTOLIC BLOOD PRESSURE: 140 MMHG | RESPIRATION RATE: 18 BRPM | OXYGEN SATURATION: 98 % | HEART RATE: 55 BPM | DIASTOLIC BLOOD PRESSURE: 80 MMHG | TEMPERATURE: 97.4 F

## 2020-10-16 PROCEDURE — 3331090001 HH PPS REVENUE CREDIT

## 2020-10-16 PROCEDURE — 3331090002 HH PPS REVENUE DEBIT

## 2020-10-16 PROCEDURE — G0300 HHS/HOSPICE OF LPN EA 15 MIN: HCPCS

## 2020-10-17 PROCEDURE — 3331090002 HH PPS REVENUE DEBIT

## 2020-10-17 PROCEDURE — 3331090001 HH PPS REVENUE CREDIT

## 2020-10-18 PROCEDURE — 3331090002 HH PPS REVENUE DEBIT

## 2020-10-18 PROCEDURE — 3331090001 HH PPS REVENUE CREDIT

## 2020-10-19 ENCOUNTER — HOME CARE VISIT (OUTPATIENT)
Dept: SCHEDULING | Facility: HOME HEALTH | Age: 70
End: 2020-10-19
Payer: MEDICARE

## 2020-10-19 VITALS
SYSTOLIC BLOOD PRESSURE: 120 MMHG | HEART RATE: 97 BPM | OXYGEN SATURATION: 98 % | TEMPERATURE: 97 F | RESPIRATION RATE: 16 BRPM | DIASTOLIC BLOOD PRESSURE: 76 MMHG

## 2020-10-19 PROCEDURE — 3331090002 HH PPS REVENUE DEBIT

## 2020-10-19 PROCEDURE — G0299 HHS/HOSPICE OF RN EA 15 MIN: HCPCS

## 2020-10-19 PROCEDURE — 3331090001 HH PPS REVENUE CREDIT

## 2020-10-20 PROCEDURE — 3331090002 HH PPS REVENUE DEBIT

## 2020-10-20 PROCEDURE — 3331090001 HH PPS REVENUE CREDIT

## 2020-10-21 ENCOUNTER — HOME CARE VISIT (OUTPATIENT)
Dept: SCHEDULING | Facility: HOME HEALTH | Age: 70
End: 2020-10-21
Payer: MEDICARE

## 2020-10-21 PROCEDURE — 3331090002 HH PPS REVENUE DEBIT

## 2020-10-21 PROCEDURE — G0299 HHS/HOSPICE OF RN EA 15 MIN: HCPCS

## 2020-10-21 PROCEDURE — 3331090001 HH PPS REVENUE CREDIT

## 2020-10-22 VITALS
HEART RATE: 67 BPM | SYSTOLIC BLOOD PRESSURE: 138 MMHG | OXYGEN SATURATION: 95 % | DIASTOLIC BLOOD PRESSURE: 78 MMHG | RESPIRATION RATE: 14 BRPM

## 2020-10-22 PROCEDURE — 3331090002 HH PPS REVENUE DEBIT

## 2020-10-22 PROCEDURE — A6252 ABSORPT DRG >16 <=48 W/O BDR: HCPCS

## 2020-10-22 PROCEDURE — A6253 ABSORPT DRG > 48 SQ IN W/O B: HCPCS

## 2020-10-22 PROCEDURE — 3331090001 HH PPS REVENUE CREDIT

## 2020-10-23 ENCOUNTER — HOME CARE VISIT (OUTPATIENT)
Dept: SCHEDULING | Facility: HOME HEALTH | Age: 70
End: 2020-10-23
Payer: MEDICARE

## 2020-10-23 PROCEDURE — 3331090002 HH PPS REVENUE DEBIT

## 2020-10-23 PROCEDURE — 3331090001 HH PPS REVENUE CREDIT

## 2020-10-24 PROCEDURE — 3331090001 HH PPS REVENUE CREDIT

## 2020-10-24 PROCEDURE — 3331090002 HH PPS REVENUE DEBIT

## 2020-10-25 PROCEDURE — 3331090002 HH PPS REVENUE DEBIT

## 2020-10-25 PROCEDURE — 3331090001 HH PPS REVENUE CREDIT

## 2020-10-26 ENCOUNTER — HOME CARE VISIT (OUTPATIENT)
Dept: SCHEDULING | Facility: HOME HEALTH | Age: 70
End: 2020-10-26
Payer: MEDICARE

## 2020-10-26 VITALS
DIASTOLIC BLOOD PRESSURE: 70 MMHG | SYSTOLIC BLOOD PRESSURE: 138 MMHG | OXYGEN SATURATION: 96 % | HEART RATE: 125 BPM | RESPIRATION RATE: 18 BRPM | TEMPERATURE: 96.8 F

## 2020-10-26 PROCEDURE — 3331090001 HH PPS REVENUE CREDIT

## 2020-10-26 PROCEDURE — 3331090002 HH PPS REVENUE DEBIT

## 2020-10-26 PROCEDURE — G0299 HHS/HOSPICE OF RN EA 15 MIN: HCPCS

## 2020-10-26 PROCEDURE — 400014 HH F/U

## 2020-10-27 PROCEDURE — 3331090002 HH PPS REVENUE DEBIT

## 2020-10-27 PROCEDURE — 3331090001 HH PPS REVENUE CREDIT

## 2020-10-28 ENCOUNTER — HOME CARE VISIT (OUTPATIENT)
Dept: SCHEDULING | Facility: HOME HEALTH | Age: 70
End: 2020-10-28
Payer: MEDICARE

## 2020-10-28 VITALS
DIASTOLIC BLOOD PRESSURE: 70 MMHG | RESPIRATION RATE: 14 BRPM | OXYGEN SATURATION: 95 % | HEART RATE: 120 BPM | SYSTOLIC BLOOD PRESSURE: 138 MMHG | TEMPERATURE: 97.8 F

## 2020-10-28 PROCEDURE — G0299 HHS/HOSPICE OF RN EA 15 MIN: HCPCS

## 2020-10-28 PROCEDURE — 3331090001 HH PPS REVENUE CREDIT

## 2020-10-28 PROCEDURE — 3331090002 HH PPS REVENUE DEBIT

## 2020-10-29 PROCEDURE — 3331090002 HH PPS REVENUE DEBIT

## 2020-10-29 PROCEDURE — 3331090001 HH PPS REVENUE CREDIT

## 2020-10-30 ENCOUNTER — HOME CARE VISIT (OUTPATIENT)
Dept: SCHEDULING | Facility: HOME HEALTH | Age: 70
End: 2020-10-30
Payer: MEDICARE

## 2020-10-30 PROCEDURE — 3331090001 HH PPS REVENUE CREDIT

## 2020-10-30 PROCEDURE — G0299 HHS/HOSPICE OF RN EA 15 MIN: HCPCS

## 2020-10-30 PROCEDURE — 3331090002 HH PPS REVENUE DEBIT

## 2020-10-31 VITALS
DIASTOLIC BLOOD PRESSURE: 70 MMHG | RESPIRATION RATE: 14 BRPM | SYSTOLIC BLOOD PRESSURE: 134 MMHG | HEART RATE: 99 BPM | TEMPERATURE: 97.3 F | OXYGEN SATURATION: 96 %

## 2020-10-31 PROCEDURE — 3331090002 HH PPS REVENUE DEBIT

## 2020-10-31 PROCEDURE — 3331090001 HH PPS REVENUE CREDIT

## 2020-11-01 PROCEDURE — 3331090001 HH PPS REVENUE CREDIT

## 2020-11-01 PROCEDURE — 3331090002 HH PPS REVENUE DEBIT

## 2020-11-02 ENCOUNTER — HOME CARE VISIT (OUTPATIENT)
Dept: SCHEDULING | Facility: HOME HEALTH | Age: 70
End: 2020-11-02
Payer: MEDICARE

## 2020-11-02 VITALS
SYSTOLIC BLOOD PRESSURE: 138 MMHG | OXYGEN SATURATION: 97 % | HEART RATE: 80 BPM | TEMPERATURE: 97.8 F | DIASTOLIC BLOOD PRESSURE: 78 MMHG | RESPIRATION RATE: 14 BRPM

## 2020-11-02 PROCEDURE — A6446 CONFORM BAND S W>=3" <5"/YD: HCPCS

## 2020-11-02 PROCEDURE — 3331090001 HH PPS REVENUE CREDIT

## 2020-11-02 PROCEDURE — 3331090002 HH PPS REVENUE DEBIT

## 2020-11-02 PROCEDURE — A4452 WATERPROOF TAPE: HCPCS

## 2020-11-02 PROCEDURE — G0299 HHS/HOSPICE OF RN EA 15 MIN: HCPCS

## 2020-11-02 PROCEDURE — A6197 ALGINATE DRSG >16 <=48 SQ IN: HCPCS

## 2020-11-03 PROCEDURE — 3331090001 HH PPS REVENUE CREDIT

## 2020-11-03 PROCEDURE — 3331090002 HH PPS REVENUE DEBIT

## 2020-11-04 ENCOUNTER — HOME CARE VISIT (OUTPATIENT)
Dept: SCHEDULING | Facility: HOME HEALTH | Age: 70
End: 2020-11-04
Payer: MEDICARE

## 2020-11-04 VITALS
TEMPERATURE: 97 F | OXYGEN SATURATION: 96 % | SYSTOLIC BLOOD PRESSURE: 128 MMHG | DIASTOLIC BLOOD PRESSURE: 80 MMHG | RESPIRATION RATE: 16 BRPM | HEART RATE: 112 BPM

## 2020-11-04 PROCEDURE — 3331090001 HH PPS REVENUE CREDIT

## 2020-11-04 PROCEDURE — 3331090002 HH PPS REVENUE DEBIT

## 2020-11-04 PROCEDURE — G0299 HHS/HOSPICE OF RN EA 15 MIN: HCPCS

## 2020-11-05 PROCEDURE — 3331090002 HH PPS REVENUE DEBIT

## 2020-11-05 PROCEDURE — 3331090001 HH PPS REVENUE CREDIT

## 2020-11-06 ENCOUNTER — HOME CARE VISIT (OUTPATIENT)
Dept: SCHEDULING | Facility: HOME HEALTH | Age: 70
End: 2020-11-06
Payer: MEDICARE

## 2020-11-06 VITALS
TEMPERATURE: 97.8 F | SYSTOLIC BLOOD PRESSURE: 138 MMHG | HEART RATE: 87 BPM | RESPIRATION RATE: 14 BRPM | DIASTOLIC BLOOD PRESSURE: 77 MMHG | OXYGEN SATURATION: 96 %

## 2020-11-06 PROCEDURE — 3331090001 HH PPS REVENUE CREDIT

## 2020-11-06 PROCEDURE — 3331090002 HH PPS REVENUE DEBIT

## 2020-11-06 PROCEDURE — G0299 HHS/HOSPICE OF RN EA 15 MIN: HCPCS

## 2020-11-07 PROCEDURE — 3331090001 HH PPS REVENUE CREDIT

## 2020-11-07 PROCEDURE — 3331090002 HH PPS REVENUE DEBIT

## 2020-11-08 PROCEDURE — 3331090002 HH PPS REVENUE DEBIT

## 2020-11-08 PROCEDURE — 3331090001 HH PPS REVENUE CREDIT

## 2020-11-09 ENCOUNTER — HOME CARE VISIT (OUTPATIENT)
Dept: SCHEDULING | Facility: HOME HEALTH | Age: 70
End: 2020-11-09
Payer: MEDICARE

## 2020-11-09 PROCEDURE — G0299 HHS/HOSPICE OF RN EA 15 MIN: HCPCS

## 2020-11-09 PROCEDURE — 3331090002 HH PPS REVENUE DEBIT

## 2020-11-09 PROCEDURE — 3331090001 HH PPS REVENUE CREDIT

## 2020-11-10 VITALS
RESPIRATION RATE: 14 BRPM | HEART RATE: 120 BPM | OXYGEN SATURATION: 96 % | SYSTOLIC BLOOD PRESSURE: 138 MMHG | DIASTOLIC BLOOD PRESSURE: 70 MMHG

## 2020-11-10 PROCEDURE — 3331090002 HH PPS REVENUE DEBIT

## 2020-11-10 PROCEDURE — 3331090001 HH PPS REVENUE CREDIT

## 2020-11-11 ENCOUNTER — HOME CARE VISIT (OUTPATIENT)
Dept: SCHEDULING | Facility: HOME HEALTH | Age: 70
End: 2020-11-11
Payer: MEDICARE

## 2020-11-11 VITALS
SYSTOLIC BLOOD PRESSURE: 122 MMHG | OXYGEN SATURATION: 97 % | DIASTOLIC BLOOD PRESSURE: 68 MMHG | HEART RATE: 61 BPM | TEMPERATURE: 98.1 F

## 2020-11-11 PROCEDURE — 3331090001 HH PPS REVENUE CREDIT

## 2020-11-11 PROCEDURE — G0299 HHS/HOSPICE OF RN EA 15 MIN: HCPCS

## 2020-11-11 PROCEDURE — 3331090002 HH PPS REVENUE DEBIT

## 2020-11-12 PROCEDURE — 3331090002 HH PPS REVENUE DEBIT

## 2020-11-12 PROCEDURE — 3331090001 HH PPS REVENUE CREDIT

## 2020-11-13 ENCOUNTER — HOME CARE VISIT (OUTPATIENT)
Dept: SCHEDULING | Facility: HOME HEALTH | Age: 70
End: 2020-11-13
Payer: MEDICARE

## 2020-11-13 PROCEDURE — 3331090001 HH PPS REVENUE CREDIT

## 2020-11-13 PROCEDURE — G0299 HHS/HOSPICE OF RN EA 15 MIN: HCPCS

## 2020-11-13 PROCEDURE — 3331090002 HH PPS REVENUE DEBIT

## 2020-11-14 VITALS
DIASTOLIC BLOOD PRESSURE: 82 MMHG | HEART RATE: 127 BPM | OXYGEN SATURATION: 96 % | RESPIRATION RATE: 14 BRPM | SYSTOLIC BLOOD PRESSURE: 150 MMHG | TEMPERATURE: 97.8 F

## 2020-11-14 PROCEDURE — 3331090002 HH PPS REVENUE DEBIT

## 2020-11-14 PROCEDURE — 3331090001 HH PPS REVENUE CREDIT

## 2020-11-15 PROCEDURE — 3331090002 HH PPS REVENUE DEBIT

## 2020-11-15 PROCEDURE — 3331090001 HH PPS REVENUE CREDIT

## 2020-11-16 ENCOUNTER — HOME CARE VISIT (OUTPATIENT)
Dept: SCHEDULING | Facility: HOME HEALTH | Age: 70
End: 2020-11-16
Payer: MEDICARE

## 2020-11-16 VITALS
DIASTOLIC BLOOD PRESSURE: 78 MMHG | TEMPERATURE: 96.7 F | HEART RATE: 120 BPM | SYSTOLIC BLOOD PRESSURE: 142 MMHG | RESPIRATION RATE: 16 BRPM | OXYGEN SATURATION: 96 %

## 2020-11-16 PROCEDURE — G0299 HHS/HOSPICE OF RN EA 15 MIN: HCPCS

## 2020-11-16 PROCEDURE — 3331090001 HH PPS REVENUE CREDIT

## 2020-11-16 PROCEDURE — 3331090002 HH PPS REVENUE DEBIT

## 2020-11-17 PROCEDURE — 3331090001 HH PPS REVENUE CREDIT

## 2020-11-17 PROCEDURE — 3331090002 HH PPS REVENUE DEBIT

## 2020-11-18 ENCOUNTER — HOME CARE VISIT (OUTPATIENT)
Dept: SCHEDULING | Facility: HOME HEALTH | Age: 70
End: 2020-11-18
Payer: MEDICARE

## 2020-11-18 VITALS
HEART RATE: 60 BPM | DIASTOLIC BLOOD PRESSURE: 78 MMHG | RESPIRATION RATE: 16 BRPM | OXYGEN SATURATION: 99 % | SYSTOLIC BLOOD PRESSURE: 138 MMHG

## 2020-11-18 PROCEDURE — G0299 HHS/HOSPICE OF RN EA 15 MIN: HCPCS

## 2020-11-18 PROCEDURE — 3331090001 HH PPS REVENUE CREDIT

## 2020-11-18 PROCEDURE — 3331090002 HH PPS REVENUE DEBIT

## 2020-11-19 PROCEDURE — 3331090001 HH PPS REVENUE CREDIT

## 2020-11-19 PROCEDURE — 3331090002 HH PPS REVENUE DEBIT

## 2020-11-20 ENCOUNTER — HOME CARE VISIT (OUTPATIENT)
Dept: SCHEDULING | Facility: HOME HEALTH | Age: 70
End: 2020-11-20
Payer: MEDICARE

## 2020-11-20 PROCEDURE — 3331090001 HH PPS REVENUE CREDIT

## 2020-11-20 PROCEDURE — G0299 HHS/HOSPICE OF RN EA 15 MIN: HCPCS

## 2020-11-20 PROCEDURE — 3331090002 HH PPS REVENUE DEBIT

## 2020-11-21 VITALS
TEMPERATURE: 97 F | SYSTOLIC BLOOD PRESSURE: 138 MMHG | OXYGEN SATURATION: 95 % | RESPIRATION RATE: 14 BRPM | DIASTOLIC BLOOD PRESSURE: 78 MMHG | HEART RATE: 65 BPM

## 2020-11-21 PROCEDURE — A6260 WOUND CLEANSER ANY TYPE/SIZE: HCPCS

## 2020-11-21 PROCEDURE — A9270 NON-COVERED ITEM OR SERVICE: HCPCS

## 2020-11-21 PROCEDURE — A6252 ABSORPT DRG >16 <=48 W/O BDR: HCPCS

## 2020-11-21 PROCEDURE — 3331090002 HH PPS REVENUE DEBIT

## 2020-11-21 PROCEDURE — A6446 CONFORM BAND S W>=3" <5"/YD: HCPCS

## 2020-11-21 PROCEDURE — 3331090001 HH PPS REVENUE CREDIT

## 2020-11-21 PROCEDURE — A6197 ALGINATE DRSG >16 <=48 SQ IN: HCPCS

## 2020-11-21 PROCEDURE — A4452 WATERPROOF TAPE: HCPCS

## 2020-11-22 PROCEDURE — 3331090002 HH PPS REVENUE DEBIT

## 2020-11-22 PROCEDURE — 3331090001 HH PPS REVENUE CREDIT

## 2020-11-23 ENCOUNTER — HOME CARE VISIT (OUTPATIENT)
Dept: SCHEDULING | Facility: HOME HEALTH | Age: 70
End: 2020-11-23
Payer: MEDICARE

## 2020-11-23 VITALS
RESPIRATION RATE: 16 BRPM | HEART RATE: 69 BPM | SYSTOLIC BLOOD PRESSURE: 140 MMHG | DIASTOLIC BLOOD PRESSURE: 78 MMHG | OXYGEN SATURATION: 96 % | TEMPERATURE: 98 F

## 2020-11-23 PROCEDURE — 400014 HH F/U

## 2020-11-23 PROCEDURE — 3331090002 HH PPS REVENUE DEBIT

## 2020-11-23 PROCEDURE — 3331090001 HH PPS REVENUE CREDIT

## 2020-11-23 PROCEDURE — G0299 HHS/HOSPICE OF RN EA 15 MIN: HCPCS

## 2020-11-24 PROCEDURE — 3331090001 HH PPS REVENUE CREDIT

## 2020-11-24 PROCEDURE — 3331090002 HH PPS REVENUE DEBIT

## 2020-11-25 ENCOUNTER — HOME CARE VISIT (OUTPATIENT)
Dept: SCHEDULING | Facility: HOME HEALTH | Age: 70
End: 2020-11-25
Payer: MEDICARE

## 2020-11-25 PROCEDURE — 3331090001 HH PPS REVENUE CREDIT

## 2020-11-25 PROCEDURE — 3331090002 HH PPS REVENUE DEBIT

## 2020-11-26 PROCEDURE — 3331090002 HH PPS REVENUE DEBIT

## 2020-11-26 PROCEDURE — 3331090001 HH PPS REVENUE CREDIT

## 2020-11-27 ENCOUNTER — HOME CARE VISIT (OUTPATIENT)
Dept: SCHEDULING | Facility: HOME HEALTH | Age: 70
End: 2020-11-27
Payer: MEDICARE

## 2020-11-27 PROCEDURE — 3331090002 HH PPS REVENUE DEBIT

## 2020-11-27 PROCEDURE — 3331090001 HH PPS REVENUE CREDIT

## 2020-11-27 PROCEDURE — G0299 HHS/HOSPICE OF RN EA 15 MIN: HCPCS

## 2020-11-28 VITALS
OXYGEN SATURATION: 96 % | HEART RATE: 82 BPM | DIASTOLIC BLOOD PRESSURE: 70 MMHG | SYSTOLIC BLOOD PRESSURE: 138 MMHG | RESPIRATION RATE: 14 BRPM | TEMPERATURE: 97.6 F

## 2020-11-28 PROCEDURE — 3331090002 HH PPS REVENUE DEBIT

## 2020-11-28 PROCEDURE — 3331090001 HH PPS REVENUE CREDIT

## 2020-11-29 PROCEDURE — 3331090002 HH PPS REVENUE DEBIT

## 2020-11-29 PROCEDURE — 3331090001 HH PPS REVENUE CREDIT

## 2020-11-30 ENCOUNTER — HOME CARE VISIT (OUTPATIENT)
Dept: SCHEDULING | Facility: HOME HEALTH | Age: 70
End: 2020-11-30
Payer: MEDICARE

## 2020-11-30 VITALS
TEMPERATURE: 96.6 F | HEART RATE: 88 BPM | SYSTOLIC BLOOD PRESSURE: 140 MMHG | RESPIRATION RATE: 14 BRPM | OXYGEN SATURATION: 96 % | DIASTOLIC BLOOD PRESSURE: 78 MMHG

## 2020-11-30 PROCEDURE — G0299 HHS/HOSPICE OF RN EA 15 MIN: HCPCS

## 2020-11-30 PROCEDURE — 3331090001 HH PPS REVENUE CREDIT

## 2020-11-30 PROCEDURE — 3331090002 HH PPS REVENUE DEBIT

## 2020-12-01 PROCEDURE — 3331090001 HH PPS REVENUE CREDIT

## 2020-12-01 PROCEDURE — 3331090002 HH PPS REVENUE DEBIT

## 2020-12-02 ENCOUNTER — HOME CARE VISIT (OUTPATIENT)
Dept: SCHEDULING | Facility: HOME HEALTH | Age: 70
End: 2020-12-02
Payer: MEDICARE

## 2020-12-02 PROCEDURE — G0299 HHS/HOSPICE OF RN EA 15 MIN: HCPCS

## 2020-12-02 PROCEDURE — 3331090001 HH PPS REVENUE CREDIT

## 2020-12-02 PROCEDURE — 3331090002 HH PPS REVENUE DEBIT

## 2020-12-03 PROCEDURE — 3331090002 HH PPS REVENUE DEBIT

## 2020-12-03 PROCEDURE — 3331090001 HH PPS REVENUE CREDIT

## 2020-12-04 ENCOUNTER — HOME CARE VISIT (OUTPATIENT)
Dept: SCHEDULING | Facility: HOME HEALTH | Age: 70
End: 2020-12-04
Payer: MEDICARE

## 2020-12-04 PROCEDURE — 3331090001 HH PPS REVENUE CREDIT

## 2020-12-04 PROCEDURE — 3331090002 HH PPS REVENUE DEBIT

## 2020-12-05 PROCEDURE — 3331090002 HH PPS REVENUE DEBIT

## 2020-12-05 PROCEDURE — 3331090001 HH PPS REVENUE CREDIT

## 2020-12-06 PROCEDURE — 3331090001 HH PPS REVENUE CREDIT

## 2020-12-06 PROCEDURE — 3331090002 HH PPS REVENUE DEBIT

## 2020-12-07 ENCOUNTER — HOME CARE VISIT (OUTPATIENT)
Dept: SCHEDULING | Facility: HOME HEALTH | Age: 70
End: 2020-12-07
Payer: MEDICARE

## 2020-12-07 VITALS
TEMPERATURE: 98 F | OXYGEN SATURATION: 94 % | SYSTOLIC BLOOD PRESSURE: 138 MMHG | DIASTOLIC BLOOD PRESSURE: 78 MMHG | RESPIRATION RATE: 14 BRPM | HEART RATE: 82 BPM

## 2020-12-07 PROCEDURE — A6216 NON-STERILE GAUZE<=16 SQ IN: HCPCS

## 2020-12-07 PROCEDURE — A6260 WOUND CLEANSER ANY TYPE/SIZE: HCPCS

## 2020-12-07 PROCEDURE — A6197 ALGINATE DRSG >16 <=48 SQ IN: HCPCS

## 2020-12-07 PROCEDURE — 3331090001 HH PPS REVENUE CREDIT

## 2020-12-07 PROCEDURE — G0299 HHS/HOSPICE OF RN EA 15 MIN: HCPCS

## 2020-12-07 PROCEDURE — A4452 WATERPROOF TAPE: HCPCS

## 2020-12-07 PROCEDURE — A4927 NON-STERILE GLOVES: HCPCS

## 2020-12-07 PROCEDURE — 3331090002 HH PPS REVENUE DEBIT

## 2020-12-08 PROCEDURE — 3331090001 HH PPS REVENUE CREDIT

## 2020-12-08 PROCEDURE — 3331090002 HH PPS REVENUE DEBIT

## 2020-12-09 ENCOUNTER — HOME CARE VISIT (OUTPATIENT)
Dept: SCHEDULING | Facility: HOME HEALTH | Age: 70
End: 2020-12-09
Payer: MEDICARE

## 2020-12-09 PROCEDURE — 3331090002 HH PPS REVENUE DEBIT

## 2020-12-09 PROCEDURE — G0299 HHS/HOSPICE OF RN EA 15 MIN: HCPCS

## 2020-12-09 PROCEDURE — 3331090001 HH PPS REVENUE CREDIT

## 2020-12-10 VITALS
TEMPERATURE: 97 F | RESPIRATION RATE: 14 BRPM | DIASTOLIC BLOOD PRESSURE: 78 MMHG | OXYGEN SATURATION: 96 % | HEART RATE: 75 BPM | SYSTOLIC BLOOD PRESSURE: 138 MMHG

## 2020-12-10 PROCEDURE — 3331090002 HH PPS REVENUE DEBIT

## 2020-12-10 PROCEDURE — 3331090001 HH PPS REVENUE CREDIT

## 2020-12-11 ENCOUNTER — HOME CARE VISIT (OUTPATIENT)
Dept: SCHEDULING | Facility: HOME HEALTH | Age: 70
End: 2020-12-11
Payer: MEDICARE

## 2020-12-11 PROCEDURE — G0299 HHS/HOSPICE OF RN EA 15 MIN: HCPCS

## 2020-12-11 PROCEDURE — 3331090001 HH PPS REVENUE CREDIT

## 2020-12-11 PROCEDURE — 3331090002 HH PPS REVENUE DEBIT

## 2020-12-12 VITALS
DIASTOLIC BLOOD PRESSURE: 80 MMHG | SYSTOLIC BLOOD PRESSURE: 138 MMHG | OXYGEN SATURATION: 97 % | RESPIRATION RATE: 14 BRPM | TEMPERATURE: 96.2 F | HEART RATE: 62 BPM

## 2020-12-12 PROCEDURE — 3331090002 HH PPS REVENUE DEBIT

## 2020-12-12 PROCEDURE — 3331090001 HH PPS REVENUE CREDIT

## 2020-12-13 PROCEDURE — 3331090001 HH PPS REVENUE CREDIT

## 2020-12-13 PROCEDURE — 3331090002 HH PPS REVENUE DEBIT

## 2020-12-14 ENCOUNTER — HOME CARE VISIT (OUTPATIENT)
Dept: SCHEDULING | Facility: HOME HEALTH | Age: 70
End: 2020-12-14
Payer: MEDICARE

## 2020-12-14 VITALS
SYSTOLIC BLOOD PRESSURE: 138 MMHG | DIASTOLIC BLOOD PRESSURE: 80 MMHG | HEART RATE: 112 BPM | TEMPERATURE: 97.4 F | RESPIRATION RATE: 14 BRPM | OXYGEN SATURATION: 95 %

## 2020-12-14 PROCEDURE — 3331090001 HH PPS REVENUE CREDIT

## 2020-12-14 PROCEDURE — A6446 CONFORM BAND S W>=3" <5"/YD: HCPCS

## 2020-12-14 PROCEDURE — 3331090002 HH PPS REVENUE DEBIT

## 2020-12-14 PROCEDURE — G0299 HHS/HOSPICE OF RN EA 15 MIN: HCPCS

## 2020-12-14 PROCEDURE — A4452 WATERPROOF TAPE: HCPCS

## 2020-12-14 PROCEDURE — A6252 ABSORPT DRG >16 <=48 W/O BDR: HCPCS

## 2020-12-15 PROCEDURE — 3331090001 HH PPS REVENUE CREDIT

## 2020-12-15 PROCEDURE — 3331090002 HH PPS REVENUE DEBIT

## 2020-12-16 ENCOUNTER — HOME CARE VISIT (OUTPATIENT)
Dept: SCHEDULING | Facility: HOME HEALTH | Age: 70
End: 2020-12-16
Payer: MEDICARE

## 2020-12-16 VITALS
RESPIRATION RATE: 14 BRPM | OXYGEN SATURATION: 95 % | DIASTOLIC BLOOD PRESSURE: 78 MMHG | HEART RATE: 104 BPM | SYSTOLIC BLOOD PRESSURE: 138 MMHG | TEMPERATURE: 97.6 F

## 2020-12-16 PROCEDURE — G0299 HHS/HOSPICE OF RN EA 15 MIN: HCPCS

## 2020-12-16 PROCEDURE — 3331090001 HH PPS REVENUE CREDIT

## 2020-12-16 PROCEDURE — 3331090002 HH PPS REVENUE DEBIT

## 2020-12-17 PROCEDURE — 3331090002 HH PPS REVENUE DEBIT

## 2020-12-17 PROCEDURE — 3331090001 HH PPS REVENUE CREDIT

## 2020-12-18 ENCOUNTER — HOME CARE VISIT (OUTPATIENT)
Dept: SCHEDULING | Facility: HOME HEALTH | Age: 70
End: 2020-12-18
Payer: MEDICARE

## 2020-12-18 PROCEDURE — G0299 HHS/HOSPICE OF RN EA 15 MIN: HCPCS

## 2020-12-18 PROCEDURE — 3331090002 HH PPS REVENUE DEBIT

## 2020-12-18 PROCEDURE — 3331090001 HH PPS REVENUE CREDIT

## 2020-12-19 VITALS
SYSTOLIC BLOOD PRESSURE: 135 MMHG | RESPIRATION RATE: 14 BRPM | DIASTOLIC BLOOD PRESSURE: 78 MMHG | HEART RATE: 76 BPM | TEMPERATURE: 97.5 F | OXYGEN SATURATION: 96 %

## 2020-12-19 PROCEDURE — A6216 NON-STERILE GAUZE<=16 SQ IN: HCPCS

## 2020-12-19 PROCEDURE — A4927 NON-STERILE GLOVES: HCPCS

## 2020-12-19 PROCEDURE — 3331090002 HH PPS REVENUE DEBIT

## 2020-12-19 PROCEDURE — 3331090001 HH PPS REVENUE CREDIT

## 2020-12-19 PROCEDURE — A6260 WOUND CLEANSER ANY TYPE/SIZE: HCPCS

## 2020-12-20 PROCEDURE — 3331090002 HH PPS REVENUE DEBIT

## 2020-12-20 PROCEDURE — 3331090001 HH PPS REVENUE CREDIT

## 2020-12-21 ENCOUNTER — HOME CARE VISIT (OUTPATIENT)
Dept: SCHEDULING | Facility: HOME HEALTH | Age: 70
End: 2020-12-21
Payer: MEDICARE

## 2020-12-21 VITALS
OXYGEN SATURATION: 96 % | HEART RATE: 89 BPM | DIASTOLIC BLOOD PRESSURE: 80 MMHG | TEMPERATURE: 96.3 F | SYSTOLIC BLOOD PRESSURE: 138 MMHG | RESPIRATION RATE: 14 BRPM

## 2020-12-21 PROCEDURE — G0299 HHS/HOSPICE OF RN EA 15 MIN: HCPCS

## 2020-12-21 PROCEDURE — 3331090002 HH PPS REVENUE DEBIT

## 2020-12-21 PROCEDURE — 3331090001 HH PPS REVENUE CREDIT

## 2020-12-22 PROCEDURE — 3331090001 HH PPS REVENUE CREDIT

## 2020-12-22 PROCEDURE — 3331090002 HH PPS REVENUE DEBIT

## 2020-12-23 ENCOUNTER — HOME CARE VISIT (OUTPATIENT)
Dept: SCHEDULING | Facility: HOME HEALTH | Age: 70
End: 2020-12-23
Payer: MEDICARE

## 2020-12-23 VITALS
HEART RATE: 76 BPM | OXYGEN SATURATION: 96 % | SYSTOLIC BLOOD PRESSURE: 144 MMHG | DIASTOLIC BLOOD PRESSURE: 80 MMHG | TEMPERATURE: 96.7 F | RESPIRATION RATE: 14 BRPM

## 2020-12-23 PROCEDURE — A6252 ABSORPT DRG >16 <=48 W/O BDR: HCPCS

## 2020-12-23 PROCEDURE — 3331090001 HH PPS REVENUE CREDIT

## 2020-12-23 PROCEDURE — A6216 NON-STERILE GAUZE<=16 SQ IN: HCPCS

## 2020-12-23 PROCEDURE — A4452 WATERPROOF TAPE: HCPCS

## 2020-12-23 PROCEDURE — A6197 ALGINATE DRSG >16 <=48 SQ IN: HCPCS

## 2020-12-23 PROCEDURE — 400014 HH F/U

## 2020-12-23 PROCEDURE — 3331090002 HH PPS REVENUE DEBIT

## 2020-12-23 PROCEDURE — G0299 HHS/HOSPICE OF RN EA 15 MIN: HCPCS

## 2020-12-23 PROCEDURE — A6446 CONFORM BAND S W>=3" <5"/YD: HCPCS

## 2020-12-24 PROCEDURE — 3331090002 HH PPS REVENUE DEBIT

## 2020-12-24 PROCEDURE — 3331090001 HH PPS REVENUE CREDIT

## 2020-12-25 PROCEDURE — 3331090001 HH PPS REVENUE CREDIT

## 2020-12-25 PROCEDURE — 3331090002 HH PPS REVENUE DEBIT

## 2020-12-26 PROCEDURE — 3331090001 HH PPS REVENUE CREDIT

## 2020-12-26 PROCEDURE — 3331090002 HH PPS REVENUE DEBIT

## 2020-12-27 PROCEDURE — 3331090002 HH PPS REVENUE DEBIT

## 2020-12-27 PROCEDURE — 3331090001 HH PPS REVENUE CREDIT

## 2020-12-28 PROCEDURE — 3331090001 HH PPS REVENUE CREDIT

## 2020-12-28 PROCEDURE — 3331090002 HH PPS REVENUE DEBIT

## 2020-12-29 ENCOUNTER — HOME CARE VISIT (OUTPATIENT)
Dept: HOME HEALTH SERVICES | Facility: HOME HEALTH | Age: 70
End: 2020-12-29
Payer: MEDICARE

## 2020-12-29 PROCEDURE — 3331090001 HH PPS REVENUE CREDIT

## 2020-12-29 PROCEDURE — 3331090002 HH PPS REVENUE DEBIT

## 2020-12-30 ENCOUNTER — HOME CARE VISIT (OUTPATIENT)
Dept: SCHEDULING | Facility: HOME HEALTH | Age: 70
End: 2020-12-30
Payer: MEDICARE

## 2020-12-30 VITALS
SYSTOLIC BLOOD PRESSURE: 154 MMHG | OXYGEN SATURATION: 96 % | HEART RATE: 59 BPM | TEMPERATURE: 97.5 F | RESPIRATION RATE: 17 BRPM | DIASTOLIC BLOOD PRESSURE: 60 MMHG

## 2020-12-30 PROCEDURE — 3331090001 HH PPS REVENUE CREDIT

## 2020-12-30 PROCEDURE — 3331090002 HH PPS REVENUE DEBIT

## 2020-12-30 PROCEDURE — G0299 HHS/HOSPICE OF RN EA 15 MIN: HCPCS

## 2020-12-31 PROCEDURE — 3331090001 HH PPS REVENUE CREDIT

## 2020-12-31 PROCEDURE — 3331090002 HH PPS REVENUE DEBIT

## 2021-01-01 ENCOUNTER — HOME CARE VISIT (OUTPATIENT)
Dept: SCHEDULING | Facility: HOME HEALTH | Age: 71
End: 2021-01-01
Payer: MEDICARE

## 2021-01-01 VITALS
DIASTOLIC BLOOD PRESSURE: 70 MMHG | RESPIRATION RATE: 14 BRPM | OXYGEN SATURATION: 95 % | TEMPERATURE: 97 F | SYSTOLIC BLOOD PRESSURE: 138 MMHG | HEART RATE: 73 BPM

## 2021-01-01 PROCEDURE — 3331090002 HH PPS REVENUE DEBIT

## 2021-01-01 PROCEDURE — G0299 HHS/HOSPICE OF RN EA 15 MIN: HCPCS

## 2021-01-01 PROCEDURE — A4452 WATERPROOF TAPE: HCPCS

## 2021-01-01 PROCEDURE — A4927 NON-STERILE GLOVES: HCPCS

## 2021-01-01 PROCEDURE — 3331090001 HH PPS REVENUE CREDIT

## 2021-01-02 PROCEDURE — 3331090002 HH PPS REVENUE DEBIT

## 2021-01-02 PROCEDURE — 3331090001 HH PPS REVENUE CREDIT

## 2021-01-03 PROCEDURE — 3331090001 HH PPS REVENUE CREDIT

## 2021-01-03 PROCEDURE — 3331090002 HH PPS REVENUE DEBIT

## 2021-01-04 ENCOUNTER — HOME CARE VISIT (OUTPATIENT)
Dept: SCHEDULING | Facility: HOME HEALTH | Age: 71
End: 2021-01-04
Payer: MEDICARE

## 2021-01-04 VITALS
DIASTOLIC BLOOD PRESSURE: 78 MMHG | HEART RATE: 78 BPM | OXYGEN SATURATION: 95 % | RESPIRATION RATE: 14 BRPM | SYSTOLIC BLOOD PRESSURE: 130 MMHG | TEMPERATURE: 97.8 F

## 2021-01-04 PROCEDURE — 3331090002 HH PPS REVENUE DEBIT

## 2021-01-04 PROCEDURE — G0299 HHS/HOSPICE OF RN EA 15 MIN: HCPCS

## 2021-01-04 PROCEDURE — 3331090001 HH PPS REVENUE CREDIT

## 2021-01-05 PROCEDURE — 3331090001 HH PPS REVENUE CREDIT

## 2021-01-05 PROCEDURE — 3331090002 HH PPS REVENUE DEBIT

## 2021-01-06 ENCOUNTER — HOME CARE VISIT (OUTPATIENT)
Dept: SCHEDULING | Facility: HOME HEALTH | Age: 71
End: 2021-01-06
Payer: MEDICARE

## 2021-01-06 VITALS
RESPIRATION RATE: 14 BRPM | OXYGEN SATURATION: 94 % | DIASTOLIC BLOOD PRESSURE: 60 MMHG | SYSTOLIC BLOOD PRESSURE: 140 MMHG | HEART RATE: 93 BPM | TEMPERATURE: 97.7 F

## 2021-01-06 PROCEDURE — G0299 HHS/HOSPICE OF RN EA 15 MIN: HCPCS

## 2021-01-06 PROCEDURE — 3331090001 HH PPS REVENUE CREDIT

## 2021-01-06 PROCEDURE — 3331090002 HH PPS REVENUE DEBIT

## 2021-01-07 PROCEDURE — 3331090002 HH PPS REVENUE DEBIT

## 2021-01-07 PROCEDURE — 3331090001 HH PPS REVENUE CREDIT

## 2021-01-08 ENCOUNTER — HOME CARE VISIT (OUTPATIENT)
Dept: SCHEDULING | Facility: HOME HEALTH | Age: 71
End: 2021-01-08
Payer: MEDICARE

## 2021-01-08 PROCEDURE — G0299 HHS/HOSPICE OF RN EA 15 MIN: HCPCS

## 2021-01-08 PROCEDURE — 3331090001 HH PPS REVENUE CREDIT

## 2021-01-08 PROCEDURE — 3331090002 HH PPS REVENUE DEBIT

## 2021-01-09 VITALS
SYSTOLIC BLOOD PRESSURE: 140 MMHG | DIASTOLIC BLOOD PRESSURE: 78 MMHG | OXYGEN SATURATION: 96 % | TEMPERATURE: 97.6 F | HEART RATE: 78 BPM | RESPIRATION RATE: 14 BRPM

## 2021-01-09 PROCEDURE — 3331090001 HH PPS REVENUE CREDIT

## 2021-01-09 PROCEDURE — 3331090002 HH PPS REVENUE DEBIT

## 2021-01-10 PROCEDURE — 3331090001 HH PPS REVENUE CREDIT

## 2021-01-10 PROCEDURE — 3331090002 HH PPS REVENUE DEBIT

## 2021-01-11 ENCOUNTER — HOME CARE VISIT (OUTPATIENT)
Dept: SCHEDULING | Facility: HOME HEALTH | Age: 71
End: 2021-01-11
Payer: MEDICARE

## 2021-01-11 VITALS
RESPIRATION RATE: 14 BRPM | SYSTOLIC BLOOD PRESSURE: 140 MMHG | HEART RATE: 78 BPM | TEMPERATURE: 97.6 F | DIASTOLIC BLOOD PRESSURE: 80 MMHG | OXYGEN SATURATION: 96 %

## 2021-01-11 PROCEDURE — G0299 HHS/HOSPICE OF RN EA 15 MIN: HCPCS

## 2021-01-11 PROCEDURE — 3331090001 HH PPS REVENUE CREDIT

## 2021-01-11 PROCEDURE — 3331090002 HH PPS REVENUE DEBIT

## 2021-01-12 PROCEDURE — 3331090001 HH PPS REVENUE CREDIT

## 2021-01-12 PROCEDURE — 3331090002 HH PPS REVENUE DEBIT

## 2021-01-13 ENCOUNTER — HOME CARE VISIT (OUTPATIENT)
Dept: SCHEDULING | Facility: HOME HEALTH | Age: 71
End: 2021-01-13
Payer: MEDICARE

## 2021-01-13 VITALS
HEART RATE: 85 BPM | DIASTOLIC BLOOD PRESSURE: 78 MMHG | RESPIRATION RATE: 14 BRPM | TEMPERATURE: 97.6 F | SYSTOLIC BLOOD PRESSURE: 142 MMHG | OXYGEN SATURATION: 98 %

## 2021-01-13 PROCEDURE — 3331090001 HH PPS REVENUE CREDIT

## 2021-01-13 PROCEDURE — A4452 WATERPROOF TAPE: HCPCS

## 2021-01-13 PROCEDURE — 3331090002 HH PPS REVENUE DEBIT

## 2021-01-13 PROCEDURE — A4927 NON-STERILE GLOVES: HCPCS

## 2021-01-13 PROCEDURE — G0299 HHS/HOSPICE OF RN EA 15 MIN: HCPCS

## 2021-01-13 PROCEDURE — A6253 ABSORPT DRG > 48 SQ IN W/O B: HCPCS

## 2021-01-13 PROCEDURE — A6446 CONFORM BAND S W>=3" <5"/YD: HCPCS

## 2021-01-13 PROCEDURE — A6252 ABSORPT DRG >16 <=48 W/O BDR: HCPCS

## 2021-01-13 PROCEDURE — A6197 ALGINATE DRSG >16 <=48 SQ IN: HCPCS

## 2021-01-13 PROCEDURE — A6260 WOUND CLEANSER ANY TYPE/SIZE: HCPCS

## 2021-01-14 PROCEDURE — 3331090002 HH PPS REVENUE DEBIT

## 2021-01-14 PROCEDURE — 3331090001 HH PPS REVENUE CREDIT

## 2021-01-15 ENCOUNTER — HOME CARE VISIT (OUTPATIENT)
Dept: SCHEDULING | Facility: HOME HEALTH | Age: 71
End: 2021-01-15
Payer: MEDICARE

## 2021-01-15 PROCEDURE — 3331090002 HH PPS REVENUE DEBIT

## 2021-01-15 PROCEDURE — 3331090001 HH PPS REVENUE CREDIT

## 2021-01-16 PROCEDURE — 3331090002 HH PPS REVENUE DEBIT

## 2021-01-16 PROCEDURE — 3331090001 HH PPS REVENUE CREDIT

## 2021-01-17 PROCEDURE — 3331090001 HH PPS REVENUE CREDIT

## 2021-01-17 PROCEDURE — 3331090002 HH PPS REVENUE DEBIT

## 2021-01-18 ENCOUNTER — HOME CARE VISIT (OUTPATIENT)
Dept: SCHEDULING | Facility: HOME HEALTH | Age: 71
End: 2021-01-18
Payer: MEDICARE

## 2021-01-18 VITALS
RESPIRATION RATE: 15 BRPM | OXYGEN SATURATION: 95 % | TEMPERATURE: 97.6 F | DIASTOLIC BLOOD PRESSURE: 78 MMHG | HEART RATE: 83 BPM | SYSTOLIC BLOOD PRESSURE: 140 MMHG

## 2021-01-18 PROCEDURE — 3331090001 HH PPS REVENUE CREDIT

## 2021-01-18 PROCEDURE — G0299 HHS/HOSPICE OF RN EA 15 MIN: HCPCS

## 2021-01-18 PROCEDURE — 3331090002 HH PPS REVENUE DEBIT

## 2021-01-19 PROCEDURE — 3331090002 HH PPS REVENUE DEBIT

## 2021-01-19 PROCEDURE — 3331090001 HH PPS REVENUE CREDIT

## 2021-01-20 ENCOUNTER — HOME CARE VISIT (OUTPATIENT)
Dept: SCHEDULING | Facility: HOME HEALTH | Age: 71
End: 2021-01-20
Payer: MEDICARE

## 2021-01-20 PROCEDURE — 3331090001 HH PPS REVENUE CREDIT

## 2021-01-20 PROCEDURE — 3331090002 HH PPS REVENUE DEBIT

## 2021-01-20 PROCEDURE — G0299 HHS/HOSPICE OF RN EA 15 MIN: HCPCS

## 2021-01-21 PROCEDURE — 3331090002 HH PPS REVENUE DEBIT

## 2021-01-21 PROCEDURE — 3331090001 HH PPS REVENUE CREDIT

## 2021-01-22 ENCOUNTER — HOME CARE VISIT (OUTPATIENT)
Dept: SCHEDULING | Facility: HOME HEALTH | Age: 71
End: 2021-01-22
Payer: MEDICARE

## 2021-01-22 PROCEDURE — G0300 HHS/HOSPICE OF LPN EA 15 MIN: HCPCS

## 2021-01-22 PROCEDURE — 400014 HH F/U

## 2021-01-22 PROCEDURE — 3331090002 HH PPS REVENUE DEBIT

## 2021-01-22 PROCEDURE — 3331090001 HH PPS REVENUE CREDIT

## 2021-01-22 PROCEDURE — 400018 HH-NO PAY CLAIM PROCEDURE

## 2021-01-23 VITALS
TEMPERATURE: 97 F | DIASTOLIC BLOOD PRESSURE: 70 MMHG | RESPIRATION RATE: 14 BRPM | OXYGEN SATURATION: 98 % | HEART RATE: 105 BPM | SYSTOLIC BLOOD PRESSURE: 130 MMHG

## 2021-01-23 PROCEDURE — 3331090002 HH PPS REVENUE DEBIT

## 2021-01-23 PROCEDURE — 3331090001 HH PPS REVENUE CREDIT

## 2021-01-24 VITALS
TEMPERATURE: 97.9 F | RESPIRATION RATE: 18 BRPM | SYSTOLIC BLOOD PRESSURE: 110 MMHG | DIASTOLIC BLOOD PRESSURE: 62 MMHG | HEART RATE: 81 BPM | OXYGEN SATURATION: 95 %

## 2021-01-24 PROCEDURE — 3331090002 HH PPS REVENUE DEBIT

## 2021-01-24 PROCEDURE — 3331090001 HH PPS REVENUE CREDIT

## 2021-01-25 ENCOUNTER — HOME CARE VISIT (OUTPATIENT)
Dept: SCHEDULING | Facility: HOME HEALTH | Age: 71
End: 2021-01-25
Payer: MEDICARE

## 2021-01-25 VITALS
DIASTOLIC BLOOD PRESSURE: 78 MMHG | RESPIRATION RATE: 14 BRPM | HEART RATE: 58 BPM | SYSTOLIC BLOOD PRESSURE: 138 MMHG | OXYGEN SATURATION: 98 % | TEMPERATURE: 97.9 F

## 2021-01-25 PROCEDURE — G0299 HHS/HOSPICE OF RN EA 15 MIN: HCPCS

## 2021-01-25 PROCEDURE — 3331090001 HH PPS REVENUE CREDIT

## 2021-01-25 PROCEDURE — 3331090002 HH PPS REVENUE DEBIT

## 2021-01-26 PROCEDURE — 3331090001 HH PPS REVENUE CREDIT

## 2021-01-26 PROCEDURE — 3331090002 HH PPS REVENUE DEBIT

## 2021-01-27 ENCOUNTER — HOME CARE VISIT (OUTPATIENT)
Dept: SCHEDULING | Facility: HOME HEALTH | Age: 71
End: 2021-01-27
Payer: MEDICARE

## 2021-01-27 VITALS
OXYGEN SATURATION: 97 % | TEMPERATURE: 98 F | HEART RATE: 66 BPM | SYSTOLIC BLOOD PRESSURE: 120 MMHG | DIASTOLIC BLOOD PRESSURE: 78 MMHG | RESPIRATION RATE: 18 BRPM

## 2021-01-27 PROCEDURE — 3331090001 HH PPS REVENUE CREDIT

## 2021-01-27 PROCEDURE — 3331090002 HH PPS REVENUE DEBIT

## 2021-01-27 PROCEDURE — G0300 HHS/HOSPICE OF LPN EA 15 MIN: HCPCS

## 2021-01-28 PROCEDURE — 3331090002 HH PPS REVENUE DEBIT

## 2021-01-28 PROCEDURE — 3331090001 HH PPS REVENUE CREDIT

## 2021-01-29 ENCOUNTER — HOME CARE VISIT (OUTPATIENT)
Dept: SCHEDULING | Facility: HOME HEALTH | Age: 71
End: 2021-01-29
Payer: MEDICARE

## 2021-01-29 VITALS
SYSTOLIC BLOOD PRESSURE: 128 MMHG | HEART RATE: 96 BPM | TEMPERATURE: 97.8 F | RESPIRATION RATE: 14 BRPM | DIASTOLIC BLOOD PRESSURE: 70 MMHG | OXYGEN SATURATION: 96 %

## 2021-01-29 PROCEDURE — G0299 HHS/HOSPICE OF RN EA 15 MIN: HCPCS

## 2021-01-29 PROCEDURE — 3331090002 HH PPS REVENUE DEBIT

## 2021-01-29 PROCEDURE — 3331090001 HH PPS REVENUE CREDIT

## 2021-01-30 PROCEDURE — 3331090001 HH PPS REVENUE CREDIT

## 2021-01-30 PROCEDURE — 3331090002 HH PPS REVENUE DEBIT

## 2021-01-31 PROCEDURE — 3331090002 HH PPS REVENUE DEBIT

## 2021-01-31 PROCEDURE — 3331090001 HH PPS REVENUE CREDIT

## 2021-02-01 ENCOUNTER — HOME CARE VISIT (OUTPATIENT)
Dept: SCHEDULING | Facility: HOME HEALTH | Age: 71
End: 2021-02-01
Payer: MEDICARE

## 2021-02-01 VITALS
SYSTOLIC BLOOD PRESSURE: 142 MMHG | HEART RATE: 67 BPM | OXYGEN SATURATION: 98 % | RESPIRATION RATE: 14 BRPM | TEMPERATURE: 96.7 F | DIASTOLIC BLOOD PRESSURE: 80 MMHG

## 2021-02-01 PROCEDURE — G0299 HHS/HOSPICE OF RN EA 15 MIN: HCPCS

## 2021-02-01 PROCEDURE — 3331090001 HH PPS REVENUE CREDIT

## 2021-02-01 PROCEDURE — 3331090002 HH PPS REVENUE DEBIT

## 2021-02-02 PROCEDURE — 3331090002 HH PPS REVENUE DEBIT

## 2021-02-02 PROCEDURE — 3331090001 HH PPS REVENUE CREDIT

## 2021-02-03 ENCOUNTER — HOME CARE VISIT (OUTPATIENT)
Dept: SCHEDULING | Facility: HOME HEALTH | Age: 71
End: 2021-02-03
Payer: MEDICARE

## 2021-02-03 PROCEDURE — 3331090001 HH PPS REVENUE CREDIT

## 2021-02-03 PROCEDURE — A6446 CONFORM BAND S W>=3" <5"/YD: HCPCS

## 2021-02-03 PROCEDURE — A6253 ABSORPT DRG > 48 SQ IN W/O B: HCPCS

## 2021-02-03 PROCEDURE — 3331090002 HH PPS REVENUE DEBIT

## 2021-02-03 PROCEDURE — G0299 HHS/HOSPICE OF RN EA 15 MIN: HCPCS

## 2021-02-03 PROCEDURE — A5120 SKIN BARRIER, WIPE OR SWAB: HCPCS

## 2021-02-03 PROCEDURE — A4649 SURGICAL SUPPLIES: HCPCS

## 2021-02-03 PROCEDURE — MED10158 APPLICATOR, COTTON-TIP, WOOD, 6, STRL

## 2021-02-04 VITALS
OXYGEN SATURATION: 96 % | SYSTOLIC BLOOD PRESSURE: 130 MMHG | RESPIRATION RATE: 14 BRPM | TEMPERATURE: 97 F | DIASTOLIC BLOOD PRESSURE: 65 MMHG | HEART RATE: 98 BPM

## 2021-02-04 PROCEDURE — 3331090001 HH PPS REVENUE CREDIT

## 2021-02-04 PROCEDURE — 3331090002 HH PPS REVENUE DEBIT

## 2021-02-05 ENCOUNTER — HOME CARE VISIT (OUTPATIENT)
Dept: SCHEDULING | Facility: HOME HEALTH | Age: 71
End: 2021-02-05
Payer: MEDICARE

## 2021-02-05 PROCEDURE — 3331090001 HH PPS REVENUE CREDIT

## 2021-02-05 PROCEDURE — 3331090002 HH PPS REVENUE DEBIT

## 2021-02-06 PROCEDURE — 3331090002 HH PPS REVENUE DEBIT

## 2021-02-06 PROCEDURE — 3331090001 HH PPS REVENUE CREDIT

## 2021-02-07 PROCEDURE — 3331090001 HH PPS REVENUE CREDIT

## 2021-02-07 PROCEDURE — 3331090002 HH PPS REVENUE DEBIT

## 2021-02-08 ENCOUNTER — HOME CARE VISIT (OUTPATIENT)
Dept: SCHEDULING | Facility: HOME HEALTH | Age: 71
End: 2021-02-08
Payer: MEDICARE

## 2021-02-08 VITALS
RESPIRATION RATE: 14 BRPM | OXYGEN SATURATION: 97 % | HEART RATE: 91 BPM | TEMPERATURE: 97.1 F | DIASTOLIC BLOOD PRESSURE: 70 MMHG | SYSTOLIC BLOOD PRESSURE: 138 MMHG

## 2021-02-08 PROCEDURE — 3331090002 HH PPS REVENUE DEBIT

## 2021-02-08 PROCEDURE — G0299 HHS/HOSPICE OF RN EA 15 MIN: HCPCS

## 2021-02-08 PROCEDURE — 3331090001 HH PPS REVENUE CREDIT

## 2021-02-09 PROCEDURE — 3331090002 HH PPS REVENUE DEBIT

## 2021-02-09 PROCEDURE — 3331090001 HH PPS REVENUE CREDIT

## 2021-02-10 ENCOUNTER — HOME CARE VISIT (OUTPATIENT)
Dept: SCHEDULING | Facility: HOME HEALTH | Age: 71
End: 2021-02-10
Payer: MEDICARE

## 2021-02-10 PROCEDURE — 3331090001 HH PPS REVENUE CREDIT

## 2021-02-10 PROCEDURE — 3331090002 HH PPS REVENUE DEBIT

## 2021-02-10 PROCEDURE — G0299 HHS/HOSPICE OF RN EA 15 MIN: HCPCS

## 2021-02-11 PROCEDURE — 3331090001 HH PPS REVENUE CREDIT

## 2021-02-11 PROCEDURE — 3331090002 HH PPS REVENUE DEBIT

## 2021-02-12 ENCOUNTER — HOME CARE VISIT (OUTPATIENT)
Dept: SCHEDULING | Facility: HOME HEALTH | Age: 71
End: 2021-02-12
Payer: MEDICARE

## 2021-02-12 VITALS
DIASTOLIC BLOOD PRESSURE: 70 MMHG | TEMPERATURE: 98 F | OXYGEN SATURATION: 97 % | OXYGEN SATURATION: 96 % | HEART RATE: 108 BPM | HEART RATE: 90 BPM | SYSTOLIC BLOOD PRESSURE: 138 MMHG | TEMPERATURE: 98 F | RESPIRATION RATE: 14 BRPM | SYSTOLIC BLOOD PRESSURE: 138 MMHG | RESPIRATION RATE: 14 BRPM | DIASTOLIC BLOOD PRESSURE: 76 MMHG

## 2021-02-12 PROCEDURE — 3331090002 HH PPS REVENUE DEBIT

## 2021-02-12 PROCEDURE — 3331090001 HH PPS REVENUE CREDIT

## 2021-02-12 PROCEDURE — G0299 HHS/HOSPICE OF RN EA 15 MIN: HCPCS

## 2021-02-13 PROCEDURE — 3331090001 HH PPS REVENUE CREDIT

## 2021-02-13 PROCEDURE — 3331090002 HH PPS REVENUE DEBIT

## 2021-02-14 PROCEDURE — 3331090001 HH PPS REVENUE CREDIT

## 2021-02-14 PROCEDURE — 3331090002 HH PPS REVENUE DEBIT

## 2021-02-15 ENCOUNTER — HOME CARE VISIT (OUTPATIENT)
Dept: SCHEDULING | Facility: HOME HEALTH | Age: 71
End: 2021-02-15
Payer: MEDICARE

## 2021-02-15 VITALS
OXYGEN SATURATION: 96 % | RESPIRATION RATE: 14 BRPM | HEART RATE: 90 BPM | SYSTOLIC BLOOD PRESSURE: 130 MMHG | TEMPERATURE: 97.7 F | DIASTOLIC BLOOD PRESSURE: 80 MMHG

## 2021-02-15 PROCEDURE — A4452 WATERPROOF TAPE: HCPCS

## 2021-02-15 PROCEDURE — A6216 NON-STERILE GAUZE<=16 SQ IN: HCPCS

## 2021-02-15 PROCEDURE — A5120 SKIN BARRIER, WIPE OR SWAB: HCPCS

## 2021-02-15 PROCEDURE — G0299 HHS/HOSPICE OF RN EA 15 MIN: HCPCS

## 2021-02-15 PROCEDURE — 3331090001 HH PPS REVENUE CREDIT

## 2021-02-15 PROCEDURE — MED10158 APPLICATOR, COTTON-TIP, WOOD, 6, STRL

## 2021-02-15 PROCEDURE — 3331090002 HH PPS REVENUE DEBIT

## 2021-02-15 PROCEDURE — A4649 SURGICAL SUPPLIES: HCPCS

## 2021-02-15 PROCEDURE — A9270 NON-COVERED ITEM OR SERVICE: HCPCS

## 2021-02-16 PROCEDURE — 3331090001 HH PPS REVENUE CREDIT

## 2021-02-16 PROCEDURE — 3331090002 HH PPS REVENUE DEBIT

## 2021-02-17 ENCOUNTER — HOME CARE VISIT (OUTPATIENT)
Dept: SCHEDULING | Facility: HOME HEALTH | Age: 71
End: 2021-02-17
Payer: MEDICARE

## 2021-02-17 PROCEDURE — 3331090002 HH PPS REVENUE DEBIT

## 2021-02-17 PROCEDURE — G0299 HHS/HOSPICE OF RN EA 15 MIN: HCPCS

## 2021-02-17 PROCEDURE — 3331090001 HH PPS REVENUE CREDIT

## 2021-02-18 VITALS
DIASTOLIC BLOOD PRESSURE: 78 MMHG | OXYGEN SATURATION: 97 % | SYSTOLIC BLOOD PRESSURE: 140 MMHG | TEMPERATURE: 97 F | RESPIRATION RATE: 14 BRPM | HEART RATE: 113 BPM

## 2021-02-18 PROCEDURE — 3331090001 HH PPS REVENUE CREDIT

## 2021-02-18 PROCEDURE — 3331090002 HH PPS REVENUE DEBIT

## 2021-02-19 ENCOUNTER — HOME CARE VISIT (OUTPATIENT)
Dept: SCHEDULING | Facility: HOME HEALTH | Age: 71
End: 2021-02-19
Payer: MEDICARE

## 2021-02-19 PROCEDURE — 3331090002 HH PPS REVENUE DEBIT

## 2021-02-19 PROCEDURE — G0299 HHS/HOSPICE OF RN EA 15 MIN: HCPCS

## 2021-02-19 PROCEDURE — 3331090001 HH PPS REVENUE CREDIT

## 2021-02-20 VITALS
SYSTOLIC BLOOD PRESSURE: 130 MMHG | DIASTOLIC BLOOD PRESSURE: 80 MMHG | RESPIRATION RATE: 14 BRPM | OXYGEN SATURATION: 96 % | HEART RATE: 70 BPM | TEMPERATURE: 97.7 F

## 2021-02-20 PROCEDURE — 3331090001 HH PPS REVENUE CREDIT

## 2021-02-20 PROCEDURE — 3331090002 HH PPS REVENUE DEBIT

## 2021-02-21 PROCEDURE — 3331090001 HH PPS REVENUE CREDIT

## 2021-02-21 PROCEDURE — 400018 HH-NO PAY CLAIM PROCEDURE

## 2021-02-21 PROCEDURE — 3331090002 HH PPS REVENUE DEBIT

## 2021-02-22 ENCOUNTER — HOME CARE VISIT (OUTPATIENT)
Dept: SCHEDULING | Facility: HOME HEALTH | Age: 71
End: 2021-02-22
Payer: MEDICARE

## 2021-02-22 VITALS
HEART RATE: 83 BPM | DIASTOLIC BLOOD PRESSURE: 70 MMHG | TEMPERATURE: 97.5 F | RESPIRATION RATE: 14 BRPM | OXYGEN SATURATION: 97 % | SYSTOLIC BLOOD PRESSURE: 138 MMHG

## 2021-02-22 PROCEDURE — 3331090002 HH PPS REVENUE DEBIT

## 2021-02-22 PROCEDURE — 3331090001 HH PPS REVENUE CREDIT

## 2021-02-22 PROCEDURE — 400014 HH F/U

## 2021-02-22 PROCEDURE — G0299 HHS/HOSPICE OF RN EA 15 MIN: HCPCS

## 2021-02-23 PROCEDURE — 3331090002 HH PPS REVENUE DEBIT

## 2021-02-23 PROCEDURE — 3331090001 HH PPS REVENUE CREDIT

## 2021-02-24 ENCOUNTER — HOME CARE VISIT (OUTPATIENT)
Dept: SCHEDULING | Facility: HOME HEALTH | Age: 71
End: 2021-02-24
Payer: MEDICARE

## 2021-02-24 PROCEDURE — 3331090001 HH PPS REVENUE CREDIT

## 2021-02-24 PROCEDURE — A4452 WATERPROOF TAPE: HCPCS

## 2021-02-24 PROCEDURE — A6446 CONFORM BAND S W>=3" <5"/YD: HCPCS

## 2021-02-24 PROCEDURE — A6253 ABSORPT DRG > 48 SQ IN W/O B: HCPCS

## 2021-02-24 PROCEDURE — 3331090002 HH PPS REVENUE DEBIT

## 2021-02-24 PROCEDURE — G0299 HHS/HOSPICE OF RN EA 15 MIN: HCPCS

## 2021-02-24 PROCEDURE — A6216 NON-STERILE GAUZE<=16 SQ IN: HCPCS

## 2021-02-24 PROCEDURE — A4649 SURGICAL SUPPLIES: HCPCS

## 2021-02-25 PROCEDURE — 3331090001 HH PPS REVENUE CREDIT

## 2021-02-25 PROCEDURE — 3331090002 HH PPS REVENUE DEBIT

## 2021-02-26 ENCOUNTER — HOME CARE VISIT (OUTPATIENT)
Dept: SCHEDULING | Facility: HOME HEALTH | Age: 71
End: 2021-02-26
Payer: MEDICARE

## 2021-02-26 PROCEDURE — G0299 HHS/HOSPICE OF RN EA 15 MIN: HCPCS

## 2021-02-26 PROCEDURE — 3331090002 HH PPS REVENUE DEBIT

## 2021-02-26 PROCEDURE — 3331090001 HH PPS REVENUE CREDIT

## 2021-02-27 VITALS
HEART RATE: 101 BPM | HEART RATE: 88 BPM | TEMPERATURE: 98.1 F | OXYGEN SATURATION: 98 % | OXYGEN SATURATION: 97 % | SYSTOLIC BLOOD PRESSURE: 138 MMHG | RESPIRATION RATE: 14 BRPM | RESPIRATION RATE: 14 BRPM | SYSTOLIC BLOOD PRESSURE: 138 MMHG | DIASTOLIC BLOOD PRESSURE: 70 MMHG | DIASTOLIC BLOOD PRESSURE: 70 MMHG | TEMPERATURE: 98.7 F

## 2021-02-27 PROCEDURE — 3331090002 HH PPS REVENUE DEBIT

## 2021-02-27 PROCEDURE — 3331090001 HH PPS REVENUE CREDIT

## 2021-02-28 PROCEDURE — 3331090002 HH PPS REVENUE DEBIT

## 2021-02-28 PROCEDURE — 3331090001 HH PPS REVENUE CREDIT

## 2021-03-01 ENCOUNTER — HOME CARE VISIT (OUTPATIENT)
Dept: SCHEDULING | Facility: HOME HEALTH | Age: 71
End: 2021-03-01
Payer: MEDICARE

## 2021-03-01 VITALS
OXYGEN SATURATION: 94 % | SYSTOLIC BLOOD PRESSURE: 135 MMHG | RESPIRATION RATE: 14 BRPM | DIASTOLIC BLOOD PRESSURE: 80 MMHG | HEART RATE: 56 BPM | TEMPERATURE: 97.9 F

## 2021-03-01 PROCEDURE — G0299 HHS/HOSPICE OF RN EA 15 MIN: HCPCS

## 2021-03-01 PROCEDURE — 3331090002 HH PPS REVENUE DEBIT

## 2021-03-01 PROCEDURE — 3331090001 HH PPS REVENUE CREDIT

## 2021-03-02 PROCEDURE — 3331090002 HH PPS REVENUE DEBIT

## 2021-03-02 PROCEDURE — 3331090001 HH PPS REVENUE CREDIT

## 2021-03-03 ENCOUNTER — HOME CARE VISIT (OUTPATIENT)
Dept: SCHEDULING | Facility: HOME HEALTH | Age: 71
End: 2021-03-03
Payer: MEDICARE

## 2021-03-03 PROCEDURE — 3331090001 HH PPS REVENUE CREDIT

## 2021-03-03 PROCEDURE — 3331090002 HH PPS REVENUE DEBIT

## 2021-03-03 PROCEDURE — G0299 HHS/HOSPICE OF RN EA 15 MIN: HCPCS

## 2021-03-04 PROCEDURE — 3331090001 HH PPS REVENUE CREDIT

## 2021-03-04 PROCEDURE — 3331090002 HH PPS REVENUE DEBIT

## 2021-03-05 ENCOUNTER — HOME CARE VISIT (OUTPATIENT)
Dept: SCHEDULING | Facility: HOME HEALTH | Age: 71
End: 2021-03-05
Payer: MEDICARE

## 2021-03-05 PROCEDURE — 3331090002 HH PPS REVENUE DEBIT

## 2021-03-05 PROCEDURE — G0299 HHS/HOSPICE OF RN EA 15 MIN: HCPCS

## 2021-03-05 PROCEDURE — 3331090001 HH PPS REVENUE CREDIT

## 2021-03-06 VITALS
OXYGEN SATURATION: 95 % | HEART RATE: 110 BPM | RESPIRATION RATE: 14 BRPM | SYSTOLIC BLOOD PRESSURE: 140 MMHG | DIASTOLIC BLOOD PRESSURE: 65 MMHG | OXYGEN SATURATION: 96 % | HEART RATE: 79 BPM | DIASTOLIC BLOOD PRESSURE: 70 MMHG | TEMPERATURE: 97.9 F | TEMPERATURE: 97.3 F | SYSTOLIC BLOOD PRESSURE: 140 MMHG | RESPIRATION RATE: 14 BRPM

## 2021-03-06 PROCEDURE — 3331090002 HH PPS REVENUE DEBIT

## 2021-03-06 PROCEDURE — 3331090001 HH PPS REVENUE CREDIT

## 2021-03-07 PROCEDURE — 3331090002 HH PPS REVENUE DEBIT

## 2021-03-07 PROCEDURE — 3331090001 HH PPS REVENUE CREDIT

## 2021-03-08 ENCOUNTER — HOME CARE VISIT (OUTPATIENT)
Dept: SCHEDULING | Facility: HOME HEALTH | Age: 71
End: 2021-03-08
Payer: MEDICARE

## 2021-03-08 VITALS
HEART RATE: 112 BPM | OXYGEN SATURATION: 99 % | RESPIRATION RATE: 14 BRPM | TEMPERATURE: 98 F | DIASTOLIC BLOOD PRESSURE: 78 MMHG | SYSTOLIC BLOOD PRESSURE: 140 MMHG

## 2021-03-08 PROCEDURE — 3331090002 HH PPS REVENUE DEBIT

## 2021-03-08 PROCEDURE — 3331090001 HH PPS REVENUE CREDIT

## 2021-03-08 PROCEDURE — G0299 HHS/HOSPICE OF RN EA 15 MIN: HCPCS

## 2021-03-09 PROCEDURE — 3331090002 HH PPS REVENUE DEBIT

## 2021-03-09 PROCEDURE — 3331090001 HH PPS REVENUE CREDIT

## 2021-03-10 ENCOUNTER — HOME CARE VISIT (OUTPATIENT)
Dept: SCHEDULING | Facility: HOME HEALTH | Age: 71
End: 2021-03-10
Payer: MEDICARE

## 2021-03-10 PROCEDURE — 3331090002 HH PPS REVENUE DEBIT

## 2021-03-10 PROCEDURE — 3331090001 HH PPS REVENUE CREDIT

## 2021-03-10 PROCEDURE — G0299 HHS/HOSPICE OF RN EA 15 MIN: HCPCS

## 2021-03-11 VITALS
SYSTOLIC BLOOD PRESSURE: 140 MMHG | DIASTOLIC BLOOD PRESSURE: 80 MMHG | OXYGEN SATURATION: 95 % | TEMPERATURE: 98.7 F | HEART RATE: 115 BPM | RESPIRATION RATE: 14 BRPM

## 2021-03-11 PROCEDURE — 3331090001 HH PPS REVENUE CREDIT

## 2021-03-11 PROCEDURE — 3331090002 HH PPS REVENUE DEBIT

## 2021-03-12 ENCOUNTER — HOME CARE VISIT (OUTPATIENT)
Dept: HOME HEALTH SERVICES | Facility: HOME HEALTH | Age: 71
End: 2021-03-12
Payer: MEDICARE

## 2021-03-12 PROCEDURE — 3331090001 HH PPS REVENUE CREDIT

## 2021-03-12 PROCEDURE — 3331090002 HH PPS REVENUE DEBIT

## 2021-03-13 PROCEDURE — 3331090002 HH PPS REVENUE DEBIT

## 2021-03-13 PROCEDURE — 3331090001 HH PPS REVENUE CREDIT

## 2021-03-14 PROCEDURE — 3331090002 HH PPS REVENUE DEBIT

## 2021-03-14 PROCEDURE — 3331090001 HH PPS REVENUE CREDIT

## 2021-03-15 ENCOUNTER — HOME CARE VISIT (OUTPATIENT)
Dept: SCHEDULING | Facility: HOME HEALTH | Age: 71
End: 2021-03-15
Payer: MEDICARE

## 2021-03-15 VITALS
TEMPERATURE: 97.7 F | DIASTOLIC BLOOD PRESSURE: 90 MMHG | OXYGEN SATURATION: 98 % | HEART RATE: 102 BPM | RESPIRATION RATE: 16 BRPM | SYSTOLIC BLOOD PRESSURE: 160 MMHG

## 2021-03-15 PROCEDURE — G0299 HHS/HOSPICE OF RN EA 15 MIN: HCPCS

## 2021-03-15 PROCEDURE — A5120 SKIN BARRIER, WIPE OR SWAB: HCPCS

## 2021-03-15 PROCEDURE — A6253 ABSORPT DRG > 48 SQ IN W/O B: HCPCS

## 2021-03-15 PROCEDURE — A4649 SURGICAL SUPPLIES: HCPCS

## 2021-03-15 PROCEDURE — 3331090002 HH PPS REVENUE DEBIT

## 2021-03-15 PROCEDURE — A9270 NON-COVERED ITEM OR SERVICE: HCPCS

## 2021-03-15 PROCEDURE — A4452 WATERPROOF TAPE: HCPCS

## 2021-03-15 PROCEDURE — 3331090001 HH PPS REVENUE CREDIT

## 2021-03-15 PROCEDURE — A6216 NON-STERILE GAUZE<=16 SQ IN: HCPCS

## 2021-03-15 PROCEDURE — MED10158 APPLICATOR, COTTON-TIP, WOOD, 6, STRL

## 2021-03-16 PROCEDURE — 3331090002 HH PPS REVENUE DEBIT

## 2021-03-16 PROCEDURE — 3331090001 HH PPS REVENUE CREDIT

## 2021-03-17 ENCOUNTER — HOME CARE VISIT (OUTPATIENT)
Dept: SCHEDULING | Facility: HOME HEALTH | Age: 71
End: 2021-03-17
Payer: MEDICARE

## 2021-03-17 PROCEDURE — 3331090002 HH PPS REVENUE DEBIT

## 2021-03-17 PROCEDURE — G0299 HHS/HOSPICE OF RN EA 15 MIN: HCPCS

## 2021-03-17 PROCEDURE — 3331090001 HH PPS REVENUE CREDIT

## 2021-03-18 PROCEDURE — 3331090002 HH PPS REVENUE DEBIT

## 2021-03-18 PROCEDURE — 3331090001 HH PPS REVENUE CREDIT

## 2021-03-19 ENCOUNTER — HOME CARE VISIT (OUTPATIENT)
Dept: SCHEDULING | Facility: HOME HEALTH | Age: 71
End: 2021-03-19
Payer: MEDICARE

## 2021-03-19 PROCEDURE — G0299 HHS/HOSPICE OF RN EA 15 MIN: HCPCS

## 2021-03-19 PROCEDURE — 3331090002 HH PPS REVENUE DEBIT

## 2021-03-19 PROCEDURE — 3331090001 HH PPS REVENUE CREDIT

## 2021-03-20 VITALS
DIASTOLIC BLOOD PRESSURE: 70 MMHG | RESPIRATION RATE: 14 BRPM | TEMPERATURE: 97.7 F | TEMPERATURE: 98.7 F | RESPIRATION RATE: 14 BRPM | HEART RATE: 103 BPM | SYSTOLIC BLOOD PRESSURE: 160 MMHG | HEART RATE: 88 BPM | DIASTOLIC BLOOD PRESSURE: 85 MMHG | OXYGEN SATURATION: 93 % | OXYGEN SATURATION: 97 % | SYSTOLIC BLOOD PRESSURE: 138 MMHG

## 2021-03-20 PROCEDURE — 3331090002 HH PPS REVENUE DEBIT

## 2021-03-20 PROCEDURE — 3331090001 HH PPS REVENUE CREDIT

## 2021-03-21 PROCEDURE — 3331090001 HH PPS REVENUE CREDIT

## 2021-03-21 PROCEDURE — 3331090002 HH PPS REVENUE DEBIT

## 2021-03-22 ENCOUNTER — HOME CARE VISIT (OUTPATIENT)
Dept: SCHEDULING | Facility: HOME HEALTH | Age: 71
End: 2021-03-22
Payer: MEDICARE

## 2021-03-22 VITALS
SYSTOLIC BLOOD PRESSURE: 150 MMHG | DIASTOLIC BLOOD PRESSURE: 80 MMHG | TEMPERATURE: 98.7 F | HEART RATE: 103 BPM | OXYGEN SATURATION: 97 % | RESPIRATION RATE: 14 BRPM

## 2021-03-22 PROCEDURE — A9270 NON-COVERED ITEM OR SERVICE: HCPCS

## 2021-03-22 PROCEDURE — A6253 ABSORPT DRG > 48 SQ IN W/O B: HCPCS

## 2021-03-22 PROCEDURE — A6252 ABSORPT DRG >16 <=48 W/O BDR: HCPCS

## 2021-03-22 PROCEDURE — 3331090001 HH PPS REVENUE CREDIT

## 2021-03-22 PROCEDURE — 3331090002 HH PPS REVENUE DEBIT

## 2021-03-22 PROCEDURE — A4452 WATERPROOF TAPE: HCPCS

## 2021-03-22 PROCEDURE — A6446 CONFORM BAND S W>=3" <5"/YD: HCPCS

## 2021-03-22 PROCEDURE — G0299 HHS/HOSPICE OF RN EA 15 MIN: HCPCS

## 2021-03-22 PROCEDURE — A5120 SKIN BARRIER, WIPE OR SWAB: HCPCS

## 2021-03-23 PROCEDURE — 3331090002 HH PPS REVENUE DEBIT

## 2021-03-23 PROCEDURE — 400018 HH-NO PAY CLAIM PROCEDURE

## 2021-03-23 PROCEDURE — 3331090001 HH PPS REVENUE CREDIT

## 2021-03-24 ENCOUNTER — HOME CARE VISIT (OUTPATIENT)
Dept: SCHEDULING | Facility: HOME HEALTH | Age: 71
End: 2021-03-24
Payer: MEDICARE

## 2021-03-24 PROCEDURE — 400014 HH F/U

## 2021-03-24 PROCEDURE — G0299 HHS/HOSPICE OF RN EA 15 MIN: HCPCS

## 2021-03-24 PROCEDURE — 3331090002 HH PPS REVENUE DEBIT

## 2021-03-24 PROCEDURE — 3331090001 HH PPS REVENUE CREDIT

## 2021-03-25 PROCEDURE — 3331090002 HH PPS REVENUE DEBIT

## 2021-03-25 PROCEDURE — 3331090001 HH PPS REVENUE CREDIT

## 2021-03-26 ENCOUNTER — HOME CARE VISIT (OUTPATIENT)
Dept: SCHEDULING | Facility: HOME HEALTH | Age: 71
End: 2021-03-26
Payer: MEDICARE

## 2021-03-26 PROCEDURE — G0299 HHS/HOSPICE OF RN EA 15 MIN: HCPCS

## 2021-03-26 PROCEDURE — 3331090001 HH PPS REVENUE CREDIT

## 2021-03-26 PROCEDURE — 3331090002 HH PPS REVENUE DEBIT

## 2021-03-27 VITALS
SYSTOLIC BLOOD PRESSURE: 140 MMHG | TEMPERATURE: 98.7 F | DIASTOLIC BLOOD PRESSURE: 68 MMHG | HEART RATE: 100 BPM | OXYGEN SATURATION: 96 % | RESPIRATION RATE: 14 BRPM | HEART RATE: 77 BPM | TEMPERATURE: 98.2 F | SYSTOLIC BLOOD PRESSURE: 140 MMHG | OXYGEN SATURATION: 95 % | DIASTOLIC BLOOD PRESSURE: 80 MMHG | RESPIRATION RATE: 16 BRPM

## 2021-03-27 PROCEDURE — 3331090002 HH PPS REVENUE DEBIT

## 2021-03-27 PROCEDURE — 3331090001 HH PPS REVENUE CREDIT

## 2021-03-28 PROCEDURE — 3331090001 HH PPS REVENUE CREDIT

## 2021-03-28 PROCEDURE — 3331090002 HH PPS REVENUE DEBIT

## 2021-03-29 ENCOUNTER — HOME CARE VISIT (OUTPATIENT)
Dept: SCHEDULING | Facility: HOME HEALTH | Age: 71
End: 2021-03-29
Payer: MEDICARE

## 2021-03-29 VITALS
DIASTOLIC BLOOD PRESSURE: 80 MMHG | HEART RATE: 89 BPM | TEMPERATURE: 98.7 F | RESPIRATION RATE: 14 BRPM | OXYGEN SATURATION: 96 % | SYSTOLIC BLOOD PRESSURE: 138 MMHG

## 2021-03-29 PROCEDURE — 3331090002 HH PPS REVENUE DEBIT

## 2021-03-29 PROCEDURE — G0299 HHS/HOSPICE OF RN EA 15 MIN: HCPCS

## 2021-03-29 PROCEDURE — 3331090001 HH PPS REVENUE CREDIT

## 2021-03-30 PROCEDURE — 3331090001 HH PPS REVENUE CREDIT

## 2021-03-30 PROCEDURE — 3331090002 HH PPS REVENUE DEBIT

## 2021-03-31 ENCOUNTER — HOME CARE VISIT (OUTPATIENT)
Dept: SCHEDULING | Facility: HOME HEALTH | Age: 71
End: 2021-03-31
Payer: MEDICARE

## 2021-03-31 PROCEDURE — G0299 HHS/HOSPICE OF RN EA 15 MIN: HCPCS

## 2021-03-31 PROCEDURE — 3331090001 HH PPS REVENUE CREDIT

## 2021-03-31 PROCEDURE — 3331090002 HH PPS REVENUE DEBIT

## 2021-04-01 PROCEDURE — 3331090002 HH PPS REVENUE DEBIT

## 2021-04-01 PROCEDURE — 3331090001 HH PPS REVENUE CREDIT

## 2021-04-02 ENCOUNTER — HOME CARE VISIT (OUTPATIENT)
Dept: SCHEDULING | Facility: HOME HEALTH | Age: 71
End: 2021-04-02
Payer: MEDICARE

## 2021-04-02 PROCEDURE — G0299 HHS/HOSPICE OF RN EA 15 MIN: HCPCS

## 2021-04-02 PROCEDURE — 3331090002 HH PPS REVENUE DEBIT

## 2021-04-02 PROCEDURE — 3331090001 HH PPS REVENUE CREDIT

## 2021-04-03 VITALS
TEMPERATURE: 98.7 F | SYSTOLIC BLOOD PRESSURE: 138 MMHG | RESPIRATION RATE: 14 BRPM | TEMPERATURE: 97.9 F | HEART RATE: 102 BPM | DIASTOLIC BLOOD PRESSURE: 78 MMHG | DIASTOLIC BLOOD PRESSURE: 78 MMHG | HEART RATE: 63 BPM | OXYGEN SATURATION: 95 % | SYSTOLIC BLOOD PRESSURE: 140 MMHG | OXYGEN SATURATION: 96 % | RESPIRATION RATE: 16 BRPM

## 2021-04-03 PROCEDURE — 3331090001 HH PPS REVENUE CREDIT

## 2021-04-03 PROCEDURE — 3331090002 HH PPS REVENUE DEBIT

## 2021-04-04 PROCEDURE — 3331090002 HH PPS REVENUE DEBIT

## 2021-04-04 PROCEDURE — 3331090001 HH PPS REVENUE CREDIT

## 2021-04-05 PROCEDURE — 3331090002 HH PPS REVENUE DEBIT

## 2021-04-05 PROCEDURE — 3331090001 HH PPS REVENUE CREDIT

## 2021-04-06 ENCOUNTER — HOME CARE VISIT (OUTPATIENT)
Dept: SCHEDULING | Facility: HOME HEALTH | Age: 71
End: 2021-04-06
Payer: MEDICARE

## 2021-04-06 PROCEDURE — 3331090001 HH PPS REVENUE CREDIT

## 2021-04-06 PROCEDURE — 3331090002 HH PPS REVENUE DEBIT

## 2021-04-07 PROCEDURE — 3331090001 HH PPS REVENUE CREDIT

## 2021-04-07 PROCEDURE — 3331090002 HH PPS REVENUE DEBIT

## 2021-04-08 ENCOUNTER — HOME CARE VISIT (OUTPATIENT)
Dept: SCHEDULING | Facility: HOME HEALTH | Age: 71
End: 2021-04-08
Payer: MEDICARE

## 2021-04-08 PROCEDURE — 3331090002 HH PPS REVENUE DEBIT

## 2021-04-08 PROCEDURE — 3331090001 HH PPS REVENUE CREDIT

## 2021-04-09 ENCOUNTER — HOME CARE VISIT (OUTPATIENT)
Dept: SCHEDULING | Facility: HOME HEALTH | Age: 71
End: 2021-04-09
Payer: MEDICARE

## 2021-04-09 PROCEDURE — 3331090001 HH PPS REVENUE CREDIT

## 2021-04-09 PROCEDURE — 3331090002 HH PPS REVENUE DEBIT

## 2021-04-10 PROCEDURE — 3331090002 HH PPS REVENUE DEBIT

## 2021-04-10 PROCEDURE — 3331090001 HH PPS REVENUE CREDIT

## 2021-04-11 PROCEDURE — 3331090001 HH PPS REVENUE CREDIT

## 2021-04-11 PROCEDURE — 3331090002 HH PPS REVENUE DEBIT

## 2021-04-12 ENCOUNTER — HOME CARE VISIT (OUTPATIENT)
Dept: SCHEDULING | Facility: HOME HEALTH | Age: 71
End: 2021-04-12
Payer: MEDICARE

## 2021-04-12 VITALS
OXYGEN SATURATION: 95 % | DIASTOLIC BLOOD PRESSURE: 80 MMHG | HEART RATE: 64 BPM | TEMPERATURE: 97.5 F | RESPIRATION RATE: 14 BRPM | SYSTOLIC BLOOD PRESSURE: 160 MMHG

## 2021-04-12 PROCEDURE — 3331090001 HH PPS REVENUE CREDIT

## 2021-04-12 PROCEDURE — 3331090002 HH PPS REVENUE DEBIT

## 2021-04-12 PROCEDURE — G0299 HHS/HOSPICE OF RN EA 15 MIN: HCPCS

## 2021-04-13 PROCEDURE — 3331090001 HH PPS REVENUE CREDIT

## 2021-04-13 PROCEDURE — 3331090002 HH PPS REVENUE DEBIT

## 2021-04-14 ENCOUNTER — HOME CARE VISIT (OUTPATIENT)
Dept: SCHEDULING | Facility: HOME HEALTH | Age: 71
End: 2021-04-14
Payer: MEDICARE

## 2021-04-14 PROCEDURE — G0299 HHS/HOSPICE OF RN EA 15 MIN: HCPCS

## 2021-04-14 PROCEDURE — 3331090001 HH PPS REVENUE CREDIT

## 2021-04-14 PROCEDURE — 3331090002 HH PPS REVENUE DEBIT

## 2021-04-15 VITALS
SYSTOLIC BLOOD PRESSURE: 140 MMHG | RESPIRATION RATE: 16 BRPM | HEART RATE: 96 BPM | DIASTOLIC BLOOD PRESSURE: 80 MMHG | OXYGEN SATURATION: 97 % | TEMPERATURE: 98.2 F

## 2021-04-15 PROCEDURE — A6446 CONFORM BAND S W>=3" <5"/YD: HCPCS

## 2021-04-15 PROCEDURE — 3331090001 HH PPS REVENUE CREDIT

## 2021-04-15 PROCEDURE — 3331090002 HH PPS REVENUE DEBIT

## 2021-04-16 ENCOUNTER — HOME CARE VISIT (OUTPATIENT)
Dept: SCHEDULING | Facility: HOME HEALTH | Age: 71
End: 2021-04-16
Payer: MEDICARE

## 2021-04-16 PROCEDURE — 3331090001 HH PPS REVENUE CREDIT

## 2021-04-16 PROCEDURE — G0299 HHS/HOSPICE OF RN EA 15 MIN: HCPCS

## 2021-04-16 PROCEDURE — 3331090002 HH PPS REVENUE DEBIT

## 2021-04-17 VITALS
TEMPERATURE: 97.7 F | HEART RATE: 97 BPM | OXYGEN SATURATION: 97 % | SYSTOLIC BLOOD PRESSURE: 140 MMHG | RESPIRATION RATE: 14 BRPM | DIASTOLIC BLOOD PRESSURE: 80 MMHG

## 2021-04-17 PROCEDURE — 3331090002 HH PPS REVENUE DEBIT

## 2021-04-17 PROCEDURE — 3331090001 HH PPS REVENUE CREDIT

## 2021-04-18 PROCEDURE — 3331090002 HH PPS REVENUE DEBIT

## 2021-04-18 PROCEDURE — 3331090001 HH PPS REVENUE CREDIT

## 2021-04-19 ENCOUNTER — HOME CARE VISIT (OUTPATIENT)
Dept: SCHEDULING | Facility: HOME HEALTH | Age: 71
End: 2021-04-19
Payer: MEDICARE

## 2021-04-19 VITALS
SYSTOLIC BLOOD PRESSURE: 135 MMHG | DIASTOLIC BLOOD PRESSURE: 60 MMHG | TEMPERATURE: 97.4 F | RESPIRATION RATE: 14 BRPM | HEART RATE: 60 BPM | OXYGEN SATURATION: 94 %

## 2021-04-19 PROCEDURE — G0299 HHS/HOSPICE OF RN EA 15 MIN: HCPCS

## 2021-04-19 PROCEDURE — 3331090001 HH PPS REVENUE CREDIT

## 2021-04-19 PROCEDURE — 3331090002 HH PPS REVENUE DEBIT

## 2021-04-20 PROCEDURE — 3331090002 HH PPS REVENUE DEBIT

## 2021-04-20 PROCEDURE — 3331090001 HH PPS REVENUE CREDIT

## 2021-04-21 ENCOUNTER — HOME CARE VISIT (OUTPATIENT)
Dept: SCHEDULING | Facility: HOME HEALTH | Age: 71
End: 2021-04-21
Payer: MEDICARE

## 2021-04-21 VITALS
OXYGEN SATURATION: 97 % | TEMPERATURE: 98.7 F | RESPIRATION RATE: 14 BRPM | SYSTOLIC BLOOD PRESSURE: 140 MMHG | DIASTOLIC BLOOD PRESSURE: 80 MMHG | HEART RATE: 98 BPM

## 2021-04-21 PROCEDURE — 3331090002 HH PPS REVENUE DEBIT

## 2021-04-21 PROCEDURE — 3331090001 HH PPS REVENUE CREDIT

## 2021-04-21 PROCEDURE — G0299 HHS/HOSPICE OF RN EA 15 MIN: HCPCS

## 2021-04-22 PROCEDURE — 3331090002 HH PPS REVENUE DEBIT

## 2021-04-22 PROCEDURE — 400018 HH-NO PAY CLAIM PROCEDURE

## 2021-04-22 PROCEDURE — 3331090001 HH PPS REVENUE CREDIT

## 2021-04-23 ENCOUNTER — HOME CARE VISIT (OUTPATIENT)
Dept: SCHEDULING | Facility: HOME HEALTH | Age: 71
End: 2021-04-23
Payer: MEDICARE

## 2021-04-23 VITALS
HEART RATE: 67 BPM | OXYGEN SATURATION: 97 % | SYSTOLIC BLOOD PRESSURE: 130 MMHG | DIASTOLIC BLOOD PRESSURE: 82 MMHG | TEMPERATURE: 97 F | RESPIRATION RATE: 14 BRPM

## 2021-04-23 PROCEDURE — 3331090001 HH PPS REVENUE CREDIT

## 2021-04-23 PROCEDURE — G0299 HHS/HOSPICE OF RN EA 15 MIN: HCPCS

## 2021-04-23 PROCEDURE — 3331090002 HH PPS REVENUE DEBIT

## 2021-04-23 PROCEDURE — 400014 HH F/U

## 2021-04-24 PROCEDURE — 3331090001 HH PPS REVENUE CREDIT

## 2021-04-24 PROCEDURE — 3331090002 HH PPS REVENUE DEBIT

## 2021-04-25 PROCEDURE — 3331090002 HH PPS REVENUE DEBIT

## 2021-04-25 PROCEDURE — 3331090001 HH PPS REVENUE CREDIT

## 2021-04-26 ENCOUNTER — HOME CARE VISIT (OUTPATIENT)
Dept: SCHEDULING | Facility: HOME HEALTH | Age: 71
End: 2021-04-26
Payer: MEDICARE

## 2021-04-26 VITALS
HEART RATE: 55 BPM | DIASTOLIC BLOOD PRESSURE: 67 MMHG | TEMPERATURE: 97.4 F | RESPIRATION RATE: 14 BRPM | OXYGEN SATURATION: 96 % | SYSTOLIC BLOOD PRESSURE: 140 MMHG

## 2021-04-26 PROCEDURE — 3331090001 HH PPS REVENUE CREDIT

## 2021-04-26 PROCEDURE — 3331090002 HH PPS REVENUE DEBIT

## 2021-04-26 PROCEDURE — G0299 HHS/HOSPICE OF RN EA 15 MIN: HCPCS

## 2021-04-27 PROCEDURE — 3331090001 HH PPS REVENUE CREDIT

## 2021-04-27 PROCEDURE — 3331090002 HH PPS REVENUE DEBIT

## 2021-04-28 ENCOUNTER — HOME CARE VISIT (OUTPATIENT)
Dept: SCHEDULING | Facility: HOME HEALTH | Age: 71
End: 2021-04-28
Payer: MEDICARE

## 2021-04-28 PROCEDURE — 3331090002 HH PPS REVENUE DEBIT

## 2021-04-28 PROCEDURE — 3331090001 HH PPS REVENUE CREDIT

## 2021-04-28 PROCEDURE — G0299 HHS/HOSPICE OF RN EA 15 MIN: HCPCS

## 2021-04-29 PROCEDURE — 3331090002 HH PPS REVENUE DEBIT

## 2021-04-29 PROCEDURE — 3331090001 HH PPS REVENUE CREDIT

## 2021-04-30 ENCOUNTER — HOME CARE VISIT (OUTPATIENT)
Dept: SCHEDULING | Facility: HOME HEALTH | Age: 71
End: 2021-04-30
Payer: MEDICARE

## 2021-04-30 PROCEDURE — G0299 HHS/HOSPICE OF RN EA 15 MIN: HCPCS

## 2021-04-30 PROCEDURE — 3331090002 HH PPS REVENUE DEBIT

## 2021-04-30 PROCEDURE — 3331090001 HH PPS REVENUE CREDIT

## 2021-05-01 VITALS
SYSTOLIC BLOOD PRESSURE: 140 MMHG | HEART RATE: 85 BPM | OXYGEN SATURATION: 97 % | DIASTOLIC BLOOD PRESSURE: 80 MMHG | TEMPERATURE: 98.3 F | HEART RATE: 103 BPM | RESPIRATION RATE: 14 BRPM | OXYGEN SATURATION: 98 % | SYSTOLIC BLOOD PRESSURE: 140 MMHG | RESPIRATION RATE: 14 BRPM | TEMPERATURE: 98.1 F | DIASTOLIC BLOOD PRESSURE: 80 MMHG

## 2021-05-01 PROCEDURE — 3331090002 HH PPS REVENUE DEBIT

## 2021-05-01 PROCEDURE — 3331090001 HH PPS REVENUE CREDIT

## 2021-05-02 PROCEDURE — 3331090001 HH PPS REVENUE CREDIT

## 2021-05-02 PROCEDURE — 3331090002 HH PPS REVENUE DEBIT

## 2021-05-03 ENCOUNTER — HOME CARE VISIT (OUTPATIENT)
Dept: SCHEDULING | Facility: HOME HEALTH | Age: 71
End: 2021-05-03
Payer: MEDICARE

## 2021-05-03 VITALS
TEMPERATURE: 97.8 F | DIASTOLIC BLOOD PRESSURE: 80 MMHG | HEART RATE: 89 BPM | SYSTOLIC BLOOD PRESSURE: 150 MMHG | RESPIRATION RATE: 14 BRPM | OXYGEN SATURATION: 96 %

## 2021-05-03 PROCEDURE — 3331090001 HH PPS REVENUE CREDIT

## 2021-05-03 PROCEDURE — G0299 HHS/HOSPICE OF RN EA 15 MIN: HCPCS

## 2021-05-03 PROCEDURE — 3331090002 HH PPS REVENUE DEBIT

## 2021-05-04 PROCEDURE — 3331090001 HH PPS REVENUE CREDIT

## 2021-05-04 PROCEDURE — 3331090002 HH PPS REVENUE DEBIT

## 2021-05-05 ENCOUNTER — HOME CARE VISIT (OUTPATIENT)
Dept: SCHEDULING | Facility: HOME HEALTH | Age: 71
End: 2021-05-05
Payer: MEDICARE

## 2021-05-05 PROCEDURE — A5120 SKIN BARRIER, WIPE OR SWAB: HCPCS

## 2021-05-05 PROCEDURE — G0299 HHS/HOSPICE OF RN EA 15 MIN: HCPCS

## 2021-05-05 PROCEDURE — 3331090001 HH PPS REVENUE CREDIT

## 2021-05-05 PROCEDURE — 3331090002 HH PPS REVENUE DEBIT

## 2021-05-05 PROCEDURE — A6446 CONFORM BAND S W>=3" <5"/YD: HCPCS

## 2021-05-05 PROCEDURE — A4452 WATERPROOF TAPE: HCPCS

## 2021-05-05 PROCEDURE — A6022 COLLAGEN DRSG>16<=48 SQ IN: HCPCS

## 2021-05-06 PROCEDURE — 3331090002 HH PPS REVENUE DEBIT

## 2021-05-06 PROCEDURE — 3331090001 HH PPS REVENUE CREDIT

## 2021-05-07 ENCOUNTER — HOME CARE VISIT (OUTPATIENT)
Dept: SCHEDULING | Facility: HOME HEALTH | Age: 71
End: 2021-05-07
Payer: MEDICARE

## 2021-05-07 VITALS
SYSTOLIC BLOOD PRESSURE: 140 MMHG | TEMPERATURE: 98.4 F | RESPIRATION RATE: 14 BRPM | OXYGEN SATURATION: 98 % | DIASTOLIC BLOOD PRESSURE: 70 MMHG | HEART RATE: 68 BPM

## 2021-05-07 PROCEDURE — G0299 HHS/HOSPICE OF RN EA 15 MIN: HCPCS

## 2021-05-07 PROCEDURE — 3331090001 HH PPS REVENUE CREDIT

## 2021-05-07 PROCEDURE — 3331090002 HH PPS REVENUE DEBIT

## 2021-05-08 PROCEDURE — 3331090002 HH PPS REVENUE DEBIT

## 2021-05-08 PROCEDURE — 3331090001 HH PPS REVENUE CREDIT

## 2021-05-09 VITALS
OXYGEN SATURATION: 95 % | SYSTOLIC BLOOD PRESSURE: 134 MMHG | TEMPERATURE: 97.3 F | DIASTOLIC BLOOD PRESSURE: 86 MMHG | RESPIRATION RATE: 17 BRPM | HEART RATE: 90 BPM

## 2021-05-09 PROCEDURE — 3331090001 HH PPS REVENUE CREDIT

## 2021-05-09 PROCEDURE — 3331090002 HH PPS REVENUE DEBIT

## 2021-05-10 PROCEDURE — 3331090001 HH PPS REVENUE CREDIT

## 2021-05-10 PROCEDURE — 3331090002 HH PPS REVENUE DEBIT

## 2021-05-11 ENCOUNTER — HOME CARE VISIT (OUTPATIENT)
Dept: HOME HEALTH SERVICES | Facility: HOME HEALTH | Age: 71
End: 2021-05-11
Payer: MEDICARE

## 2021-05-11 PROCEDURE — 3331090002 HH PPS REVENUE DEBIT

## 2021-05-11 PROCEDURE — 3331090001 HH PPS REVENUE CREDIT

## 2021-05-12 ENCOUNTER — HOME CARE VISIT (OUTPATIENT)
Dept: SCHEDULING | Facility: HOME HEALTH | Age: 71
End: 2021-05-12
Payer: MEDICARE

## 2021-05-12 PROCEDURE — G0299 HHS/HOSPICE OF RN EA 15 MIN: HCPCS

## 2021-05-12 PROCEDURE — 3331090002 HH PPS REVENUE DEBIT

## 2021-05-12 PROCEDURE — 3331090001 HH PPS REVENUE CREDIT

## 2021-05-13 PROCEDURE — 3331090001 HH PPS REVENUE CREDIT

## 2021-05-13 PROCEDURE — 3331090002 HH PPS REVENUE DEBIT

## 2021-05-14 ENCOUNTER — HOME CARE VISIT (OUTPATIENT)
Dept: SCHEDULING | Facility: HOME HEALTH | Age: 71
End: 2021-05-14
Payer: MEDICARE

## 2021-05-14 VITALS
DIASTOLIC BLOOD PRESSURE: 80 MMHG | TEMPERATURE: 98 F | HEART RATE: 65 BPM | SYSTOLIC BLOOD PRESSURE: 130 MMHG | HEART RATE: 113 BPM | OXYGEN SATURATION: 95 % | DIASTOLIC BLOOD PRESSURE: 80 MMHG | TEMPERATURE: 97.4 F | RESPIRATION RATE: 14 BRPM | RESPIRATION RATE: 14 BRPM | SYSTOLIC BLOOD PRESSURE: 140 MMHG | OXYGEN SATURATION: 98 %

## 2021-05-14 PROCEDURE — G0299 HHS/HOSPICE OF RN EA 15 MIN: HCPCS

## 2021-05-14 PROCEDURE — 3331090002 HH PPS REVENUE DEBIT

## 2021-05-14 PROCEDURE — 3331090001 HH PPS REVENUE CREDIT

## 2021-05-15 PROCEDURE — 3331090002 HH PPS REVENUE DEBIT

## 2021-05-15 PROCEDURE — 3331090001 HH PPS REVENUE CREDIT

## 2021-05-16 PROCEDURE — 3331090002 HH PPS REVENUE DEBIT

## 2021-05-16 PROCEDURE — 3331090001 HH PPS REVENUE CREDIT

## 2021-05-17 ENCOUNTER — HOME CARE VISIT (OUTPATIENT)
Dept: SCHEDULING | Facility: HOME HEALTH | Age: 71
End: 2021-05-17
Payer: MEDICARE

## 2021-05-17 VITALS
TEMPERATURE: 96.2 F | HEART RATE: 112 BPM | RESPIRATION RATE: 14 BRPM | SYSTOLIC BLOOD PRESSURE: 140 MMHG | OXYGEN SATURATION: 96 % | DIASTOLIC BLOOD PRESSURE: 75 MMHG

## 2021-05-17 PROCEDURE — 3331090001 HH PPS REVENUE CREDIT

## 2021-05-17 PROCEDURE — G0299 HHS/HOSPICE OF RN EA 15 MIN: HCPCS

## 2021-05-17 PROCEDURE — 3331090002 HH PPS REVENUE DEBIT

## 2021-05-18 PROCEDURE — 3331090001 HH PPS REVENUE CREDIT

## 2021-05-18 PROCEDURE — 3331090002 HH PPS REVENUE DEBIT

## 2021-05-19 ENCOUNTER — HOME CARE VISIT (OUTPATIENT)
Dept: SCHEDULING | Facility: HOME HEALTH | Age: 71
End: 2021-05-19
Payer: MEDICARE

## 2021-05-19 VITALS
HEART RATE: 68 BPM | RESPIRATION RATE: 16 BRPM | OXYGEN SATURATION: 95 % | SYSTOLIC BLOOD PRESSURE: 124 MMHG | DIASTOLIC BLOOD PRESSURE: 80 MMHG | TEMPERATURE: 96.7 F

## 2021-05-19 PROCEDURE — G0299 HHS/HOSPICE OF RN EA 15 MIN: HCPCS

## 2021-05-19 PROCEDURE — 3331090002 HH PPS REVENUE DEBIT

## 2021-05-19 PROCEDURE — 3331090001 HH PPS REVENUE CREDIT

## 2021-05-20 PROCEDURE — 3331090001 HH PPS REVENUE CREDIT

## 2021-05-20 PROCEDURE — 3331090002 HH PPS REVENUE DEBIT

## 2021-05-21 ENCOUNTER — HOME CARE VISIT (OUTPATIENT)
Dept: SCHEDULING | Facility: HOME HEALTH | Age: 71
End: 2021-05-21
Payer: MEDICARE

## 2021-05-21 PROCEDURE — 3331090001 HH PPS REVENUE CREDIT

## 2021-05-21 PROCEDURE — 3331090002 HH PPS REVENUE DEBIT

## 2021-05-21 PROCEDURE — G0299 HHS/HOSPICE OF RN EA 15 MIN: HCPCS

## 2021-05-22 VITALS
HEART RATE: 67 BPM | SYSTOLIC BLOOD PRESSURE: 140 MMHG | OXYGEN SATURATION: 95 % | DIASTOLIC BLOOD PRESSURE: 76 MMHG | TEMPERATURE: 97.5 F | RESPIRATION RATE: 14 BRPM

## 2021-05-22 PROCEDURE — 3331090002 HH PPS REVENUE DEBIT

## 2021-05-22 PROCEDURE — 400018 HH-NO PAY CLAIM PROCEDURE

## 2021-05-22 PROCEDURE — 3331090001 HH PPS REVENUE CREDIT

## 2021-05-23 PROCEDURE — 3331090002 HH PPS REVENUE DEBIT

## 2021-05-23 PROCEDURE — 3331090001 HH PPS REVENUE CREDIT

## 2021-05-24 ENCOUNTER — HOME CARE VISIT (OUTPATIENT)
Dept: SCHEDULING | Facility: HOME HEALTH | Age: 71
End: 2021-05-24
Payer: MEDICARE

## 2021-05-24 VITALS
RESPIRATION RATE: 16 BRPM | TEMPERATURE: 98.8 F | HEART RATE: 57 BPM | SYSTOLIC BLOOD PRESSURE: 132 MMHG | OXYGEN SATURATION: 97 % | DIASTOLIC BLOOD PRESSURE: 78 MMHG

## 2021-05-24 PROCEDURE — 400014 HH F/U

## 2021-05-24 PROCEDURE — 3331090001 HH PPS REVENUE CREDIT

## 2021-05-24 PROCEDURE — 3331090002 HH PPS REVENUE DEBIT

## 2021-05-24 PROCEDURE — G0299 HHS/HOSPICE OF RN EA 15 MIN: HCPCS

## 2021-05-25 PROCEDURE — 3331090001 HH PPS REVENUE CREDIT

## 2021-05-25 PROCEDURE — 3331090002 HH PPS REVENUE DEBIT

## 2021-05-26 ENCOUNTER — HOME CARE VISIT (OUTPATIENT)
Dept: SCHEDULING | Facility: HOME HEALTH | Age: 71
End: 2021-05-26
Payer: MEDICARE

## 2021-05-26 PROCEDURE — G0299 HHS/HOSPICE OF RN EA 15 MIN: HCPCS

## 2021-05-26 PROCEDURE — 3331090001 HH PPS REVENUE CREDIT

## 2021-05-26 PROCEDURE — 3331090002 HH PPS REVENUE DEBIT

## 2021-05-27 PROCEDURE — 3331090002 HH PPS REVENUE DEBIT

## 2021-05-27 PROCEDURE — 3331090001 HH PPS REVENUE CREDIT

## 2021-05-28 ENCOUNTER — HOME CARE VISIT (OUTPATIENT)
Dept: SCHEDULING | Facility: HOME HEALTH | Age: 71
End: 2021-05-28
Payer: MEDICARE

## 2021-05-28 PROCEDURE — A5120 SKIN BARRIER, WIPE OR SWAB: HCPCS

## 2021-05-28 PROCEDURE — A6446 CONFORM BAND S W>=3" <5"/YD: HCPCS

## 2021-05-28 PROCEDURE — 3331090001 HH PPS REVENUE CREDIT

## 2021-05-28 PROCEDURE — A6022 COLLAGEN DRSG>16<=48 SQ IN: HCPCS

## 2021-05-28 PROCEDURE — G0299 HHS/HOSPICE OF RN EA 15 MIN: HCPCS

## 2021-05-28 PROCEDURE — MED10160 APPLICATOR,CTN,WOOD,6,STL

## 2021-05-28 PROCEDURE — A6252 ABSORPT DRG >16 <=48 W/O BDR: HCPCS

## 2021-05-28 PROCEDURE — A6216 NON-STERILE GAUZE<=16 SQ IN: HCPCS

## 2021-05-28 PROCEDURE — 3331090002 HH PPS REVENUE DEBIT

## 2021-05-29 VITALS
SYSTOLIC BLOOD PRESSURE: 140 MMHG | HEART RATE: 87 BPM | TEMPERATURE: 67 F | SYSTOLIC BLOOD PRESSURE: 140 MMHG | DIASTOLIC BLOOD PRESSURE: 78 MMHG | OXYGEN SATURATION: 96 % | OXYGEN SATURATION: 98 % | RESPIRATION RATE: 14 BRPM | DIASTOLIC BLOOD PRESSURE: 78 MMHG | TEMPERATURE: 97.8 F | HEART RATE: 97 BPM | RESPIRATION RATE: 14 BRPM

## 2021-05-29 PROCEDURE — 3331090002 HH PPS REVENUE DEBIT

## 2021-05-29 PROCEDURE — 3331090001 HH PPS REVENUE CREDIT

## 2021-05-30 PROCEDURE — 3331090001 HH PPS REVENUE CREDIT

## 2021-05-30 PROCEDURE — 3331090002 HH PPS REVENUE DEBIT

## 2021-05-31 ENCOUNTER — HOME CARE VISIT (OUTPATIENT)
Dept: SCHEDULING | Facility: HOME HEALTH | Age: 71
End: 2021-05-31
Payer: MEDICARE

## 2021-05-31 PROCEDURE — 3331090002 HH PPS REVENUE DEBIT

## 2021-05-31 PROCEDURE — 3331090001 HH PPS REVENUE CREDIT

## 2021-05-31 PROCEDURE — G0299 HHS/HOSPICE OF RN EA 15 MIN: HCPCS

## 2021-06-01 VITALS
RESPIRATION RATE: 18 BRPM | TEMPERATURE: 97.6 F | OXYGEN SATURATION: 98 % | SYSTOLIC BLOOD PRESSURE: 134 MMHG | HEART RATE: 70 BPM | DIASTOLIC BLOOD PRESSURE: 82 MMHG

## 2021-06-01 PROCEDURE — 3331090001 HH PPS REVENUE CREDIT

## 2021-06-01 PROCEDURE — 3331090002 HH PPS REVENUE DEBIT

## 2021-06-02 ENCOUNTER — HOME CARE VISIT (OUTPATIENT)
Dept: SCHEDULING | Facility: HOME HEALTH | Age: 71
End: 2021-06-02
Payer: MEDICARE

## 2021-06-02 PROCEDURE — 3331090002 HH PPS REVENUE DEBIT

## 2021-06-02 PROCEDURE — G0300 HHS/HOSPICE OF LPN EA 15 MIN: HCPCS

## 2021-06-02 PROCEDURE — 3331090001 HH PPS REVENUE CREDIT

## 2021-06-03 VITALS
HEART RATE: 53 BPM | DIASTOLIC BLOOD PRESSURE: 62 MMHG | SYSTOLIC BLOOD PRESSURE: 110 MMHG | OXYGEN SATURATION: 97 % | TEMPERATURE: 97.5 F | RESPIRATION RATE: 15 BRPM

## 2021-06-03 PROCEDURE — 3331090001 HH PPS REVENUE CREDIT

## 2021-06-03 PROCEDURE — 3331090002 HH PPS REVENUE DEBIT

## 2021-06-04 PROCEDURE — 3331090002 HH PPS REVENUE DEBIT

## 2021-06-04 PROCEDURE — 3331090001 HH PPS REVENUE CREDIT

## 2021-06-05 ENCOUNTER — HOME CARE VISIT (OUTPATIENT)
Dept: HOME HEALTH SERVICES | Facility: HOME HEALTH | Age: 71
End: 2021-06-05
Payer: MEDICARE

## 2021-06-05 PROCEDURE — 3331090001 HH PPS REVENUE CREDIT

## 2021-06-05 PROCEDURE — 3331090002 HH PPS REVENUE DEBIT

## 2021-06-06 PROCEDURE — 3331090002 HH PPS REVENUE DEBIT

## 2021-06-06 PROCEDURE — 3331090001 HH PPS REVENUE CREDIT

## 2021-06-07 PROCEDURE — 3331090002 HH PPS REVENUE DEBIT

## 2021-06-07 PROCEDURE — 3331090001 HH PPS REVENUE CREDIT

## 2021-06-08 ENCOUNTER — HOME CARE VISIT (OUTPATIENT)
Dept: SCHEDULING | Facility: HOME HEALTH | Age: 71
End: 2021-06-08
Payer: MEDICARE

## 2021-06-08 PROCEDURE — 3331090002 HH PPS REVENUE DEBIT

## 2021-06-08 PROCEDURE — 3331090001 HH PPS REVENUE CREDIT

## 2021-06-09 ENCOUNTER — HOME CARE VISIT (OUTPATIENT)
Dept: SCHEDULING | Facility: HOME HEALTH | Age: 71
End: 2021-06-09
Payer: MEDICARE

## 2021-06-09 PROCEDURE — 3331090002 HH PPS REVENUE DEBIT

## 2021-06-09 PROCEDURE — 3331090001 HH PPS REVENUE CREDIT

## 2021-06-09 PROCEDURE — G0299 HHS/HOSPICE OF RN EA 15 MIN: HCPCS

## 2021-06-10 VITALS
HEART RATE: 92 BPM | OXYGEN SATURATION: 93 % | TEMPERATURE: 97.2 F | SYSTOLIC BLOOD PRESSURE: 132 MMHG | DIASTOLIC BLOOD PRESSURE: 80 MMHG | RESPIRATION RATE: 16 BRPM

## 2021-06-10 PROCEDURE — 3331090002 HH PPS REVENUE DEBIT

## 2021-06-10 PROCEDURE — 3331090001 HH PPS REVENUE CREDIT

## 2021-06-11 ENCOUNTER — HOME CARE VISIT (OUTPATIENT)
Dept: SCHEDULING | Facility: HOME HEALTH | Age: 71
End: 2021-06-11
Payer: MEDICARE

## 2021-06-11 PROCEDURE — 3331090002 HH PPS REVENUE DEBIT

## 2021-06-11 PROCEDURE — G0299 HHS/HOSPICE OF RN EA 15 MIN: HCPCS

## 2021-06-11 PROCEDURE — 3331090001 HH PPS REVENUE CREDIT

## 2021-06-12 VITALS
HEART RATE: 103 BPM | TEMPERATURE: 97.8 F | RESPIRATION RATE: 16 BRPM | DIASTOLIC BLOOD PRESSURE: 75 MMHG | SYSTOLIC BLOOD PRESSURE: 140 MMHG | OXYGEN SATURATION: 96 %

## 2021-06-12 PROCEDURE — 3331090002 HH PPS REVENUE DEBIT

## 2021-06-12 PROCEDURE — 3331090001 HH PPS REVENUE CREDIT

## 2021-06-13 PROCEDURE — 3331090001 HH PPS REVENUE CREDIT

## 2021-06-13 PROCEDURE — 3331090002 HH PPS REVENUE DEBIT

## 2021-06-14 ENCOUNTER — HOME CARE VISIT (OUTPATIENT)
Dept: SCHEDULING | Facility: HOME HEALTH | Age: 71
End: 2021-06-14
Payer: MEDICARE

## 2021-06-14 VITALS
SYSTOLIC BLOOD PRESSURE: 130 MMHG | OXYGEN SATURATION: 96 % | DIASTOLIC BLOOD PRESSURE: 80 MMHG | RESPIRATION RATE: 14 BRPM | TEMPERATURE: 97.7 F | HEART RATE: 74 BPM

## 2021-06-14 PROCEDURE — 3331090001 HH PPS REVENUE CREDIT

## 2021-06-14 PROCEDURE — G0299 HHS/HOSPICE OF RN EA 15 MIN: HCPCS

## 2021-06-14 PROCEDURE — 3331090002 HH PPS REVENUE DEBIT

## 2021-06-15 PROCEDURE — 3331090001 HH PPS REVENUE CREDIT

## 2021-06-15 PROCEDURE — 3331090002 HH PPS REVENUE DEBIT

## 2021-06-16 ENCOUNTER — HOME CARE VISIT (OUTPATIENT)
Dept: SCHEDULING | Facility: HOME HEALTH | Age: 71
End: 2021-06-16
Payer: MEDICARE

## 2021-06-16 PROCEDURE — G0299 HHS/HOSPICE OF RN EA 15 MIN: HCPCS

## 2021-06-16 PROCEDURE — 3331090001 HH PPS REVENUE CREDIT

## 2021-06-16 PROCEDURE — 3331090002 HH PPS REVENUE DEBIT

## 2021-06-17 PROCEDURE — 3331090002 HH PPS REVENUE DEBIT

## 2021-06-17 PROCEDURE — 3331090001 HH PPS REVENUE CREDIT

## 2021-06-18 ENCOUNTER — HOME CARE VISIT (OUTPATIENT)
Dept: SCHEDULING | Facility: HOME HEALTH | Age: 71
End: 2021-06-18
Payer: MEDICARE

## 2021-06-18 PROCEDURE — G0299 HHS/HOSPICE OF RN EA 15 MIN: HCPCS

## 2021-06-18 PROCEDURE — A6252 ABSORPT DRG >16 <=48 W/O BDR: HCPCS

## 2021-06-18 PROCEDURE — 3331090002 HH PPS REVENUE DEBIT

## 2021-06-18 PROCEDURE — 3331090001 HH PPS REVENUE CREDIT

## 2021-06-18 PROCEDURE — A6216 NON-STERILE GAUZE<=16 SQ IN: HCPCS

## 2021-06-18 PROCEDURE — A4452 WATERPROOF TAPE: HCPCS

## 2021-06-18 PROCEDURE — A4649 SURGICAL SUPPLIES: HCPCS

## 2021-06-18 PROCEDURE — MED10160 APPLICATOR,CTN,WOOD,6,STL

## 2021-06-18 PROCEDURE — A6446 CONFORM BAND S W>=3" <5"/YD: HCPCS

## 2021-06-19 VITALS
TEMPERATURE: 98.7 F | RESPIRATION RATE: 14 BRPM | OXYGEN SATURATION: 97 % | HEART RATE: 64 BPM | TEMPERATURE: 97.5 F | DIASTOLIC BLOOD PRESSURE: 80 MMHG | SYSTOLIC BLOOD PRESSURE: 140 MMHG | RESPIRATION RATE: 14 BRPM | DIASTOLIC BLOOD PRESSURE: 70 MMHG | OXYGEN SATURATION: 97 % | SYSTOLIC BLOOD PRESSURE: 150 MMHG | HEART RATE: 75 BPM

## 2021-06-19 PROCEDURE — 3331090002 HH PPS REVENUE DEBIT

## 2021-06-19 PROCEDURE — 3331090001 HH PPS REVENUE CREDIT

## 2021-06-20 PROCEDURE — 3331090001 HH PPS REVENUE CREDIT

## 2021-06-20 PROCEDURE — 3331090002 HH PPS REVENUE DEBIT

## 2021-06-21 ENCOUNTER — HOME CARE VISIT (OUTPATIENT)
Dept: SCHEDULING | Facility: HOME HEALTH | Age: 71
End: 2021-06-21
Payer: MEDICARE

## 2021-06-21 PROCEDURE — 400014 HH F/U

## 2021-06-21 PROCEDURE — 3331090001 HH PPS REVENUE CREDIT

## 2021-06-21 PROCEDURE — 3331090002 HH PPS REVENUE DEBIT

## 2021-06-21 PROCEDURE — 400018 HH-NO PAY CLAIM PROCEDURE

## 2021-06-21 PROCEDURE — G0299 HHS/HOSPICE OF RN EA 15 MIN: HCPCS

## 2021-06-22 PROCEDURE — 3331090002 HH PPS REVENUE DEBIT

## 2021-06-22 PROCEDURE — 3331090001 HH PPS REVENUE CREDIT

## 2021-06-23 PROCEDURE — 3331090002 HH PPS REVENUE DEBIT

## 2021-06-23 PROCEDURE — 3331090001 HH PPS REVENUE CREDIT

## 2021-06-24 PROCEDURE — 3331090001 HH PPS REVENUE CREDIT

## 2021-06-24 PROCEDURE — 3331090002 HH PPS REVENUE DEBIT

## 2021-06-25 ENCOUNTER — HOME CARE VISIT (OUTPATIENT)
Dept: SCHEDULING | Facility: HOME HEALTH | Age: 71
End: 2021-06-25
Payer: MEDICARE

## 2021-06-25 VITALS
RESPIRATION RATE: 14 BRPM | HEART RATE: 98 BPM | DIASTOLIC BLOOD PRESSURE: 68 MMHG | TEMPERATURE: 97.5 F | SYSTOLIC BLOOD PRESSURE: 140 MMHG | OXYGEN SATURATION: 94 %

## 2021-06-25 PROCEDURE — 3331090001 HH PPS REVENUE CREDIT

## 2021-06-25 PROCEDURE — 3331090002 HH PPS REVENUE DEBIT

## 2021-06-26 PROCEDURE — 3331090001 HH PPS REVENUE CREDIT

## 2021-06-26 PROCEDURE — 3331090002 HH PPS REVENUE DEBIT

## 2021-06-27 PROCEDURE — 3331090002 HH PPS REVENUE DEBIT

## 2021-06-27 PROCEDURE — 3331090001 HH PPS REVENUE CREDIT

## 2021-06-28 ENCOUNTER — HOME CARE VISIT (OUTPATIENT)
Dept: SCHEDULING | Facility: HOME HEALTH | Age: 71
End: 2021-06-28
Payer: MEDICARE

## 2021-06-28 VITALS
DIASTOLIC BLOOD PRESSURE: 78 MMHG | OXYGEN SATURATION: 95 % | SYSTOLIC BLOOD PRESSURE: 140 MMHG | RESPIRATION RATE: 14 BRPM | HEART RATE: 96 BPM | TEMPERATURE: 97.8 F

## 2021-06-28 PROCEDURE — G0299 HHS/HOSPICE OF RN EA 15 MIN: HCPCS

## 2021-06-28 PROCEDURE — 3331090002 HH PPS REVENUE DEBIT

## 2021-06-28 PROCEDURE — 3331090001 HH PPS REVENUE CREDIT

## 2021-06-29 PROCEDURE — 3331090002 HH PPS REVENUE DEBIT

## 2021-06-29 PROCEDURE — 3331090001 HH PPS REVENUE CREDIT

## 2021-06-30 PROCEDURE — 3331090001 HH PPS REVENUE CREDIT

## 2021-06-30 PROCEDURE — 3331090002 HH PPS REVENUE DEBIT

## 2021-07-01 PROCEDURE — 3331090002 HH PPS REVENUE DEBIT

## 2021-07-01 PROCEDURE — 3331090001 HH PPS REVENUE CREDIT

## 2021-07-02 ENCOUNTER — HOME CARE VISIT (OUTPATIENT)
Dept: SCHEDULING | Facility: HOME HEALTH | Age: 71
End: 2021-07-02
Payer: MEDICARE

## 2021-07-02 VITALS
HEART RATE: 98 BPM | DIASTOLIC BLOOD PRESSURE: 70 MMHG | OXYGEN SATURATION: 94 % | TEMPERATURE: 97.3 F | SYSTOLIC BLOOD PRESSURE: 140 MMHG | RESPIRATION RATE: 14 BRPM

## 2021-07-02 PROCEDURE — G0299 HHS/HOSPICE OF RN EA 15 MIN: HCPCS

## 2021-07-02 PROCEDURE — 3331090002 HH PPS REVENUE DEBIT

## 2021-07-02 PROCEDURE — 3331090001 HH PPS REVENUE CREDIT

## 2021-07-03 PROCEDURE — 3331090002 HH PPS REVENUE DEBIT

## 2021-07-03 PROCEDURE — 3331090001 HH PPS REVENUE CREDIT

## 2021-07-04 PROCEDURE — 3331090002 HH PPS REVENUE DEBIT

## 2021-07-04 PROCEDURE — 3331090001 HH PPS REVENUE CREDIT

## 2021-07-05 ENCOUNTER — HOME CARE VISIT (OUTPATIENT)
Dept: SCHEDULING | Facility: HOME HEALTH | Age: 71
End: 2021-07-05
Payer: MEDICARE

## 2021-07-05 VITALS
SYSTOLIC BLOOD PRESSURE: 152 MMHG | RESPIRATION RATE: 20 BRPM | DIASTOLIC BLOOD PRESSURE: 82 MMHG | TEMPERATURE: 96.5 F | OXYGEN SATURATION: 96 % | HEART RATE: 52 BPM

## 2021-07-05 PROCEDURE — 3331090002 HH PPS REVENUE DEBIT

## 2021-07-05 PROCEDURE — 3331090001 HH PPS REVENUE CREDIT

## 2021-07-05 PROCEDURE — G0300 HHS/HOSPICE OF LPN EA 15 MIN: HCPCS

## 2021-07-06 PROCEDURE — 3331090002 HH PPS REVENUE DEBIT

## 2021-07-06 PROCEDURE — 3331090001 HH PPS REVENUE CREDIT

## 2021-07-07 PROCEDURE — 3331090001 HH PPS REVENUE CREDIT

## 2021-07-07 PROCEDURE — 3331090002 HH PPS REVENUE DEBIT

## 2021-07-08 PROCEDURE — 3331090002 HH PPS REVENUE DEBIT

## 2021-07-08 PROCEDURE — 3331090001 HH PPS REVENUE CREDIT

## 2021-07-09 ENCOUNTER — HOME CARE VISIT (OUTPATIENT)
Dept: SCHEDULING | Facility: HOME HEALTH | Age: 71
End: 2021-07-09
Payer: MEDICARE

## 2021-07-09 VITALS
DIASTOLIC BLOOD PRESSURE: 70 MMHG | RESPIRATION RATE: 18 BRPM | OXYGEN SATURATION: 97 % | SYSTOLIC BLOOD PRESSURE: 108 MMHG | HEART RATE: 59 BPM | TEMPERATURE: 97 F

## 2021-07-09 PROCEDURE — 3331090001 HH PPS REVENUE CREDIT

## 2021-07-09 PROCEDURE — G0300 HHS/HOSPICE OF LPN EA 15 MIN: HCPCS

## 2021-07-09 PROCEDURE — 3331090002 HH PPS REVENUE DEBIT

## 2021-07-10 PROCEDURE — 3331090002 HH PPS REVENUE DEBIT

## 2021-07-10 PROCEDURE — 3331090001 HH PPS REVENUE CREDIT

## 2021-07-11 PROCEDURE — 3331090001 HH PPS REVENUE CREDIT

## 2021-07-11 PROCEDURE — 3331090002 HH PPS REVENUE DEBIT

## 2021-07-12 ENCOUNTER — HOME CARE VISIT (OUTPATIENT)
Dept: SCHEDULING | Facility: HOME HEALTH | Age: 71
End: 2021-07-12
Payer: MEDICARE

## 2021-07-12 VITALS
HEART RATE: 83 BPM | OXYGEN SATURATION: 96 % | TEMPERATURE: 97.2 F | DIASTOLIC BLOOD PRESSURE: 68 MMHG | SYSTOLIC BLOOD PRESSURE: 128 MMHG | RESPIRATION RATE: 18 BRPM

## 2021-07-12 PROCEDURE — G0300 HHS/HOSPICE OF LPN EA 15 MIN: HCPCS

## 2021-07-12 PROCEDURE — 3331090001 HH PPS REVENUE CREDIT

## 2021-07-12 PROCEDURE — 3331090002 HH PPS REVENUE DEBIT

## 2021-07-13 PROCEDURE — 3331090002 HH PPS REVENUE DEBIT

## 2021-07-13 PROCEDURE — 3331090001 HH PPS REVENUE CREDIT

## 2021-07-14 PROCEDURE — 3331090002 HH PPS REVENUE DEBIT

## 2021-07-14 PROCEDURE — 3331090001 HH PPS REVENUE CREDIT

## 2021-07-15 PROCEDURE — 3331090001 HH PPS REVENUE CREDIT

## 2021-07-15 PROCEDURE — 3331090002 HH PPS REVENUE DEBIT

## 2021-07-16 ENCOUNTER — HOME CARE VISIT (OUTPATIENT)
Dept: SCHEDULING | Facility: HOME HEALTH | Age: 71
End: 2021-07-16
Payer: MEDICARE

## 2021-07-16 VITALS
SYSTOLIC BLOOD PRESSURE: 126 MMHG | RESPIRATION RATE: 18 BRPM | TEMPERATURE: 97.2 F | HEART RATE: 87 BPM | OXYGEN SATURATION: 98 % | DIASTOLIC BLOOD PRESSURE: 70 MMHG

## 2021-07-16 PROCEDURE — 3331090002 HH PPS REVENUE DEBIT

## 2021-07-16 PROCEDURE — G0300 HHS/HOSPICE OF LPN EA 15 MIN: HCPCS

## 2021-07-16 PROCEDURE — 3331090001 HH PPS REVENUE CREDIT

## 2021-07-17 PROCEDURE — 3331090002 HH PPS REVENUE DEBIT

## 2021-07-17 PROCEDURE — 3331090001 HH PPS REVENUE CREDIT

## 2021-07-18 PROCEDURE — 3331090001 HH PPS REVENUE CREDIT

## 2021-07-18 PROCEDURE — 3331090002 HH PPS REVENUE DEBIT

## 2021-07-19 ENCOUNTER — HOME CARE VISIT (OUTPATIENT)
Dept: SCHEDULING | Facility: HOME HEALTH | Age: 71
End: 2021-07-19
Payer: MEDICARE

## 2021-07-19 PROCEDURE — 3331090002 HH PPS REVENUE DEBIT

## 2021-07-19 PROCEDURE — 3331090001 HH PPS REVENUE CREDIT

## 2021-07-19 PROCEDURE — G0299 HHS/HOSPICE OF RN EA 15 MIN: HCPCS

## 2021-07-20 VITALS
TEMPERATURE: 97.8 F | HEART RATE: 80 BPM | RESPIRATION RATE: 18 BRPM | OXYGEN SATURATION: 95 % | DIASTOLIC BLOOD PRESSURE: 78 MMHG | SYSTOLIC BLOOD PRESSURE: 148 MMHG

## 2021-07-20 PROCEDURE — A6022 COLLAGEN DRSG>16<=48 SQ IN: HCPCS

## 2021-07-20 PROCEDURE — A4452 WATERPROOF TAPE: HCPCS

## 2021-07-20 PROCEDURE — 3331090002 HH PPS REVENUE DEBIT

## 2021-07-20 PROCEDURE — A6446 CONFORM BAND S W>=3" <5"/YD: HCPCS

## 2021-07-20 PROCEDURE — MED10158 APPLICATOR, COTTON-TIP, WOOD, 6, STRL

## 2021-07-20 PROCEDURE — 3331090001 HH PPS REVENUE CREDIT

## 2021-07-20 PROCEDURE — A6252 ABSORPT DRG >16 <=48 W/O BDR: HCPCS

## 2021-07-21 PROCEDURE — 400018 HH-NO PAY CLAIM PROCEDURE

## 2021-07-21 PROCEDURE — 3331090002 HH PPS REVENUE DEBIT

## 2021-07-21 PROCEDURE — 3331090001 HH PPS REVENUE CREDIT

## 2021-07-22 PROCEDURE — 3331090002 HH PPS REVENUE DEBIT

## 2021-07-22 PROCEDURE — 3331090001 HH PPS REVENUE CREDIT

## 2021-07-23 ENCOUNTER — HOME CARE VISIT (OUTPATIENT)
Dept: SCHEDULING | Facility: HOME HEALTH | Age: 71
End: 2021-07-23
Payer: MEDICARE

## 2021-07-23 PROCEDURE — 3331090002 HH PPS REVENUE DEBIT

## 2021-07-23 PROCEDURE — 400014 HH F/U

## 2021-07-23 PROCEDURE — G0299 HHS/HOSPICE OF RN EA 15 MIN: HCPCS

## 2021-07-23 PROCEDURE — 3331090001 HH PPS REVENUE CREDIT

## 2021-07-24 VITALS
OXYGEN SATURATION: 95 % | DIASTOLIC BLOOD PRESSURE: 78 MMHG | TEMPERATURE: 96.9 F | HEART RATE: 101 BPM | SYSTOLIC BLOOD PRESSURE: 140 MMHG | RESPIRATION RATE: 20 BRPM

## 2021-07-24 PROCEDURE — 3331090001 HH PPS REVENUE CREDIT

## 2021-07-24 PROCEDURE — 3331090002 HH PPS REVENUE DEBIT

## 2021-07-25 PROCEDURE — 3331090002 HH PPS REVENUE DEBIT

## 2021-07-25 PROCEDURE — 3331090001 HH PPS REVENUE CREDIT

## 2021-07-26 ENCOUNTER — HOME CARE VISIT (OUTPATIENT)
Dept: SCHEDULING | Facility: HOME HEALTH | Age: 71
End: 2021-07-26
Payer: MEDICARE

## 2021-07-26 VITALS
TEMPERATURE: 97.2 F | DIASTOLIC BLOOD PRESSURE: 70 MMHG | HEART RATE: 84 BPM | SYSTOLIC BLOOD PRESSURE: 116 MMHG | RESPIRATION RATE: 18 BRPM | OXYGEN SATURATION: 97 %

## 2021-07-26 PROCEDURE — G0300 HHS/HOSPICE OF LPN EA 15 MIN: HCPCS

## 2021-07-26 PROCEDURE — 3331090002 HH PPS REVENUE DEBIT

## 2021-07-26 PROCEDURE — 3331090001 HH PPS REVENUE CREDIT

## 2021-07-26 NOTE — HOME HEALTH
Skilled reason for visit: Skin assessment, wound treatment, education and [employer] education    Caregiver involvement: Patient's cg is family. They lives with patient and is available 24/7 for assistance with iadls, adls, meal prep, medication management, taking to md appointments. Medications reviewed and all medications are available in the home this visit   Medications  are effective at this time. Home health supplies by type and quantity ordered/delivered this visit include: pt has supplies  Patient education provided this visit: Monitor vital signs. Report any values outside the following parameters for adults:  SBP > 160 or < 90. DBP >90 or <50. Pulse >100 or < 50. Respiratory rate > 20 or < 12. Temperature >100.4 or < 97  O2 saturation < 92%. ENCOURAGED PATIENT TO HAVE PROTEIN WITH EACH MEAL TO PROMOTE WOUND HEALING.     Patient's Progress towards personal goals: Pt monitors blood pressure daily and has increased his protein intake to help his wound healing     Home exercise program: pt ambulates inside the home with no assistive devices but does use his cane when outside the home     Continued need for the following skills: nursing       Patient and/or caregiver notified and agrees to changes in the Plan of Care NA    The following discharge planning was discussed with the pt/caregiver: follow up with MD as ordered  medication compliance  importance of maintaining adequate hydration and nutrition  monitoring for skin breakdown, off setting pressure

## 2021-07-27 PROCEDURE — 3331090002 HH PPS REVENUE DEBIT

## 2021-07-27 PROCEDURE — 3331090001 HH PPS REVENUE CREDIT

## 2021-07-28 PROCEDURE — 3331090002 HH PPS REVENUE DEBIT

## 2021-07-28 PROCEDURE — 3331090001 HH PPS REVENUE CREDIT

## 2021-07-29 PROCEDURE — 3331090002 HH PPS REVENUE DEBIT

## 2021-07-29 PROCEDURE — 3331090001 HH PPS REVENUE CREDIT

## 2021-07-30 ENCOUNTER — HOME CARE VISIT (OUTPATIENT)
Dept: SCHEDULING | Facility: HOME HEALTH | Age: 71
End: 2021-07-30
Payer: MEDICARE

## 2021-07-30 VITALS
RESPIRATION RATE: 14 BRPM | SYSTOLIC BLOOD PRESSURE: 128 MMHG | OXYGEN SATURATION: 94 % | DIASTOLIC BLOOD PRESSURE: 60 MMHG | HEART RATE: 71 BPM | TEMPERATURE: 97.2 F

## 2021-07-30 PROCEDURE — 3331090001 HH PPS REVENUE CREDIT

## 2021-07-30 PROCEDURE — G0299 HHS/HOSPICE OF RN EA 15 MIN: HCPCS

## 2021-07-30 PROCEDURE — 3331090002 HH PPS REVENUE DEBIT

## 2021-07-30 NOTE — HOME HEALTH
Skilled reason for visit: wound care and medication teaching. Caregiver involvement:has transportation to [facility]. Medications reviewed and all medications are available in the home this visit. The following education was provided regarding medications, medication interactions, and look alike medications (specify): reviewed all medication side effects and no reported side efefcts. Medications  are effective at this time. no changes    Home health supplies by type and quantity ordered/delivered this visit include: wound care supplies in the home. Patient education provided this visit: Reviewed fall precautions and safety:dangle, uses assistive device at all times, non skid foot wear,and night light. Verbalized understanding. REVIEWED TO KEEP SKIN CLEAN, DRY, SKIN PROTECTANT, TURN REPOSITION EVERY 2 HOURS AND IMPORTANCE OF PROTEIN IN DIET TO PREVENT SKIN BREAKDOWN. USE SKIN BARRIER AS PRESCRIBED AND INCREASE PROTEIN IN DIET. advised to call medical provider for non-life-threathing concerns before going to ER/urgent care. High protein diet to improve wound healing ex.: meat, fish, eggs, yogurt, or beans. Verbalized understanding and will attempt 5-6 small meals to improve appetite. Reviewed to reported any redness, swelling, warmth, fever, chills, increased heart/respiratory rate, uncontrolled pain/tenderness, drainage:green/yellow/brown, or odor to MD immediately. reviewed HTN: to notify home health/provider for: Feeling faint, dizzy, confused, or drowsy; continually high blood pressure readings despite taking medications; questions or concerns about condition or care. Notify emergency services for signs of a heart attack: Squeezing, pressure, or pain in chest; discomfort or pain in back, neck, jaw, stomach, or arm; shortness of breath; nausea or vomiting; lightheadedness or a sudden cold sweat.  Notify emergency services for signs of a stroke: Numbness or drooping on one side of face, weakness in an arm or leg, confusion or difficulty speaking, dizziniess, severe headache, or vision loss. Reviewed daily weight with similar clothing after urination in the AM. Record results daily reporting any weight gain of 3-5lbs in a day to MD.          Yassine Hatch Performed this visit: RLE wound care performed. Agency Progress toward goals: progressing toward wound healing and goals met with HTN, DM, and pressure ulcer. Patient's Progress towards personal goals:  progressing toward wound healing and goals met with HTN, DM, and pressure ulcer. Home exercise program: CONT ALL MEDICATIONS AS PRESCRIBED, DIET AS PRESCRIBED, IMPLEMENT FALL/INFECTION CONTROL/PRESSURE ULCER INTERVENTIONS,MONITOR FOR ABNORMAL S/SX infection (listed above) Hanna Campbell TO MD IMMEDIATELY, AND PERFORM DAILY WEIGHTS. KEEP ALL FOLLOW UP APPTS AS PRESCRIBED. READ ALL TEACHING PACKETS FOR EDUCATION OF DISEASE PROCESS & TO PREVENT COMPLICATIONS. Continued need for the following skills: Nursing    Plan for next visit: wound care    Patient and/or caregiver notified and agrees to changes in the Plan of Care N/A      The following discharge planning was discussed with the pt/caregiver: Will discharge when all wound care/DM disease process, complication and dietary goals are met and understands all medication purpose/frequencies/side effects. 5 day notification of DC was discussed for discharge.

## 2021-07-31 PROCEDURE — 3331090002 HH PPS REVENUE DEBIT

## 2021-07-31 PROCEDURE — 3331090001 HH PPS REVENUE CREDIT

## 2021-08-01 PROCEDURE — 3331090001 HH PPS REVENUE CREDIT

## 2021-08-01 PROCEDURE — A6022 COLLAGEN DRSG>16<=48 SQ IN: HCPCS

## 2021-08-01 PROCEDURE — A6213 FOAM DRG >16<=48 SQ IN W/BDR: HCPCS

## 2021-08-01 PROCEDURE — 3331090002 HH PPS REVENUE DEBIT

## 2021-08-02 ENCOUNTER — HOME CARE VISIT (OUTPATIENT)
Dept: HOME HEALTH SERVICES | Facility: HOME HEALTH | Age: 71
End: 2021-08-02
Payer: MEDICARE

## 2021-08-02 PROCEDURE — 3331090002 HH PPS REVENUE DEBIT

## 2021-08-02 PROCEDURE — 3331090001 HH PPS REVENUE CREDIT

## 2021-08-03 PROCEDURE — 3331090001 HH PPS REVENUE CREDIT

## 2021-08-03 PROCEDURE — 3331090002 HH PPS REVENUE DEBIT

## 2021-08-04 PROCEDURE — 3331090002 HH PPS REVENUE DEBIT

## 2021-08-04 PROCEDURE — 3331090001 HH PPS REVENUE CREDIT

## 2021-08-05 PROCEDURE — 3331090001 HH PPS REVENUE CREDIT

## 2021-08-05 PROCEDURE — 3331090002 HH PPS REVENUE DEBIT

## 2021-08-06 ENCOUNTER — HOME CARE VISIT (OUTPATIENT)
Dept: SCHEDULING | Facility: HOME HEALTH | Age: 71
End: 2021-08-06
Payer: MEDICARE

## 2021-08-06 VITALS
SYSTOLIC BLOOD PRESSURE: 132 MMHG | RESPIRATION RATE: 15 BRPM | OXYGEN SATURATION: 95 % | DIASTOLIC BLOOD PRESSURE: 72 MMHG | HEART RATE: 64 BPM | TEMPERATURE: 96.3 F

## 2021-08-06 PROCEDURE — G0299 HHS/HOSPICE OF RN EA 15 MIN: HCPCS

## 2021-08-06 PROCEDURE — 3331090002 HH PPS REVENUE DEBIT

## 2021-08-06 PROCEDURE — 3331090001 HH PPS REVENUE CREDIT

## 2021-08-07 NOTE — HOME HEALTH
Skilled reason for visit: RLE WOUND ASSESSMENT/CARE AND HOME CARE DISCHARGE    Caregiver involvement: HAS TRANSPORTATION TO [facility]. Medications reviewed and all medications are available in the home this visit. The following education was provided regarding medications, medication interactions, and look alike medications (specify): reviewed all medication side effects and no reported side efefcts. Medications  are effective at this time. NO CHANGES    Home health supplies by type and quantity ordered/delivered this visit include: [facility]. Skilled Care Performed this visit: RLE WOUND CARE ASSESSMENT/PERFORMED. Agency Progress toward goals: ALL GOALS MET    Pt.clinically discharged and documentation finalized for completion of agency discharge.

## 2022-03-18 PROBLEM — L97.212 NON-PRESSURE CHRONIC ULCER OF RIGHT CALF WITH FAT LAYER EXPOSED (HCC): Status: ACTIVE | Noted: 2018-02-01

## 2022-03-18 PROBLEM — S81.831A: Status: ACTIVE | Noted: 2019-08-15

## 2022-03-19 PROBLEM — L03.115 CELLULITIS OF LEG WITHOUT FOOT, RIGHT: Status: ACTIVE | Noted: 2019-08-15

## 2022-03-19 PROBLEM — L03.115 CELLULITIS OF RIGHT LOWER EXTREMITY: Status: ACTIVE | Noted: 2019-08-16

## 2022-03-19 PROBLEM — L97.213 NON-PRESSURE CHRONIC ULCER OF RIGHT CALF WITH NECROSIS OF MUSCLE (HCC): Status: ACTIVE | Noted: 2019-08-15

## 2022-03-20 PROBLEM — L97.312: Status: ACTIVE | Noted: 2018-02-01

## 2022-03-20 PROBLEM — E11.40 TYPE 2 DIABETES MELLITUS WITH DIABETIC NEUROPATHY (HCC): Status: ACTIVE | Noted: 2018-08-15

## 2022-03-20 PROBLEM — I50.32 CHRONIC DIASTOLIC HF (HEART FAILURE) (HCC): Status: ACTIVE | Noted: 2019-08-16

## 2022-03-29 ENCOUNTER — HOSPITAL ENCOUNTER (OUTPATIENT)
Dept: LAB | Age: 72
Discharge: HOME | End: 2022-03-29
Payer: MEDICARE

## 2022-03-29 PROCEDURE — 88342 IMHCHEM/IMCYTCHM 1ST ANTB: CPT

## 2022-03-29 PROCEDURE — 88305 TISSUE EXAM BY PATHOLOGIST: CPT

## 2023-05-06 RX ORDER — TRAMADOL HYDROCHLORIDE 50 MG/1
50 TABLET ORAL EVERY 6 HOURS PRN
COMMUNITY
End: 2023-05-31

## 2023-05-06 RX ORDER — LEVOTHYROXINE SODIUM 0.05 MG/1
1 TABLET ORAL
COMMUNITY
Start: 2019-09-30

## 2023-06-14 NOTE — PROGRESS NOTES
1200 -- Shift assessment completed, will continue to monitor. 0 -- In patient's room, patient has not taken his gout meds since 8/16/19. Pain level 9, Norco given, well tolerated. 5316 -- Paged Dr. Caro Dominguez for gout meds to be place on [facility], awaiting a response. 0 -- Spoke with Dr. Caro Dominguez, Reassessment completed, no change in patient's condition, will continue to monitor. Bedside shift change report given to Olinda Petersen RN (oncoming nurse) by Haja Rasmussen RN (offgoing nurse). Report included the following information SBAR, Kardex, Intake/Output, MAR and Recent Results. Path Notes (To The Dermatopathologist): Please check margins.

## 2024-09-18 NOTE — ANESTHESIA PREPROCEDURE EVALUATION
Relevant Problems   No relevant active problems       Anesthetic History   No history of anesthetic complications            Review of Systems / Medical History  Patient summary reviewed and pertinent labs reviewed    Pulmonary  Within defined limits                 Neuro/Psych   Within defined limits           Cardiovascular    Hypertension              Exercise tolerance: >4 METS     GI/Hepatic/Renal  Within defined limits              Endo/Other    Diabetes: type 2  Hypothyroidism  Arthritis     Other Findings              Physical Exam    Airway  Mallampati: II  TM Distance: 4 - 6 cm  Neck ROM: normal range of motion   Mouth opening: Normal     Cardiovascular  Regular rate and rhythm,  S1 and S2 normal,  no murmur, click, rub, or gallop  Rhythm: regular  Rate: normal         Dental    Dentition: Lower dentition intact and Upper dentition intact     Pulmonary  Breath sounds clear to auscultation               Abdominal  GI exam deferred       Other Findings            Anesthetic Plan    ASA: 3  Anesthesia type: general          Induction: Intravenous  Anesthetic plan and risks discussed with: Patient none

## 2025-02-09 NOTE — PROGRESS NOTES
Podiatry Surgery Progress Note      Patient: Monica Esposito MRN: [id number]  SSN: [id number]    YOB: 1950  Age: 81 y.o. Sex: male      Assessment:     Patient Active Problem List   Diagnosis Code    Diabetic foot ulcer ([facility] E11.621, J68.457    Diastolic dysfunction F54.7    Dyslipidemia E78.5    Cellulitis and abscess of foot, except toes L03.119, L02.619    Lymphedema of both lower extremities I89.0    CKD (chronic kidney disease) stage 4, GFR 15-29 ml/min ([facility]) N18.4    Morbid obesity with BMI of 70 and over, adult ([facility] E66.01, Z68.45    Type 2 diabetes mellitus with diabetic nephropathy ([facility] E11.21    Erectile dysfunction N52.9    Hypothyroidism, adult E03.9    Hypogonadism in male E29.1    Borderline anemia D64.9    Idiopathic gout of multiple sites M10.09    Skin ulcer of ankle with fat layer exposed, right ([facility]) L97.312    Non-pressure chronic ulcer of right calf with fat layer exposed ([facility] B01.248    Type 2 diabetes mellitus with diabetic neuropathy ([facility]) E11.40          Plan:   Lubricate feet and legs daily, not between toes. Use Lymphedema pumps as previously instructed and continue compression with elevation. Keep diabetic foot care appointments in the office as scheduled. Total time spent with patient: 30 [address] discussed with: Patient and Nursing Staff    Discussed:  D/C Planning    Disposition:  Stable      Mr. Qasim Dey is a 81 y.o. male who was admitted for chronic ulcer care right ankle. Theraskin application on 8/97, 4/45, 6/07, 6/21, 7/05, 7/19, 10/04/18 RLE.                 Subjective:   Past Medical History  Past Medical History:   Diagnosis Date    Anemia of chronic disease     Arthritis     Chronic renal insufficiency     Diabetes ([facility]     Dyslipidemia     Edema     Gout     Gout     Hypertension     Lymphedema     Morbid obesity ([facility]      Social History     Socioeconomic History    Marital status:      Spouse name: Not on file    Number of children: Not on file    Years of education: Not on file    Highest education level: Not on file   Social Needs    Financial resource strain: Not on file    Food insecurity - worry: Not on file    Food insecurity - inability: Not on file    Transportation needs - medical: Not on file   [employer] needs - non-medical: Not on file   Occupational History    Not on file   Tobacco Use    Smoking status: Never Smoker    Smokeless tobacco: Never Used   Substance and Sexual Activity    Alcohol use: No    Drug use: No    Sexual activity: Not on file   Other Topics Concern    Not on file   Social History Narrative    Not on file       Current Medications  Current Outpatient Medications   Medication Sig Dispense Refill    TRULICITY 1.5 EL/1.4 mL sub-q pen INJECT 0.5ML SUBCUTANEOUSLY EVERY 7 DAYS 6 Syringe 3    levothyroxine (SYNTHROID) 50 mcg tablet TAKE 1 TABLET BY MOUTH EVERY DAY BEFORE BREAKFAST 90 Tab 0    testosterone (ANDRODERM) 4 mg/24 hr pt24 1 Patch by TransDERmal route daily. Max Daily Amount: 1 Patch. 60 Patch 0    colchicine 0.6 mg tablet TAKE 1 TAB BY MOUTH DAILY AS NEEDED. 90 Tab 3    rosuvastatin (CRESTOR) 40 mg tablet TAKE 1 TABLET BY MOUTH NIGHTLY FOR 90 DAYS. 90 Tab 1    diclofenac-miSOPROStol (ARTHROTEC 50)  mg-mcg per tablet TAKE 1 TABLET BY MOUTH TWICE A  Tab 0    vit b comp & c-fa-copper-zinc (FOLBEE PLUS) 5-1.5-25 mg tab Take 1 Tab by mouth daily. 90 Tab 3    KLOR-CON M20 20 mEq tablet TAKE 1 TABLET BY MOUTH ONCE DAILY 90 Tab 3    ibuprofen (MOTRIN) 600 mg tablet Take 1 Tab by mouth every six (6) hours as needed for Pain. 20 Tab 0    valsartan (DIOVAN) 320 mg tablet Take 1 Tab by mouth daily. 90 Tab 3    furosemide (LASIX) 80 mg tablet TAKE 1 TABLET BY MOUTH ONCE DAILY 90 Tab 3    levothyroxine (SYNTHROID) 50 mcg tablet TAKE 1 TAB BY MOUTH DAILY (BEFORE BREAKFAST) FOR 90 DAYS.  90 Tab 0    tadalafil (CIALIS) 20 mg tablet Take 1 Tab by mouth every thirty-six (36) hours. Indications: Erectile Dysfunction 12 Tab 5    diclofenac (VOLTAREN) 1 % gel Apply 2 g to affected area four (4) times daily. 2 g 0    dulaglutide (TRULICITY) 1.5 GB/7.0 mL sub-q pen 0.5 ML BY SUBCUTANEOUS ROUTE EVERY SEVEN (7) DAYS. 7.5 Syringe 0    Comp Stocking,Knee,Long,X-Lrg misc 2 Each.  COLCHICINE PO 0.6 mg.      gabapentin (NEURONTIN) 300 mg capsule Take 300 mg by mouth nightly.  Urea 40 % topical foam Apply  to affected area two (2) times a day. 1 Can 0    allopurinol (ZYLOPRIM) 100 mg tablet Take 2 Tabs by mouth daily. 30 Tab 0    metoprolol (LOPRESSOR) 50 mg tablet Take 1 Tab by mouth every twelve (12) hours. 60 Tab 0    Fenofibrate 150 mg cap Take 145 mg by mouth daily. Patient Allergies  No Known Allergies       Objective:   General Exam  alert, cooperative, no distress, appears stated age    Vitals  There were no vitals taken for this visit.     REVIEW OF SYSTEMS:  General: denies chronic fatigue, weight loss, fever, anemia, bruising, depression, nervousness, panic attacks  HEENT: denies ringing in ears, ear infections, dizzy spells, poor vision, glaucoma, sinus trouble, hoarseness, eye infections  GI: denies diarrhea, gas, bloating, heartburn, regurgitation, difficulty swallowing, painful swallowing, nausea, vomiting, constipation, abdominal pain, decreased appetite, blood in stools, black stools, jaundice, dark urine  Lungs: denies pneumonia, asthma, cough, SOB, hemoptysis  Heart: denies chest pain, irregular heart beat, ankle swelling, HTN  Skin: denies rashes, hives, allergic reaction  Urinary: denies UTI, kidney stones, decreased urine force and flow, urination at night, blood in urine, painful urination  Bones and Joints: denies arthritis, rheumatism, back pain, gout, osteoporosis  Neurologic: denies stroke, seizures, headaches, numbness, tingling    Foot Exam  VASCULAR EXAM:. Pedal pulses  2/4 DP and PT are non palpable right and left foot. Skin temperature is warm to cool right and left foot.  Digital capillary fill time is 4 seconds right and left foot. Patient has extensive lymphedema of the right and left lower extremity and feet.       NEUROLOGICAL EXAM:. Sensation is diminished to the right and left foot. Protective sensation diminished with 5.07g monofilament wire Bilateral pt can not feel wire at distal tip of all toes. Babinski is age appropriated, bilateral. Patient with Diabetic peripheral Neuropathy.       MUSCULOSKELETAL EXAM:.  There is equinus of the right and left limb.       DERMATOLOGICAL EXAM:. Full thickness ulceration to the lateral aspect of the right ankle and anterior RLE. Espiridion Amel is evidence of  complete epithialization lateral right ankle and anterior RLE     MYCOTIC NAIL Dystrophic nail 1,2,3,4,5 bilateral. Elongated thickened nails 1,2,3,4,5 bilateral Hypertrophic nails 1,2,3,4,5      Ulcer  Ulcer Location: R lower leg anterior, R ankle lateral and Ulcer base: Epithialization  Azul Scale: Stage 2Resolved    Wound Leg Lower Right;Lateral (Active)   Dressing Status  Removed 12/6/2018  9:49 AM   Dressing Type  Open to air 1/3/2019 10:56 AM   Non-Pressure Injury Partial thickness (epider/derm) 1/3/2019 10:56 AM   Wound Length (cm) 0.3 cm 1/3/2019 10:56 AM   Wound Width (cm) 0.2 cm 1/3/2019 10:56 AM   Wound Depth (cm) 0.2 1/3/2019 10:56 AM   Wound Surface area (cm^2) 0.06 cm^2 1/3/2019 10:56 AM   Change in Wound Size % 98.7 1/3/2019 10:56 AM   Condition of Base [place] 1/3/2019 10:56 AM   Condition of Edges Open 1/3/2019 10:56 AM   Tissue Type Red 10/25/2018 12:08 PM   Tissue Type Percent Pink 100 1/3/2019 10:56 AM   Tissue Type Percent Yellow 95 11/1/2018 10:02 AM   Tissue Type Percent Other (comment) 100 10/25/2018 12:08 PM   Drainage Amount  None 1/3/2019 10:56 AM   Drainage Color Serous 12/6/2018  9:49 AM   Wound Odor None 1/3/2019 10:56 AM   Periwound Skin Condition Edematous 1/3/2019 10:56 AM   Cleansing and Cleansing Agents  Dermal wound cleanser 1/3/2019 10:56 AM   Dressing Type Applied Other (Comment) 1/3/2019 10:56 AM   Number of Skin Staples Removed 5 10/18/2018  9:31 AM   Procedure Tolerated Well 10/11/2018  9:42 AM   Number of days: 371       [REMOVED] Wound Foot Left;Dorsal (Removed)   Dressing Status  Removed 4/9/2015  3:26 PM   Dressing Type  Other (Comment) 4/9/2015  3:26 PM   Incision site well approximated? No 4/9/2015  3:26 PM   Condition of Base Pink;Granulation 4/9/2015  3:26 PM   Condition of Edges Open 4/9/2015  3:26 PM   Tissue Type Red;Pink 4/9/2015  3:26 PM   Tissue Type Percent Pink 50 4/9/2015  3:26 PM   Tissue Type Percent Red 50 4/9/2015  3:26 PM   Drainage Amount  Small  4/9/2015  3:26 PM   Drainage Color Serosanguinous 4/9/2015  3:26 PM   Wound Odor None 4/9/2015  3:26 PM   Periwound Skin Condition Edematous; Other (comment) 4/9/2015  3:26 PM   Cleansing and Cleansing Agents  Dermal wound cleanser 4/9/2015  3:26 PM   Dressing Type Applied Other (Comment) 4/9/2015  3:26 PM   Procedure Tolerated Well 4/9/2015  3:26 PM   Number of days: 0671       [REMOVED] Wound Leg Lower Right; Anterior (Removed)   Dressing Status  Removed 7/19/2018  9:36 AM   Dressing Type  Other (Comment) 7/19/2018  9:36 AM   Non-Pressure Injury Partial thickness (epider/derm) 7/12/2018 11:40 AM   Wound Length (cm) 0 cm 8/2/2018  9:45 AM   Wound Width (cm) 0 cm 8/2/2018  9:45 AM   Wound Depth (cm) 0 8/2/2018  9:45 AM   Wound Surface area (cm^2) 0 cm^2 8/2/2018  9:45 AM   Condition of Base [place] 7/5/2018 10:50 AM   Condition of Edges Closed 8/2/2018  9:45 AM   Tissue Type Other (comment) 7/12/2018 11:40 AM   Tissue Type Percent Pink 100 7/5/2018 10:50 AM   Tissue Type Percent Red 100 5/23/2018 11:26 AM   Tissue Type Percent Yellow 20 6/7/2018 10:09 AM   Drainage Amount  None 7/19/2018  9:36 AM   Drainage Color Serous 7/12/2018 11:40 AM   Wound Odor None 7/19/2018  9:36 AM   Periwound Skin Condition Intact 8/2/2018  9:45 AM Cleansing and Cleansing Agents  Dermal wound cleanser 7/19/2018  9:36 AM   Dressing Type Applied Other (Comment) 7/19/2018  9:36 AM   Number of Skin Staples Removed 6 6/28/2018  9:42 AM   Procedure Tolerated Well 7/19/2018  9:36 AM   Number of days: 189        Labs  No results found for this or any previous visit (from the past 24 hour(s)). X-Ray:  deferred    Procedures:   S/p Theraskin application on 6/80, 7/21, 6/07, 6/21, 7/05, 7/19, 10/04/18 JEAN CARLOS.                    Lorena Owen DPM  February 7, 2019 Constipation Constipation

## (undated) DEVICE — SOLUTION IV 1000ML 0.9% SOD CHL

## (undated) DEVICE — (D)SLEEVE COMPR SCD CLF STD -- DISC BY MFR NO SUB

## (undated) DEVICE — SOLUTION IRRIG 1000ML H2O STRL BLT